# Patient Record
Sex: FEMALE | Race: WHITE | Employment: OTHER | URBAN - METROPOLITAN AREA
[De-identification: names, ages, dates, MRNs, and addresses within clinical notes are randomized per-mention and may not be internally consistent; named-entity substitution may affect disease eponyms.]

---

## 2017-01-30 ENCOUNTER — TRANSFERRED RECORDS (OUTPATIENT)
Dept: HEALTH INFORMATION MANAGEMENT | Facility: CLINIC | Age: 82
End: 2017-01-30

## 2017-02-06 DIAGNOSIS — M81.0 OSTEOPOROSIS: ICD-10-CM

## 2017-02-06 DIAGNOSIS — D50.8 OTHER IRON DEFICIENCY ANEMIA: Primary | ICD-10-CM

## 2017-02-06 RX ORDER — FERROUS GLUCONATE 324(38)MG
1 TABLET ORAL DAILY
Qty: 30 TABLET | Refills: 10 | Status: SHIPPED | OUTPATIENT
Start: 2017-02-06 | End: 2018-02-02

## 2017-02-06 RX ORDER — ALENDRONATE SODIUM 70 MG/1
70 TABLET ORAL
Qty: 12 TABLET | Refills: 3 | Status: SHIPPED
Start: 2017-02-06 | End: 2018-02-02

## 2017-02-06 NOTE — TELEPHONE ENCOUNTER
Please have MA/RN/CCRN return call to patient to advise when this has been addressed     Rosita Gaspar Care Coordinator RN  [facility]  [phone]

## 2017-02-06 NOTE — TELEPHONE ENCOUNTER
Clinic Care Coordination Contact    CCRN received call from patient     She states she does not have any refills on her Iron pills     And     Wonders if she is to continue with Fosamax - as she does not have any refills left of this either     CCRN advised that she would send a note to pcp and someone will call her back with recommendations     Refills pended     Rosita Gaspar Care Coordinator RN  [facility]  [phone]

## 2017-02-17 ENCOUNTER — TELEPHONE (OUTPATIENT)
Dept: FAMILY MEDICINE | Facility: CLINIC | Age: 82
End: 2017-02-17

## 2017-02-17 NOTE — TELEPHONE ENCOUNTER
Kam with  HC calling [phone]    Requesting recertification orders of:    HHA 2 x a week until the 22nd of April.    Informed approved per standing orders. HC staff will fax these orders for MD signature.      Natividad Guerra RN, BSN, PHN

## 2017-04-18 ENCOUNTER — TELEPHONE (OUTPATIENT)
Dept: FAMILY MEDICINE | Facility: CLINIC | Age: 82
End: 2017-04-18

## 2017-04-18 NOTE — TELEPHONE ENCOUNTER
Informed approved per standing orders-- Dr. Allen Dupree.  Home Care staff will fax these orders for MD lewis.   Blayne Lopez RN

## 2017-04-18 NOTE — TELEPHONE ENCOUNTER
[facility] needs verbal order for home health aide 2 times a week for the next 60 days, they are doing 60 days recertification.    [facility] number is [phone].  Ludmila Menon

## 2017-05-08 ENCOUNTER — OFFICE VISIT (OUTPATIENT)
Dept: FAMILY MEDICINE | Facility: CLINIC | Age: 82
End: 2017-05-08
Payer: COMMERCIAL

## 2017-05-08 ENCOUNTER — RADIANT APPOINTMENT (OUTPATIENT)
Dept: GENERAL RADIOLOGY | Facility: CLINIC | Age: 82
End: 2017-05-08
Attending: PHYSICIAN ASSISTANT
Payer: COMMERCIAL

## 2017-05-08 VITALS
TEMPERATURE: 97.5 F | HEART RATE: 124 BPM | RESPIRATION RATE: 18 BRPM | HEIGHT: 55 IN | SYSTOLIC BLOOD PRESSURE: 132 MMHG | WEIGHT: 70 LBS | BODY MASS INDEX: 16.2 KG/M2 | DIASTOLIC BLOOD PRESSURE: 83 MMHG

## 2017-05-08 DIAGNOSIS — R10.13 ABDOMINAL PAIN, EPIGASTRIC: ICD-10-CM

## 2017-05-08 DIAGNOSIS — R13.10 DYSPHAGIA, UNSPECIFIED TYPE: ICD-10-CM

## 2017-05-08 DIAGNOSIS — R11.0 NAUSEA: ICD-10-CM

## 2017-05-08 DIAGNOSIS — G47.00 INSOMNIA, UNSPECIFIED TYPE: ICD-10-CM

## 2017-05-08 DIAGNOSIS — R10.13 ABDOMINAL PAIN, EPIGASTRIC: Primary | ICD-10-CM

## 2017-05-08 PROCEDURE — 99214 OFFICE O/P EST MOD 30 MIN: CPT | Performed by: PHYSICIAN ASSISTANT

## 2017-05-08 PROCEDURE — 71020 XR CHEST 2 VW: CPT

## 2017-05-08 PROCEDURE — 93000 ELECTROCARDIOGRAM COMPLETE: CPT | Performed by: PHYSICIAN ASSISTANT

## 2017-05-08 RX ORDER — ONDANSETRON 4 MG/1
4-8 TABLET, ORALLY DISINTEGRATING ORAL
Qty: 20 TABLET | Refills: 1 | Status: SHIPPED | OUTPATIENT
Start: 2017-05-08 | End: 2017-05-13

## 2017-05-08 NOTE — MR AVS SNAPSHOT
"              After Visit Summary   2017    Nedra Carr    MRN: [id number]           Patient Information     Date Of Birth          1932        Visit Information        Provider Department      2017 11:30 AM Juan A Solo PA-C [facility]        Today's Diagnoses     Abdominal pain, epigastric    -  1    Nausea        Insomnia, unspecified type           Follow-ups after your visit        Who to contact     If you have questions or need follow up information about today's clinic visit or your schedule please contact [facility] directly at [phone].  Normal or non-critical lab and imaging results will be communicated to you by [employer], letter or phone within 4 business days after the clinic has received the results. If you do not hear from us within 7 days, please contact the clinic through [employer] or phone. If you have a critical or abnormal lab result, we will notify you by phone as soon as possible.  Submit refill requests through [facility] or call your pharmacy and they will forward the refill request to us. Please allow 3 business days for your refill to be completed.          Additional Information About Your Visit        MyChart Information     [facility] lets you send messages to your doctor, view your test results, renew your prescriptions, schedule appointments and more. To sign up, go to [email or web address] . Click on \"Log in\" on the left side of the screen, which will take you to the Welcome page. Then click on \"Sign up Now\" on the right side of the page.     You will be asked to enter the access code listed below, as well as some personal information. Please follow the directions to create your username and password.     Your access code is: [email or web address]  Expires: 2017 12:44 PM     Your access code will  in 90 days. If you need help or a new code, please call your [facility] or [phone].        Care EveryWhere ID     " "This is your Care EveryWhere ID. This could be used by other organizations to access your [place] medical records  [id number]        Your Vitals Were     Pulse Temperature Respirations Height BMI (Body Mass Index)       124 97.5  F (36.4  C) (Oral) 18 4' 6\" (1.372 m) 16.88 kg/m2        Blood Pressure from Last 3 Encounters:   05/08/17 132/83   12/05/16 124/70   04/18/16 129/76    Weight from Last 3 Encounters:   05/08/17 70 lb (31.8 kg)   12/05/16 76 lb (34.5 kg)   04/18/16 78 lb (35.4 kg)              We Performed the Following     EKG 12-lead complete w/read - Clinics          Today's Medication Changes          These changes are accurate as of: 5/8/17 12:44 PM.  If you have any questions, ask your nurse or doctor.               Start taking these medicines.        Dose/Directions    melatonin 1 MG Tabs tablet   Used for:  Insomnia, unspecified type   Started by:  Juan A Solo PA-C        Dose:  1-3 mg   Take 1-3 tablets (1-3 mg) by mouth nightly as needed for sleep   Refills:  0       ondansetron 4 MG ODT tab   Commonly known as:  ZOFRAN ODT   Used for:  Abdominal pain, epigastric, Nausea   Started by:  Juan A Solo PA-C        Dose:  4-8 mg   Take 1-2 tablets (4-8 mg) by mouth 3 times daily (before meals)   Quantity:  20 tablet   Refills:  1         These medicines have changed or have updated prescriptions.        Dose/Directions    HYDROcodone-acetaminophen 5-325 MG per tablet   Commonly known as:  NORCO   This may have changed:  Another medication with the same name was removed. Continue taking this medication, and follow the directions you see here.   Used for:  Right-sided thoracic back pain   Changed by:  Juan A Solo PA-C        Dose:  1 tablet   Take 1 tablet by mouth every 6 hours as needed for moderate to severe pain   Quantity:  20 tablet   Refills:  0         Stop taking these medicines if you haven't already. Please contact your care team if you have questions.     " azelastine 0.1 % spray   Commonly known as:  ASTELIN   Stopped by:  Juan A Solo PA-C                Where to get your medicines      These medications were sent to [facility] [id number] [place] [address]  [id number] [place] [address]     Phone:  [phone]     ondansetron 4 MG ODT tab         Some of these will need a paper prescription and others can be bought over the counter.  Ask your nurse if you have questions.     You don't need a prescription for these medications     melatonin 1 MG Tabs tablet                Primary Care Provider Office Phone # Fax #    Juan A Solo PA-C [phone]       [facility] [id number] [place] [address]        Thank you!     Thank you for choosing [facility]  for your care. Our goal is always to provide you with excellent care. Hearing back from our patients is one way we can continue to improve our services. Please take a few minutes to complete the written survey that you may receive in the mail after your visit with us. Thank you!             Your Updated Medication List - Protect others around you: Learn how to safely use, store and throw away your medicines at www.disposemymeds.org.          This list is accurate as of: 5/8/17 12:44 PM.  Always use your most recent med list.                   Brand Name Dispense Instructions for use    acetaminophen 325 MG tablet    TYLENOL    60 tablet    Take 2 tablets (650 mg) by mouth every 6 hours as needed for mild pain       albuterol 108 (90 BASE) MCG/ACT Inhaler    PROAIR HFA/PROVENTIL HFA/VENTOLIN HFA    1 Inhaler    Inhale 2 puffs into the lungs every 6 hours as needed for shortness of breath / dyspnea or wheezing       alendronate 70 MG tablet    FOSAMAX    12 tablet    Take 1 tablet (70 mg) by mouth every 7 days Take with over 8 ounces water and stay upright for at least 30 minutes after dose.  Take at least 60  minutes before breakfast       ENSURE Liqd     15 each    Take 0.5 Cans by mouth daily (strawberry)       ferrous gluconate 324 (38 FE) MG tablet    FERGON    30 tablet    Take 1 tablet (324 mg) by mouth daily       HYDROcodone-acetaminophen 5-325 MG per tablet    NORCO    20 tablet    Take 1 tablet by mouth every 6 hours as needed for moderate to severe pain       melatonin 1 MG Tabs tablet      Take 1-3 tablets (1-3 mg) by mouth nightly as needed for sleep       Multi-vitamin Tabs tablet     100 tablet    Take 1 tablet by mouth daily.       neomycin-polymyxin-hydrocortisone 3.5-38030-5 otic suspension    CORTISPORIN         ondansetron 4 MG ODT tab    ZOFRAN ODT    20 tablet    Take 1-2 tablets (4-8 mg) by mouth 3 times daily (before meals)

## 2017-05-08 NOTE — PROGRESS NOTES
SUBJECTIVE:                                                    Nedra Carr is a 84 year old female who presents to clinic today for the following health issues:      ABDOMINAL PAIN     Onset: Friday afternoon    Description:   Character: cramping  Radiation: None    Intensity: moderate    Progression of Symptoms:  same    Accompanying Signs & Symptoms:  Fever/Chills?: no   Gas/Bloating: no   Nausea: YES  Vomitting: YES-very little  Diarrhea?: no   Constipation:no   Dysuria or Hematuria: no    History:   Trauma: YES- patient was eating apple and got caught in throat-after getting out stomach pain started  Previous similar pain: no    Previous tests done: none    Precipitating factors:   Does the pain change with:     Food: patient has not been able to eat    BM: no     Urination: no     Therapies Tried and outcome: none         Problem list and histories reviewed & adjusted, as indicated.  Additional history: as documented    Patient Active Problem List   Diagnosis     Hallux varus, acquired     Other hammer toe (acquired)     Osteoporosis     HTN (hypertension)     Cataract     CARDIOVASCULAR SCREENING; LDL GOAL LESS THAN 130     Advance Care Planning     Intractable chronic migraine without aura     Anemia     Perforated gastric ulcer (H)     Thoracic back pain     Emphysematous cholecystitis     Health Care Home     Paroxysmal atrial fibrillation (H)     Abdominal pain, unspecified abdominal location     UTI (urinary tract infection)     Sepsis (H)     Headache     Physical deconditioning     Urinary bladder stone     S/P cystoscopy     Hypoxia     Hydronephrosis     Acute cholecystitis     Cardiogenic shock (H)     Closed compression fracture of thoracic vertebra (H)     Family history of other digestive disorders     Allergic rhinitis     Arthritis of hand     Esophageal stricture     Obstructive sleep apnea     Weakness     At high risk for falls     Multiple pelvic fractures (H)     Mobility impaired      Past Surgical History:   Procedure Laterality Date     BACK SURGERY       BRONCHOSCOPY FLEXIBLE N/A 11/21/2014    Procedure: BRONCHOSCOPY FLEXIBLE;  Surgeon: Rhonda Donohue MD;  Location: [facility]     C NONSPECIFIC PROCEDURE      s/p dilation at [facility] by Dr. Radha FELIPE NONSPECIFIC PROCEDURE      s/p dilation at [facility] by Dr. Betty FELIPE NONSPECIFIC PROCEDURE      Vag hysterectomy     CHOLECYSTECTOMY N/A 11/15/2014    Procedure: CHOLECYSTECTOMY;  Surgeon: Yomi Brennan MD;  Location: [facility]     CYSTOSCOPY, LITHOLAPAXY, COMBINED N/A 4/8/2015    Procedure: COMBINED CYSTOSCOPY, LITHOLAPAXY;  Surgeon: Randy Reno MD;  Location: [facility]     ENT SURGERY       ESOPHAGOSCOPY, GASTROSCOPY, DUODENOSCOPY (EGD), COMBINED  11/21/2012    Procedure: COMBINED ESOPHAGOSCOPY, GASTROSCOPY, DUODENOSCOPY (EGD), BIOPSY SINGLE OR MULTIPLE;   ESOPHAGOSCOPY, GASTROSCOPY, DUODENOSCOPY (EGD)   with cold biopsy and dilalation with #15 savary;  Surgeon: Guillermo Arcos MD;  Location: [facility]     ESOPHAGOSCOPY, GASTROSCOPY, DUODENOSCOPY (EGD), COMBINED  2/22/2013    Procedure: COMBINED ESOPHAGOSCOPY, GASTROSCOPY, DUODENOSCOPY (EGD);  ESOPHAGOSCOPY, GASTROSCOPY, DUODENOSCOPY (EGD) ;  Surgeon: Lissett Posada MD;  Location: [facility]     ESOPHAGOSCOPY, GASTROSCOPY, DUODENOSCOPY (EGD), COMBINED N/A 10/29/2014    Procedure: COMBINED ESOPHAGOSCOPY, GASTROSCOPY, DUODENOSCOPY (EGD);  Surgeon: Dany Ambrocio MD;  Location: [facility]     ESOPHAGOSCOPY, GASTROSCOPY, DUODENOSCOPY (EGD), COMBINED N/A 1/13/2015    Procedure: COMBINED ESOPHAGOSCOPY, GASTROSCOPY, DUODENOSCOPY (EGD), BIOPSY SINGLE OR MULTIPLE;  Surgeon: Dany Ambrocio MD;  Location: [facility]     ESOPHAGOSCOPY, GASTROSCOPY, DUODENOSCOPY (EGD), COMBINED N/A 3/5/2015    Procedure: COMBINED ESOPHAGOSCOPY, GASTROSCOPY, DUODENOSCOPY (EGD);  Surgeon: Dany Ambrocio MD;  Location: [facility]     ESOPHAGOSCOPY, GASTROSCOPY, DUODENOSCOPY (EGD), COMBINED N/A 11/10/2015     Procedure: COMBINED ESOPHAGOSCOPY, GASTROSCOPY, DUODENOSCOPY (EGD);  Surgeon: Dany Ambrocio MD;  Location: [facility]     EYE SURGERY       GYN SURGERY       LAPAROTOMY EXPLORATORY N/A 11/15/2014    Procedure: LAPAROTOMY EXPLORATORY;  Surgeon: Yomi Brennan MD;  Location: [facility]     LASER HOLMIUM LITHOTRIPSY BLADDER N/A 4/8/2015    Procedure: LASER HOLMIUM LITHOTRIPSY BLADDER;  Surgeon: Randy Reno MD;  Location: [facility]     ORTHOPEDIC SURGERY      hip fx surgery x 2       Social History   Substance Use Topics     Smoking status: Never Smoker     Smokeless tobacco: Never Used     Alcohol use No     Family History   Problem Relation Age of Onset     DIABETES Mother      DIABETES Sister      HEART DISEASE Son          Current Outpatient Prescriptions   Medication Sig Dispense Refill     ondansetron (ZOFRAN ODT) 4 MG ODT tab Take 1-2 tablets (4-8 mg) by mouth 3 times daily (before meals) 20 tablet 1     melatonin 1 MG TABS tablet Take 1-3 tablets (1-3 mg) by mouth nightly as needed for sleep       ferrous gluconate (FERGON) 324 (38 FE) MG tablet Take 1 tablet (324 mg) by mouth daily 30 tablet 10     alendronate (FOSAMAX) 70 MG tablet Take 1 tablet (70 mg) by mouth every 7 days Take with over 8 ounces water and stay upright for at least 30 minutes after dose.  Take at least 60 minutes before breakfast 12 tablet 3     neomycin-polymyxin-hydrocortisone (CORTISPORIN) 3.5-10516-8 otic suspension   0     Nutritional Supplements (ENSURE) LIQD Take 0.5 Cans by mouth daily (strawberry) 15 each 11     HYDROcodone-acetaminophen (NORCO) 5-325 MG per tablet Take 1 tablet by mouth every 6 hours as needed for moderate to severe pain 20 tablet 0     acetaminophen (TYLENOL) 325 MG tablet Take 2 tablets (650 mg) by mouth every 6 hours as needed for mild pain 60 tablet 0     albuterol (PROAIR HFA, PROVENTIL HFA, VENTOLIN HFA) 108 (90 BASE) MCG/ACT inhaler Inhale 2 puffs into the lungs every 6 hours as needed for shortness  "of breath / dyspnea or wheezing 1 Inhaler 1     multivitamin, therapeutic with minerals (MULTI-VITAMIN) TABS Take 1 tablet by mouth daily. 100 tablet 3     Allergies   Allergen Reactions     No Known Drug Allergies      BP Readings from Last 3 Encounters:   05/08/17 132/83   12/05/16 124/70   04/18/16 129/76    Wt Readings from Last 3 Encounters:   05/08/17 70 lb (31.8 kg)   12/05/16 76 lb (34.5 kg)   04/18/16 78 lb (35.4 kg)                    Reviewed and updated as needed this visit by clinical staff  Tobacco  Allergies  Meds  Problems  Med Hx  Surg Hx  Fam Hx  Soc Hx        Reviewed and updated as needed this visit by Provider  Allergies  Meds  Problems         ROS:  Constitutional, HEENT, neuro, cardiovascular, pulmonary, gi and gu systems are negative, except as otherwise noted.    OBJECTIVE:                                                    /83 (BP Location: Right arm, Patient Position: Chair, Cuff Size: Adult Regular)  Pulse 124  Temp 97.5  F (36.4  C) (Oral)  Resp 18  Ht 4' 6\" (1.372 m)  Wt 70 lb (31.8 kg)  BMI 16.88 kg/m2  Body mass index is 16.88 kg/(m^2).  GENERAL: alert, no distress, frail and elderly  HENT: normal cephalic/atraumatic, oropharynx clear and oral mucous membranes moist  RESP: lungs clear to auscultation - no rales, rhonchi or wheezes  CV: regular rates and rhythm, normal S1 S2, no S3 or S4 and no murmur, click or rub  ABDOMEN: mild tenderness epigastric, no organomegaly or masses, liver span normal to percussion, bowel sounds normal and no palpable or pulsatile masses  MS: tenderness to palpation over midline sternum and left anterior medial 9th rib.   PSYCH: mentation appears normal, affect normal/bright    Diagnostic Test Results:  CXR - negative for acute changes. No evidence supportive hiatal hernia, fracture, or infiltrate.   EKG - Tachycardia with slightly shortened GA at 118. Otherwise, negative.      ASSESSMENT/PLAN:                                           "            This is a 84 yoF with a history of past esophageal stricture and perforated gastric ulcers as well as osteoporosis and fragility who presents to clinic today for a 3 day history of epigastric pain associated with nausea to the point of inability to tolerate PO other than sips of water. CXR was obtained to assess for fracture, evidence of possible hiatal hernia, or atypical pneumonia presentation. This was negative on preliminary review by radiology. EKG showed no significant findings of acute cardiac etiology. Given inability to tolerate POs as well as fragility of patient, considered having patient go to ER. Instead, will attempt zofran and if able to better tolerate orals, will have close follow up with GI facilitated by YAHAIRA in referrals. If still unable to tolerate orals after zofran, patient educated to go to ER.      Of note, recommending melatonin otc prn for sleep.       ICD-10-CM    1. Abdominal pain, epigastric R10.13 XR Chest 2 Views     EKG 12-lead complete w/read - Clinics     ondansetron (ZOFRAN ODT) 4 MG ODT tab     GASTROENTEROLOGY ADULT REF CONSULT ONLY   2. Nausea R11.0 ondansetron (ZOFRAN ODT) 4 MG ODT tab     GASTROENTEROLOGY ADULT REF CONSULT ONLY   3. Insomnia, unspecified type G47.00 melatonin 1 MG TABS tablet   4. Dysphagia, unspecified type R13.10 GASTROENTEROLOGY ADULT REF CONSULT ONLY       Follow up: as above     Juan A Solo PA-C  [facility]

## 2017-05-08 NOTE — NURSING NOTE
"  Chief Complaint   Patient presents with     Abdominal Pain       Initial /83 (BP Location: Right arm, Patient Position: Chair, Cuff Size: Adult Regular)  Pulse 124  Temp 97.5  F (36.4  C) (Oral)  Resp 18  Ht 4' 6\" (1.372 m)  Wt 70 lb (31.8 kg)  BMI 16.88 kg/m2 Estimated body mass index is 16.88 kg/(m^2) as calculated from the following:    Height as of this encounter: 4' 6\" (1.372 m).    Weight as of this encounter: 70 lb (31.8 kg).  Medication Reconciliation: complete      Health Maintenance addressed:  NONE    n/a    ANISH Chaudhry    "

## 2017-05-11 ENCOUNTER — APPOINTMENT (OUTPATIENT)
Dept: CT IMAGING | Facility: CLINIC | Age: 82
End: 2017-05-11
Attending: INTERNAL MEDICINE
Payer: COMMERCIAL

## 2017-05-11 ENCOUNTER — HOSPITAL ENCOUNTER (EMERGENCY)
Facility: CLINIC | Age: 82
Discharge: HOME | End: 2017-05-11
Attending: INTERNAL MEDICINE | Admitting: INTERNAL MEDICINE
Payer: COMMERCIAL

## 2017-05-11 VITALS
HEART RATE: 83 BPM | BODY MASS INDEX: 18.32 KG/M2 | SYSTOLIC BLOOD PRESSURE: 143 MMHG | TEMPERATURE: 97.6 F | RESPIRATION RATE: 20 BRPM | DIASTOLIC BLOOD PRESSURE: 84 MMHG | OXYGEN SATURATION: 94 % | WEIGHT: 76 LBS

## 2017-05-11 DIAGNOSIS — R11.0 NAUSEA: ICD-10-CM

## 2017-05-11 DIAGNOSIS — R10.13 ABDOMINAL PAIN, EPIGASTRIC: ICD-10-CM

## 2017-05-11 LAB
ALBUMIN SERPL-MCNC: 3.7 G/DL (ref 3.4–5)
ALP SERPL-CCNC: 118 U/L (ref 40–150)
ALT SERPL W P-5'-P-CCNC: 25 U/L (ref 0–50)
ANION GAP SERPL CALCULATED.3IONS-SCNC: 9 MMOL/L (ref 3–14)
AST SERPL W P-5'-P-CCNC: 19 U/L (ref 0–45)
BASOPHILS # BLD AUTO: 0.1 10E9/L (ref 0–0.2)
BASOPHILS NFR BLD AUTO: 0.7 %
BILIRUB SERPL-MCNC: 0.4 MG/DL (ref 0.2–1.3)
BUN SERPL-MCNC: 23 MG/DL (ref 7–30)
CALCIUM SERPL-MCNC: 9.5 MG/DL (ref 8.5–10.1)
CHLORIDE SERPL-SCNC: 108 MMOL/L (ref 94–109)
CO2 SERPL-SCNC: 23 MMOL/L (ref 20–32)
CREAT SERPL-MCNC: 0.66 MG/DL (ref 0.52–1.04)
DIFFERENTIAL METHOD BLD: ABNORMAL
EOSINOPHIL # BLD AUTO: 0 10E9/L (ref 0–0.7)
EOSINOPHIL NFR BLD AUTO: 0.3 %
ERYTHROCYTE [DISTWIDTH] IN BLOOD BY AUTOMATED COUNT: 13.2 % (ref 10–15)
GFR SERPL CREATININE-BSD FRML MDRD: 85 ML/MIN/1.7M2
GLUCOSE SERPL-MCNC: 153 MG/DL (ref 70–99)
HCT VFR BLD AUTO: 36.3 % (ref 35–47)
HGB BLD-MCNC: 11.1 G/DL (ref 11.7–15.7)
IMM GRANULOCYTES # BLD: 0 10E9/L (ref 0–0.4)
IMM GRANULOCYTES NFR BLD: 0.4 %
LIPASE SERPL-CCNC: 167 U/L (ref 73–393)
LYMPHOCYTES # BLD AUTO: 1.3 10E9/L (ref 0.8–5.3)
LYMPHOCYTES NFR BLD AUTO: 18 %
MCH RBC QN AUTO: 29.6 PG (ref 26.5–33)
MCHC RBC AUTO-ENTMCNC: 30.6 G/DL (ref 31.5–36.5)
MCV RBC AUTO: 97 FL (ref 78–100)
MONOCYTES # BLD AUTO: 0.7 10E9/L (ref 0–1.3)
MONOCYTES NFR BLD AUTO: 9.3 %
NEUTROPHILS # BLD AUTO: 5.2 10E9/L (ref 1.6–8.3)
NEUTROPHILS NFR BLD AUTO: 71.3 %
NRBC # BLD AUTO: 0 10*3/UL
NRBC BLD AUTO-RTO: 0 /100
PLATELET # BLD AUTO: 454 10E9/L (ref 150–450)
POTASSIUM SERPL-SCNC: 4.2 MMOL/L (ref 3.4–5.3)
PROT SERPL-MCNC: 7.7 G/DL (ref 6.8–8.8)
RBC # BLD AUTO: 3.75 10E12/L (ref 3.8–5.2)
SODIUM SERPL-SCNC: 140 MMOL/L (ref 133–144)
WBC # BLD AUTO: 7.3 10E9/L (ref 4–11)

## 2017-05-11 PROCEDURE — 85025 COMPLETE CBC W/AUTO DIFF WBC: CPT | Performed by: INTERNAL MEDICINE

## 2017-05-11 PROCEDURE — 99285 EMERGENCY DEPT VISIT HI MDM: CPT | Mod: 25

## 2017-05-11 PROCEDURE — 74177 CT ABD & PELVIS W/CONTRAST: CPT

## 2017-05-11 PROCEDURE — 80053 COMPREHEN METABOLIC PANEL: CPT | Performed by: INTERNAL MEDICINE

## 2017-05-11 PROCEDURE — 25500064 ZZH RX 255 OP 636: Performed by: INTERNAL MEDICINE

## 2017-05-11 PROCEDURE — 83690 ASSAY OF LIPASE: CPT | Performed by: INTERNAL MEDICINE

## 2017-05-11 PROCEDURE — 96374 THER/PROPH/DIAG INJ IV PUSH: CPT | Mod: 59

## 2017-05-11 PROCEDURE — 25000128 H RX IP 250 OP 636: Performed by: INTERNAL MEDICINE

## 2017-05-11 RX ORDER — HYDROCODONE BITARTRATE AND ACETAMINOPHEN 7.5; 325 MG/15ML; MG/15ML
5 SOLUTION ORAL 4 TIMES DAILY PRN
Qty: 80 ML | Refills: 0 | Status: SHIPPED | OUTPATIENT
Start: 2017-05-11 | End: 2017-12-19

## 2017-05-11 RX ORDER — ONDANSETRON 2 MG/ML
4 INJECTION INTRAMUSCULAR; INTRAVENOUS EVERY 30 MIN PRN
Status: DISCONTINUED | OUTPATIENT
Start: 2017-05-11 | End: 2017-05-11 | Stop reason: HOSPADM

## 2017-05-11 RX ORDER — ONDANSETRON 4 MG/1
4 TABLET, ORALLY DISINTEGRATING ORAL EVERY 8 HOURS PRN
Qty: 10 TABLET | Refills: 0 | Status: SHIPPED | OUTPATIENT
Start: 2017-05-11 | End: 2017-05-14

## 2017-05-11 RX ORDER — IOPAMIDOL 755 MG/ML
500 INJECTION, SOLUTION INTRAVASCULAR ONCE
Status: COMPLETED | OUTPATIENT
Start: 2017-05-11 | End: 2017-05-11

## 2017-05-11 RX ADMIN — IOPAMIDOL 38 ML: 755 INJECTION, SOLUTION INTRAVENOUS at 14:48

## 2017-05-11 RX ADMIN — ONDANSETRON 4 MG: 2 INJECTION INTRAMUSCULAR; INTRAVENOUS at 13:27

## 2017-05-11 RX ADMIN — SODIUM CHLORIDE 9 ML: 9 INJECTION, SOLUTION INTRAVENOUS at 14:48

## 2017-05-11 ASSESSMENT — ENCOUNTER SYMPTOMS
VOMITING: 0
NAUSEA: 1
FEVER: 0
CHILLS: 0
ABDOMINAL PAIN: 1
DIARRHEA: 0
CONSTIPATION: 1
CHOKING: 0

## 2017-05-11 NOTE — ED NOTES
Patient educated on narcotic pain medicine, Lortab, as well as medication instructions for Zofran and Prilosec. Educated to not drive or operate equipment while taking this medication. Patient educated that medication can make them drowsy or impaired. Educated that pain medications can cause addiction and that opioids can cause constipation, and to drink plenty of fluids and consume fiber. Patient received discharge instructions and has no other questions at this time.

## 2017-05-11 NOTE — ED AVS SNAPSHOT
[facility] Emergency Department    [address] [place] [address]    Phone:  [phone]    Fax:  [phone]                                       Nedra Carr   MRN: [id number]    Department:  [facility] Emergency Department   Date of Visit:  5/11/2017           Patient Information     Date Of Birth          12/22/1932        Your diagnoses for this visit were:     Abdominal pain, epigastric     Nausea        You were seen by uYly Sage MD.        Discharge Instructions       Discharge Instructions  Abdominal Pain    Abdominal pain can be caused by many things. Your evaluation today does not show the exact cause for your pain. Your doctor today has decided that it is unlikely your pain is due to a life threatening problem, or a problem requiring surgery or hospital admission. Sometimes those problems cannot be found right away, so it is very important that you follow up as directed.  Sometimes only the changes which occur over time allow the cause of your pain to be found.    Return to the Emergency Department for a recheck in 8-12 hours if your pain continues.  If your pain gets worse, changes in location, or feels different, return to the Emergency Department right away.    ADULTS:  Return to the Emergency Department right away if:      You get an oral temperature above 102oF or as directed by your doctor.    You have blood in your stools (bright red or black, tarry stools).    You keep throwing up or can t drink liquids.    You see blood when you throw up.    You can t have a bowel movement or you can t pass gas.    Your stomach gets bloated or bigger.    Your skin or the whites of your eyes look yellow.    You faint.    You have bloody, frequent or painful urination.    You have new symptoms or anything that worries you.    CHILDREN:  Return to the Emergency Department right away if your child has any of the above-listed symptoms or the  following:      Pushes your hand away or screams/cries when his/her belly is touched.    You notice your child is very fussy or weak.    Your child is very tired and is too tired to eat or drink.    Your child is dehydrated.  Signs of dehydration can be:  o Your infant has had no wet diapers in 4-5 hours.  o Your older child has not passed urine in 6-8 hours.  o Your infant or child starts to have dry mouth and lips, or no saliva or tears.    PREGNANT WOMEN:  Return to the Emergency Department right away if you have any of the above-listed symptoms or the following:      You have bleeding, leaking fluid or passing tissue from the vagina.    You have worse pain or cramping, or pain in your shoulder or back.    You have vomiting that will not stop.    You have painful or bloody urination.    You have a temperature of 100oF or more.    Your baby is not moving as much as usual.    You faint.    You get a bad headache with or without eye problems and abdominal pain.    You have a convulsion or seizure.    You have unusual discharge from your vagina and abdominal pain.    Abdominal pain is pretty common during pregnancy.  Your pain may or may not be related to your pregnancy. You should follow-up closely with your OB doctor so they can evaluate you and your baby.  Until you follow-up with your regular doctor, do the following:       Avoid sex and do not put anything in your vagina.    Drink clear fluids.    Only take medications approved by your doctor.    MORE INFORMATION:    Appendicitis:  A possible cause of abdominal pain in any person who still has their appendix is acute appendicitis. Appendicitis is often hard to diagnose.  Testing does not always rule out early appendicitis or other causes of abdominal pain. Close follow-up with your doctor and re-evaluations may be needed to figure out the reason for your abdominal pain.    Follow-up:  It is very important that you make an appointment with your clinic and go to  "the appointment.  If you do not follow-up with your primary doctor, it may result in missing an important development which could result in permanent injury or disability and/or lasting pain.  If there is any problem keeping your appointment, call your doctor or return to the Emergency Department.    Medications:  Take your medications as directed by your doctor today.  Before using over-the-counter medications, ask your doctor and make sure to take the medications as directed.  If you have any questions about medications, ask your doctor.    Diet:  Resume your normal diet as much as possible, but do not eat fried, fatty or spicy foods while you have pain.  Do not drink alcohol or have caffeine.  Do not smoke tobacco.    Probiotics: If you have been given an antibiotic, you may want to also take a probiotic pill or eat yogurt with live cultures. Probiotics have \"good bacteria\" to help your intestines stay healthy. Studies have shown that probiotics help prevent diarrhea and other intestine problems (including C. diff infection) when you take antibiotics. You can buy these without a prescription in the pharmacy section of the store.     If you were given a prescription for medicine here today, be sure to read all of the information (including the package insert) that comes with your prescription.  This will include important information about the medicine, its side effects, and any warnings that you need to know about.  The pharmacist who fills the prescription can provide more information and answer questions you may have about the medicine.  If you have questions or concerns that the pharmacist cannot address, please call or return to the Emergency Department.         Opioid Medication Information    Pain medications are among the most commonly prescribed medicines, so we are including this information for all our patients. If you did not receive pain medication or get a prescription for pain medicine, you can " ignore it.     You may have been given a prescription for an opioid (narcotic) pain medicine and/or have received a pain medicine while here in the Emergency Department. These medicines can make you drowsy or impaired. You must not drive, operate dangerous equipment, or engage in any other dangerous activities while taking these medications. If you drive while taking these medications, you could be arrested for DUI, or driving under the influence. Do not drink any alcohol while you are taking these medications.     Opioid pain medications can cause addiction. If you have a history of chemical dependency of any type, you are at a higher risk of becoming addicted to pain medications.  Only take these prescribed medications to treat your pain when all other options have been tried. Take it for as short a time and as few doses as possible. Store your pain pills in a secure place, as they are frequently stolen and provide a dangerous opportunity for children or visitors in your house to start abusing these powerful medications. We will not replace any lost or stolen medicine.  As soon as your pain is better, you should flush all your remaining medication.     Many prescription pain medications contain Tylenol  (acetaminophen), including Vicodin , Tylenol #3 , Norco , Lortab , and Percocet .  You should not take any extra pills of Tylenol  if you are using these prescription medications or you can get very sick.  Do not ever take more than 3000 mg of acetaminophen in any 24 hour period.    All opioids tend to cause constipation. Drink plenty of water and eat foods that have a lot of fiber, such as fruits, vegetables, prune juice, apple juice and high fiber cereal.  Take a laxative if you don t move your bowels at least every other day. Miralax , Milk of Magnesia, Colace , or Senna  can be used to keep you regular.      Remember that you can always come back to the Emergency Department if you are not able to see your  regular doctor in the amount of time listed above, if you get any new symptoms, or if there is anything that worries you.        24 Hour Appointment Hotline       To make an appointment at any [facility] clinic, call [phone] ([phone]). If you don't have a family doctor or clinic, we will help you find one. [facility] are conveniently located to serve the needs of you and your family.             Review of your medicines      START taking        Dose / Directions Last dose taken    omeprazole 20 MG CR capsule   Commonly known as:  priLOSEC   Dose:  20 mg   Quantity:  30 capsule        Take 1 capsule (20 mg) by mouth daily   Refills:  0          Our records show that you are taking the medicines listed below. If these are incorrect, please call your family doctor or clinic.        Dose / Directions Last dose taken    acetaminophen 325 MG tablet   Commonly known as:  TYLENOL   Dose:  650 mg   Quantity:  60 tablet        Take 2 tablets (650 mg) by mouth every 6 hours as needed for mild pain   Refills:  0        albuterol 108 (90 BASE) MCG/ACT Inhaler   Commonly known as:  PROAIR HFA/PROVENTIL HFA/VENTOLIN HFA   Dose:  2 puff   Quantity:  1 Inhaler        Inhale 2 puffs into the lungs every 6 hours as needed for shortness of breath / dyspnea or wheezing   Refills:  1        alendronate 70 MG tablet   Commonly known as:  FOSAMAX   Dose:  70 mg   Quantity:  12 tablet        Take 1 tablet (70 mg) by mouth every 7 days Take with over 8 ounces water and stay upright for at least 30 minutes after dose.  Take at least 60 minutes before breakfast   Refills:  3        ENSURE Liqd   Dose:  0.5 Can   Quantity:  15 each        Take 0.5 Cans by mouth daily (strawberry)   Refills:  11        ferrous gluconate 324 (38 FE) MG tablet   Commonly known as:  FERGON   Dose:  1 tablet   Quantity:  30 tablet        Take 1 tablet (324 mg) by mouth daily   Refills:  10        melatonin 1 MG Tabs tablet   Dose:  1-3 mg         Take 1-3 tablets (1-3 mg) by mouth nightly as needed for sleep   Refills:  0        Multi-vitamin Tabs tablet   Dose:  1 tablet   Quantity:  100 tablet        Take 1 tablet by mouth daily.   Refills:  3        neomycin-polymyxin-hydrocortisone 3.5-80558-5 otic suspension   Commonly known as:  CORTISPORIN        Refills:  0          ASK your doctor about these medications        Dose / Directions Last dose taken    * HYDROcodone-acetaminophen 5-325 MG per tablet   Commonly known as:  NORCO   Dose:  1 tablet   What changed:  Another medication with the same name was added. Make sure you understand how and when to take each.   Quantity:  20 tablet   Ask about: Which instructions should I use?        Take 1 tablet by mouth every 6 hours as needed for moderate to severe pain   Refills:  0        * HYDROcodone-acetaminophen 7.5-325 MG/15ML solution   Commonly known as:  LORTAB   Dose:  5 mL   What changed:  You were already taking a medication with the same name, and this prescription was added. Make sure you understand how and when to take each.   Quantity:  80 mL   Ask about: Which instructions should I use?        Take 5 mLs by mouth 4 times daily as needed for moderate to severe pain   Refills:  0        * ondansetron 4 MG ODT tab   Commonly known as:  ZOFRAN ODT   Dose:  4-8 mg   What changed:  Another medication with the same name was added. Make sure you understand how and when to take each.   Quantity:  20 tablet   Ask about: Which instructions should I use?        Take 1-2 tablets (4-8 mg) by mouth 3 times daily (before meals)   Refills:  1        * ondansetron 4 MG ODT tab   Commonly known as:  ZOFRAN ODT   Dose:  4 mg   What changed:  You were already taking a medication with the same name, and this prescription was added. Make sure you understand how and when to take each.   Quantity:  10 tablet   Ask about: Which instructions should I use?        Take 1 tablet (4 mg) by mouth every 8 hours as needed    Refills:  0        * Notice:  This list has 4 medication(s) that are the same as other medications prescribed for you. Read the directions carefully, and ask your doctor or other care provider to review them with you.            Prescriptions were sent or printed at these locations (3 Prescriptions)                   Other Prescriptions                Printed at Department/Unit printer (3 of 3)         omeprazole (PRILOSEC) 20 MG CR capsule               ondansetron (ZOFRAN ODT) 4 MG ODT tab               HYDROcodone-acetaminophen (LORTAB) 7.5-325 MG/15ML solution                Procedures and tests performed during your visit     CBC with platelets differential    CT Abdomen Pelvis w Contrast    Comprehensive metabolic panel    Lipase      Orders Needing Specimen Collection     None      Pending Results     No orders found from 5/9/2017 to 5/12/2017.            Pending Culture Results     No orders found from 5/9/2017 to 5/12/2017.            Pending Results Instructions     If you had any lab results that were not finalized at the time of your Discharge, you can call the ED Lab Result RN at [phone]. You will be contacted by this team for any positive Lab results or changes in treatment. The nurses are available 7 days a week from 10A to 6:30P.  You can leave a message 24 hours per day and they will return your call.        Test Results From Your Hospital Stay        5/11/2017  1:28 PM      Component Results     Component Value Ref Range & Units Status    WBC 7.3 4.0 - 11.0 10e9/L Final    RBC Count 3.75 (L) 3.8 - 5.2 10e12/L Final    Hemoglobin 11.1 (L) 11.7 - 15.7 g/dL Final    Hematocrit 36.3 35.0 - 47.0 % Final    MCV 97 78 - 100 fl Final    MCH 29.6 26.5 - 33.0 pg Final    MCHC 30.6 (L) 31.5 - 36.5 g/dL Final    RDW 13.2 10.0 - 15.0 % Final    Platelet Count 454 (H) 150 - 450 10e9/L Final    Diff Method Automated Method  Final    % Neutrophils 71.3 % Final    % Lymphocytes 18.0 % Final    % Monocytes  9.3 % Final    % Eosinophils 0.3 % Final    % Basophils 0.7 % Final    % Immature Granulocytes 0.4 % Final    Nucleated RBCs 0 0 /100 Final    Absolute Neutrophil 5.2 1.6 - 8.3 10e9/L Final    Absolute Lymphocytes 1.3 0.8 - 5.3 10e9/L Final    Absolute Monocytes 0.7 0.0 - 1.3 10e9/L Final    Absolute Eosinophils 0.0 0.0 - 0.7 10e9/L Final    Absolute Basophils 0.1 0.0 - 0.2 10e9/L Final    Abs Immature Granulocytes 0.0 0 - 0.4 10e9/L Final    Absolute Nucleated RBC 0.0  Final         5/11/2017  1:49 PM      Component Results     Component Value Ref Range & Units Status    Sodium 140 133 - 144 mmol/L Final    Potassium 4.2 3.4 - 5.3 mmol/L Final    Chloride 108 94 - 109 mmol/L Final    Carbon Dioxide 23 20 - 32 mmol/L Final    Anion Gap 9 3 - 14 mmol/L Final    Glucose 153 (H) 70 - 99 mg/dL Final    Urea Nitrogen 23 7 - 30 mg/dL Final    Creatinine 0.66 0.52 - 1.04 mg/dL Final    GFR Estimate 85 >60 mL/min/1.7m2 Final    Non  GFR Calc    GFR Estimate If Black >90   GFR Calc   >60 mL/min/1.7m2 Final    Calcium 9.5 8.5 - 10.1 mg/dL Final    Bilirubin Total 0.4 0.2 - 1.3 mg/dL Final    Albumin 3.7 3.4 - 5.0 g/dL Final    Protein Total 7.7 6.8 - 8.8 g/dL Final    Alkaline Phosphatase 118 40 - 150 U/L Final    ALT 25 0 - 50 U/L Final    AST 19 0 - 45 U/L Final         5/11/2017  1:49 PM      Component Results     Component Value Ref Range & Units Status    Lipase 167 73 - 393 U/L Final         5/11/2017  3:26 PM      Narrative     CT ABDOMEN AND PELVIS WITH CONTRAST  5/11/2017 2:59 PM     HISTORY: Epigastric pain.     COMPARISON: CT abdomen and pelvis 11/15/2014. CT abdomen and pelvis  1/10/2015.    TECHNIQUE: Axial images from the lung bases to the symphysis are  performed with additional coronal reformatted images. 38 mL of Isovue  370 are given intravenously.  Radiation dose for this scan was reduced  using automated exposure control, adjustment of the mA and/or kV  according to patient  size, or iterative reconstruction technique. Oral  contrast is also given.    FINDINGS: Possible mild mucoid impaction at the right lung base. Mild  stranding is also noted at the right lung base. This could represent  scarring or inflammation. No consolidation is appreciated to suggest  pneumonia. No pleural fluid. Heart size appears enlarged.     Abdomen: Cholecystectomy changes are noted. The liver, spleen,  pancreas, adrenal glands and kidneys are unremarkable. No  hydronephrosis. Nonobstructing left renal collecting system stone  measures 0.4 cm on series 2, image 18. No enlarged lymph nodes. Aorta  is calcified and tortuous. No aneurysm or dissection. The bowel is  normal in caliber without obstruction or diverticulitis. Appendix not  visualized.    Pelvis: Old bilateral pelvic rami fractures are present. Bilateral  dynamic hip screws reduce old bilateral femoral neck fractures. The  bladder and rectum are unremarkable. No pelvic mass or fluid  collection is evident. No enlarged pelvic lymph nodes. Bone window  examination demonstrates multiple vertebral body compression  deformities, likely chronic as they appear unchanged compared to prior  CT.        Impression     IMPRESSION: Possible early infiltrate right lung base with mucoid  impactions. No consolidation to indicate pneumonia. No acute changes  in the abdomen or pelvis to account for patient's symptoms. No bowel  obstruction or diverticulitis.    BRENT CLANCY MD                Clinical Quality Measure: Blood Pressure Screening     Your blood pressure was checked while you were in the emergency department today. The last reading we obtained was  BP: 159/87 . Please read the guidelines below about what these numbers mean and what you should do about them.  If your systolic blood pressure (the top number) is less than 120 and your diastolic blood pressure (the bottom number) is less than 80, then your blood pressure is normal. There is nothing more that  "you need to do about it.  If your systolic blood pressure (the top number) is 120-139 or your diastolic blood pressure (the bottom number) is 80-89, your blood pressure may be higher than it should be. You should have your blood pressure rechecked within a year by a primary care provider.  If your systolic blood pressure (the top number) is 140 or greater or your diastolic blood pressure (the bottom number) is 90 or greater, you may have high blood pressure. High blood pressure is treatable, but if left untreated over time it can put you at risk for heart attack, stroke, or kidney failure. You should have your blood pressure rechecked by a primary care provider within the next 4 weeks.  If your provider in the emergency department today gave you specific instructions to follow-up with your doctor or provider even sooner than that, you should follow that instruction and not wait for up to 4 weeks for your follow-up visit.        Thank you for choosing [facility]       Thank you for choosing [facility] for your care. Our goal is always to provide you with excellent care. Hearing back from our patients is one way we can continue to improve our services. Please take a few minutes to complete the written survey that you may receive in the mail after you visit with us. Thank you!        [employer] Information     [email or web address] lets you send messages to your doctor, view your test results, renew your prescriptions, schedule appointments and more. To sign up, go to [email or web address] . Click on \"Log in\" on the left side of the screen, which will take you to the Welcome page. Then click on \"Sign up Now\" on the right side of the page.     You will be asked to enter the access code listed below, as well as some personal information. Please follow the directions to create your username and password.     Your access code is: [email or web address]  Expires: 2017 12:44 PM     Your access code will  in 90 days. If you need help or a new " code, please call your [facility] clinic or [phone].        Care EveryWhere ID     This is your Care EveryWhere ID. This could be used by other organizations to access your [facility] medical records  [id number]        After Visit Summary       This is your record. Keep this with you and show to your community pharmacist(s) and doctor(s) at your next visit.

## 2017-05-11 NOTE — ED PROVIDER NOTES
History     Chief Complaint:  Abdominal Pain    HPI   Nedra Carr is a 84 year old female with a history of esophageal stricture and dilatation as well as ulcers who presents with abdominal pain. The patient states that Friday she was eating a piece of apple pie when it got stuck in her throat. She did not choke on this and eventually it moved down. She notes that since that time she has had persistent upper abdominal pain and nausea. She has not had any further choking episodes and has been tolerating soft foods like jello. However, her pain was not improved and given her lack of PO intake, she presents here with a neighbor for evaluation. She denies any vomiting. She has not had any fevers, chills, diarrhea (she has not had a bowel movement since Sunday), or shortness of breath.    Allergies:  No Known Drug Allergies      Medications:    Fergon  Fosamax  Zofran ODT  Ensure liquid  Tylenol  Albuterol inhaler     Past Medical History:    Cardiogenic shock  Allergic rhinitis  Physical deconditioning  Esophageal stricture  UZMA  Emphysematous cholecystitis  Thoracic back pain  Perforated gastric ulcer  Anemia  Chronic migraine  Osteoporosis  Hypertension  Hallux varus  Atrial fibrillation  Polymyalgia rheumatica  Renal stones     Past Surgical History:    Back surgery  Dilatation esophageal stricture x2  Hysterectomy   Cholecystectomy  Litholapaxy  ENT surgery  Eye surgery  Gyn surgery  Laparotomy exploratory  Lithotripsy bladder  Hip fracture surgery x2    Family History:    Diabetes  Heart disease     Social History:  Smoking status: Never smoker  Alcohol use: No   Marital Status:        Review of Systems   Constitutional: Negative for chills and fever.   Respiratory: Negative for choking.    Gastrointestinal: Positive for abdominal pain, constipation and nausea. Negative for diarrhea and vomiting.   All other systems reviewed and are negative.      Physical Exam   First Vitals:  BP: 135/87  Pulse:  125  Temp: 97.6  F (36.4  C)  Resp: 20  Weight: 34.5 kg (76 lb)  SpO2: 95 %      Physical Exam   Constitutional: She is cooperative.   frail   HENT:   Right Ear: Tympanic membrane normal.   Left Ear: Tympanic membrane normal.   Mouth/Throat: Oropharynx is clear and moist and mucous membranes are normal.   Eyes: Conjunctivae are normal.   Neck: Normal range of motion.   Cardiovascular: Regular rhythm and normal heart sounds.    Pulmonary/Chest: Effort normal and breath sounds normal.   Abdominal: Soft. Normal appearance and bowel sounds are normal. There is tenderness in the epigastric area. There is no rebound and no guarding.   Musculoskeletal: Normal range of motion.   Marked kyphoscoliosis   Lymphadenopathy:     She has no cervical adenopathy.   Neurological: She is alert.   Skin: Skin is warm and dry.   Psychiatric: She has a normal mood and affect.       Emergency Department Course   Imaging:  Radiographic findings were communicated with the patient who voiced understanding of the findings.    CT Abdomen Pelvis with contrast:  IMPRESSION: Possible early infiltrate right lung base with mucoid impactions. No consolidation to indicate pneumonia. No acute changes in the abdomen or pelvis to account for patient's symptoms. No bowel obstruction or diverticulitis.    BRENT CLANCY MD    Results per radiology.    Laboratory:  CBC: HGB 11.1 (L),  (H) ow WNL (WBC 7.3)   CMP: Glucose 153 (H) ow WNL (Creatinine 0.66)   Lipase: 167     Interventions:  1327: Zofran 4 mg IV     Emergency Department Course:  Past medical records, nursing notes, and vitals reviewed.  1253: I performed an exam of the patient and obtained history as documented above.   Above workup undertaken.  1541: I discussed patient with Dr. Ambrocio who performed patient's most recent EGD.  I personally reviewed the laboratory results with the Patient and answered all related questions prior to discharge.    1549: I rechecked the patient.  Findings and  plan explained to the Patient. Patient discharged home with instructions regarding supportive care, medications, and reasons to return. The importance of close follow-up was reviewed.      Impression & Plan    Medical Decision Making:  Nedra Carr is a 84 year old female with a history of esophageal stricture with multiple dilatations presenting now with ongoing epigastric pain and nausea.  As noted she was previously evaluated in clinic with no distinct etiology identified.  I considered a broad differential here but evaluation here is not diagnostic.  CT does not show any evidence of ulcer, pancreatitis, or other inflammatory condition within the abdomen.  There was noted to be an area that appeared to be mucoid impaction at the right lung base but the patient has not had cough, fever, or shortness of breath, and does not have leukocytosis.  I do not think this is consistent with infectious pneumonia.  Given her marked kyphoscoliosis this may be related to decreased pulmonary function.  I think we can observe.  As noted, I spoke with Dr. Ambrocio who has done the patient's most recent EGDs.  He favored treatment with a proton pump inhibitor and antiemetic.  She tells me she is already taking Tagamet, so will leave that intact. I will also prescribe Lortab which I have given in liquid form given patient's small size.  I reminded her to be careful given the sedating effects.  Return if worse or new symptoms, follow up Monday as scheduled.      Diagnosis:    ICD-10-CM    1. Abdominal pain, epigastric R10.13    2. Nausea R11.0        Disposition:  Discharged to home with plan as outlined.    Discharge Medications:  New Prescriptions    HYDROCODONE-ACETAMINOPHEN (LORTAB) 7.5-325 MG/15ML SOLUTION    Take 5 mLs by mouth 4 times daily as needed for moderate to severe pain    OMEPRAZOLE (PRILOSEC) 20 MG CR CAPSULE    Take 1 capsule (20 mg) by mouth daily    ONDANSETRON (ZOFRAN ODT) 4 MG ODT TAB    Take 1 tablet (4  mg) by mouth every 8 hours as needed         Juan A Willett  5/11/2017   [facility] EMERGENCY DEPARTMENT  I, Juan A Willett, am serving as a scribe at 12:53 PM on 5/11/2017 to document services personally performed by Yuly Sage MD based on my observations and the provider's statements to me.       Yuly Sage MD  05/13/17 0701

## 2017-05-11 NOTE — ED AVS SNAPSHOT
[facility] Emergency Department    [address]    [facility] [address]    Phone:  [phone]    Fax:  [phone]                                       Nedra Carr   MRN: [id number]    Department:  [facility] Emergency Department   Date of Visit:  5/11/2017           After Visit Summary Signature Page     I have received my discharge instructions, and my questions have been answered. I have discussed any challenges I see with this plan with the nurse or doctor.    ..........................................................................................................................................  Patient/Patient Representative Signature      ..........................................................................................................................................  Patient Representative Print Name and Relationship to Patient    ..................................................               ................................................  Date                                            Time    ..........................................................................................................................................  Reviewed by Signature/Title    ...................................................              ..............................................  Date                                                            Time

## 2017-05-13 ENCOUNTER — HOSPITAL ENCOUNTER (OUTPATIENT)
Facility: CLINIC | Age: 82
Setting detail: OBSERVATION
Discharge: HOME | End: 2017-05-14
Attending: EMERGENCY MEDICINE | Admitting: HOSPITALIST
Payer: COMMERCIAL

## 2017-05-13 ENCOUNTER — APPOINTMENT (OUTPATIENT)
Dept: GENERAL RADIOLOGY | Facility: CLINIC | Age: 82
End: 2017-05-13
Attending: PHYSICIAN ASSISTANT
Payer: COMMERCIAL

## 2017-05-13 DIAGNOSIS — K29.00 OTHER ACUTE GASTRITIS WITHOUT HEMORRHAGE: ICD-10-CM

## 2017-05-13 DIAGNOSIS — R13.10 DYSPHAGIA, UNSPECIFIED TYPE: ICD-10-CM

## 2017-05-13 DIAGNOSIS — R11.2 NAUSEA AND VOMITING, INTRACTABILITY OF VOMITING NOT SPECIFIED, UNSPECIFIED VOMITING TYPE: ICD-10-CM

## 2017-05-13 PROBLEM — R10.9 ABDOMINAL PAIN: Status: ACTIVE | Noted: 2017-05-13

## 2017-05-13 LAB
ALBUMIN SERPL-MCNC: 3.5 G/DL (ref 3.4–5)
ALP SERPL-CCNC: 101 U/L (ref 40–150)
ALT SERPL W P-5'-P-CCNC: 20 U/L (ref 0–50)
ANION GAP SERPL CALCULATED.3IONS-SCNC: 9 MMOL/L (ref 3–14)
AST SERPL W P-5'-P-CCNC: 16 U/L (ref 0–45)
BASOPHILS # BLD AUTO: 0 10E9/L (ref 0–0.2)
BASOPHILS NFR BLD AUTO: 0.5 %
BILIRUB SERPL-MCNC: 0.4 MG/DL (ref 0.2–1.3)
BUN SERPL-MCNC: 22 MG/DL (ref 7–30)
CALCIUM SERPL-MCNC: 9.3 MG/DL (ref 8.5–10.1)
CHLORIDE SERPL-SCNC: 107 MMOL/L (ref 94–109)
CO2 SERPL-SCNC: 26 MMOL/L (ref 20–32)
CREAT SERPL-MCNC: 0.62 MG/DL (ref 0.52–1.04)
DIFFERENTIAL METHOD BLD: ABNORMAL
EOSINOPHIL # BLD AUTO: 0 10E9/L (ref 0–0.7)
EOSINOPHIL NFR BLD AUTO: 0.1 %
ERYTHROCYTE [DISTWIDTH] IN BLOOD BY AUTOMATED COUNT: 13.1 % (ref 10–15)
GFR SERPL CREATININE-BSD FRML MDRD: ABNORMAL ML/MIN/1.7M2
GLUCOSE SERPL-MCNC: 120 MG/DL (ref 70–99)
HCT VFR BLD AUTO: 34.4 % (ref 35–47)
HGB BLD-MCNC: 10.9 G/DL (ref 11.7–15.7)
IMM GRANULOCYTES # BLD: 0 10E9/L (ref 0–0.4)
IMM GRANULOCYTES NFR BLD: 0.4 %
LACTATE BLD-SCNC: 0.8 MMOL/L (ref 0.7–2.1)
LIPASE SERPL-CCNC: 79 U/L (ref 73–393)
LYMPHOCYTES # BLD AUTO: 0.9 10E9/L (ref 0.8–5.3)
LYMPHOCYTES NFR BLD AUTO: 12.6 %
MCH RBC QN AUTO: 29.6 PG (ref 26.5–33)
MCHC RBC AUTO-ENTMCNC: 31.7 G/DL (ref 31.5–36.5)
MCV RBC AUTO: 94 FL (ref 78–100)
MONOCYTES # BLD AUTO: 0.5 10E9/L (ref 0–1.3)
MONOCYTES NFR BLD AUTO: 7.2 %
NEUTROPHILS # BLD AUTO: 5.9 10E9/L (ref 1.6–8.3)
NEUTROPHILS NFR BLD AUTO: 79.2 %
NRBC # BLD AUTO: 0 10*3/UL
NRBC BLD AUTO-RTO: 0 /100
PLATELET # BLD AUTO: 481 10E9/L (ref 150–450)
POTASSIUM SERPL-SCNC: 4.2 MMOL/L (ref 3.4–5.3)
PROT SERPL-MCNC: 7.3 G/DL (ref 6.8–8.8)
RBC # BLD AUTO: 3.68 10E12/L (ref 3.8–5.2)
SODIUM SERPL-SCNC: 142 MMOL/L (ref 133–144)
WBC # BLD AUTO: 7.4 10E9/L (ref 4–11)

## 2017-05-13 PROCEDURE — 96375 TX/PRO/DX INJ NEW DRUG ADDON: CPT

## 2017-05-13 PROCEDURE — 25000125 ZZHC RX 250: Performed by: EMERGENCY MEDICINE

## 2017-05-13 PROCEDURE — 96374 THER/PROPH/DIAG INJ IV PUSH: CPT

## 2017-05-13 PROCEDURE — 96376 TX/PRO/DX INJ SAME DRUG ADON: CPT

## 2017-05-13 PROCEDURE — 25000128 H RX IP 250 OP 636: Performed by: EMERGENCY MEDICINE

## 2017-05-13 PROCEDURE — 83690 ASSAY OF LIPASE: CPT | Performed by: EMERGENCY MEDICINE

## 2017-05-13 PROCEDURE — 99217 ZZC OBSERVATION CARE DISCHARGE: CPT | Performed by: PHYSICIAN ASSISTANT

## 2017-05-13 PROCEDURE — 99220 ZZC INITIAL OBSERVATION CARE,LEVL III: CPT | Performed by: PHYSICIAN ASSISTANT

## 2017-05-13 PROCEDURE — 96361 HYDRATE IV INFUSION ADD-ON: CPT

## 2017-05-13 PROCEDURE — 83605 ASSAY OF LACTIC ACID: CPT | Performed by: EMERGENCY MEDICINE

## 2017-05-13 PROCEDURE — 74240 X-RAY XM UPR GI TRC 1CNTRST: CPT

## 2017-05-13 PROCEDURE — 25000128 H RX IP 250 OP 636: Performed by: PHYSICIAN ASSISTANT

## 2017-05-13 PROCEDURE — 80053 COMPREHEN METABOLIC PANEL: CPT | Performed by: EMERGENCY MEDICINE

## 2017-05-13 PROCEDURE — G0378 HOSPITAL OBSERVATION PER HR: HCPCS

## 2017-05-13 PROCEDURE — 99285 EMERGENCY DEPT VISIT HI MDM: CPT | Mod: 25

## 2017-05-13 PROCEDURE — 85025 COMPLETE CBC W/AUTO DIFF WBC: CPT | Performed by: EMERGENCY MEDICINE

## 2017-05-13 RX ORDER — PROCHLORPERAZINE 25 MG
12.5 SUPPOSITORY, RECTAL RECTAL EVERY 12 HOURS PRN
Status: DISCONTINUED | OUTPATIENT
Start: 2017-05-13 | End: 2017-05-14 | Stop reason: HOSPADM

## 2017-05-13 RX ORDER — SODIUM CHLORIDE 9 MG/ML
1000 INJECTION, SOLUTION INTRAVENOUS CONTINUOUS
Status: DISCONTINUED | OUTPATIENT
Start: 2017-05-13 | End: 2017-05-13

## 2017-05-13 RX ORDER — POLYETHYLENE GLYCOL 3350 17 G/17G
17 POWDER, FOR SOLUTION ORAL DAILY PRN
Status: DISCONTINUED | OUTPATIENT
Start: 2017-05-13 | End: 2017-05-14 | Stop reason: HOSPADM

## 2017-05-13 RX ORDER — MORPHINE SULFATE 2 MG/ML
2-4 INJECTION, SOLUTION INTRAMUSCULAR; INTRAVENOUS
Status: COMPLETED | OUTPATIENT
Start: 2017-05-13 | End: 2017-05-13

## 2017-05-13 RX ORDER — ONDANSETRON 4 MG/1
4 TABLET, ORALLY DISINTEGRATING ORAL EVERY 6 HOURS PRN
Status: DISCONTINUED | OUTPATIENT
Start: 2017-05-13 | End: 2017-05-14 | Stop reason: HOSPADM

## 2017-05-13 RX ORDER — LACTOSE-REDUCED FOOD
0.5 LIQUID (ML) ORAL DAILY
Status: DISCONTINUED | OUTPATIENT
Start: 2017-05-13 | End: 2017-05-13 | Stop reason: RX

## 2017-05-13 RX ORDER — LIDOCAINE 40 MG/G
CREAM TOPICAL
Status: DISCONTINUED | OUTPATIENT
Start: 2017-05-13 | End: 2017-05-14 | Stop reason: HOSPADM

## 2017-05-13 RX ORDER — MORPHINE SULFATE 4 MG/ML
4 INJECTION, SOLUTION INTRAMUSCULAR; INTRAVENOUS ONCE
Status: COMPLETED | OUTPATIENT
Start: 2017-05-13 | End: 2017-05-13

## 2017-05-13 RX ORDER — SODIUM CHLORIDE 9 MG/ML
INJECTION, SOLUTION INTRAVENOUS CONTINUOUS
Status: DISCONTINUED | OUTPATIENT
Start: 2017-05-13 | End: 2017-05-14 | Stop reason: HOSPADM

## 2017-05-13 RX ORDER — AMOXICILLIN 250 MG
1-2 CAPSULE ORAL 2 TIMES DAILY PRN
Status: DISCONTINUED | OUTPATIENT
Start: 2017-05-13 | End: 2017-05-14 | Stop reason: HOSPADM

## 2017-05-13 RX ORDER — ONDANSETRON 2 MG/ML
4 INJECTION INTRAMUSCULAR; INTRAVENOUS EVERY 6 HOURS PRN
Status: DISCONTINUED | OUTPATIENT
Start: 2017-05-13 | End: 2017-05-14 | Stop reason: HOSPADM

## 2017-05-13 RX ORDER — LIDOCAINE 40 MG/G
CREAM TOPICAL
Status: DISCONTINUED | OUTPATIENT
Start: 2017-05-13 | End: 2017-05-13

## 2017-05-13 RX ORDER — BISACODYL 10 MG
10 SUPPOSITORY, RECTAL RECTAL DAILY PRN
Status: DISCONTINUED | OUTPATIENT
Start: 2017-05-13 | End: 2017-05-14 | Stop reason: HOSPADM

## 2017-05-13 RX ORDER — NALOXONE HYDROCHLORIDE 0.4 MG/ML
.1-.4 INJECTION, SOLUTION INTRAMUSCULAR; INTRAVENOUS; SUBCUTANEOUS
Status: DISCONTINUED | OUTPATIENT
Start: 2017-05-13 | End: 2017-05-14 | Stop reason: HOSPADM

## 2017-05-13 RX ORDER — OXYCODONE HYDROCHLORIDE 5 MG/1
5-10 TABLET ORAL
Status: DISCONTINUED | OUTPATIENT
Start: 2017-05-13 | End: 2017-05-14 | Stop reason: HOSPADM

## 2017-05-13 RX ORDER — ONDANSETRON 2 MG/ML
4 INJECTION INTRAMUSCULAR; INTRAVENOUS EVERY 30 MIN PRN
Status: DISCONTINUED | OUTPATIENT
Start: 2017-05-13 | End: 2017-05-13

## 2017-05-13 RX ORDER — CALCIUM POLYCARBOPHIL 625 MG 625 MG/1
625 TABLET ORAL DAILY
Status: DISCONTINUED | OUTPATIENT
Start: 2017-05-13 | End: 2017-05-14 | Stop reason: HOSPADM

## 2017-05-13 RX ORDER — ACETAMINOPHEN 325 MG/1
650 TABLET ORAL EVERY 4 HOURS PRN
Status: DISCONTINUED | OUTPATIENT
Start: 2017-05-13 | End: 2017-05-14 | Stop reason: HOSPADM

## 2017-05-13 RX ORDER — PROCHLORPERAZINE MALEATE 5 MG
5 TABLET ORAL EVERY 6 HOURS PRN
Status: DISCONTINUED | OUTPATIENT
Start: 2017-05-13 | End: 2017-05-14 | Stop reason: HOSPADM

## 2017-05-13 RX ADMIN — ONDANSETRON 4 MG: 2 INJECTION INTRAMUSCULAR; INTRAVENOUS at 15:34

## 2017-05-13 RX ADMIN — MORPHINE SULFATE 2 MG: 2 INJECTION, SOLUTION INTRAMUSCULAR; INTRAVENOUS at 10:29

## 2017-05-13 RX ADMIN — MORPHINE SULFATE 4 MG: 4 INJECTION, SOLUTION INTRAMUSCULAR; INTRAVENOUS at 11:52

## 2017-05-13 RX ADMIN — SODIUM CHLORIDE 1000 ML: 9 INJECTION, SOLUTION INTRAVENOUS at 10:30

## 2017-05-13 RX ADMIN — DIATRIZOATE MEGLUMINE AND DIATRIZOATE SODIUM 120 ML: 660; 100 SOLUTION ORAL; RECTAL at 17:31

## 2017-05-13 RX ADMIN — ONDANSETRON 4 MG: 2 INJECTION INTRAMUSCULAR; INTRAVENOUS at 10:29

## 2017-05-13 RX ADMIN — SODIUM CHLORIDE: 9 INJECTION, SOLUTION INTRAVENOUS at 15:32

## 2017-05-13 RX ADMIN — PANTOPRAZOLE SODIUM 40 MG: 40 INJECTION, POWDER, FOR SOLUTION INTRAVENOUS at 10:29

## 2017-05-13 ASSESSMENT — ENCOUNTER SYMPTOMS
FEVER: 0
VOMITING: 1
SHORTNESS OF BREATH: 0
APPETITE CHANGE: 1
TROUBLE SWALLOWING: 0
ABDOMINAL PAIN: 1

## 2017-05-13 NOTE — ED NOTES
"Patient was here two days ago for abdomen. There where no findings at that time. Patient is back because she states she is still having the pain and \"isn't able to keep anything down\". Of note patient also states her last BM was last Sunday night.   "

## 2017-05-13 NOTE — PLAN OF CARE
Problem: Discharge Planning  Goal: Discharge Planning (Adult, OB, Behavioral, Peds)  Outcome: No Change  PRIMARY DIAGNOSIS: epigastric pain  Primary Symptoms: nausea, constipation, abd pain     OUTPATIENT/OBSERVATION GOALS TO BE MET BEFORE DISCHARGE:     1. Orthostatic symptoms (BP decrease or HR increase with patient upright)?  N/A  2. Vital Signs stable? Yes  3. Documented urine output: Yes  4. Tolerating PO fluid: No, NPO with ice chips  5. Symptoms improved:  No, nausea  6. Labs WNL? Yes  7. ADLs back to baseline?  No, generalized weakness  8. Activity and level of assistance: Assist of 1  9. Pain status: Denies at this time  10. Barriers to discharge noted: Yes, GI consult     Interpretation of rhythm per telemetry tech: N/A     VSS, A&Ox4, pt up with assist of 1, reports nausea, IV zofran x1, requested ice chips, dryheaving after ice chips, unable to tolerate, NPO, LS diminished, RA, denies SOB, BS hypoactive, passing gas, last BM 5/7/17, IVF, GI consult in place, plan for small bowel follow through, will continue to monitor and provide supportive cares.

## 2017-05-13 NOTE — IP AVS SNAPSHOT
[facility] Observation Department    [address]    [facility] [address]    Phone:  [phone]                                       After Visit Summary   5/13/2017    Nedra Carr    MRN: [id number]           After Visit Summary Signature Page     I have received my discharge instructions, and my questions have been answered. I have discussed any challenges I see with this plan with the nurse or doctor.    ..........................................................................................................................................  Patient/Patient Representative Signature      ..........................................................................................................................................  Patient Representative Print Name and Relationship to Patient    ..................................................               ................................................  Date                                            Time    ..........................................................................................................................................  Reviewed by Signature/Title    ...................................................              ..............................................  Date                                                            Time

## 2017-05-13 NOTE — IP AVS SNAPSHOT
MRN:[id number]                      After Visit Summary   5/13/2017    Nedra Carr    MRN: [id number]           Thank you!     Thank you for choosing [facility] for your care. Our goal is always to provide you with excellent care. Hearing back from our patients is one way we can continue to improve our services. Please take a few minutes to complete the written survey that you may receive in the mail after you visit. If you would like to speak to someone directly about your visit please contact Patient Relations at [phone]. Thank you!          Patient Information     Date Of Birth          12/22/1932        About your hospital stay     You were admitted on:  May 13, 2017 You last received care in the:  [facility] Observation Department    You were discharged on:  May 14, 2017        Reason for your hospital stay       Nedra Carr is a 84 year old female with a PMH significant for HTN (not on meds), PUD, esophageal stricture s/p dilation in the past, anemia, paroxysmal a fib, and UZMA (does not use her CPAP), who presented on 5/11/2017 with acute nausea, vomiting and epigastric pain. Work up in the ED revealed: VSS; CMP within normal limits; CBC with hgb 10.9 (baseline) otherwise unremarkable; lactic acid wnl; lipase wnl; CT of the abdomen/pelvis with contrast on 5/11/17 showed Possible early infiltrate right lung base with mucoid impactions but otherwise unremarkable; CXR negative for infiltrate. Patient was admitted under observation for further evaluation. Patient started on IV Protonix and given anti-emetics and pain medications as needed with relief. Underwent upper GI study, which was negative for esophageal stricture. Patient's symptoms improved with PPI so likely due to underlying gastritis. Patient discharged home on Protonix daily; still has anti-emetics available at home in case she needs them. Was given GI referral by her PCP several days,  "recommend OP follow-up with them.                  Who to Call     For medical emergencies, please call 911.  For non-urgent questions about your medical care, please call your primary care provider or clinic, [phone]          Attending Provider     Provider Specialty    Marlen Bennett MD Emergency Medicine    Blayne Mercado MD Internal Medicine       Primary Care Provider Office Phone # Fax #    Juan A Solo PA-C [phone]       [facility] [id number] [place] [address]        After Care Instructions     Diet       Follow this diet upon discharge: soft, bland diet as tolerated                  Follow-up Appointments     Follow-up and recommended labs and tests        Follow up with primary care provider, Juan A Solo, within 7 days for hospital follow- up.  No follow up labs or test are needed.  Follow up with [facility] within the next month. You can contact them at [phone] to schedule an appointment.                             Pending Results     Date and Time Order Name Status Description    5/13/2017 1641 XR Upper GI without KUB Preliminary             Statement of Approval     Ordered          05/14/17 0948  I have reviewed and agree with all the recommendations and orders detailed in this document.  EFFECTIVE NOW     Approved and electronically signed by:  Terri Arevalo PA-C             Admission Information     Date & Time Provider Department Dept. Phone    5/13/2017 Blayne Mercado MD [facility] Observation Department [phone]      Your Vitals Were     Blood Pressure Pulse Temperature Respirations Height Weight    142/73 (BP Location: Right arm) 102 97.3  F (36.3  C) (Oral) 16 1.245 m (4' 1\") 32.2 kg (70 lb 14.4 oz)    Pulse Oximetry BMI (Body Mass Index)                96% 20.76 kg/m2          MyChart Information     [employer] lets you send messages to your doctor, view your test results, " "renew your prescriptions, schedule appointments and more. To sign up, go to [email or web address] . Click on \"Log in\" on the left side of the screen, which will take you to the Welcome page. Then click on \"Sign up Now\" on the right side of the page.     You will be asked to enter the access code listed below, as well as some personal information. Please follow the directions to create your username and password.     Your access code is: [email or web address]  Expires: 2017 12:44 PM     Your access code will  in 90 days. If you need help or a new code, please call your [place] clinic or [phone].        Care EveryWhere ID     This is your Care EveryWhere ID. This could be used by other organizations to access your [place] medical records  [id number]           Review of your medicines      START taking        Dose / Directions    pantoprazole 40 MG EC tablet   Commonly known as:  PROTONIX   Used for:  Other acute gastritis without hemorrhage        Dose:  40 mg   Take 1 tablet (40 mg) by mouth every morning (before breakfast) Take 30-60 minutes before a meal.   Quantity:  30 tablet   Refills:  0         CONTINUE these medicines which have NOT CHANGED        Dose / Directions    acetaminophen 325 MG tablet   Commonly known as:  TYLENOL   Used for:  Abdominal pain, other specified site        Dose:  650 mg   Take 2 tablets (650 mg) by mouth every 6 hours as needed for mild pain   Quantity:  60 tablet   Refills:  0       albuterol 108 (90 BASE) MCG/ACT Inhaler   Commonly known as:  PROAIR HFA/PROVENTIL HFA/VENTOLIN HFA   Used for:  Dyspnea        Dose:  2 puff   Inhale 2 puffs into the lungs every 6 hours as needed for shortness of breath / dyspnea or wheezing   Quantity:  1 Inhaler   Refills:  1       alendronate 70 MG tablet   Commonly known as:  FOSAMAX   Used for:  Osteoporosis        Dose:  70 mg   Take 1 tablet (70 mg) by mouth every 7 days Take with over 8 ounces water and stay upright for at least 30 " minutes after dose.  Take at least 60 minutes before breakfast   Quantity:  12 tablet   Refills:  3       ENSURE Liqd   Used for:  Perforated gastric ulcer, chronic or unspecified (H), Physical deconditioning, Weakness        Dose:  0.5 Can   Take 0.5 Cans by mouth daily (strawberry)   Quantity:  15 each   Refills:  11       ferrous gluconate 324 (38 FE) MG tablet   Commonly known as:  FERGON   Used for:  Other iron deficiency anemia        Dose:  1 tablet   Take 1 tablet (324 mg) by mouth daily   Quantity:  30 tablet   Refills:  10       HYDROcodone-acetaminophen 7.5-325 MG/15ML solution   Commonly known as:  LORTAB        Dose:  5 mL   Take 5 mLs by mouth 4 times daily as needed for moderate to severe pain   Quantity:  80 mL   Refills:  0       Multi-vitamin Tabs tablet        Dose:  1 tablet   Take 1 tablet by mouth daily.   Quantity:  100 tablet   Refills:  3       ondansetron 4 MG ODT tab   Commonly known as:  ZOFRAN ODT        Dose:  4 mg   Take 1 tablet (4 mg) by mouth every 8 hours as needed   Quantity:  10 tablet   Refills:  0         STOP taking     omeprazole 20 MG CR capsule   Commonly known as:  priLOSEC                Where to get your medicines      These medications were sent to [facility] [address] [facility]  [address] [facility] [address]     Phone:  [phone]     pantoprazole 40 MG EC tablet                Protect others around you: Learn how to safely use, store and throw away your medicines at www.disposemymeds.org.             Medication List: This is a list of all your medications and when to take them. Check marks below indicate your daily home schedule. Keep this list as a reference.      Medications           Morning Afternoon Evening Bedtime As Needed    acetaminophen 325 MG tablet   Commonly known as:  TYLENOL   Take 2 tablets (650 mg) by mouth every 6 hours as needed for mild pain                                albuterol 108 (90  BASE) MCG/ACT Inhaler   Commonly known as:  PROAIR HFA/PROVENTIL HFA/VENTOLIN HFA   Inhale 2 puffs into the lungs every 6 hours as needed for shortness of breath / dyspnea or wheezing                                alendronate 70 MG tablet   Commonly known as:  FOSAMAX   Take 1 tablet (70 mg) by mouth every 7 days Take with over 8 ounces water and stay upright for at least 30 minutes after dose.  Take at least 60 minutes before breakfast                                ENSURE Liqd   Take 0.5 Cans by mouth daily (strawberry)                                ferrous gluconate 324 (38 FE) MG tablet   Commonly known as:  FERGON   Take 1 tablet (324 mg) by mouth daily                                HYDROcodone-acetaminophen 7.5-325 MG/15ML solution   Commonly known as:  LORTAB   Take 5 mLs by mouth 4 times daily as needed for moderate to severe pain                                Multi-vitamin Tabs tablet   Take 1 tablet by mouth daily.                                ondansetron 4 MG ODT tab   Commonly known as:  ZOFRAN ODT   Take 1 tablet (4 mg) by mouth every 8 hours as needed                                pantoprazole 40 MG EC tablet   Commonly known as:  PROTONIX   Take 1 tablet (40 mg) by mouth every morning (before breakfast) Take 30-60 minutes before a meal.

## 2017-05-13 NOTE — PLAN OF CARE
ROOM # 223    Living Situation (if not independent, order SW consult):Independently in an apt building  Facility name:  : Mark(daughter) [phone]    Activity level at baseline: Ind   Activity level on admit: Light A1      Patient registered to observation; given Patient Bill of Rights; given the opportunity to ask questions about observation status and their plan of care.  Patient has been oriented to the observation room, bathroom and call light is in place.    Discussed discharge goals and expectations with patient/family.

## 2017-05-13 NOTE — PHARMACY-ADMISSION MEDICATION HISTORY
Admission medication history interview status for this patient is complete. See [facility] admission navigator for allergy information, prior to admission medications and immunization status.     Medication history interview source(s):Patient  Medication history resources (including written lists, pill bottles, clinic record):None  Primary pharmacy:[facility]     Changes made to PTA medication list:  Added: none  Deleted: Norco, melatonin, Cortisporin otic  Changed: none  She hasn't taken any of her medications except for tylenol 325 mg on Wednesday in the past week because of sickness.      Actions taken by pharmacist (provider contacted, etc):None     Additional medication history information:None    Medication reconciliation/reorder completed by provider prior to medication history? No    For patients on insulin therapy: No (Yes/No)  Lantus/levemir/NPH/Mix 70/30 dose:  _____   in AM/PM  or twice daily   Sliding scale Novolog Y/N  If Yes, do you have a baseline novolog pre-meal dose:  ______units with meals   Patients eat three meals a day:   Y/N     Any Barriers to therapy:  cost of medications/comfortable with giving injections (if applicable)/ comfortable and confident with current diabetes regimen       Prior to Admission medications    Medication Sig Last Dose Taking? Auth Provider   omeprazole (PRILOSEC) 20 MG CR capsule Take 1 capsule (20 mg) by mouth daily Past Week at na Yes Yuly Sage MD   HYDROcodone-acetaminophen (LORTAB) 7.5-325 MG/15ML solution Take 5 mLs by mouth 4 times daily as needed for moderate to severe pain Past Week at na Yes Yuly Sage MD   ferrous gluconate (FERGON) 324 (38 FE) MG tablet Take 1 tablet (324 mg) by mouth daily Past Week at na Yes Martin Rodriguez MD   Nutritional Supplements (ENSURE) LIQD Take 0.5 Cans by mouth daily (strawberry) Past Week at na Yes Juan A Solo PA-C   multivitamin, therapeutic with minerals (MULTI-VITAMIN) TABS Take  1 tablet by mouth daily. Past Week at   Houston Vargas MD   ondansetron (ZOFRAN ODT) 4 MG ODT tab Take 1 tablet (4 mg) by mouth every 8 hours as needed More than a month at   Yuly Sage MD   alendronate (FOSAMAX) 70 MG tablet Take 1 tablet (70 mg) by mouth every 7 days Take with over 8 ounces water and stay upright for at least 30 minutes after dose.  Take at least 60 minutes before breakfast 5/5/2017 at   Martin Rodriguez MD   acetaminophen (TYLENOL) 325 MG tablet Take 2 tablets (650 mg) by mouth every 6 hours as needed for mild pain 5/10/2017 at   Dane Christina MD   albuterol (PROAIR HFA, PROVENTIL HFA, VENTOLIN HFA) 108 (90 BASE) MCG/ACT inhaler Inhale 2 puffs into the lungs every 6 hours as needed for shortness of breath / dyspnea or wheezing More than a month at   Hannah Galeana MD

## 2017-05-13 NOTE — H&P
[facility]  History and Physical Exam     Nedra Carr MRN# [id number]   YOB: 1932 Age: 84 year old      Date of Admission:  5/13/2017    Primary care provider: Juan A Solo          Assessment:   Nedra Carr is a 84 year old female with a PMH significant for HTN (not on meds), PUD, esophageal stricture s/p dilation in the past, anemia, paroxysmal a fib, and UZMA (does not use her CPAP), who presents with acute nausea, vomiting and epigastric pain.     Work up in the ED reveals: VSS. CMP within normal limits. CBC with a white count of 7.4, hgb 10.9 (baseline), plt 481. Lactic acid 0.8. Lipase 79. CT of the abdomen/pelvis with contrast on 5/11/17 showed Possible early infiltrate right lung base with mucoid impactions. No consolidation to indicate pneumonia. No acute changes in the abdomen or pelvis to account for patient's symptoms. No bowel obstruction or diverticulitis. Appendix not visualized. The patient was given IVF, protonix 40mg IV x 1 and IV Morphine with improvement of her symptoms. Dr. Ambrocio was contacted and recommended UGI series with SBFT and GI consult for continued care.     Patient will be registered to Observation for further evaluation and symptom management.     1. Acute Epigastric Pain - with nausea and vomiting. The patient states that 1 week ago she was eating apple pie and it got stuck in the back of her throat. She was able to get it up and out and felt fine after that. The following day she developed epigastric pain and nausea/vomiting. She has also had constipation, last BM was 5/5/17. She was seen in the clinic on 5/8 (EKG, CXR done that were normal), she was started on Zofran and given an OP GI referral. Her symptoms persisted so she presented to the ER on 5/11. CT of the abdomen/pelvis was done that was unremarkable. Labs also normal (CMP, CBC, Lipase). Dr. Ambrocio was called and recommended a PPI and pain meds for her  symptoms and OP follow up on 5/15. The patient has continued to have symptoms with continued epigastric pain and decreased oral intake. ER eval today again normal. Patient has a history of gastritis and duodenal ulcers as well as esophageal stricture - most likely what is causing her symptoms today.   - admit to Obs  - start gentle IVF  - NPO for now  - UGI with SBFT today  - continue Protonix 40mg IV  - GI consult  - avoid NSAIDs    2. Constipation - patient has not had a BM for 1 week. Has good BS on exam and is passing flatus. Belly is soft and non distended. Will await UGI series. Following these results will initiate dulcolax, miralax as needed. Add in fiber con as well.     3. UZMA  - supplemental oxygen at night as needed. Patient does not use her CPAP.     4. DVT prophylaxis: pt at low risk, encourage ambulation.    5. FULL CODE                Chief Complaint:   Acute nausea, vomiting and abdominal pain          History of Present Illness:   Nedra Carr is a 84 year old female with a PMH significant for HTN (not on meds), PUD, esophageal stricture s/p dilation in the past, anemia, paroxysmal a fib, and UZMA (does not use her CPAP), who presents with acute nausea, vomiting and epigastric pain. The patient states that 1 week ago she was eating apple pie and it got stuck in the back of her throat. She was able to get it up and out and felt fine after that. The following day she developed epigastric pain and nausea/vomiting. She has also had constipation, last BM was 5/5/17. She was seen in the clinic on 5/8 (EKG, CXR done that were normal), she was started on Zofran and given an OP GI referral. Her symptoms persisted so she presented to the ER on 5/11. CT of the abdomen/pelvis was done that was unremarkable. Labs also normal (CMP, CBC, Lipase). Dr. Ambrocio was called and recommended a PPI and pain meds for her symptoms and OP follow up on 5/15. The patient has continued to have symptoms with continued  epigastric pain and decreased oral intake. She states the pain is in the epigastric area and non radiating. Dull pain. Seems to be worse when she eats. Pain meds make it better. Has nausea all the time, vomits only sometimes - mainly when she eats or drinks. Has not eaten much. No diarrhea. No hematemesis. No blood per rectum. No f/c. She does not feel ill. No recent travel or ill contacts. No ASA or NSAID use due to previous GIB from duodenal ulcer. Has not had symptoms like this in the past.     Work up in the ED reveals: VSS. CMP within normal limits. CBC with a white count of 7.4, hgb 10.9 (baseline), plt 481. Lactic acid 0.8. Lipase 79. CT of the abdomen/pelvis with contrast on 5/11/17 showed Possible early infiltrate right lung base with mucoid impactions. No consolidation to indicate pneumonia. No acute changes in the abdomen or pelvis to account for patient's symptoms. No bowel obstruction or diverticulitis. Appendix not visualized. The patient was given IVF, protonix 40mg IV x 1 and IV Morphine with improvement of her symptoms. Dr. Abmrocio was contacted and recommended UGI series with SBFT and GI consult for continued care.              Past Medical History:     Past Medical History:   Diagnosis Date     Atrial fibrillation (H)      Dysphagia 2000    Had duodenal strcture dilated at [facility] [place] in 2000 and by Dr. Hernández in 2001     HTN (hypertension)      Migraine, unspecified, without mention of intractable migraine without mention of status migrainosus     beta blocker prophylaxis     Obstructive sleep apnea (adult) (pediatric) 4/9/2015     Osteoporosis, unspecified      Pain in joint, shoulder region      Polymyalgia rheumatica (H)      Renal stones                Past Surgical History:     Past Surgical History:   Procedure Laterality Date     BACK SURGERY       BRONCHOSCOPY FLEXIBLE N/A 11/21/2014    Procedure: BRONCHOSCOPY FLEXIBLE;  Surgeon: Rhonda Donohue MD;  Location:  GI     C NONSPECIFIC  PROCEDURE      s/p dilation [place] [facility] [place]. Radha FELIPE NONSPECIFIC PROCEDURE      s/p dilation at [facility] by Dr. Betty FELIPE NONSPECIFIC PROCEDURE      Vag hysterectomy     CHOLECYSTECTOMY N/A 11/15/2014    Procedure: CHOLECYSTECTOMY;  Surgeon: Yomi Brennan MD;  Location: [facility] OR     CYSTOSCOPY, LITHOLAPAXY, COMBINED N/A 4/8/2015    Procedure: COMBINED CYSTOSCOPY, LITHOLAPAXY;  Surgeon: Randy Reno MD;  Location: [facility] OR     ENT SURGERY       ESOPHAGOSCOPY, GASTROSCOPY, DUODENOSCOPY (EGD), COMBINED  11/21/2012    Procedure: COMBINED ESOPHAGOSCOPY, GASTROSCOPY, DUODENOSCOPY (EGD), BIOPSY SINGLE OR MULTIPLE;   ESOPHAGOSCOPY, GASTROSCOPY, DUODENOSCOPY (EGD)   with cold biopsy and dilalation with #15 savary;  Surgeon: Guillermo Arcos MD;  Location: [facility] GI     ESOPHAGOSCOPY, GASTROSCOPY, DUODENOSCOPY (EGD), COMBINED  2/22/2013    Procedure: COMBINED ESOPHAGOSCOPY, GASTROSCOPY, DUODENOSCOPY (EGD);  ESOPHAGOSCOPY, GASTROSCOPY, DUODENOSCOPY (EGD) ;  Surgeon: Lissett Posada MD;  Location: [facility] GI     ESOPHAGOSCOPY, GASTROSCOPY, DUODENOSCOPY (EGD), COMBINED N/A 10/29/2014    Procedure: COMBINED ESOPHAGOSCOPY, GASTROSCOPY, DUODENOSCOPY (EGD);  Surgeon: Dany Ambrocio MD;  Location: [facility]     ESOPHAGOSCOPY, GASTROSCOPY, DUODENOSCOPY (EGD), COMBINED N/A 1/13/2015    Procedure: COMBINED ESOPHAGOSCOPY, GASTROSCOPY, DUODENOSCOPY (EGD), BIOPSY SINGLE OR MULTIPLE;  Surgeon: Dany Ambrocio MD;  Location: [facility]     ESOPHAGOSCOPY, GASTROSCOPY, DUODENOSCOPY (EGD), COMBINED N/A 3/5/2015    Procedure: COMBINED ESOPHAGOSCOPY, GASTROSCOPY, DUODENOSCOPY (EGD);  Surgeon: Dany Ambrocio MD;  Location: [facility]     ESOPHAGOSCOPY, GASTROSCOPY, DUODENOSCOPY (EGD), COMBINED N/A 11/10/2015    Procedure: COMBINED ESOPHAGOSCOPY, GASTROSCOPY, DUODENOSCOPY (EGD);  Surgeon: Dany Ambrocio MD;  Location: [facility]     EYE SURGERY       GYN SURGERY       LAPAROTOMY EXPLORATORY N/A 11/15/2014    Procedure: LAPAROTOMY  EXPLORATORY;  Surgeon: Yomi Brennan MD;  Location: [facility] OR     LASER HOLMIUM LITHOTRIPSY BLADDER N/A 4/8/2015    Procedure: LASER HOLMIUM LITHOTRIPSY BLADDER;  Surgeon: Randy Reno MD;  Location: [facility] OR     ORTHOPEDIC SURGERY      hip fx surgery x 2               Social History:     Social History     Social History     Marital status:      Spouse name: Jefferson     Number of children: 3     Years of education: N/A     Occupational History      Retired     Social History Main Topics     Smoking status: Never Smoker     Smokeless tobacco: Never Used     Alcohol use No     Drug use: No     Sexual activity: Not Currently     Partners: Male     Other Topics Concern     Caffeine Concern No     1 cup weekly     Special Diet No     Exercise Yes     band exercises     Social History Narrative               Family History:     Family History   Problem Relation Age of Onset     DIABETES Mother      DIABETES Sister      HEART DISEASE Son               Allergies:      Allergies   Allergen Reactions     No Known Drug Allergies                Medications:     Prior to Admission medications    Medication Sig Last Dose Taking? Auth Provider   omeprazole (PRILOSEC) 20 MG CR capsule Take 1 capsule (20 mg) by mouth daily Past Week at na Yes Yuly Sage MD   HYDROcodone-acetaminophen (LORTAB) 7.5-325 MG/15ML solution Take 5 mLs by mouth 4 times daily as needed for moderate to severe pain Past Week at na Yes Yuly Sage MD   ferrous gluconate (FERGON) 324 (38 FE) MG tablet Take 1 tablet (324 mg) by mouth daily Past Week at na Yes Martin Rodriguez MD   Nutritional Supplements (ENSURE) LIQD Take 0.5 Cans by mouth daily (strawberry) Past Week at na Yes Juan A Solo PA-C   multivitamin, therapeutic with minerals (MULTI-VITAMIN) TABS Take 1 tablet by mouth daily. Past Week at  na Yes Houston Zhang MD   ondansetron (ZOFRAN ODT) 4 MG ODT tab Take 1 tablet (4 mg) by mouth every 8 hours as needed  "More than a month at   Yuly Sage MD   alendronate (FOSAMAX) 70 MG tablet Take 1 tablet (70 mg) by mouth every 7 days Take with over 8 ounces water and stay upright for at least 30 minutes after dose.  Take at least 60 minutes before breakfast 5/5/2017 at   Martin Rodriguez MD   acetaminophen (TYLENOL) 325 MG tablet Take 2 tablets (650 mg) by mouth every 6 hours as needed for mild pain 5/10/2017 at   Dane Christina MD   albuterol (PROAIR HFA, PROVENTIL HFA, VENTOLIN HFA) 108 (90 BASE) MCG/ACT inhaler Inhale 2 puffs into the lungs every 6 hours as needed for shortness of breath / dyspnea or wheezing More than a month at   Hannah Galeana MD              Review of Systems:   A Comprehensive greater than 10 system review of systems was carried out.  Pertinent positives and negatives are noted above.  Otherwise negative for contributory information.     CONSTITUTIONAL:NEGATIVE for fever, chills, change in weight  INTEGUMENTARY/SKIN: NEGATIVE for worrisome rashes, moles or lesions  ENT/MOUTH: NEGATIVE for ear, mouth and throat problems  RESP:NEGATIVE for significant cough or SOB  CV: NEGATIVE for chest pain, palpitations or peripheral edema  GI: abdominal pain epigastric, constipation, nausea, poor appetite and vomiting  MUSCULOSKELETAL: NEGATIVE for significant arthralgias or myalgia  NEURO: NEGATIVE for weakness, dizziness or paresthesias         Physical Exam:   Blood pressure 138/60, pulse 102, temperature 97.8  F (36.6  C), temperature source Oral, resp. rate 18, height 1.245 m (4' 1\"), weight 31.6 kg (69 lb 11.2 oz), SpO2 96 %, not currently breastfeeding.    GENERAL: healthy, alert and no distress, non-toxic appearing  EYES: Eyes grossly normal to inspection, extraocular movements - intact, and PERRL  HENT: Nose- normal; Mouth- no ulcers, no lesions.   ORAL MUCOSA: moderately dry  NECK: no tenderness, no adenopathy, no asymmetry, no masses, no stiffness; thyroid- normal to " palpation  RESP: lungs clear to auscultation - no rales, no rhonchi, no wheezes  CV: regular rates and rhythm, normal S1 S2, no S3 or S4 and no murmur, no click or rub   ABDOMEN: BS present and normoactive. Soft and non distended. Tender to palpation in the epigastric area with no rebound or guarding.   MS: extremities- no gross deformities noted, no edema  SKIN: no suspicious lesions, no rashes  NEURO: strength and tone- normal, sensory exam- grossly normal, mentation- intact, speech- normal, reflexes- symmetric  BACK: no CVA tenderness, no paralumbar tenderness  PSYCH: Alert and oriented times 3. Affect is normal.            Data:     No results for input(s): PH, PHV, PO2, PO2V, SAT, PCO2, PCO2V, HCO3, HCO3V in the last 168 hours.    Recent Labs  Lab 05/13/17  1028 05/11/17  1314   WBC 7.4 7.3   HGB 10.9* 11.1*   HCT 34.4* 36.3   MCV 94 97   * 454*          Lab Results   Component Value Date     05/13/2017     05/11/2017     08/24/2015    Lab Results   Component Value Date    CHLORIDE 107 05/13/2017    CHLORIDE 108 05/11/2017    CHLORIDE 109 08/24/2015    Lab Results   Component Value Date    BUN 22 05/13/2017    BUN 23 05/11/2017    BUN 18 08/24/2015      Lab Results   Component Value Date    POTASSIUM 4.2 05/13/2017    POTASSIUM 4.2 05/11/2017    POTASSIUM 4.2 08/24/2015    Lab Results   Component Value Date    CO2 26 05/13/2017    CO2 23 05/11/2017    CO2 23 08/24/2015    Lab Results   Component Value Date    CR 0.62 05/13/2017    CR 0.66 05/11/2017    CR 0.60 08/27/2015        No results for input(s): CULT in the last 168 hours.    Recent Labs  Lab 05/13/17  1028 05/11/17  1314    140   POTASSIUM 4.2 4.2   CHLORIDE 107 108   CO2 26 23   ANIONGAP 9 9   * 153*   BUN 22 23   CR 0.62 0.66   GFRESTIMATED >90Non  GFR Calc 85   GFRESTBLACK >90African American GFR Calc >90African American GFR Calc   GABINO 9.3 9.5   PROTTOTAL 7.3 7.7   ALBUMIN 3.5 3.7   BILITOTAL 0.4  0.4   ALKPHOS 101 118   AST 16 19   ALT 20 25     No results for input(s): INR in the last 168 hours.    Recent Labs  Lab 05/13/17  1028   LACT 0.8       Recent Labs  Lab 05/13/17  1028 05/11/17  1314   LIPASE 79 167     No results for input(s): TSH in the last 168 hours.  No results for input(s): TROPONIN, TROPI, TROPR in the last 168 hours.    Invalid input(s): TROP, TROPONINIES  No results for input(s): COLOR, APPEARANCE, URINEGLC, URINEBILI, URINEKETONE, SG, UBLD, URINEPH, PROTEIN, UROBILINOGEN, NITRITE, LEUKEST, RBCU, WBCU in the last 168 hours.      No results found for this or any previous visit (from the past 48 hour(s)).    Codie Perez PA-C

## 2017-05-13 NOTE — PROCEDURES
RADIOLOGY PROCEDURE NOTE  Patient name: Nedra Carr  MRN: [id number]  : 1932    Pre-procedure diagnosis: Barium swallow, difficulty swallowing.  Post-procedure diagnosis: Same    Procedure Date/Time: May 13, 2017  6:18 PM  Procedure: Single contrast barium swallow performed.  Contrast easily passes through esophagus to stomach.  No obstruction, web, stenosis.  Stomach fills normally.  No obvious ulceration, though patient unable to tolerate crystals (and therefore, double contrast exam).   Unable to perform SBFT tonight, though scattered contrast within loops of bowel.  No obvious obstruction, though fluoro hold images poor quality for this assessment.  No gastric outlet obstruction.  Estimated blood loss: None  Specimen(s) collected with description: None  The patient tolerated the procedure well with no immediate complications.  Significant findings:  Please see above    See imaging dictation for procedural details.    Provider name: Patel Philip  Assistant(s):None

## 2017-05-13 NOTE — ED NOTES
Called daughter in law Luann at [phone] and left a message to let her know patient will be admitted.

## 2017-05-13 NOTE — ED PROVIDER NOTES
History     Chief Complaint  Abdominal Pain    HPI   Nedra Carr is a 84 year old female who presents to the emergency department today for evaluation of abdominal pain. The patient was recently seen here in the emergency department on 05/11/2017 for abdominal pain and the feeling of a bit of apple stuck in her throat. The patient had work up as per below and was then discharged. Since that time, the patient reports that she has been vomiting and hasn't been able to keep any food down for the past week. The patient denies any sensation of foreign body in her throat and she states that she can swallow her secretions. The patient also endorses epigastric abdominal pain and states that her last bowel movement was on Sunday, 05/07/2017. She denies any shortness of breath, fevers, or recent falls. The patient has had decreased urine. She lives alone in a senior citizen building with help available. The patient doesn't have her gallbladder but still has her appendix.     05/11/2017 - CT Abdomen Pelvis with contrast  IMPRESSION: Possible early infiltrate right lung base with mucoid impactions. No consolidation to indicate pneumonia. No acute changes in the abdomen or pelvis to account for patient's symptoms. No bowel obstruction or diverticulitis.  BRENT CLANCY MD  Reading per radiology     05/11/2017 - Laboratory  CBC: HGB 11.1 (L),  (H), WBC 7.3  CMP: Glucose 153 (H) o/w WNL (Creatinine 0.66)   Lipase: 167     05/11/2017 - [facility] Dr. Sage  Nedra Carr is a 84 year old female with a history of esophageal stricture with multiple dilatations presenting now with ongoing epigastric pain and nausea. As noted she was previously evaluated in clinic with no distinct etiology identified. I considered a broad differential here but evaluation here is not diagnostic. CT does not show any evidence of ulcer, pancreatitis, or other inflammatory condition within the abdomen. There was noted to be an area that  appeared to be mucoid impaction at the right lung base but the patient has not had cough, fever, or shortness of breath, and does not have leukocytosis. I do not think this is consistent with infectious pneumonia. Given her marked kyphoscoliosis this may be related to decreased pulmonary function. I think we can observe. As noted, I spoke with Dr. Ambrocio who has done the patient's most recent EGDs. He favored treatment with a proton pump inhibitor and antiemetic. She tells me she is already taking Tagamet, so will leave that intact. I will also prescribe Lortab which I have given in liquid form given patient's small size. I reminded her to be careful given the sedating effects. Return if worse or new symptoms, follow up Monday as scheduled.     Allergies:  No Known Drug Allergies    Medications:    Prilosec  Zofran  Lortab  Zofran  Melatonin  Fergon  Fosamax  Cortisporin  Ensure  Norco  Tylenol  Albuterol  Multivitamin     Past Medical History:    Atrial Fibrillation   Dysphagia  Hypertension   Migraine, unspecified, without mention of intractable migraine without mention of status migrainosus   Obstructive sleep apnea  Osteoporosis, unspecified  Pain in joint, shoulder region  Polymyalgia rheumatica  Renal stones     Past Surgical History:    Back surgery   Bronchoscopy flexible  Vaginal hysterectomy   Cholecystectomy  Cystoscopy, litholapaxy, combined  ENT Surgery   Esophagoscopy, gastroscopy, duodenoscopy (egd), combined    Eye surgery   Gyn surgery   laparotomy exploratory  Laser holmium lithotripsy bladder  Orthopedic surgery, hip fracture surgery, x 2     Family History:    Mother: Diabetes  Sister: Diabetes   Son: Heart Disease     Social History:  Smoking Status: Never Smoker  Smokeless Tobacco: Never Used  Alcohol Use: Negative   Marital Status:   [5]     Review of Systems   Constitutional: Positive for appetite change (Decreased due to vomiting). Negative for fever.   HENT: Negative for trouble  "swallowing.    Respiratory: Negative for shortness of breath.    Gastrointestinal: Positive for abdominal pain and vomiting.   Genitourinary: Positive for decreased urine volume.   All other systems reviewed and are negative.    Physical Exam   Vitals:  Patient Vitals for the past 24 hrs:   BP Temp Temp src Pulse Heart Rate Resp SpO2 Height Weight   05/13/17 1408 138/60 97.8  F (36.6  C) Oral - 87 18 96 % - 31.6 kg (69 lb 11.2 oz)   05/13/17 1300 125/66 - - - - - (!) 89 % - -   05/13/17 1245 130/70 - - - - - 92 % - -   05/13/17 1230 126/65 - - - - - 96 % - -   05/13/17 1154 - - - - - - 95 % - -   05/13/17 1152 - - - - 89 - - - -   05/13/17 1145 147/75 - - - - - 96 % - -   05/13/17 1130 146/73 - - - - - 95 % - -   05/13/17 1115 141/72 - - - - - 90 % - -   05/13/17 1100 132/75 - - - - - 93 % - -   05/13/17 1045 126/70 - - - - - 94 % - -   05/13/17 1030 129/79 - - - - - - - -   05/13/17 1015 138/76 - - - - - 94 % - -   05/13/17 0916 139/86 97.8  F (36.6  C) - 102 102 18 95 % 1.245 m (4' 1\") 34 kg (75 lb)        Physical Exam  General: Frail elderly female  Eyes: PERRL, pale pink conjunctiva, no scleral icterus  ENT: Dry mucous membranes, oropharynx clear.   CV: Normal S1S2, no murmur, rub or gallop. Regular rate and rhythm  Resp: Clear to auscultation bilaterally, no wheezes, rales or rhonchi. Normal respiratory effort.  GI: Abdomen is tender in upper abdomen, most tender in the epigastrium, nondistended. No palpable masses. No rebound or guarding.  MSK: No edema. Nontender. Normal active range of motion.  Skin: Warm and dry. No rashes or lesions or ecchymoses on visible skin.  Neuro: Alert and oriented. Responds appropriately to all questions and commands. No focal findings appreciated. Normal muscle tone.  Psych: Normal mood and affect. Pleasant.    Emergency Department Course     Laboratory:  Laboratory findings were communicated with the patient who voiced understanding of the findings.    CBC: WBC 7.4, HGB 10.9 " (L),  (H)  CMP: Glucose 120 (H) o/w WNL (Creatinine 0.62)  Lactic Acid Whole Blood: 0.8  Lipase: 79     Interventions:  1030 NS 1000 ml IV   1029 Zofran 4 mg IV  1029 Morphine 2 mg IV  1029 Protonix 40 mg IV  1152 Morphine 4 mg IV    Emergency Department Course:  Nursing notes and vitals reviewed.  I performed an exam of the patient as documented above.   IV was inserted and blood was drawn for laboratory testing, results above.  Patient reassessed. Notes she is still in pain.  1212 I spoke with Dr. Dany Ambrocio of gastroenterology regarding patient's presentation, findings, and plan of care.  Patient reassessed. Is feeling more comfortable. Denies any new concerns.  I discussed the treatment plan with the patient. They expressed understanding of this plan and consented to admission. I discussed the patient with Codie Perez PA-C, who will admit the patient to a monitored bed for further evaluation and treatment.  I personally reviewed the laboratory results with the patient and answered all related questions prior to admission.  Impression & Plan      Medical Decision Making:  Nedra Carr is a 84 year old female with known esophageal issues, status post multiple endoscopies performed by Dr. Ambrocio, who presents to the emergency department today for evaluation of dysphagia, nausea, and vomiting. She was seen here in the emergency department two days prior with similar symptoms as well as epigastric pain which she also has today but more severe. She has evidence, clinically, of dehydration, however her labs are not reflective of that. She was not having trouble swallowing secretions and so I doubt complete obstruction of the esophagus. Certainly I considered a partial esophageal obstruction or stricture. It could also be dysmotility secondary to smooth muscle issues. She will be admitted given her worsening symptoms with decreased oral intake and ongoing pain. I considered peptic ulcer disease  and gastritis. She had improvement in her pain after interventions here. She was given IV Protonix. I discussed the patient with Dr. Ambrocio who recommended gastrograph and swallowing be consulting on admission. I discussed the plan with the patient and she voiced understanding and agreement. All questions were answered prior to admission.     Diagnosis:    ICD-10-CM    1. Abdominal pain, epigastric R10.13    2. Dysphagia, unspecified type R13.10    3. Nausea and vomiting, intractability of vomiting not specified, unspecified vomiting type R11.2      Disposition:   The patient is admitted to Codie Perez PA-C, of the hospitalist service to a medical observation bed in stable condition.      Scribe Disclosure:  I, Fernando Yeyo, am serving as a scribe at 9:12 AM on 5/13/2017 to document services personally performed by Marlen Bennett MD, based on my observations and the provider's statements to me.    [facility] EMERGENCY DEPARTMENT       Marlen Bennett MD  05/13/17 3140

## 2017-05-13 NOTE — ED NOTES
"[facility]  ED Nurse Handoff Report    Nedra Carr is a 84 year old female   ED Chief complaint: Abdominal Pain  . ED Diagnosis:   Final diagnoses:   Abdominal pain, epigastric   Dysphagia, unspecified type   Nausea and vomiting, intractability of vomiting not specified, unspecified vomiting type     Allergies:   Allergies   Allergen Reactions     No Known Drug Allergies        Code Status:  Activity level - Baseline/Home:  Stand with Assist. Activity Level - Current:   Stand with Assist. Lift room needed: No. Bariatric: No   Needed: No   Isolation: No. Infection: Not Applicable.     Vital Signs:   Vitals:    05/13/17 1130 05/13/17 1145 05/13/17 1154 05/13/17 1230   BP: 146/73 147/75  126/65   Pulse:       Resp:       Temp:       SpO2: 95% 96% 95% 96%   Weight:       Height:         Cardiac Rhythm:  ,      Pain level: 0-10 Pain Scale: 9  Patient confused: No. Patient Falls Risk: Yes.   Elimination Status: Has voided  Patient Report - Initial Complaint: Abdomen pain.  Focused Assessment: Bilateral lower quadrant pain, nausea. Patient was seen here two days ago for abdomen pain and the feeling of a \"bite of apple\" being stuck. Workup was unremarkable at this time. Patient is now returned because she has not had any improvement in symptoms. Plan is to admit patient for pain control and possible swallow study.   Tests Performed: bloodwork. Abnormal Results: Unremarkable.   Treatments provided: Pain medication, nausea medication, and Protonix  Family Comments: Aware patient is here.   OBS brochure/video discussed/provided to patient:  N/A  ED Medications:   Medications   lidocaine 1 % 1 mL (not administered)   lidocaine (LMX4) kit (not administered)   sodium chloride (PF) 0.9% PF flush 3 mL (not administered)   sodium chloride (PF) 0.9% PF flush 3 mL (not administered)   0.9% sodium chloride BOLUS (1,000 mLs Intravenous New Bag 5/13/17 1030)     Followed by   0.9% sodium chloride infusion " (not administered)   ondansetron (ZOFRAN) injection 4 mg (4 mg Intravenous Given 5/13/17 1029)   morphine (PF) injection 2-4 mg (2 mg Intravenous Given 5/13/17 1029)   pantoprazole (PROTONIX) 40 mg IV push injection (40 mg Intravenous Given 5/13/17 1029)   morphine (PF) injection 4 mg (4 mg Intravenous Given 5/13/17 1152)     Drips infusing:  No     ED Nurse Name/Phone Number: Nikki Arenas,   12:40 PM  RECEIVING UNIT ED HANDOFF REVIEW    Above ED Nurse Handoff Report was reviewed: Yes  Reviewed by: Mariam Porter on May 13, 2017 at 12:55 PM

## 2017-05-14 VITALS
OXYGEN SATURATION: 92 % | DIASTOLIC BLOOD PRESSURE: 69 MMHG | TEMPERATURE: 98 F | RESPIRATION RATE: 14 BRPM | WEIGHT: 70.9 LBS | SYSTOLIC BLOOD PRESSURE: 127 MMHG | BODY MASS INDEX: 16.41 KG/M2 | HEART RATE: 102 BPM | HEIGHT: 55 IN

## 2017-05-14 LAB
ANION GAP SERPL CALCULATED.3IONS-SCNC: 12 MMOL/L (ref 3–14)
BASOPHILS # BLD AUTO: 0 10E9/L (ref 0–0.2)
BASOPHILS NFR BLD AUTO: 0.5 %
BUN SERPL-MCNC: 17 MG/DL (ref 7–30)
CALCIUM SERPL-MCNC: 8.4 MG/DL (ref 8.5–10.1)
CHLORIDE SERPL-SCNC: 112 MMOL/L (ref 94–109)
CO2 SERPL-SCNC: 21 MMOL/L (ref 20–32)
CREAT SERPL-MCNC: 0.54 MG/DL (ref 0.52–1.04)
DIFFERENTIAL METHOD BLD: ABNORMAL
EOSINOPHIL # BLD AUTO: 0 10E9/L (ref 0–0.7)
EOSINOPHIL NFR BLD AUTO: 0.5 %
ERYTHROCYTE [DISTWIDTH] IN BLOOD BY AUTOMATED COUNT: 13.4 % (ref 10–15)
GFR SERPL CREATININE-BSD FRML MDRD: ABNORMAL ML/MIN/1.7M2
GLUCOSE SERPL-MCNC: 51 MG/DL (ref 70–99)
HCT VFR BLD AUTO: 31.5 % (ref 35–47)
HGB BLD-MCNC: 9.7 G/DL (ref 11.7–15.7)
IMM GRANULOCYTES # BLD: 0 10E9/L (ref 0–0.4)
IMM GRANULOCYTES NFR BLD: 0.6 %
LYMPHOCYTES # BLD AUTO: 1.8 10E9/L (ref 0.8–5.3)
LYMPHOCYTES NFR BLD AUTO: 27.6 %
MCH RBC QN AUTO: 30 PG (ref 26.5–33)
MCHC RBC AUTO-ENTMCNC: 30.8 G/DL (ref 31.5–36.5)
MCV RBC AUTO: 98 FL (ref 78–100)
MONOCYTES # BLD AUTO: 0.6 10E9/L (ref 0–1.3)
MONOCYTES NFR BLD AUTO: 8.7 %
NEUTROPHILS # BLD AUTO: 4 10E9/L (ref 1.6–8.3)
NEUTROPHILS NFR BLD AUTO: 62.1 %
NRBC # BLD AUTO: 0 10*3/UL
NRBC BLD AUTO-RTO: 0 /100
PLATELET # BLD AUTO: 425 10E9/L (ref 150–450)
POTASSIUM SERPL-SCNC: 4 MMOL/L (ref 3.4–5.3)
RBC # BLD AUTO: 3.23 10E12/L (ref 3.8–5.2)
SODIUM SERPL-SCNC: 145 MMOL/L (ref 133–144)
WBC # BLD AUTO: 6.4 10E9/L (ref 4–11)

## 2017-05-14 PROCEDURE — G0378 HOSPITAL OBSERVATION PER HR: HCPCS

## 2017-05-14 PROCEDURE — 25000128 H RX IP 250 OP 636: Performed by: PHYSICIAN ASSISTANT

## 2017-05-14 PROCEDURE — 85025 COMPLETE CBC W/AUTO DIFF WBC: CPT | Performed by: PHYSICIAN ASSISTANT

## 2017-05-14 PROCEDURE — 36415 COLL VENOUS BLD VENIPUNCTURE: CPT | Performed by: PHYSICIAN ASSISTANT

## 2017-05-14 PROCEDURE — 96376 TX/PRO/DX INJ SAME DRUG ADON: CPT

## 2017-05-14 PROCEDURE — 25000125 ZZHC RX 250: Performed by: PHYSICIAN ASSISTANT

## 2017-05-14 PROCEDURE — 80048 BASIC METABOLIC PNL TOTAL CA: CPT | Performed by: PHYSICIAN ASSISTANT

## 2017-05-14 RX ORDER — PANTOPRAZOLE SODIUM 40 MG/1
40 TABLET, DELAYED RELEASE ORAL
Qty: 30 TABLET | Refills: 0 | Status: SHIPPED | OUTPATIENT
Start: 2017-05-14 | End: 2017-05-25

## 2017-05-14 RX ADMIN — PANTOPRAZOLE SODIUM 40 MG: 40 INJECTION, POWDER, FOR SOLUTION INTRAVENOUS at 07:02

## 2017-05-14 RX ADMIN — SODIUM CHLORIDE: 9 INJECTION, SOLUTION INTRAVENOUS at 00:43

## 2017-05-14 NOTE — PLAN OF CARE
Problem: Discharge Planning  Goal: Discharge Planning (Adult, OB, Behavioral, Peds)  Outcome: No Change  PRIMARY DIAGNOSIS: epigastric pain  Primary Symptoms: nausea, constipation, abd pain      OUTPATIENT/OBSERVATION GOALS TO BE MET BEFORE DISCHARGE:      1. Orthostatic symptoms (BP decrease or HR increase with patient upright)? N/A  2. Vital Signs stable? Yes  3. Documented urine output: Yes  4. Tolerating PO fluid: No, NPO with ice chips  5. Symptoms improved: Yes, pt denies nausea, stomach discomfort  6. Labs WNL? Yes  7. ADLs back to baseline? No, generalized weakness  8. Activity and level of assistance: Assist of 1  9. Pain status: Denies at this time  10. Barriers to discharge noted: Yes, GI consult      Interpretation of rhythm per telemetry tech: N/A      VSS, A&Ox4, pt up with assist of 1, pt denies nausea, NPO, GI consult, small bowel follow through results pending, will continue to monitor and provide supportive cares.

## 2017-05-14 NOTE — PROGRESS NOTES
Ambulated patient in the vera with a SBA. Patient had a slow but steady gait. Required the railing for balance several times.

## 2017-05-14 NOTE — PLAN OF CARE
Problem: Discharge Planning  Goal: Discharge Planning (Adult, OB, Behavioral, Peds)  Outcome: Adequate for Discharge Date Met:  05/14/17  PRIMARY DIAGNOSIS: epigastric pain  Primary Symptoms: nausea, constipation, abd pain      OUTPATIENT/OBSERVATION GOALS TO BE MET BEFORE DISCHARGE:      1. Orthostatic symptoms (BP decrease or HR increase with patient upright)? N/A  2. Vital Signs stable? Yes  3. Documented urine output: Yes  4. Tolerating PO fluid: Yes, CL/Regular  5. Symptoms improved: Yes, pt denies nausea, stomach discomfort, emesis  6. Labs WNL? Yes  7. ADLs back to baseline? Yes  8. Activity and level of assistance: Assist of 1  9. Pain status: Denies at this time  10. Barriers to discharge noted: No      Interpretation of rhythm per telemetry tech: N/A      VSS, A&Ox4. Denies nausea, emesis, pain, +BS and abd is soft. Had 1 loose stool this shift. Diet advanced to regular and pt tolerating so far. Up with A x 1 and walker. Barium swallow complete, u/a to complete small bowel follow through. Plan to discharge back to home.  Will continue to monitor and provide supportive cares.

## 2017-05-14 NOTE — PLAN OF CARE
Problem: Discharge Planning  Goal: Discharge Planning (Adult, OB, Behavioral, Peds)  Outcome: Improving  PRIMARY DIAGNOSIS: epigastric pain  Primary Symptoms: nausea, constipation, abd pain      OUTPATIENT/OBSERVATION GOALS TO BE MET BEFORE DISCHARGE:      1. Orthostatic symptoms (BP decrease or HR increase with patient upright)? N/A  2. Vital Signs stable? Yes  3. Documented urine output: Yes  4. Tolerating PO fluid: Yes, CL  5. Symptoms improved: Yes, pt denies nausea, stomach discomfort, emesis  6. Labs WNL? Yes  7. ADLs back to baseline? Yes  8. Activity and level of assistance: Assist of 1  9. Pain status: Denies at this time  10. Barriers to discharge noted: No-GI consult to be cancelled      Interpretation of rhythm per telemetry tech: N/A      VSS, A&Ox4. Denies nausea, emesis, pain, +BS and abd is soft. Had 3 loose stools on evening shift per RN report.   Diet advanced to CL and pt tolerating so far.  Up with A x 1 and walker. Barium swallow complete, u/a to complete small bowel follow through.  GI consult to be canceled by PA. Will continue to monitor and provide supportive cares.

## 2017-05-14 NOTE — PLAN OF CARE
Problem: Discharge Planning  Goal: Discharge Planning (Adult, OB, Behavioral, Peds)  Outcome: Improving  PRIMARY DIAGNOSIS: epigastric pain  Primary Symptoms: nausea, constipation, abd pain      OUTPATIENT/OBSERVATION GOALS TO BE MET BEFORE DISCHARGE:      1. Orthostatic symptoms (BP decrease or HR increase with patient upright)? N/A  2. Vital Signs stable? Yes  3. Documented urine output: Yes  4. Tolerating PO fluid: No, NPO with ice chips  5. Symptoms improved: Yes, pt denies nausea, stomach discomfort  6. Labs WNL? Yes  7. ADLs back to baseline? No, generalized weakness  8. Activity and level of assistance: Assist of 1  9. Pain status: Denies at this time  10. Barriers to discharge noted: Yes, GI consult      Interpretation of rhythm per telemetry tech: N/A      VSS, A&Ox4, pt up with assist of 1, pt denies nausea, NPO, GI consult, small bowel follow through results pending, will continue to monitor and provide supportive cares.

## 2017-05-14 NOTE — PROGRESS NOTES
Patient's After Visit Summary was reviewed with patient and/or family.   Patient verbalized understanding of After Visit Summary, recommended follow up and was given an opportunity to ask questions.   Discharge medications sent home with patient/family: YES, Protonix   Discharged with other:Luann daughter in law-left unit via wheelchair.

## 2017-05-14 NOTE — DISCHARGE SUMMARY
[facility]  Discharge Summary        Nedra Carr MRN# [id number]   YOB: 1932 Age: 84 year old     Date of Admission:  5/13/2017  Date of Discharge:  5/14/2017  Admitting Physician:  Blayne Mercado MD  Discharge Physician: Terri Arevalo PA-C  Discharging Service: Hospitalist      Primary Provider: Juan A Solo  Primary Care Physician Phone Number: [phone]         Primary Discharge Diagnoses:    Nedra Carr was admitted on 5/13/2017 for concerns of nausea, vomiting and diarrhea. Consistent with acute on chronic gastritis.     1. Gastritis: sx resolved at time of discharge. Upper GI study negative for esophageal stricture. D/c omeprazole, start Protonix 40 mg qAM. Anti-emetics prn. Follow soft, bland diet. Avoid NSAIDs.     2. Constipation - resolved during hospitalization with fiber supplement        Secondary Discharge Diagnoses:     Past Medical History:   Diagnosis Date     Atrial fibrillation (H)      Dysphagia 2000    Had duodenal strcture dilated at [facility] in 2000 and by Dr. Hernández in 2001     HTN (hypertension)      Migraine, unspecified, without mention of intractable migraine without mention of status migrainosus     beta blocker prophylaxis     Obstructive sleep apnea (adult) (pediatric) 4/9/2015     Osteoporosis, unspecified      Pain in joint, shoulder region      Polymyalgia rheumatica (H)      Renal stones                 Code Status:      Full Code        Brief Hospital Summary:        Reason for your hospital stay       Nedra Carr is a 84 year old female with a PMH significant for HTN (not on meds), PUD, esophageal stricture s/p dilation in the past, anemia, paroxysmal a fib, and UZMA (does not use her CPAP), who presented on 5/11/2017 with acute nausea, vomiting and epigastric pain. Work up in the ED revealed: VSS; CMP within normal limits; CBC with hgb 10.9 (baseline) otherwise unremarkable; lactic acid wnl; lipase wnl; CT of the  abdomen/pelvis with contrast on 5/11/17 showed Possible early infiltrate right lung base with mucoid impactions but otherwise unremarkable; CXR negative for infiltrate. Patient was admitted under observation for further evaluation. Patient started on IV Protonix and given anti-emetics and pain medications as needed with relief. Underwent upper GI study,   which was negative for esophageal stricture. Patient's symptoms improved with PPI so likely due to underlying gastritis. Patient discharged home on Protonix daily; still has anti-emetics available at home in case she needs them. Was given GI referral by her PCP several days, recommend OP follow-up with them.                 Significant Lab During Hospitalization:        Recent Labs  Lab 05/14/17  0542 05/13/17  1028 05/11/17  1314   WBC 6.4 7.4 7.3   HGB 9.7* 10.9* 11.1*   HCT 31.5* 34.4* 36.3   MCV 98 94 97    481* 454*       Recent Labs  Lab 05/14/17  0542 05/13/17  1028 05/11/17  1314   * 142 140   POTASSIUM 4.0 4.2 4.2   CHLORIDE 112* 107 108   CO2 21 26 23   ANIONGAP 12 9 9   GLC 51* 120* 153*   BUN 17 22 23   CR 0.54 0.62 0.66   GFRESTIMATED >90Non  GFR Calc >90Non  GFR Calc 85   GFRESTBLACK >90African American GFR Calc >90African American GFR Calc >90African American GFR Calc   GABINO 8.4* 9.3 9.5   PROTTOTAL  --  7.3 7.7   ALBUMIN  --  3.5 3.7   BILITOTAL  --  0.4 0.4   ALKPHOS  --  101 118   AST  --  16 19   ALT  --  20 25       Recent Labs  Lab 05/13/17  1028   LACT 0.8       Recent Labs  Lab 05/13/17  1028 05/11/17  1314   LIPASE 79 167                Significant Imaging During Hospitalization:      Results for orders placed or performed during the hospital encounter of 05/13/17   XR Upper GI without KUB    Narrative    UPPER GI WITHOUT ABDOMEN ONE VIEW 5/13/2017 5:27 PM    HISTORY: 84-year-old woman with epigastric pain, nausea, and vomiting.  History of esophageal stricture.    TECHNIQUE: Patient was brought to  the radiology department and placed  in a standing position. Patient unable to tolerate crystals, therefore  this is a single contrast upper GI examination. Barium was  administered to the patient while spot fluoroscopic images obtained  throughout the procedure. The barium passed quickly into the gastric  lumen. No suggestion of stricture, web, obstruction, or other  abnormality. Gastric lumen fills normally with contrast. No evidence  of gastric outlet obstruction. Limited for evaluation in any gastric  or duodenal ulcer given inability to administer crystal formation.  Patient has significant scoliotic curvature of the thoracic spine,  apex right.    Fluoroscopic time: 1.8 minutes  Total fluoroscopic dose: 7.6 mGy      Impression    IMPRESSION:   1. Single contrast upper GI examination demonstrating patent esophagus  with quick transit of contrast material. The gastric lumen fills  normally, no evidence of gastric outlet obstruction. Limited for  assessment of gastric or duodenal ulcer given this is a single  contrast examination.  2. Unable to perform small bowel follow-through this evening. This  would require to be done in the daytime hours. No evidence of bowel  obstruction on CT examination obtained 2 days prior.              Pending Results:        Unresulted Labs Ordered in the Past 30 Days of this Admission     No orders found for last 61 day(s).              Consultations This Hospital Stay:      No consultations were requested during this admission         Discharge Instructions and Follow-Up:      Follow-up Appointments       Follow up with primary care provider, Juan A Solo, within 7 days   for hospital follow- up.  No follow up labs or test are needed.    Follow up with [facility] within the next month. You can contact   them at [phone] to schedule an appointment.                Discharge Disposition:      Discharged to home         Discharge Medications:        Current  Discharge Medication List      START taking these medications    Details   pantoprazole (PROTONIX) 40 MG EC tablet Take 1 tablet (40 mg) by mouth every morning (before breakfast) Take 30-60 minutes before a meal.  Qty: 30 tablet, Refills: 0    Associated Diagnoses: Other acute gastritis without hemorrhage         CONTINUE these medications which have NOT CHANGED    Details   HYDROcodone-acetaminophen (LORTAB) 7.5-325 MG/15ML solution Take 5 mLs by mouth 4 times daily as needed for moderate to severe pain  Qty: 80 mL, Refills: 0      ferrous gluconate (FERGON) 324 (38 FE) MG tablet Take 1 tablet (324 mg) by mouth daily  Qty: 30 tablet, Refills: 10    Associated Diagnoses: Other iron deficiency anemia      Nutritional Supplements (ENSURE) LIQD Take 0.5 Cans by mouth daily (strawberry)  Qty: 15 each, Refills: 11    Comments: Supplement type/formula/brand per patient's preference and health care plan.  Associated Diagnoses: Perforated gastric ulcer, chronic or unspecified (H); Physical deconditioning; Weakness      multivitamin, therapeutic with minerals (MULTI-VITAMIN) TABS Take 1 tablet by mouth daily.  Qty: 100 tablet, Refills: 3      ondansetron (ZOFRAN ODT) 4 MG ODT tab Take 1 tablet (4 mg) by mouth every 8 hours as needed  Qty: 10 tablet, Refills: 0      alendronate (FOSAMAX) 70 MG tablet Take 1 tablet (70 mg) by mouth every 7 days Take with over 8 ounces water and stay upright for at least 30 minutes after dose.  Take at least 60 minutes before breakfast  Qty: 12 tablet, Refills: 3    Associated Diagnoses: Osteoporosis      acetaminophen (TYLENOL) 325 MG tablet Take 2 tablets (650 mg) by mouth every 6 hours as needed for mild pain  Qty: 60 tablet, Refills: 0    Associated Diagnoses: Abdominal pain, other specified site      albuterol (PROAIR HFA, PROVENTIL HFA, VENTOLIN HFA) 108 (90 BASE) MCG/ACT inhaler Inhale 2 puffs into the lungs every 6 hours as needed for shortness of breath / dyspnea or wheezing  Qty: 1  "Inhaler, Refills: 1    Associated Diagnoses: Dyspnea         STOP taking these medications       omeprazole (PRILOSEC) 20 MG CR capsule Comments:   Reason for Stopping:                   Allergies:         Allergies   Allergen Reactions     No Known Drug Allergies            Condition and Physical on Discharge:      Discharge condition: Stable   Vitals: Blood pressure 142/73, pulse 102, temperature 97.3  F (36.3  C), temperature source Oral, resp. rate 16, height 1.245 m (4' 1\"), weight 32.2 kg (70 lb 14.4 oz), SpO2 96 %, not currently breastfeeding.  70 lbs 14.4 oz      GENERAL: thin, comfortable, no acute distress  PSYCH: pleasant, oriented  HEENT:  Normal conjunctiva, normal hearing, nasal mucosa and Oropharynx are normal.  NECK:  Supple, no neck vein distention, no adenopathy.  HEART:  Normal S1, S2 with no murmur, no pericardial rub, gallops or S3 or S4.  LUNGS:  Clear to auscultation, normal Respiratory effort. No wheezing, rales or ronchi.  ABDOMEN:  Soft, normal bowel sounds. Non-tender, non distended.   EXTREMITIES:  No pedal edema, +2 pulses bilateral and equal.  SKIN:  Dry to touch, No rash, wound or ulcerations.  NEUROLOGIC:  alert, sensation is intact with no focal deficits.     Terri Arevalo PA-C  "

## 2017-05-14 NOTE — PLAN OF CARE
Problem: Discharge Planning  Goal: Discharge Planning (Adult, OB, Behavioral, Peds)  Outcome: Declining  PRIMARY DIAGNOSIS: epigastric pain  Primary Symptoms: nausea, constipation, abd pain      OUTPATIENT/OBSERVATION GOALS TO BE MET BEFORE DISCHARGE:      1. Orthostatic symptoms (BP decrease or HR increase with patient upright)? N/A  2. Vital Signs stable? Yes  3. Documented urine output: Yes  4. Tolerating PO fluid: No, NPO with ice chips  5. Symptoms improved: No, nausea  6. Labs WNL? Yes  7. ADLs back to baseline? No, generalized weakness  8. Activity and level of assistance: Assist of 1  9. Pain status: Denies at this time  10. Barriers to discharge noted: Yes, GI consult      Interpretation of rhythm per telemetry tech: N/A      VSS, A&Ox4, pt up with assist of 1, emesis x1 though states she feels better after, NPO, GI consult, small bowel follow through results pending, will continue to monitor and provide supportive cares.

## 2017-05-15 ENCOUNTER — CARE COORDINATION (OUTPATIENT)
Dept: CARE COORDINATION | Facility: CLINIC | Age: 82
End: 2017-05-15

## 2017-05-15 ENCOUNTER — TELEPHONE (OUTPATIENT)
Dept: FAMILY MEDICINE | Facility: CLINIC | Age: 82
End: 2017-05-15

## 2017-05-15 NOTE — PROGRESS NOTES
Clinic Care Coordination Contact  OUTREACH    Referral Information:  Referral Source: IP/TCU Report  Reason for Contact: post hospital follow up   Care Conference: No     Universal Utilization:   ED Visits in last year: 1  Hospital visits in last year: 1  Last PCP appointment: 05/08/17  Missed Appointments:  (NO CONCERNS)  Concerns:  (NO CONCERNS)  Multiple Providers or Specialists: YES    Clinical Concerns:  Current Medical Concerns: Patient admitted from 5/10-5/14 for nausea/vomiting/dysphagia   Patient states she is still really not feeling well at all -   Patient has been able to keep down a small amount of jell and applesauce that is it.   Patient given gastro referral upon discharge and advised to f/u within a few weeks. Patent does not wish to do this today   CCRN offered pcp appt. for hospital follow up, and patient declines - she wants to watch her symptoms and make appt. If wosrened    Current Behavioral Concerns: no concerns noted    Education Provided to patient:    Clinical Pathway Name: None    Medication Management:  States compliance - no questions/conceee    Functional Status:  Mobility Status: Independent w/Device  Independent with ADLs and daughter able to help with IADLS if needed      Transportation: family / medical transport       Psychosocial:  Current living arrangement:: I live alone  Financial/Insurance:no concerns noted      Resources and Interventions:  Current Resources: none      Goals:   Goal #1 eat small amounts of soft bland diet to assist with stomach pain     Patient/Caregiver understanding: Yes   Frequency of Care Coordination: 1 WEEK   Upcoming appointment:  (declines scheduling now )     Plan:   Patient will continue to eat/drink small amounts - if pain worsens she will go back to ER   Patient will call to schedule pcp office visit as she declines this now   CCRN will check in with patient in a few days - she agrees with this plan     Rosita Gaspar Care Coordinator  RN  [facility]  [phone]  May 15, 2017

## 2017-05-15 NOTE — LETTER
[facility]  Complex Care Plan  About Me  Patient Name:  Nedra Carr    YOB: 1932  Age:   84 year old   Adriano MRN: [id number] Telephone Information:     Home Phone [phone]   Mobile [phone]       Address:    [address]    [place] [place] [facility] [place] [address] Email address:  No e-mail address on record      Emergency Contact(s)  Name Relationship Lgl Grd Work Phone Home Phone Mobile Phone   1. EVELINA RAMÍREZ Daughter  none [phone]   2. MICKEY RAMÍREZ Son  none [phone]   3. KAUSHAL DRAKE Son  none [phone]           Primary language:  English     needed? No   [facility]:  [phone] op. 1  Other communication barriers: No  Preferred Method of Communication:  Phone  Current living arrangement: I live alone  Mobility Status/ Medical Equipment: Independent w/Device  Other information to know about me:    Health Maintenance  Health Maintenance Reviewed: Due/Overdue   Health Maintenance Due   Topic Date Due     PNEUMOCOCCAL (2 of 2 - PCV13) 11/01/2007     TETANUS IMMUNIZATION (SYSTEM ASSIGNED)  11/05/2016     FALL RISK ASSESSMENT  04/18/2017     My Access Plan  Medical Emergency 911   Primary Clinic Line [facility]- [phone]   24 Hour Appointment Line [phone] or  [phone]) (toll-free)   24 Hour Nurse Line [phone] (toll-free)   Preferred Urgent Care [facility], [phone]   Preferred Hospital [facility]  [phone]   Preferred Pharmacy [facility] [place], [place] - [address]     Behavioral Health Crisis Line [facility], [phone] or 911     My Care Team Members   Juan A Solo PA-C PCP - General Physician Assistant 7/1/15    Phone: [phone] Fax: [phone]         Rosita Gaspar RN Clinic Care Coordinator  5/15/17    Phone: [phone] Fax:  [phone]      Rolando Nieves MD MD Otolaryngology 5/15/17    Phone: [phone] Fax: [phone]           My Care Plans  Self Management and Treatment Plan  Goals and (Comments)  Goal #1: I will eat and drink small amount of food at a time to aid with stomach pain / discomfort     10% of goal reached    Action Plans on File: Depression  Advance Care Plans/Directives Type:   Type Advanced Care Plans/Directives: Advanced Directive - On File    My Medical and Care Information  Problem List   Patient Active Problem List   Diagnosis     Hallux varus, acquired     Other hammer toe (acquired)     Osteoporosis     HTN (hypertension)     Cataract     CARDIOVASCULAR SCREENING; LDL GOAL LESS THAN 130     Advance Care Planning     Intractable chronic migraine without aura     Anemia     Perforated gastric ulcer (H)     Thoracic back pain     Emphysematous cholecystitis     Health Care Home     Paroxysmal atrial fibrillation (H)     Abdominal pain, unspecified abdominal location     UTI (urinary tract infection)     Sepsis (H)     Headache     Physical deconditioning     Urinary bladder stone     S/P cystoscopy     Hypoxia     Hydronephrosis     Acute cholecystitis     Cardiogenic shock (H)     Closed compression fracture of thoracic vertebra (H)     Family history of other digestive disorders     Allergic rhinitis     Arthritis of hand     Esophageal stricture     Obstructive sleep apnea     Weakness     At high risk for falls     Multiple pelvic fractures (H)     Mobility impaired     Abdominal pain        Current Medications and Allergies:  See printed Medication Report.

## 2017-05-25 ENCOUNTER — CARE COORDINATION (OUTPATIENT)
Dept: CARE COORDINATION | Facility: CLINIC | Age: 82
End: 2017-05-25

## 2017-05-25 ENCOUNTER — OFFICE VISIT (OUTPATIENT)
Dept: FAMILY MEDICINE | Facility: CLINIC | Age: 82
End: 2017-05-25
Payer: COMMERCIAL

## 2017-05-25 VITALS
RESPIRATION RATE: 16 BRPM | BODY MASS INDEX: 19.62 KG/M2 | WEIGHT: 67 LBS | DIASTOLIC BLOOD PRESSURE: 74 MMHG | HEART RATE: 121 BPM | SYSTOLIC BLOOD PRESSURE: 113 MMHG

## 2017-05-25 DIAGNOSIS — K29.00 OTHER ACUTE GASTRITIS WITHOUT HEMORRHAGE: ICD-10-CM

## 2017-05-25 PROCEDURE — 99495 TRANSJ CARE MGMT MOD F2F 14D: CPT | Performed by: PHYSICIAN ASSISTANT

## 2017-05-25 RX ORDER — PANTOPRAZOLE SODIUM 40 MG/1
40 TABLET, DELAYED RELEASE ORAL
Qty: 90 TABLET | Refills: 1 | Status: SHIPPED | OUTPATIENT
Start: 2017-05-25 | End: 2018-11-13

## 2017-05-25 NOTE — PROGRESS NOTES
SUBJECTIVE:                                                    Nedra Carr is a 84 year old female who presents to clinic today for the following health issues:          Hospital Follow-up Visit:    Hospital/Nursing Home/IP Rehab Facility: [facility]  Date of Admission: 5/10/17  Date of Discharge: 5/14/17  Reason(s) for Admission: nausea, vomiting, dysphagia  -patient states feeling a little better, starting to eat more            Problems taking medications regularly:  None       Medication changes since discharge: None       Problems adhering to non-medication therapy:  None      Summary of hospitalization:  [facility] discharge [facility] reviewed  Diagnostic Tests/Treatments reviewed.  Follow up needed: none  Other Healthcare Providers Involved in Patient s Care:         None  Update since discharge: improved. Still have mild discomfort in epigastric area, but has increased food intake. Continues to avoid spicy foods. Has not yet scheduled GI follow up given hosptialization.     Post Discharge Medication Reconciliation: discharge medications reconciled, continue medications without change.  Plan of care communicated with patient     Coding guidelines for this visit:  Type of Medical   Decision Making Face-to-Face Visit       within 7 Days of discharge Face-to-Face Visit        within 14 days of discharge   Moderate Complexity [id number]   High Complexity [id number]              Problem list and histories reviewed & adjusted, as indicated.  Additional history: as documented    Patient Active Problem List   Diagnosis     Hallux varus, acquired     Other hammer toe (acquired)     Osteoporosis     HTN (hypertension)     Cataract     CARDIOVASCULAR SCREENING; LDL GOAL LESS THAN 130     Advance Care Planning     Intractable chronic migraine without aura     Anemia     Perforated gastric ulcer (H)     Thoracic back pain     Emphysematous cholecystitis     Health Care Home     Paroxysmal  atrial fibrillation (H)     Abdominal pain, unspecified abdominal location     UTI (urinary tract infection)     Sepsis (H)     Headache     Physical deconditioning     Urinary bladder stone     S/P cystoscopy     Hypoxia     Hydronephrosis     Acute cholecystitis     Cardiogenic shock (H)     Closed compression fracture of thoracic vertebra (H)     Family history of other digestive disorders     Allergic rhinitis     Arthritis of hand     Esophageal stricture     Obstructive sleep apnea     Weakness     At high risk for falls     Multiple pelvic fractures (H)     Mobility impaired     Abdominal pain     Past Surgical History:   Procedure Laterality Date     BACK SURGERY       BRONCHOSCOPY FLEXIBLE N/A 11/21/2014    Procedure: BRONCHOSCOPY FLEXIBLE;  Surgeon: Rhonda Donohue MD;  Location:  GI     C NONSPECIFIC PROCEDURE      s/p dilation at [facility] by Dr. Radha FELIPE NONSPECIFIC PROCEDURE      s/p dilation at [facility] by Dr. Betty FELIPE NONSPECIFIC PROCEDURE      Vag hysterectomy     CHOLECYSTECTOMY N/A 11/15/2014    Procedure: CHOLECYSTECTOMY;  Surgeon: Yomi Brennan MD;  Location: [facility] OR     CYSTOSCOPY, LITHOLAPAXY, COMBINED N/A 4/8/2015    Procedure: COMBINED CYSTOSCOPY, LITHOLAPAXY;  Surgeon: Randy Reno MD;  Location: [facility] OR     ENT SURGERY       ESOPHAGOSCOPY, GASTROSCOPY, DUODENOSCOPY (EGD), COMBINED  11/21/2012    Procedure: COMBINED ESOPHAGOSCOPY, GASTROSCOPY, DUODENOSCOPY (EGD), BIOPSY SINGLE OR MULTIPLE;   ESOPHAGOSCOPY, GASTROSCOPY, DUODENOSCOPY (EGD)   with cold biopsy and dilalation with #15 savary;  Surgeon: Guillermo Arcos MD;  Location:  GI     ESOPHAGOSCOPY, GASTROSCOPY, DUODENOSCOPY (EGD), COMBINED  2/22/2013    Procedure: COMBINED ESOPHAGOSCOPY, GASTROSCOPY, DUODENOSCOPY (EGD);  ESOPHAGOSCOPY, GASTROSCOPY, DUODENOSCOPY (EGD) ;  Surgeon: Lissett Posada MD;  Location:  GI     ESOPHAGOSCOPY, GASTROSCOPY, DUODENOSCOPY (EGD), COMBINED N/A 10/29/2014    Procedure:  COMBINED ESOPHAGOSCOPY, GASTROSCOPY, DUODENOSCOPY (EGD);  Surgeon: Dany Ambrocio MD;  Location:  GI     ESOPHAGOSCOPY, GASTROSCOPY, DUODENOSCOPY (EGD), COMBINED N/A 1/13/2015    Procedure: COMBINED ESOPHAGOSCOPY, GASTROSCOPY, DUODENOSCOPY (EGD), BIOPSY SINGLE OR MULTIPLE;  Surgeon: Dany Ambrocio MD;  Location:  GI     ESOPHAGOSCOPY, GASTROSCOPY, DUODENOSCOPY (EGD), COMBINED N/A 3/5/2015    Procedure: COMBINED ESOPHAGOSCOPY, GASTROSCOPY, DUODENOSCOPY (EGD);  Surgeon: Dany Ambrocio MD;  Location:  GI     ESOPHAGOSCOPY, GASTROSCOPY, DUODENOSCOPY (EGD), COMBINED N/A 11/10/2015    Procedure: COMBINED ESOPHAGOSCOPY, GASTROSCOPY, DUODENOSCOPY (EGD);  Surgeon: Dany Ambrocio MD;  Location:  GI     EYE SURGERY       GYN SURGERY       LAPAROTOMY EXPLORATORY N/A 11/15/2014    Procedure: LAPAROTOMY EXPLORATORY;  Surgeon: Yomi Brennan MD;  Location:  OR     LASER HOLMIUM LITHOTRIPSY BLADDER N/A 4/8/2015    Procedure: LASER HOLMIUM LITHOTRIPSY BLADDER;  Surgeon: Randy Reno MD;  Location:  OR     ORTHOPEDIC SURGERY      hip fx surgery x 2       Social History   Substance Use Topics     Smoking status: Never Smoker     Smokeless tobacco: Never Used     Alcohol use No     Family History   Problem Relation Age of Onset     DIABETES Mother      DIABETES Sister      HEART DISEASE Son          Current Outpatient Prescriptions   Medication Sig Dispense Refill     pantoprazole (PROTONIX) 40 MG EC tablet Take 1 tablet (40 mg) by mouth every morning (before breakfast) Take 30-60 minutes before a meal. 90 tablet 1     HYDROcodone-acetaminophen (LORTAB) 7.5-325 MG/15ML solution Take 5 mLs by mouth 4 times daily as needed for moderate to severe pain 80 mL 0     ferrous gluconate (FERGON) 324 (38 FE) MG tablet Take 1 tablet (324 mg) by mouth daily 30 tablet 10     alendronate (FOSAMAX) 70 MG tablet Take 1 tablet (70 mg) by mouth every 7 days Take with over 8 ounces water and stay upright for at  least 30 minutes after dose.  Take at least 60 minutes before breakfast 12 tablet 3     Nutritional Supplements (ENSURE) LIQD Take 0.5 Cans by mouth daily (strawberry) 15 each 11     acetaminophen (TYLENOL) 325 MG tablet Take 2 tablets (650 mg) by mouth every 6 hours as needed for mild pain 60 tablet 0     albuterol (PROAIR HFA, PROVENTIL HFA, VENTOLIN HFA) 108 (90 BASE) MCG/ACT inhaler Inhale 2 puffs into the lungs every 6 hours as needed for shortness of breath / dyspnea or wheezing 1 Inhaler 1     multivitamin, therapeutic with minerals (MULTI-VITAMIN) TABS Take 1 tablet by mouth daily. 100 tablet 3     Allergies   Allergen Reactions     No Known Drug Allergies      BP Readings from Last 3 Encounters:   05/25/17 113/74   05/14/17 127/69   05/11/17 143/84    Wt Readings from Last 3 Encounters:   05/25/17 67 lb (30.4 kg)   05/14/17 70 lb 14.4 oz (32.2 kg)   05/11/17 76 lb (34.5 kg)                    Reviewed and updated as needed this visit by clinical staff  Tobacco  Allergies  Meds  Problems  Med Hx  Surg Hx  Fam Hx  Soc Hx        Reviewed and updated as needed this visit by Provider  Allergies  Meds  Problems         ROS:  Constitutional, HEENT, cardiovascular, pulmonary, gi and gu systems are negative, except as otherwise noted.    OBJECTIVE:                                                    /74 (BP Location: Right arm, Patient Position: Chair, Cuff Size: Adult Regular)  Pulse 121  Resp 16  Wt 67 lb (30.4 kg)  BMI 19.62 kg/m2  Body mass index is 19.62 kg/(m^2).  GENERAL: alert, no distress, frail and elderly  RESP: lungs clear to auscultation - no rales, rhonchi or wheezes  CV: regular rates and rhythm, normal S1 S2, no S3 or S4 and no murmur, click or rub  ABDOMEN: soft, nontender, no hepatosplenomegaly, no masses and bowel sounds normal  PSYCH: mentation appears normal, affect normal/bright    Diagnostic Test Results:  none      ASSESSMENT/PLAN:                                                       (K29.00) Other acute gastritis without hemorrhage  Comment: improved symptoms with change to protonix. Recommending continuing this and maintaining follow up with GI. Will have referrals help facilitate this given patient's age. Also recommending continued avoidance of acid foods, which were discussed, until pain completely resolves.   Plan: pantoprazole (PROTONIX) 40 MG EC tablet        -Medication use and side effects discussed with the patient. Patient is in complete understanding and agreement with plan.         Follow up: per GI    Juan A Solo PA-C  [facility]

## 2017-05-25 NOTE — MR AVS SNAPSHOT
After Visit Summary   5/25/2017    Nedra Carr    MRN: [id number]           Patient Information     Date Of Birth          12/22/1932        Visit Information        Provider Department      5/25/2017 1:30 PM Juan A Solo PA-C [facility]        Today's Diagnoses     Other acute gastritis without hemorrhage          Care Instructions      Treating Gastritis  A medical evaluation will be done to find out the cause of your symptoms. The evaluation may include your health history, a physical exam, and some tests. Once your evaluation is done, treatment can begin. It may include taking certain medications and making some lifestyle changes. Follow your doctor s advice.  Taking Medications     Take your medications as directed, even if your stomach pain goes away.    Your doctor may prescribe some medications to neutralize or reduce excess stomach acids. If tests show that H. pylori are in your stomach lining, antibiotics may be prescribed. H.pylori are a type of bacteria that can cause gastritis.  Avoiding Certain Things    Aspirin. Avoid taking aspirin and other anti-inflammatory medications, such as ibuprofen. They can irritate your stomach lining. Also, check with your doctor before taking or stopping any medications.    Spicy foods and caffeine. Stay away from foods prepared with spices, especially black pepper. Caffeine can also make your symptoms worse. So, avoid coffee, tea, cola drinks, and chocolate. Be sure to tell your doctor about any other foods or liquids that bother your stomach.    Tobacco and alcohol. Don t use tobacco or drink alcohol. Tobacco and alcohol can increase stomach acids and worsen your gastritis symptoms.  Reducing Your Stress  Stress may make your gastritis symptoms worse. Whenever you can, reduce the stress in your life. One way to do this is to start an exercise program--talk to your doctor first. Also, try to get enough sleep, at least 8  "hours a night.    5284-6871 [employer]. [address] [place] [address], [place] [address]. All rights reserved. This information is not intended as a substitute for professional medical care. Always follow your healthcare professional's instructions.                Follow-ups after your visit        Who to contact     If you have questions or need follow up information about today's clinic visit or your schedule please contact [facility] directly at [phone].  Normal or non-critical lab and imaging results will be communicated to you by [employer], letter or phone within 4 business days after the clinic has received the results. If you do not hear from us within 7 days, please contact the clinic through [employer] or phone. If you have a critical or abnormal lab result, we will notify you by phone as soon as possible.  Submit refill requests through [employer] or call your pharmacy and they will forward the refill request to us. Please allow 3 business days for your refill to be completed.          Additional Information About Your Visit        [employer] Information     [employer] lets you send messages to your doctor, view your test results, renew your prescriptions, schedule appointments and more. To sign up, go to [email or web address] . Click on \"Log in\" on the left side of the screen, which will take you to the Welcome page. Then click on \"Sign up Now\" on the right side of the page.     You will be asked to enter the access code listed below, as well as some personal information. Please follow the directions to create your username and password.     Your access code is: [email or web address]  Expires: 2017 12:44 PM     Your access code will  in 90 days. If you need help or a new code, please call your [facility] or [phone].        Care EveryWhere ID     This is your Care EveryWhere ID. This could be used by other organizations to access your [facility] medical records  [id number]   "      Your Vitals Were     Pulse Respirations BMI (Body Mass Index)             121 16 19.62 kg/m2          Blood Pressure from Last 3 Encounters:   05/25/17 113/74   05/14/17 127/69   05/11/17 143/84    Weight from Last 3 Encounters:   05/25/17 67 lb (30.4 kg)   05/14/17 70 lb 14.4 oz (32.2 kg)   05/11/17 76 lb (34.5 kg)              Today, you had the following     No orders found for display         Where to get your medicines      These medications were sent to [facility] [address]  [address] [place] [address]     Phone:  [phone]     pantoprazole 40 MG EC tablet          Primary Care Provider Office Phone # Fax #    Juan A Rolando Solo PA-C [phone]       [facility] [id number] [place] [address]        Thank you!     Thank you for choosing [facility]  for your care. Our goal is always to provide you with excellent care. Hearing back from our patients is one way we can continue to improve our services. Please take a few minutes to complete the written survey that you may receive in the mail after your visit with us. Thank you!             Your Updated Medication List - Protect others around you: Learn how to safely use, store and throw away your medicines at www.disposemymeds.org.          This list is accurate as of: 5/25/17  1:39 PM.  Always use your most recent med list.                   Brand Name Dispense Instructions for use    acetaminophen 325 MG tablet    TYLENOL    60 tablet    Take 2 tablets (650 mg) by mouth every 6 hours as needed for mild pain       albuterol 108 (90 BASE) MCG/ACT Inhaler    PROAIR HFA/PROVENTIL HFA/VENTOLIN HFA    1 Inhaler    Inhale 2 puffs into the lungs every 6 hours as needed for shortness of breath / dyspnea or wheezing       alendronate 70 MG tablet    FOSAMAX    12 tablet    Take 1 tablet (70 mg) by mouth every 7 days Take with over 8 ounces water  and stay upright for at least 30 minutes after dose.  Take at least 60 minutes before breakfast       ENSURE Liqd     15 each    Take 0.5 Cans by mouth daily (strawberry)       ferrous gluconate 324 (38 FE) MG tablet    FERGON    30 tablet    Take 1 tablet (324 mg) by mouth daily       HYDROcodone-acetaminophen 7.5-325 MG/15ML solution    LORTAB    80 mL    Take 5 mLs by mouth 4 times daily as needed for moderate to severe pain       Multi-vitamin Tabs tablet     100 tablet    Take 1 tablet by mouth daily.       pantoprazole 40 MG EC tablet    PROTONIX    90 tablet    Take 1 tablet (40 mg) by mouth every morning (before breakfast) Take 30-60 minutes before a meal.

## 2017-05-25 NOTE — PROGRESS NOTES
Clinic Care Coordination Contact    Situation: Patient chart reviewed by care coordinator RN     Background: F/U planned today with patient to f/u on gastritis issues     Assessment: per chart review patient was in to see pcp today     Plan/Recommendations: [facility] will f/u with patient next week     Rosita Gaspar Care Coordinator RN  [facility]  [phone]  May 25, 2017

## 2017-05-25 NOTE — PATIENT INSTRUCTIONS
Treating Gastritis  A medical evaluation will be done to find out the cause of your symptoms. The evaluation may include your health history, a physical exam, and some tests. Once your evaluation is done, treatment can begin. It may include taking certain medications and making some lifestyle changes. Follow your doctor s advice.  Taking Medications     Take your medications as directed, even if your stomach pain goes away.    Your doctor may prescribe some medications to neutralize or reduce excess stomach acids. If tests show that H. pylori are in your stomach lining, antibiotics may be prescribed. H.pylori are a type of bacteria that can cause gastritis.  Avoiding Certain Things    Aspirin. Avoid taking aspirin and other anti-inflammatory medications, such as ibuprofen. They can irritate your stomach lining. Also, check with your doctor before taking or stopping any medications.    Spicy foods and caffeine. Stay away from foods prepared with spices, especially black pepper. Caffeine can also make your symptoms worse. So, avoid coffee, tea, cola drinks, and chocolate. Be sure to tell your doctor about any other foods or liquids that bother your stomach.    Tobacco and alcohol. Don t use tobacco or drink alcohol. Tobacco and alcohol can increase stomach acids and worsen your gastritis symptoms.  Reducing Your Stress  Stress may make your gastritis symptoms worse. Whenever you can, reduce the stress in your life. One way to do this is to start an exercise program talk to your doctor first. Also, try to get enough sleep, at least 8 hours a night.    5736-4205 [facility]. [address] [place] [address], [place] [address]. All rights reserved. This information is not intended as a substitute for professional medical care. Always follow your healthcare professional's instructions.

## 2017-05-25 NOTE — Clinical Note
Dakotah Padron ended up going to the hospital and did not follow up with [facility]. Symptoms are improving, but  I still recommend follow up with them. Can we call dakotah and help get this set up for routine follow up?   Thanks,  Bert Solo, PAC

## 2017-05-25 NOTE — NURSING NOTE
"  Chief Complaint   Patient presents with     Hospital F/U       Initial /74 (BP Location: Right arm, Patient Position: Chair, Cuff Size: Adult Regular)  Pulse 121  Resp 16  Wt 67 lb (30.4 kg)  BMI 19.62 kg/m2 Estimated body mass index is 19.62 kg/(m^2) as calculated from the following:    Height as of 5/13/17: 4' 1\" (1.245 m).    Weight as of this encounter: 67 lb (30.4 kg).  Medication Reconciliation: complete          ANISH Chaudhry    "

## 2017-06-20 ENCOUNTER — TELEPHONE (OUTPATIENT)
Dept: FAMILY MEDICINE | Facility: CLINIC | Age: 82
End: 2017-06-20

## 2017-06-20 NOTE — TELEPHONE ENCOUNTER
Jason calling from  [facility]  re certification of services  HHA 2x/wk for 2 months    Approved per protocol    Jeanne Judd RN, BS  Clinical Nurse Triage.

## 2017-06-26 ENCOUNTER — TRANSFERRED RECORDS (OUTPATIENT)
Dept: HEALTH INFORMATION MANAGEMENT | Facility: CLINIC | Age: 82
End: 2017-06-26

## 2017-07-10 ENCOUNTER — HOSPITAL ENCOUNTER (OUTPATIENT)
Facility: CLINIC | Age: 82
Discharge: HOME | End: 2017-07-10
Attending: INTERNAL MEDICINE | Admitting: INTERNAL MEDICINE
Payer: COMMERCIAL

## 2017-07-10 VITALS
WEIGHT: 67 LBS | HEART RATE: 109 BPM | HEIGHT: 55 IN | OXYGEN SATURATION: 94 % | SYSTOLIC BLOOD PRESSURE: 105 MMHG | RESPIRATION RATE: 18 BRPM | BODY MASS INDEX: 15.51 KG/M2 | DIASTOLIC BLOOD PRESSURE: 78 MMHG

## 2017-07-10 LAB — UPPER GI ENDOSCOPY: NORMAL

## 2017-07-10 PROCEDURE — 25000128 H RX IP 250 OP 636: Performed by: INTERNAL MEDICINE

## 2017-07-10 PROCEDURE — 43249 ESOPH EGD DILATION <30 MM: CPT | Performed by: INTERNAL MEDICINE

## 2017-07-10 PROCEDURE — 40000104 ZZH STATISTIC MODERATE SEDATION < 10 MIN: Performed by: INTERNAL MEDICINE

## 2017-07-10 PROCEDURE — 25000125 ZZHC RX 250: Performed by: INTERNAL MEDICINE

## 2017-07-10 RX ORDER — ONDANSETRON 2 MG/ML
4 INJECTION INTRAMUSCULAR; INTRAVENOUS
Status: DISCONTINUED | OUTPATIENT
Start: 2017-07-10 | End: 2017-07-10 | Stop reason: HOSPADM

## 2017-07-10 RX ORDER — NALOXONE HYDROCHLORIDE 0.4 MG/ML
.1-.4 INJECTION, SOLUTION INTRAMUSCULAR; INTRAVENOUS; SUBCUTANEOUS
Status: DISCONTINUED | OUTPATIENT
Start: 2017-07-10 | End: 2017-07-10 | Stop reason: HOSPADM

## 2017-07-10 RX ORDER — FLUMAZENIL 0.1 MG/ML
0.2 INJECTION, SOLUTION INTRAVENOUS
Status: DISCONTINUED | OUTPATIENT
Start: 2017-07-10 | End: 2017-07-10 | Stop reason: HOSPADM

## 2017-07-10 RX ORDER — ONDANSETRON 4 MG/1
4 TABLET, ORALLY DISINTEGRATING ORAL EVERY 6 HOURS PRN
Status: DISCONTINUED | OUTPATIENT
Start: 2017-07-10 | End: 2017-07-10 | Stop reason: HOSPADM

## 2017-07-10 RX ORDER — ONDANSETRON 2 MG/ML
4 INJECTION INTRAMUSCULAR; INTRAVENOUS EVERY 6 HOURS PRN
Status: DISCONTINUED | OUTPATIENT
Start: 2017-07-10 | End: 2017-07-10 | Stop reason: HOSPADM

## 2017-07-10 RX ORDER — LIDOCAINE 40 MG/G
CREAM TOPICAL
Status: DISCONTINUED | OUTPATIENT
Start: 2017-07-10 | End: 2017-07-10 | Stop reason: HOSPADM

## 2017-07-10 RX ORDER — FENTANYL CITRATE 50 UG/ML
INJECTION, SOLUTION INTRAMUSCULAR; INTRAVENOUS PRN
Status: DISCONTINUED | OUTPATIENT
Start: 2017-07-10 | End: 2017-07-10 | Stop reason: HOSPADM

## 2017-07-10 NOTE — H&P
Pre-Endoscopy History and Physical     Nedra Carr MRN# [id number]   YOB: 1932 Age: 84 year old     Date of Procedure: 7/10/2017  Primary care provider: Juan A Solo  Type of Endoscopy: Gastroscopy with possible biopsy, possible dilation  Reason for Procedure: dysphagia  Type of Anesthesia Anticipated: Conscious Sedation    HPI:    Nedra is a 84 year old female who will be undergoing the above procedure.      A history and physical has been performed. The patient's medications and allergies have been reviewed. The risks and benefits of the procedure and the sedation options and risks were discussed with the patient.  All questions were answered and informed consent was obtained.      She denies a personal or family history of anesthesia complications or bleeding disorders.     Patient Active Problem List   Diagnosis     Hallux varus, acquired     Other hammer toe (acquired)     Osteoporosis     HTN (hypertension)     Cataract     CARDIOVASCULAR SCREENING; LDL GOAL LESS THAN 130     Advance Care Planning     Intractable chronic migraine without aura     Anemia     Perforated gastric ulcer (H)     Thoracic back pain     Emphysematous cholecystitis     Health Care Home     Paroxysmal atrial fibrillation (H)     Abdominal pain, unspecified abdominal location     UTI (urinary tract infection)     Sepsis (H)     Headache     Physical deconditioning     Urinary bladder stone     S/P cystoscopy     Hypoxia     Hydronephrosis     Acute cholecystitis     Cardiogenic shock (H)     Closed compression fracture of thoracic vertebra (H)     Family history of other digestive disorders     Allergic rhinitis     Arthritis of hand     Esophageal stricture     Obstructive sleep apnea     Weakness     At high risk for falls     Multiple pelvic fractures (H)     Mobility impaired     Abdominal pain        Past Medical History:   Diagnosis Date     Atrial fibrillation (H)      Dysphagia 2000    [place]  [place] str[place] [facility] [place] Dr. Hernández in 2001     HTN (hypertension)      Migraine, unspecified, without mention of intractable migraine without mention of status migrainosus     beta blocker prophylaxis     Obstructive sleep apnea (adult) (pediatric) 4/9/2015     Osteoporosis, unspecified      Pain in joint, shoulder region      Polymyalgia rheumatica (H)      Renal stones         Past Surgical History:   Procedure Laterality Date     BACK SURGERY       BRONCHOSCOPY FLEXIBLE N/A 11/21/2014    Procedure: BRONCHOSCOPY FLEXIBLE;  Surgeon: Rhonda Donohue MD;  Location: [facility]     C NONSPECIFIC PROCEDURE      s/p dilation [place] [facility] [place]. Radha FELIPE NONSPECIFIC PROCEDURE      s/p dilation at [facility] by Dr. Betty FELIPE NONSPECIFIC PROCEDURE      Vag hysterectomy     CHOLECYSTECTOMY N/A 11/15/2014    Procedure: CHOLECYSTECTOMY;  Surgeon: Yomi Brennan MD;  Location: [facility]     CYSTOSCOPY, LITHOLAPAXY, COMBINED N/A 4/8/2015    Procedure: COMBINED CYSTOSCOPY, LITHOLAPAXY;  Surgeon: Randy Reno MD;  Location: [facility]     ENT SURGERY       ESOPHAGOSCOPY, GASTROSCOPY, DUODENOSCOPY (EGD), COMBINED  11/21/2012    Procedure: COMBINED ESOPHAGOSCOPY, GASTROSCOPY, DUODENOSCOPY (EGD), BIOPSY SINGLE OR MULTIPLE;   ESOPHAGOSCOPY, GASTROSCOPY, DUODENOSCOPY (EGD)   with cold biopsy and dilalation with #15 savary;  Surgeon: Guillermo Arcos MD;  Location: [facility]     ESOPHAGOSCOPY, GASTROSCOPY, DUODENOSCOPY (EGD), COMBINED  2/22/2013    Procedure: COMBINED ESOPHAGOSCOPY, GASTROSCOPY, DUODENOSCOPY (EGD);  ESOPHAGOSCOPY, GASTROSCOPY, DUODENOSCOPY (EGD) ;  Surgeon: Lissett Posada MD;  Location:  GI     ESOPHAGOSCOPY, GASTROSCOPY, DUODENOSCOPY (EGD), COMBINED N/A 10/29/2014    Procedure: COMBINED ESOPHAGOSCOPY, GASTROSCOPY, DUODENOSCOPY (EGD);  Surgeon: Dany Ambrocio MD;  Location:  GI     ESOPHAGOSCOPY, GASTROSCOPY, DUODENOSCOPY (EGD), COMBINED N/A 1/13/2015    Procedure: COMBINED  ESOPHAGOSCOPY, GASTROSCOPY, DUODENOSCOPY (EGD), BIOPSY SINGLE OR MULTIPLE;  Surgeon: Dany Ambrocio MD;  Location:  GI     ESOPHAGOSCOPY, GASTROSCOPY, DUODENOSCOPY (EGD), COMBINED N/A 3/5/2015    Procedure: COMBINED ESOPHAGOSCOPY, GASTROSCOPY, DUODENOSCOPY (EGD);  Surgeon: Dany Ambrocio MD;  [facility]: [facility]     ESOPHAGOSCOPY, GASTROSCOPY, DUODENOSCOPY (EGD), COMBINED N/A 11/10/2015    Procedure: COMBINED ESOPHAGOSCOPY, GASTROSCOPY, DUODENOSCOPY (EGD);  Surgeon: Dany Ambrocio MD;  Location:  GI     EYE SURGERY       GYN SURGERY       LAPAROTOMY EXPLORATORY N/A 11/15/2014    Procedure: LAPAROTOMY EXPLORATORY;  Surgeon: Yomi Brennan MD;  Location: [facility]     LASER HOLMIUM LITHOTRIPSY BLADDER N/A 4/8/2015    Procedure: LASER HOLMIUM LITHOTRIPSY BLADDER;  Surgeon: Randy Reno MD;  Location: [facility]     ORTHOPEDIC SURGERY      hip fx surgery x 2       Social History   Substance Use Topics     Smoking status: Never Smoker     Smokeless tobacco: Never Used     Alcohol use No       Family History   Problem Relation Age of Onset     DIABETES Mother      DIABETES Sister      HEART DISEASE Son        Prior to Admission medications    Medication Sig Start Date End Date Taking? Authorizing Provider   pantoprazole (PROTONIX) 40 MG EC tablet Take 1 tablet (40 mg) by mouth every morning (before breakfast) Take 30-60 minutes before a meal. 5/25/17  Yes Juan A Solo PA-C   HYDROcodone-acetaminophen (LORTAB) 7.5-325 MG/15ML solution Take 5 mLs by mouth 4 times daily as needed for moderate to severe pain 5/11/17  Yes Yuly Sage MD   ferrous gluconate (FERGON) 324 (38 FE) MG tablet Take 1 tablet (324 mg) by mouth daily 2/6/17  Yes Martin Rodriguez MD   alendronate (FOSAMAX) 70 MG tablet Take 1 tablet (70 mg) by mouth every 7 days Take with over 8 ounces water and stay upright for at least 30 minutes after dose.  Take at least 60 minutes before breakfast 2/6/17  Yes Martin Rodriguez MD  "  Nutritional Supplements (ENSURE) LIQD Take 0.5 Cans by mouth daily (strawberry) 10/14/15  Yes Juan A Solo PA-C   multivitamin, therapeutic with minerals (MULTI-VITAMIN) TABS Take 1 tablet by mouth daily. 5/8/13  Yes Houston Zhang MD   acetaminophen (TYLENOL) 325 MG tablet Take 2 tablets (650 mg) by mouth every 6 hours as needed for mild pain 1/16/15   Dane Christina MD   albuterol (PROAIR HFA, PROVENTIL HFA, VENTOLIN HFA) 108 (90 BASE) MCG/ACT inhaler Inhale 2 puffs into the lungs every 6 hours as needed for shortness of breath / dyspnea or wheezing 8/20/14   Hannah Galeana MD       Allergies   Allergen Reactions     No Known Drug Allergies         REVIEW OF SYSTEMS:   5 point ROS negative except as noted above in HPI, including Gen., Resp., CV, GI &  system review.    PHYSICAL EXAM:   There were no vitals taken for this visit. Estimated body mass index is 19.62 kg/(m^2) as calculated from the following:    Height as of 5/13/17: 1.245 m (4' 1\").    Weight as of 5/25/17: 30.4 kg (67 lb).   GENERAL APPEARANCE: alert, and oriented  MENTAL STATUS: alert  AIRWAY EXAM: Mallampatti Class II (visualization of the soft palate, fauces, and uvula)  RESP: lungs clear to auscultation - no rales, rhonchi or wheezes  CV: regular rates and rhythm  DIAGNOSTICS:    Not indicated    IMPRESSION   ASA Class 2 - Mild systemic disease    PLAN:   Plan for Gastroscopy with possible biopsy, possible dilation. We discussed the risks, benefits and alternatives and the patient wished to proceed.    The above has been forwarded to the consulting provider.      Signed Electronically by: Dany Ambrocio  July 10, 2017          "

## 2017-07-10 NOTE — LETTER
July 3, 2017      Nedra Carr  [address]   [facility] [address]              Dear Nedra Carr,    Thank you for choosing [facility]. You are scheduled for the following service.   Date:   7/10/2017  Monday      Procedure: UPPER ENDOSCOPY-EGD  Doctor: Dr. Dany Ambrocio           Arrival Time:  12:30 PM    *check in at Emergency/Endoscopy desk*  Procedure Time: 1:00 PM     Location:   [facility]        Endoscopy Department, First Floor (Enter through ER Doors) *         [address] [place], [place] [address]      [phone] or [phone] ([facility]) to reschedule        PRE-PROCEDURE CHECKLIST    If you have diabetes, ask your regular doctor for diet and medication restrictions.  If you take any antiplatelet or anticoagulant medications (such as Coumadin, Lovenox, Plavix, etc.) and have not already discussed this, please call your primary physician for advice on holding this medication.  If you take Aspirin, you may continue to do so.  If you are or may be pregnant, please discuss the risks and benefits of this procedure with your doctor.  You must arrange for a ride for the day of your exam. If you fail to arrange transportation with a responsible adult, your procedure will need to be cancelled and rescheduled. Taxi, bus and medical transport are not acceptable unless you have a responsible adult that you know & trust with you. Please arrange for this  to be able to pick you up in our department, approximately one hour after your scheduled procedure, if they are not able to stay with you.      Canceling or rescheduling   If you must cancel or reschedule your appointment, please call [phone] as soon as possible.      Upper Endoscopy or Esophagogastroduodenoscopy (EGD) is a test performed to evaluate symptoms of persistent abdominal pain, nausea, vomiting and difficulty swallowing. It may also be used to treat various  conditions of the upper gastrointestinal tract, such as bleeding, narrowing or abnormal growths.     What happens during an upper endoscopy?  On the day of your procedure, plan to spend up to one and a half hour after your arrival at the endoscopy center. The exam itself takes about 5 to 10 minutes.    Before the exam:  - You will change into a gown.   - Your medical history and medication list will be reviewed with you, unless it has already been done over the phone.   - A nurse will insert an intravenous (IV) line into your hand or arm.  - The doctor will talk to you and give you a consent form to sign.    During the exam:  - Medicine will be given through the IV line to help you relax and feel comfortable.   - Your heart rate and oxygen levels will be monitored. If your blood pressure is low, you may be given fluids through the IV line.   - The doctor will insert a flexible, hollow tube, called an endoscope, into your mouth and will advance it slowly through the esophagus, stomach and duodenum (the first part of your small intestine).   - You may have a feeling of pressure or fullness.   - If you have difficulty swallowing, and the doctor finds a narrowing in your esophagus, it may be possible for the area to be expanded-dilated during the exam.   - If abnormal tissue is found, the doctor may remove it through the endoscope (biopsy it) for closer examination. The tissue removal is painless.    After the exam:  - Any tissue samples removed during the exam will be sent to a lab for evaluation. It may take 5 to 7 working days for you to be notified of the results  - The doctor will prepare a full report for the physician who referred you for the upper endoscopy.   - The doctor will talk with you about the initial results of your exam.   - You may feel bloated after the procedure. That is normal and should not last long.   - Your throat may feel sore for a short time.   - Following the exam, you may resume your  normal diet. Avoid alcohol until the next day.   - You may resume your regular activities the day after the procedure.   - Medication given during the exam will prohibit you from driving for the rest of the day.  - A nurse will provide you with complete discharge instructions before you leave the endoscopy center. Be sure to ask the nurse for specific instructions if you take blood thinners such as Aspirin , Coumadin , Lovenox , Plavix , etc.       PREPARATION    To ensure a successful exam, please follow all instructions carefully.      The night before your exam:    STOP eating solid foods at 11:45 pm.     Clear liquids are okay to drink (examples: Gatorade , apple juice, clear broth, etc.).     DO NOT drink red liquids or alcoholic beverages.    The day of your exam:    STOP drinking clear liquids 4 hours before your exam.     You may take your usual medications with 4 oz. of water, but it needs to be at least 4 hours prior to your procedure.    When you leave for the procedure:    Bring a list of all of your current medications, including any allergy or over-the-counter medications, unless you have already reviewed that with an Endoscopy RN over the phone.     Bring a photo ID as well as up-to-date insurance information, such as your insurance card and any referral forms that might be required by your payer.       DIRECTIONS TO THE ENDOSCOPY DEPARTMENT    From the north ([facility])  Take 35W south, exit on [address] . Get into the left hand eri, turn left (east), go one-half mile to [address] and turn left. Go north to the first stoplight, take a right on [email or web address] and follow it to the Emergency entrance.  From the south ([facility])  Take 35N to the 35E split and exit on [address]. On [address] , turn left (west) to [address]. Turn right (north) on [address]. Go north to the first stoplight, take a right on [email or web address] and follow it to  the Emergency entrance.  From the east via 35E ([facility])  Take [address]E south to [address]  exit. Turn right on [address]. Go west to [address]. Turn right (north) on [address]. Go to the first stoplight, take a right and follow on [address] to the Emergency entrance.  From the east via [address] ([facility])  Take [address] West to [address]. Turn left (south) on [address] to [address]. Turn left (east) on [address] and follow it to the Emergency entrance.  From the west via Highway 13 ([place])  Take [address] east to [address]. Turn right (south) on [address] to [address]. Turn left (east) on [address] and follow it to the Emergency entrance.

## 2017-07-16 ENCOUNTER — CARE COORDINATION (OUTPATIENT)
Dept: CARE COORDINATION | Facility: CLINIC | Age: 82
End: 2017-07-16

## 2017-07-16 NOTE — PROGRESS NOTES
Clinic Care Coordination Contact  [facility]/Voicemail    Referral Source: IP/TCU Report  Clinical Data: Care Coordinator Outreach - follow up gastritis   Outreach attempted x 1.  Left message on voicemail with call back information and requested return call.  Plan: Care Coordinator will try to reach patient again in 1-2 business days.    Rosita Gaspar Care Coordinator RN  [facility]  [phone]  July 17, 2017

## 2017-07-18 NOTE — PROGRESS NOTES
Clinic Care Coordination Contact  OUTREACH    Referral Information:  Referral Source: IP/TCU Report  Reason for Contact: gastritis follow up   Care Conference: No     Universal Utilization:   ED Visits in last year: 1  Hospital visits in last year: 1  Last PCP appointment: 05/25/17  Missed Appointments:  (NO CONCERNS )  Concerns:  (NO CONCERNS )  Multiple Providers or Specialists: YES    Clinical Concerns:  Current Medical Concerns: gastritis follow up   Patient states her stomach is feeling much better. She is eating well and does not have any concerns at this time. Patient states she did the upper endoscopy and they did not find anything wrong.    Patient is back to baseline with stomach pain d/c and eating po intake     No follow up necessary from RN Care coordinator per patient - she will keep CCRN contact information and call in the future if needs arise     Current Behavioral Concerns: no concerns - patient is in good spirits     Education Provided to patient: call if stomach pain returns     Clinical Pathway Name: None    Medication Management:  States compliance - no concerns with meds at this time     Functional Status:  Mobility Status: Independent w/Device  Equipment Currently Used at Home: walker, rolling  Transportation: family - medical transport   Independent with ADLs and if assistance needed with IADLS daughter assists      Psychosocial:  Current living arrangement: I live alone  Financial/Insurance: no concerns      Resources and Interventions:  Current Resources:  (NA);  (NA)  PAS Number:  (NA)  Senior Linkage Line Referral Placed:  (NA)  Advanced Care Plans/Directives on file:: Yes  Referrals Placed:  (NA)     Goals:   Goal 1 Statement: I will eat small amount of food through out the day to aid with stomach upset instead of larger meals  Goal 1 Progression Percent: 100%  Goal 1 Progression Date: 07/18/17     Patient/Caregiver understanding: yes   Frequency of Care Coordination: closed   Upcoming  appointment:  (declines scheduling now )     Plan: Patient is back to baseline - denies stomach pain and is eating/drinking orally with no concerns   CCRN will close care coordination at this time and patient will call in future if needs arise   Please refer to care coordination in the future if needs arise     Rosita Gaspar Care Coordinator RN  [facility]  [phone]  July 18, 2017

## 2017-07-18 NOTE — PROGRESS NOTES
Clinic Care Coordination Contact  [facility]/Voicemail    Referral Source: IP/TCU Report  Clinical Data: Care Coordinator Outreach follow up gastritis   Outreach attempted x 2.  Left message on voicemail with call back information and requested return call.  Plan: Care Coordinator will mail unable to contact letter in 2 business days if no contact from patient.     Rosita Gaspar Care Coordinator RN  [facility]  [phone]  July 18, 2017

## 2017-08-09 ENCOUNTER — TELEPHONE (OUTPATIENT)
Dept: FAMILY MEDICINE | Facility: CLINIC | Age: 82
End: 2017-08-09

## 2017-08-09 NOTE — TELEPHONE ENCOUNTER
Can we call patient and verify number of calories consumed without ensure daily?     Thanks,    Bert Solo, PAC

## 2017-08-09 NOTE — TELEPHONE ENCOUNTER
Received 2 page fax for Prescription/Certificate of Medical Necessity for Bert Solo to complete. Form in the in-basket at ZB's desk.

## 2017-08-09 NOTE — TELEPHONE ENCOUNTER
Nedra does not know how many calories but this is usual diet  Breakfast  1 cup apple juice   Donut holes  bown of oatmeal     Lunch   Royal with lunch meat     Snacks day  Piece of cake or cookies  1 cup milk   1/2 can Ensure    Dinner  Meat, potatoes and vegetables  Blayne Lopez, RN

## 2017-08-18 ENCOUNTER — TELEPHONE (OUTPATIENT)
Dept: FAMILY MEDICINE | Facility: CLINIC | Age: 82
End: 2017-08-18

## 2017-08-18 NOTE — TELEPHONE ENCOUNTER
Jesus with [facility] home [facility] calls, verbal orders provided for home care re certification for another 60 days, HHA etc, fyi to RITESH Lucas, RN, BSN  Message handled by Nurse Triage.

## 2017-08-23 NOTE — LETTER
[facility] CARE COORDINATION  [address] [address]  [phone]    May 15, 2017    Nedra Carr  [address] [place] [address] [address]  [facility] [address]    Dear Nedra,     I am the Clinic Care Coordinator that works with your primary care provider's clinic. I wanted to introduce myself and provide you with my contact information for you to be able to call me with any questions or concerns. I wanted to thank you for spending the time to talk with me.  Below is a description of what Clinic Care Coordination is and how I can further assist you.     The Clinic Care Coordinator role is a Registered Nurse and/or  who understands the health care system. The goal of Clinic Care Coordination is to help you manage your health and improve access to the [facility] system in the most efficient manner.  The Registered Nurse can assist you in meeting your health care goals by providing education, coordinating services, and strengthening the communication among your providers. The  can assist you with financial, behavioral, psychosocial, and chemical dependency and counseling/psychiatric resources.    Please feel free to keep this letter and contact information to contact me at [phone] with any further questions or concerns that may arise. We at [facility] are focused on providing you with the highest-quality healthcare experience possible and that all starts with you.     Sincerely,     Rosita Gaspar Care Coordinator RN  [facility]  [phone]  May 15, 2017     Enclosed: I have enclosed a copy of the Complex Care Plan. This has helpful information and goals that we have talked about. Please keep this in an easy to access place to use as needed.     REVIEW OF SYSTEMS:  Constitutional: negative  Eye Problem(s):glasses or contacts  ENT Problem(s):headaches and sinus trouble  Cardiovascular problem(s):negative  Respiratory problem(s):negative  Gastro-intestinal problem(s):negative GI  Genito-urinary problem(s):negative  Musculoskeletal problem(s):back pain and arthritis  Integumentary problem(s):negative  Neurological problem(s):neuropathy  Psychiatric problem(s):negative  Endocrine problem(s):diabetes  Hematologic and/or Lymphatic problem(s):negative    Patient does take aspirin.

## 2017-08-30 NOTE — TELEPHONE ENCOUNTER
[employer] calling, never received fax, do not see it abstracted, is this at [facility] ? Please re-fax [phone]. Ruth Behrens.

## 2017-08-31 NOTE — TELEPHONE ENCOUNTER
Waiting to hear back from [employer] if they have form, form was sent to abstracting 8/10,  Aniya Davila was working on it.

## 2017-09-11 ENCOUNTER — TELEPHONE (OUTPATIENT)
Dept: FAMILY MEDICINE | Facility: CLINIC | Age: 82
End: 2017-09-11

## 2017-09-11 NOTE — TELEPHONE ENCOUNTER
3 page fax received from [employer] for Juan A Solo's signature.  Forms have been signed/dated by Bert Solo.  Forms faxed to [phone].    Codie Phillips/

## 2017-10-06 NOTE — TELEPHONE ENCOUNTER
Closing old open encounter     Rosiat Gaspar Care Coordinator RN  [facility]  [phone]  October 6, 2017

## 2017-10-17 ENCOUNTER — TELEPHONE (OUTPATIENT)
Dept: FAMILY MEDICINE | Facility: CLINIC | Age: 82
End: 2017-10-17

## 2017-10-17 NOTE — TELEPHONE ENCOUNTER
Jesus with [facility] home [facility] calls, verbal orders provided for 60 day re certification and HHA twice a week x 60 days, RITESH and MD xochilt PEREZ RN, BSN  Message handled by Nurse Triage.

## 2017-10-18 ENCOUNTER — ALLIED HEALTH/NURSE VISIT (OUTPATIENT)
Dept: NURSING | Facility: CLINIC | Age: 82
End: 2017-10-18
Payer: COMMERCIAL

## 2017-10-18 DIAGNOSIS — Z23 NEED FOR PROPHYLACTIC VACCINATION AND INOCULATION AGAINST INFLUENZA: Primary | ICD-10-CM

## 2017-10-18 PROCEDURE — 99207 ZZC NO CHARGE NURSE ONLY: CPT

## 2017-10-18 PROCEDURE — 90662 IIV NO PRSV INCREASED AG IM: CPT

## 2017-10-18 PROCEDURE — G0008 ADMIN INFLUENZA VIRUS VAC: HCPCS

## 2017-10-18 NOTE — MR AVS SNAPSHOT
"              After Visit Summary   10/18/2017    Nedra Carr    MRN: [id number]           Patient Information     Date Of Birth          1932        Visit Information        Provider Department      10/18/2017 11:00 AM CR SILVER MA/LPN [facility]        Today's Diagnoses     Need for prophylactic vaccination and inoculation against influenza    -  1       Follow-ups after your visit        Who to contact     If you have questions or need follow up information about today's clinic visit or your schedule please contact [facility] directly at [phone].  Normal or non-critical lab and imaging results will be communicated to you by Archevoshart, letter or phone within 4 business days after the clinic has received the results. If you do not hear from us within 7 days, please contact the clinic through Currentlyt or phone. If you have a critical or abnormal lab result, we will notify you by phone as soon as possible.  Submit refill requests through GoTable or call your pharmacy and they will forward the refill request to us. Please allow 3 business days for your refill to be completed.          Additional Information About Your Visit        MyChart Information     GoTable lets you send messages to your doctor, view your test results, renew your prescriptions, schedule appointments and more. To sign up, go to [email or web address] . Click on \"Log in\" on the left side of the screen, which will take you to the Welcome page. Then click on \"Sign up Now\" on the right side of the page.     You will be asked to enter the access code listed below, as well as some personal information. Please follow the directions to create your username and password.     Your access code is: [email or web address]  Expires: 2018 11:01 AM     Your access code will  in 90 days. If you need help or a new code, please call your [facility] or [phone].        Care EveryWhere ID     This is your " Care EveryWhere ID. This could be used by other organizations to access your [place] medical records  [id number]         Blood Pressure from Last 3 Encounters:   07/10/17 105/78   05/25/17 113/74   05/14/17 127/69    Weight from Last 3 Encounters:   07/10/17 67 lb (30.4 kg)   05/25/17 67 lb (30.4 kg)   05/14/17 70 lb 14.4 oz (32.2 kg)              We Performed the Following     FLU VACCINE, INCREASED ANTIGEN, PRESV FREE, AGE 65+ [35024]     Vaccine Administration, Initial [44181]        Primary Care Provider Office Phone # Fax #    Juan A Rolando Solo PA-C [phone] [address] [facility] [address]        Equal Access to Services     ELAINA ANAYA : Hadii aad ku hadasho Soayad, waaxda luqadaha, qaybta kaalmada adeegyada, [place] el . So [facility] [phone].    ATENCIÓN: Si habla español, tiene a barrientos disposición servicios gratuitos de asistencia lingüística. Naomi al [phone].    We comply with applicable federal civil rights laws and [place] laws. We do not discriminate on the basis of race, color, national origin, age, disability, sex, sexual orientation, or gender identity.            Thank you!     Thank you for choosing [facility]  for your care. Our goal is always to provide you with excellent care. Hearing back from our patients is one way we can continue to improve our services. Please take a few minutes to complete the written survey that you may receive in the mail after your visit with us. Thank you!             Your Updated Medication List - Protect others around you: Learn how to safely use, store and throw away your medicines at www.disposemymeds.org.          This list is accurate as of: 10/18/17 11:01 AM.  Always use your most recent med list.                   Brand Name Dispense Instructions for use Diagnosis    acetaminophen 325 MG tablet    TYLENOL    60 tablet    Take 2 tablets (650 mg) by mouth every 6 hours as  needed for mild pain    Abdominal pain, other specified site       albuterol 108 (90 BASE) MCG/ACT Inhaler    PROAIR HFA/PROVENTIL HFA/VENTOLIN HFA    1 Inhaler    Inhale 2 puffs into the lungs every 6 hours as needed for shortness of breath / dyspnea or wheezing    Dyspnea       alendronate 70 MG tablet    FOSAMAX    12 tablet    Take 1 tablet (70 mg) by mouth every 7 days Take with over 8 ounces water and stay upright for at least 30 minutes after dose.  Take at least 60 minutes before breakfast    Osteoporosis       ENSURE Liqd     15 each    Take 0.5 Cans by mouth daily (strawberry)    Perforated gastric ulcer, chronic or unspecified, Physical deconditioning, Weakness       ferrous gluconate 324 (38 FE) MG tablet    FERGON    30 tablet    Take 1 tablet (324 mg) by mouth daily    Other iron deficiency anemia       HYDROcodone-acetaminophen 7.5-325 MG/15ML solution    LORTAB    80 mL    Take 5 mLs by mouth 4 times daily as needed for moderate to severe pain        Multi-vitamin Tabs tablet     100 tablet    Take 1 tablet by mouth daily.        pantoprazole 40 MG EC tablet    PROTONIX    90 tablet    Take 1 tablet (40 mg) by mouth every morning (before breakfast) Take 30-60 minutes before a meal.    Other acute gastritis without hemorrhage

## 2017-10-18 NOTE — PROGRESS NOTES
Injectable Influenza Immunization Documentation    1.  Is the person to be vaccinated sick today?   No    2. Does the person to be vaccinated have an allergy to a component   of the vaccine?   No    3. Has the person to be vaccinated ever had a serious reaction   to influenza vaccine in the past?   No    4. Has the person to be vaccinated ever had Guillain-Barré syndrome?   No    Form completed by Denia Savage MA

## 2017-10-26 ENCOUNTER — OFFICE VISIT (OUTPATIENT)
Dept: FAMILY MEDICINE | Facility: CLINIC | Age: 82
End: 2017-10-26
Payer: COMMERCIAL

## 2017-10-26 ENCOUNTER — TELEPHONE (OUTPATIENT)
Dept: FAMILY MEDICINE | Facility: CLINIC | Age: 82
End: 2017-10-26

## 2017-10-26 VITALS
WEIGHT: 69.4 LBS | BODY MASS INDEX: 16.06 KG/M2 | TEMPERATURE: 97.5 F | HEIGHT: 55 IN | DIASTOLIC BLOOD PRESSURE: 72 MMHG | HEART RATE: 113 BPM | SYSTOLIC BLOOD PRESSURE: 114 MMHG | OXYGEN SATURATION: 94 %

## 2017-10-26 DIAGNOSIS — K29.00 OTHER ACUTE GASTRITIS WITHOUT HEMORRHAGE: Primary | ICD-10-CM

## 2017-10-26 LAB
ALBUMIN SERPL-MCNC: 3.7 G/DL (ref 3.4–5)
ALP SERPL-CCNC: 78 U/L (ref 40–150)
ALT SERPL W P-5'-P-CCNC: 20 U/L (ref 0–50)
ANION GAP SERPL CALCULATED.3IONS-SCNC: 10 MMOL/L (ref 3–14)
AST SERPL W P-5'-P-CCNC: 18 U/L (ref 0–45)
BILIRUB SERPL-MCNC: 0.2 MG/DL (ref 0.2–1.3)
BUN SERPL-MCNC: 22 MG/DL (ref 7–30)
CALCIUM SERPL-MCNC: 9.4 MG/DL (ref 8.5–10.1)
CHLORIDE SERPL-SCNC: 106 MMOL/L (ref 94–109)
CO2 SERPL-SCNC: 22 MMOL/L (ref 20–32)
CREAT SERPL-MCNC: 0.74 MG/DL (ref 0.52–1.04)
ERYTHROCYTE [DISTWIDTH] IN BLOOD BY AUTOMATED COUNT: 13.9 % (ref 10–15)
GFR SERPL CREATININE-BSD FRML MDRD: 75 ML/MIN/1.7M2
GLUCOSE SERPL-MCNC: 168 MG/DL (ref 70–99)
HCT VFR BLD AUTO: 36.4 % (ref 35–47)
HGB BLD-MCNC: 11.3 G/DL (ref 11.7–15.7)
LIPASE SERPL-CCNC: 89 U/L (ref 73–393)
MCH RBC QN AUTO: 30 PG (ref 26.5–33)
MCHC RBC AUTO-ENTMCNC: 31 G/DL (ref 31.5–36.5)
MCV RBC AUTO: 97 FL (ref 78–100)
PLATELET # BLD AUTO: 438 10E9/L (ref 150–450)
POTASSIUM SERPL-SCNC: 4.3 MMOL/L (ref 3.4–5.3)
PROT SERPL-MCNC: 7.2 G/DL (ref 6.8–8.8)
RBC # BLD AUTO: 3.77 10E12/L (ref 3.8–5.2)
SODIUM SERPL-SCNC: 138 MMOL/L (ref 133–144)
WBC # BLD AUTO: 6.9 10E9/L (ref 4–11)

## 2017-10-26 PROCEDURE — 80053 COMPREHEN METABOLIC PANEL: CPT | Performed by: PHYSICIAN ASSISTANT

## 2017-10-26 PROCEDURE — 85027 COMPLETE CBC AUTOMATED: CPT | Performed by: PHYSICIAN ASSISTANT

## 2017-10-26 PROCEDURE — 99213 OFFICE O/P EST LOW 20 MIN: CPT | Performed by: PHYSICIAN ASSISTANT

## 2017-10-26 PROCEDURE — 83690 ASSAY OF LIPASE: CPT | Performed by: PHYSICIAN ASSISTANT

## 2017-10-26 PROCEDURE — 36415 COLL VENOUS BLD VENIPUNCTURE: CPT | Performed by: PHYSICIAN ASSISTANT

## 2017-10-26 NOTE — NURSING NOTE
"Chief Complaint   Patient presents with     Abdominal Pain       Initial /72 (BP Location: Right arm, Patient Position: Chair, Cuff Size: Adult Regular)  Pulse 113  Temp 97.5  F (36.4  C) (Oral)  Wt 69 lb 6.4 oz (31.5 kg)  SpO2 94%  Breastfeeding? No  BMI 20.32 kg/m2 Estimated body mass index is 20.32 kg/(m^2) as calculated from the following:    Height as of 7/10/17: 4' 1\" (1.245 m).    Weight as of this encounter: 69 lb 6.4 oz (31.5 kg).  Medication Reconciliation: complete Denia Savage MA  Health Maintenance has been reviewed.       "

## 2017-10-26 NOTE — LETTER
October 27, 2017      Nedra Carr  [address] [facility]   [facility] [address]        Nedra,     Your electrolytes, blood sugar, kidney function and liver enzymes were normal.   Lipase (pancreas) and CBC/hemoglobin were normal as well. Please monitor your symptoms and if they continue, let us know.     Shima Saenz PA-C    Results for orders placed or performed in visit on 10/26/17   CBC with platelets   Result Value Ref Range    WBC 6.9 4.0 - 11.0 10e9/L    RBC Count 3.77 (L) 3.8 - 5.2 10e12/L    Hemoglobin 11.3 (L) 11.7 - 15.7 g/dL    Hematocrit 36.4 35.0 - 47.0 %    MCV 97 78 - 100 fl    MCH 30.0 26.5 - 33.0 pg    MCHC 31.0 (L) 31.5 - 36.5 g/dL    RDW 13.9 10.0 - 15.0 %    Platelet Count 438 150 - 450 10e9/L   Comprehensive metabolic panel   Result Value Ref Range    Sodium 138 133 - 144 mmol/L    Potassium 4.3 3.4 - 5.3 mmol/L    Chloride 106 94 - 109 mmol/L    Carbon Dioxide 22 20 - 32 mmol/L    Anion Gap 10 3 - 14 mmol/L    Glucose 168 (H) 70 - 99 mg/dL    Urea Nitrogen 22 7 - 30 mg/dL    Creatinine 0.74 0.52 - 1.04 mg/dL    GFR Estimate 75 >60 mL/min/1.7m2    GFR Estimate If Black >90 >60 mL/min/1.7m2    Calcium 9.4 8.5 - 10.1 mg/dL    Bilirubin Total 0.2 0.2 - 1.3 mg/dL    Albumin 3.7 3.4 - 5.0 g/dL    Protein Total 7.2 6.8 - 8.8 g/dL    Alkaline Phosphatase 78 40 - 150 U/L    ALT 20 0 - 50 U/L    AST 18 0 - 45 U/L   Lipase   Result Value Ref Range    Lipase 89 73 - 393 U/L

## 2017-10-26 NOTE — LETTER
October 27, 2017      Nedra Carr  [id number] [facility]   [facility] [address]        Dear ,    We are writing to inform you of your test results.    Your electrolytes, blood sugar, kidney function and liver enzymes were normal.   Lipase (pancreas) and CBC/hemoglobin were normal as well. Please monitor your symptoms and if they continue, let us know.      Resulted Orders   CBC with platelets   Result Value Ref Range    WBC 6.9 4.0 - 11.0 10e9/L    RBC Count 3.77 (L) 3.8 - 5.2 10e12/L      Comment:      Results confirmed by repeat test    Hemoglobin 11.3 (L) 11.7 - 15.7 g/dL      Comment:      Results confirmed by repeat test    Hematocrit 36.4 35.0 - 47.0 %    MCV 97 78 - 100 fl    MCH 30.0 26.5 - 33.0 pg    MCHC 31.0 (L) 31.5 - 36.5 g/dL      Comment:      Results confirmed by repeat test    RDW 13.9 10.0 - 15.0 %    Platelet Count 438 150 - 450 10e9/L   Comprehensive metabolic panel   Result Value Ref Range    Sodium 138 133 - 144 mmol/L    Potassium 4.3 3.4 - 5.3 mmol/L    Chloride 106 94 - 109 mmol/L    Carbon Dioxide 22 20 - 32 mmol/L    Anion Gap 10 3 - 14 mmol/L    Glucose 168 (H) 70 - 99 mg/dL    Urea Nitrogen 22 7 - 30 mg/dL    Creatinine 0.74 0.52 - 1.04 mg/dL    GFR Estimate 75 >60 mL/min/1.7m2      Comment:      Non  GFR Calc    GFR Estimate If Black >90 >60 mL/min/1.7m2      Comment:       GFR Calc    Calcium 9.4 8.5 - 10.1 mg/dL    Bilirubin Total 0.2 0.2 - 1.3 mg/dL    Albumin 3.7 3.4 - 5.0 g/dL    Protein Total 7.2 6.8 - 8.8 g/dL    Alkaline Phosphatase 78 40 - 150 U/L    ALT 20 0 - 50 U/L    AST 18 0 - 45 U/L   Lipase   Result Value Ref Range    Lipase 89 73 - 393 U/L       If you have any questions or concerns, please call the clinic at the number listed above.       Sincerely,        Shima Saenz PA-C, CANDE

## 2017-10-26 NOTE — MR AVS SNAPSHOT
"              After Visit Summary   10/26/2017    Nedra Carr    MRN: [id number]           Patient Information     Date Of Birth          1932        Visit Information        Provider Department      10/26/2017 1:15 PM Shima Saenz PA-C [facility]        Today's Diagnoses     Other acute gastritis without hemorrhage    -  1       Follow-ups after your visit        Who to contact     If you have questions or need follow up information about today's clinic visit or your schedule please contact [facility] directly at [phone].  Normal or non-critical lab and imaging results will be communicated to you by Ecohaushart, letter or phone within 4 business days after the clinic has received the results. If you do not hear from us within 7 days, please contact the clinic through Ecohaushart or phone. If you have a critical or abnormal lab result, we will notify you by phone as soon as possible.  Submit refill requests through Bonobos or call your pharmacy and they will forward the refill request to us. Please allow 3 business days for your refill to be completed.          Additional Information About Your Visit        MyChart Information     Bonobos lets you send messages to your doctor, view your test results, renew your prescriptions, schedule appointments and more. To sign up, go to [email or web address] . Click on \"Log in\" on the left side of the screen, which will take you to the Welcome page. Then click on \"Sign up Now\" on the right side of the page.     You will be asked to enter the access code listed below, as well as some personal information. Please follow the directions to create your username and password.     Your access code is: [email or web address]  Expires: 2018 11:01 AM     Your access code will  in 90 days. If you need help or a new code, please call your [facility] or [phone].        Care EveryWhere ID     This is your Care EveryWhere " "ID. This could be used by other organizations to access your [place] medical records  [id number]        Your Vitals Were     Pulse Temperature Height Pulse Oximetry Breastfeeding? BMI (Body Mass Index)    113 97.5  F (36.4  C) (Oral) 4' 1\" (1.245 m) 94% No 20.32 kg/m2       Blood Pressure from Last 3 Encounters:   10/26/17 114/72   07/10/17 105/78   05/25/17 113/74    Weight from Last 3 Encounters:   10/26/17 69 lb 6.4 oz (31.5 kg)   07/10/17 67 lb (30.4 kg)   05/25/17 67 lb (30.4 kg)              We Performed the Following     CBC with platelets     Comprehensive metabolic panel     Lipase          Today's Medication Changes          These changes are accurate as of: 10/26/17  3:03 PM.  If you have any questions, ask your nurse or doctor.               Start taking these medicines.        Dose/Directions    ranitidine 300 MG tablet   Commonly known as:  ZANTAC   Used for:  Other acute gastritis without hemorrhage   Started by:  Shima Saenz PA-C        Dose:  300 mg   Take 1 tablet (300 mg) by mouth At Bedtime   Quantity:  30 tablet   Refills:  1            Where to get your medicines      These medications were sent to [facility] [place] [id number]     Phone:  [phone]     ranitidine 300 MG tablet                Primary Care Provider Office Phone # Fax #    Juan A Rolando Solo PA-C [phone] [address] [place] [address]        Equal Access to Services     : Hadii sravan suarez Soayad, waaxda luqadaha, qaybta kaalmada [place] . So [place] -[phone].    ATENCIÓN: Si habla español, tiene a barrientos disposición servicios gratuitos de asistencia lingüística. Llame al [phone].    We comply with applicable federal civil rights laws and [place] laws. We do not discriminate on the basis of race, color, national origin, age, disability, " sex, sexual orientation, or gender identity.            Thank you!     Thank you for choosing [facility]  for your care. Our goal is always to provide you with excellent care. Hearing back from our patients is one way we can continue to improve our services. Please take a few minutes to complete the written survey that you may receive in the mail after your visit with us. Thank you!             Your Updated Medication List - Protect others around you: Learn how to safely use, store and throw away your medicines at www.disposemymeds.org.          This list is accurate as of: 10/26/17  3:03 PM.  Always use your most recent med list.                   Brand Name Dispense Instructions for use Diagnosis    acetaminophen 325 MG tablet    TYLENOL    60 tablet    Take 2 tablets (650 mg) by mouth every 6 hours as needed for mild pain    Abdominal pain, other specified site       albuterol 108 (90 BASE) MCG/ACT Inhaler    PROAIR HFA/PROVENTIL HFA/VENTOLIN HFA    1 Inhaler    Inhale 2 puffs into the lungs every 6 hours as needed for shortness of breath / dyspnea or wheezing    Dyspnea       alendronate 70 MG tablet    FOSAMAX    12 tablet    Take 1 tablet (70 mg) by mouth every 7 days Take with over 8 ounces water and stay upright for at least 30 minutes after dose.  Take at least 60 minutes before breakfast    Osteoporosis       ENSURE Liqd     15 each    Take 0.5 Cans by mouth daily (strawberry)    Perforated gastric ulcer, chronic or unspecified, Physical deconditioning, Weakness       ferrous gluconate 324 (38 FE) MG tablet    FERGON    30 tablet    Take 1 tablet (324 mg) by mouth daily    Other iron deficiency anemia       HYDROcodone-acetaminophen 7.5-325 MG/15ML solution    LORTAB    80 mL    Take 5 mLs by mouth 4 times daily as needed for moderate to severe pain        Multi-vitamin Tabs tablet     100 tablet    Take 1 tablet by mouth daily.        pantoprazole 40 MG EC tablet    PROTONIX    90  tablet    Take 1 tablet (40 mg) by mouth every morning (before breakfast) Take 30-60 minutes before a meal.    Other acute gastritis without hemorrhage       ranitidine 300 MG tablet    ZANTAC    30 tablet    Take 1 tablet (300 mg) by mouth At Bedtime    Other acute gastritis without hemorrhage

## 2017-10-26 NOTE — PROGRESS NOTES
SUBJECTIVE:   Nedra Carr is a 84 year old female who presents to clinic today for the following health issues:      ABDOMINAL   PAIN     Onset: x 7 days ago    Description:   Character: Dull ache  Location: lower abdomen both sides  Radiation: None    Intensity: moderate    Progression of Symptoms:  same    Accompanying Signs & Symptoms:  Fever/Chills?: no   Gas/Bloating: no   Nausea: YES  Vomitting: no   Diarrhea?: no   Constipation:no   Dysuria or Hematuria: no    History:   Trauma: no   Previous similar pain: YES- Patient states along time ago when she had and ulcer    Previous tests done: none    Precipitating factors:   Does the pain change with:     Food: no      BM: no     Urination: no     Alleviating factors:      Therapies Tried and outcome: Tylenol helps with little relief     LMP:  not applicable     Patient reports oranges hurt her stomach.     Normal BM's daily. No dark stool or loose stool.   H/o PUD  Last endoscopy normal 7/2017.     Problem list and histories reviewed & adjusted, as indicated.  Additional history: as documented    Patient Active Problem List   Diagnosis     Hallux varus, acquired     Other hammer toe (acquired)     Osteoporosis     HTN (hypertension)     Cataract     CARDIOVASCULAR SCREENING; LDL GOAL LESS THAN 130     Advance Care Planning     Intractable chronic migraine without aura     Anemia     Perforated gastric ulcer (H)     Thoracic back pain     Emphysematous cholecystitis     Health Care Home     Paroxysmal atrial fibrillation (H)     Abdominal pain, unspecified abdominal location     UTI (urinary tract infection)     Sepsis (H)     Headache     Physical deconditioning     Urinary bladder stone     S/P cystoscopy     Hypoxia     Hydronephrosis     Acute cholecystitis     Cardiogenic shock (H)     Closed compression fracture of thoracic vertebra (H)     Family history of other digestive disorders     Allergic rhinitis     Arthritis of hand     Esophageal stricture      Obstructive sleep apnea     Weakness     At high risk for falls     Multiple pelvic fractures (H)     Mobility impaired     Abdominal pain     Past Surgical History:   Procedure Laterality Date     BACK SURGERY       BRONCHOSCOPY FLEXIBLE N/A 11/21/2014    Procedure: BRONCHOSCOPY FLEXIBLE;  Surgeon: Rhonda Donohue MD;  Location: [facility]     C NONSPECIFIC PROCEDURE      s/p dilation at [facility] by Dr. Radha FELIPE NONSPECIFIC PROCEDURE      s/p dilation at [facility] by Dr. Betty FELIPE NONSPECIFIC PROCEDURE      Vag hysterectomy     CHOLECYSTECTOMY N/A 11/15/2014    Procedure: CHOLECYSTECTOMY;  Surgeon: Yomi Brennan MD;  Location: [facility] OR     CYSTOSCOPY, LITHOLAPAXY, COMBINED N/A 4/8/2015    Procedure: COMBINED CYSTOSCOPY, LITHOLAPAXY;  Surgeon: Randy Reno MD;  Location: [facility] OR     ENT SURGERY       ESOPHAGOSCOPY, GASTROSCOPY, DUODENOSCOPY (EGD), COMBINED  11/21/2012    Procedure: COMBINED ESOPHAGOSCOPY, GASTROSCOPY, DUODENOSCOPY (EGD), BIOPSY SINGLE OR MULTIPLE;   ESOPHAGOSCOPY, GASTROSCOPY, DUODENOSCOPY (EGD)   with cold biopsy and dilalation with #15 savary;  Surgeon: Guillermo Arcos MD;  Location: [facility]     ESOPHAGOSCOPY, GASTROSCOPY, DUODENOSCOPY (EGD), COMBINED  2/22/2013    Procedure: COMBINED ESOPHAGOSCOPY, GASTROSCOPY, DUODENOSCOPY (EGD);  ESOPHAGOSCOPY, GASTROSCOPY, DUODENOSCOPY (EGD) ;  Surgeon: Lissett Posada MD;  Location: [facility]     ESOPHAGOSCOPY, GASTROSCOPY, DUODENOSCOPY (EGD), COMBINED N/A 10/29/2014    Procedure: COMBINED ESOPHAGOSCOPY, GASTROSCOPY, DUODENOSCOPY (EGD);  Surgeon: Dany Ambrocio MD;  Location: [facility]     ESOPHAGOSCOPY, GASTROSCOPY, DUODENOSCOPY (EGD), COMBINED N/A 1/13/2015    Procedure: COMBINED ESOPHAGOSCOPY, GASTROSCOPY, DUODENOSCOPY (EGD), BIOPSY SINGLE OR MULTIPLE;  Surgeon: Dany Ambrocio MD;  Location: [facility]     ESOPHAGOSCOPY, GASTROSCOPY, DUODENOSCOPY (EGD), COMBINED N/A 3/5/2015    Procedure: COMBINED ESOPHAGOSCOPY, GASTROSCOPY, DUODENOSCOPY  "(EGD);  Surgeon: Dany Ambrocio MD;  Location:  GI     ESOPHAGOSCOPY, GASTROSCOPY, DUODENOSCOPY (EGD), COMBINED N/A 11/10/2015    Procedure: COMBINED ESOPHAGOSCOPY, GASTROSCOPY, DUODENOSCOPY (EGD);  Surgeon: Dany Ambrocio MD;  Location:  GI     EYE SURGERY       GYN SURGERY       LAPAROTOMY EXPLORATORY N/A 11/15/2014    Procedure: LAPAROTOMY EXPLORATORY;  Surgeon: Yomi Brennan MD;  Location: [facility]     LASER HOLMIUM LITHOTRIPSY BLADDER N/A 4/8/2015    Procedure: LASER HOLMIUM LITHOTRIPSY BLADDER;  Surgeon: Randy Reno MD;  Location: [facility]     ORTHOPEDIC SURGERY      hip fx surgery x 2       Social History   Substance Use Topics     Smoking status: Never Smoker     Smokeless tobacco: Never Used     Alcohol use No     Family History   Problem Relation Age of Onset     DIABETES Mother      DIABETES Sister      HEART DISEASE Son              Reviewed and updated as needed this visit by clinical staffTobacco  Allergies  Med Hx  Surg Hx  Fam Hx  Soc Hx      Reviewed and updated as needed this visit by Provider         ROS:  Constitutional, HEENT, cardiovascular, pulmonary, gi and gu systems are negative, except as otherwise noted.      OBJECTIVE:   /72 (BP Location: Right arm, Patient Position: Chair, Cuff Size: Adult Regular)  Pulse 113  Temp 97.5  F (36.4  C) (Oral)  Ht 4' 1\" (1.245 m)  Wt 69 lb 6.4 oz (31.5 kg)  SpO2 94%  Breastfeeding? No  BMI 20.32 kg/m2  Body mass index is 20.32 kg/(m^2).  GENERAL APPEARANCE: healthy, alert and no distress  RESP: lungs clear to auscultation - no rales, rhonchi or wheezes  CV: regular rates and rhythm, normal S1 S2, no S3 or S4 and no murmur, click or rub  ABDOMEN: soft, mild epigastric tenderness,  without hepatosplenomegaly or masses and bowel sounds normal    Diagnostic Test Results:  Results for orders placed or performed in visit on 10/26/17 (from the past 24 hour(s))   CBC with platelets   Result Value Ref Range    WBC 6.9 4.0 - " 11.0 10e9/L    RBC Count 3.77 (L) 3.8 - 5.2 10e12/L    Hemoglobin 11.3 (L) 11.7 - 15.7 g/dL    Hematocrit 36.4 35.0 - 47.0 %    MCV 97 78 - 100 fl    MCH 30.0 26.5 - 33.0 pg    MCHC 31.0 (L) 31.5 - 36.5 g/dL    RDW 13.9 10.0 - 15.0 %    Platelet Count 438 150 - 450 10e9/L       ASSESSMENT/PLAN:             1. Other acute gastritis without hemorrhage  Monitor symptoms. [place] diet, advance as tolerated.  Trial of ranitidine. If no improvement over next 5-7 days return to clinic or call.   The patient indicates understanding of these issues and agrees with the plan.    - ranitidine (ZANTAC) 300 MG tablet; Take 1 tablet (300 mg) by mouth At Bedtime  Dispense: 30 tablet; Refill: 1  - CBC with platelets  - Comprehensive metabolic panel  - Lipase        Shima Saenz PA-C, CANDE  [facility]

## 2017-10-27 NOTE — TELEPHONE ENCOUNTER
Nedra,    Your electrolytes, blood sugar, kidney function and liver enzymes were normal.   Lipase (pancreas) and CBC/hemoglobin were normal as well. Please monitor your symptoms and if they continue, let us know.    Shima Saenz PA-C

## 2017-12-19 ENCOUNTER — OFFICE VISIT (OUTPATIENT)
Dept: FAMILY MEDICINE | Facility: CLINIC | Age: 82
End: 2017-12-19
Payer: COMMERCIAL

## 2017-12-19 VITALS
TEMPERATURE: 97.6 F | BODY MASS INDEX: 20.5 KG/M2 | RESPIRATION RATE: 20 BRPM | HEART RATE: 94 BPM | DIASTOLIC BLOOD PRESSURE: 74 MMHG | SYSTOLIC BLOOD PRESSURE: 128 MMHG | WEIGHT: 70 LBS

## 2017-12-19 DIAGNOSIS — M12.9 ARTHRITIS, MULTIPLE JOINT INVOLVEMENT: Primary | ICD-10-CM

## 2017-12-19 PROCEDURE — 99213 OFFICE O/P EST LOW 20 MIN: CPT | Performed by: PHYSICIAN ASSISTANT

## 2017-12-19 RX ORDER — OXYCODONE HYDROCHLORIDE 5 MG/1
5 TABLET ORAL EVERY 8 HOURS PRN
Qty: 30 TABLET | Refills: 0 | Status: SHIPPED | OUTPATIENT
Start: 2017-12-19 | End: 2018-06-18

## 2017-12-19 NOTE — MR AVS SNAPSHOT
After Visit Summary   12/19/2017    Nedra Carr    MRN: [id number]           Patient Information     Date Of Birth          12/22/1932        Visit Information        Provider Department      12/19/2017 1:45 PM Juan A Solo PA-C [facility]        Today's Diagnoses     Arthritis, multiple joint involvement    -  1      Care Instructions      What Is Arthritis?  Arthritis is a disease that affects the joints. Joints are the parts where bones meet and move. It can affect any joint in your body. There are many types of arthritis, including osteoarthritis, rheumatoid arthritis, gout and lupus. If your symptoms are mild, medicines may be enough to ease pain and swelling. For more severe arthritis, you may need surgery to improve the condition of the joint or replace part or all of the joint.                  What causes arthritis?  Cartilage is a smooth substance that protects the ends of your bones and provides cushioning. When you have arthritis, this cartilage breaks down and can no longer protect your bones. This can happen from an autoimmune disease. Or it can happen from wear and tear, infections, or trauma. The bones rub against each other, causing pain and swelling. Over time, small pieces of rough or splintered bone (bone spurs) may develop. The joint's range of motion can become limited.  Symptoms  Some of the more common symptoms of arthritis include:    Joint pain and stiffness. Pain and stiffness get worse with long periods of rest or using a joint too long or too hard.    Joints that have lost normal shape and motion    Tender, inflamed joints. They may look red and feel warm.    Grinding or popping noise with joint movement    Feeling tired all the time  Reducing symptoms  Following a healthy lifestyle by losing weight and exercising can help ease symptoms of osteoarthritis. Strengthening muscles around the affected joint may will reduce the strain on the  joint. Hot and cold packs may help. Over-the-counter and prescription medicines can be very helpful for arthritis. Talk with your healthcare provider about the best treatments for your condition.   Date Last Reviewed: 5/1/2017 2000-2017 [employer]. [address] [place] [address]. All rights reserved. This information is not intended as a substitute for professional medical care. Always follow your healthcare professional's instructions.                Follow-ups after your visit        Additional Services     ORTHO  REFERRAL       [facility] is referring you to the Orthopedic  Services at [facility].       The  Representative will assist you in the coordination of your Orthopedic and Musculoskeletal Care as prescribed by your physician.    The  Representative will call you within 1 business day to help schedule your appointment, or you may contact the  Representative at:    All areas ~ [phone]     Type of Referral : Non Surgical       Timeframe requested: Routine    Coverage of these services is subject to the terms and limitations of your health insurance plan.  Please call member services at your health plan with any benefit or coverage questions.      If X-rays, CT or MRI's have been performed, please contact the facility where they were done to arrange for , prior to your scheduled appointment.  Please bring this referral request to your appointment and present it to your specialist.                  Who to contact     If you have questions or need follow up information about today's clinic visit or your schedule please contact [facility] directly at [phone].  Normal or non-critical lab and imaging results will be communicated to you by MyChart, letter or phone within 4 business days after the clinic has received the results. If you do not hear from us within 7  "days, please contact the clinic through [employer] or phone. If you have a critical or abnormal lab result, we will notify you by phone as soon as possible.  Submit refill requests through [employer] or call your pharmacy and they will forward the refill request to us. Please allow 3 business days for your refill to be completed.          Additional Information About Your Visit        [employer] Information     [employer] lets you send messages to your doctor, view your test results, renew your prescriptions, schedule appointments and more. To sign up, go to [email or web address] . Click on \"Log in\" on the left side of the screen, which will take you to the Welcome page. Then click on \"Sign up Now\" on the right side of the page.     You will be asked to enter the access code listed below, as well as some personal information. Please follow the directions to create your username and password.     Your access code is: [email or web address]  Expires: 2018 10:01 AM     Your access code will  in 90 days. If you need help or a new code, please call your [facility] clinic or [phone].        Care EveryWhere ID     This is your Care EveryWhere ID. This could be used by other organizations to access your [facility] medical records  [id number]        Your Vitals Were     Pulse Temperature Respirations BMI (Body Mass Index)          94 97.6  F (36.4  C) (Oral) 20 20.5 kg/m2         Blood Pressure from Last 3 Encounters:   17 128/74   10/26/17 114/72   07/10/17 105/78    Weight from Last 3 Encounters:   17 70 lb (31.8 kg)   10/26/17 69 lb 6.4 oz (31.5 kg)   07/10/17 67 lb (30.4 kg)              We Performed the Following     ORTHO  REFERRAL          Today's Medication Changes          These changes are accurate as of: 17  2:13 PM.  If you have any questions, ask your nurse or doctor.               Start taking these medicines.        Dose/Directions    oxyCODONE IR 5 MG tablet   Commonly known as:  " ROXICODONE   Used for:  Arthritis, multiple joint involvement   Started by:  Juan A Solo PA-C        Dose:  5 mg   Take 1 tablet (5 mg) by mouth every 8 hours as needed for pain maximum 3 tablet(s) per day   Quantity:  30 tablet   Refills:  0         Stop taking these medicines if you haven't already. Please contact your care team if you have questions.     HYDROcodone-acetaminophen 7.5-325 MG/15ML solution   Commonly known as:  LORTAB   Stopped by:  Juan A Solo PA-C                Where to get your medicines      Some of these will need a paper prescription and others can be bought over the counter.  Ask your nurse if you have questions.     Bring a paper prescription for each of these medications     oxyCODONE IR 5 MG tablet                Primary Care Provider Office Phone # Fax #    Juan A Solo PA-C [phone] [address] [facility] [address]        Equal Access to Services     [facility] Center[facility] : Hadii sravan alvaradoo Soayad, waaxda luqadaha, qaybta kaalmada adeegyada, [place] el . So [facility] [phone].    ATENCIÓN: Si habla español, tiene a barrientos disposición servicios gratuitos de asistencia lingüística. Llame al [phone].    We comply with applicable federal civil rights laws and [place] laws. We do not discriminate on the basis of race, color, national origin, age, disability, sex, sexual orientation, or gender identity.            Thank you!     Thank you for choosing [facility]  for your care. Our goal is always to provide you with excellent care. Hearing back from our patients is one way we can continue to improve our services. Please take a few minutes to complete the written survey that you may receive in the mail after your visit with us. Thank you!             Your Updated Medication List - Protect others around you: Learn how to safely use, store and throw away your medicines at  www.disposemymeds.org.          This list is accurate as of: 12/19/17  2:13 PM.  Always use your most recent med list.                   Brand Name Dispense Instructions for use Diagnosis    acetaminophen 325 MG tablet    TYLENOL    60 tablet    Take 2 tablets (650 mg) by mouth every 6 hours as needed for mild pain    Abdominal pain, other specified site       albuterol 108 (90 BASE) MCG/ACT Inhaler    PROAIR HFA/PROVENTIL HFA/VENTOLIN HFA    1 Inhaler    Inhale 2 puffs into the lungs every 6 hours as needed for shortness of breath / dyspnea or wheezing    Dyspnea       alendronate 70 MG tablet    FOSAMAX    12 tablet    Take 1 tablet (70 mg) by mouth every 7 days Take with over 8 ounces water and stay upright for at least 30 minutes after dose.  Take at least 60 minutes before breakfast    Osteoporosis       ENSURE Liqd     15 each    Take 0.5 Cans by mouth daily (strawberry)    Perforated gastric ulcer, chronic or unspecified, Physical deconditioning, Weakness       ferrous gluconate 324 (38 FE) MG tablet    FERGON    30 tablet    Take 1 tablet (324 mg) by mouth daily    Other iron deficiency anemia       Multi-vitamin Tabs tablet     100 tablet    Take 1 tablet by mouth daily.        oxyCODONE IR 5 MG tablet    ROXICODONE    30 tablet    Take 1 tablet (5 mg) by mouth every 8 hours as needed for pain maximum 3 tablet(s) per day    Arthritis, multiple joint involvement       pantoprazole 40 MG EC tablet    PROTONIX    90 tablet    Take 1 tablet (40 mg) by mouth every morning (before breakfast) Take 30-60 minutes before a meal.    Other acute gastritis without hemorrhage       ranitidine 300 MG tablet    ZANTAC    30 tablet    Take 1 tablet (300 mg) by mouth At Bedtime    Other acute gastritis without hemorrhage

## 2017-12-19 NOTE — PATIENT INSTRUCTIONS
What Is Arthritis?  Arthritis is a disease that affects the joints. Joints are the parts where bones meet and move. It can affect any joint in your body. There are many types of arthritis, including osteoarthritis, rheumatoid arthritis, gout and lupus. If your symptoms are mild, medicines may be enough to ease pain and swelling. For more severe arthritis, you may need surgery to improve the condition of the joint or replace part or all of the joint.                  What causes arthritis?  Cartilage is a smooth substance that protects the ends of your bones and provides cushioning. When you have arthritis, this cartilage breaks down and can no longer protect your bones. This can happen from an autoimmune disease. Or it can happen from wear and tear, infections, or trauma. The bones rub against each other, causing pain and swelling. Over time, small pieces of rough or splintered bone (bone spurs) may develop. The joint's range of motion can become limited.  Symptoms  Some of the more common symptoms of arthritis include:    Joint pain and stiffness. Pain and stiffness get worse with long periods of rest or using a joint too long or too hard.    Joints that have lost normal shape and motion    Tender, inflamed joints. They may look red and feel warm.    Grinding or popping noise with joint movement    Feeling tired all the time  Reducing symptoms  Following a healthy lifestyle by losing weight and exercising can help ease symptoms of osteoarthritis. Strengthening muscles around the affected joint may will reduce the strain on the joint. Hot and cold packs may help. Over-the-counter and prescription medicines can be very helpful for arthritis. Talk with your healthcare provider about the best treatments for your condition.   Date Last Reviewed: 5/1/2017 2000-2017 [employer]. [address] [place] [address], [place] [address]. All rights reserved. This information is not intended as a substitute for  professional medical care. Always follow your healthcare professional's instructions.

## 2017-12-19 NOTE — PROGRESS NOTES
"  SUBJECTIVE:   Nedra Carr is a 84 year old female who presents to clinic today for the following health issues:        Pain    Onset: weeks    Description:   Location: left side of back, unable to lift arms up; pain \"all over\"  Character: Dull ache    Intensity: 7-8/10    Progression of Symptoms: worse    Accompanying Signs & Symptoms:  Other symptoms: none    History:   Previous similar pain: YES- arthritis    Precipitating factors:   Trauma or overuse: no     Alleviating factors:  Improved by: nothing    Therapies Tried and outcome: tylenol-no relief          Problem list and histories reviewed & adjusted, as indicated.  Additional history: as documented    Patient Active Problem List   Diagnosis     Hallux varus, acquired     Other hammer toe (acquired)     Osteoporosis     HTN (hypertension)     Cataract     CARDIOVASCULAR SCREENING; LDL GOAL LESS THAN 130     Advance Care Planning     Intractable chronic migraine without aura     Anemia     Perforated gastric ulcer (H)     Thoracic back pain     Emphysematous cholecystitis     Health Care Home     Paroxysmal atrial fibrillation (H)     Abdominal pain, unspecified abdominal location     UTI (urinary tract infection)     Sepsis (H)     Headache     Physical deconditioning     Urinary bladder stone     S/P cystoscopy     Hypoxia     Hydronephrosis     Acute cholecystitis     Cardiogenic shock (H)     Closed compression fracture of thoracic vertebra (H)     Family history of other digestive disorders     Allergic rhinitis     Arthritis of hand     Esophageal stricture     Obstructive sleep apnea     Weakness     At high risk for falls     Multiple pelvic fractures (H)     Mobility impaired     Abdominal pain     Past Surgical History:   Procedure Laterality Date     BACK SURGERY       BRONCHOSCOPY FLEXIBLE N/A 11/21/2014    Procedure: BRONCHOSCOPY FLEXIBLE;  Surgeon: Rhonda Donohue MD;  Location: [facility] GI     C NONSPECIFIC PROCEDURE      s/p dilation at " [facility] by Dr. Radha FELIPE NONSPECIFIC PROCEDURE      s/p dilation at [facility] by Dr. Betty FELIPE NONSPECIFIC PROCEDURE      Vag hysterectomy     CHOLECYSTECTOMY N/A 11/15/2014    Procedure: CHOLECYSTECTOMY;  Surgeon: Yomi Brennan MD;  Location: [facility] OR     CYSTOSCOPY, LITHOLAPAXY, COMBINED N/A 4/8/2015    Procedure: COMBINED CYSTOSCOPY, LITHOLAPAXY;  Surgeon: Randy Reno MD;  Location: [facility] OR     ENT SURGERY       ESOPHAGOSCOPY, GASTROSCOPY, DUODENOSCOPY (EGD), COMBINED  11/21/2012    Procedure: COMBINED ESOPHAGOSCOPY, GASTROSCOPY, DUODENOSCOPY (EGD), BIOPSY SINGLE OR MULTIPLE;   ESOPHAGOSCOPY, GASTROSCOPY, DUODENOSCOPY (EGD)   with cold biopsy and dilalation with #15 savary;  Surgeon: Guillermo Arcos MD;  Location: [facility] GI     ESOPHAGOSCOPY, GASTROSCOPY, DUODENOSCOPY (EGD), COMBINED  2/22/2013    Procedure: COMBINED ESOPHAGOSCOPY, GASTROSCOPY, DUODENOSCOPY (EGD);  ESOPHAGOSCOPY, GASTROSCOPY, DUODENOSCOPY (EGD) ;  Surgeon: Lissett Posada MD;  Location:  GI     ESOPHAGOSCOPY, GASTROSCOPY, DUODENOSCOPY (EGD), COMBINED N/A 10/29/2014    Procedure: COMBINED ESOPHAGOSCOPY, GASTROSCOPY, DUODENOSCOPY (EGD);  Surgeon: Dany Ambrocio MD;  Location:  GI     ESOPHAGOSCOPY, GASTROSCOPY, DUODENOSCOPY (EGD), COMBINED N/A 1/13/2015    Procedure: COMBINED ESOPHAGOSCOPY, GASTROSCOPY, DUODENOSCOPY (EGD), BIOPSY SINGLE OR MULTIPLE;  Surgeon: Dany Ambrocio MD;  Location: [facility]     ESOPHAGOSCOPY, GASTROSCOPY, DUODENOSCOPY (EGD), COMBINED N/A 3/5/2015    Procedure: COMBINED ESOPHAGOSCOPY, GASTROSCOPY, DUODENOSCOPY (EGD);  Surgeon: Dany Ambrocio MD;  Location: [facility]     ESOPHAGOSCOPY, GASTROSCOPY, DUODENOSCOPY (EGD), COMBINED N/A 11/10/2015    Procedure: COMBINED ESOPHAGOSCOPY, GASTROSCOPY, DUODENOSCOPY (EGD);  Surgeon: Dany Ambrocio MD;  Location:  GI     EYE SURGERY       GYN SURGERY       LAPAROTOMY EXPLORATORY N/A 11/15/2014    Procedure: LAPAROTOMY EXPLORATORY;  Surgeon: Ymoi Brennan  MD FLOWER;  Location: [facility] OR     LASER HOLMIUM LITHOTRIPSY BLADDER N/A 4/8/2015    Procedure: LASER HOLMIUM LITHOTRIPSY BLADDER;  Surgeon: Randy Reno MD;  Location: [facility] OR     ORTHOPEDIC SURGERY      hip fx surgery x 2       Social History   Substance Use Topics     Smoking status: Never Smoker     Smokeless tobacco: Never Used     Alcohol use No     Family History   Problem Relation Age of Onset     DIABETES Mother      DIABETES Sister      HEART DISEASE Son          Current Outpatient Prescriptions   Medication Sig Dispense Refill     oxyCODONE IR (ROXICODONE) 5 MG tablet Take 1 tablet (5 mg) by mouth every 8 hours as needed for pain maximum 3 tablet(s) per day 30 tablet 0     ranitidine (ZANTAC) 300 MG tablet Take 1 tablet (300 mg) by mouth At Bedtime 30 tablet 1     pantoprazole (PROTONIX) 40 MG EC tablet Take 1 tablet (40 mg) by mouth every morning (before breakfast) Take 30-60 minutes before a meal. 90 tablet 1     ferrous gluconate (FERGON) 324 (38 FE) MG tablet Take 1 tablet (324 mg) by mouth daily 30 tablet 10     alendronate (FOSAMAX) 70 MG tablet Take 1 tablet (70 mg) by mouth every 7 days Take with over 8 ounces water and stay upright for at least 30 minutes after dose.  Take at least 60 minutes before breakfast 12 tablet 3     Nutritional Supplements (ENSURE) LIQD Take 0.5 Cans by mouth daily (strawberry) 15 each 11     acetaminophen (TYLENOL) 325 MG tablet Take 2 tablets (650 mg) by mouth every 6 hours as needed for mild pain 60 tablet 0     albuterol (PROAIR HFA, PROVENTIL HFA, VENTOLIN HFA) 108 (90 BASE) MCG/ACT inhaler Inhale 2 puffs into the lungs every 6 hours as needed for shortness of breath / dyspnea or wheezing 1 Inhaler 1     multivitamin, therapeutic with minerals (MULTI-VITAMIN) TABS Take 1 tablet by mouth daily. 100 tablet 3     Allergies   Allergen Reactions     No Known Drug Allergies      BP Readings from Last 3 Encounters:   12/19/17 128/74   10/26/17 114/72   07/10/17 105/78     Wt Readings from Last 3 Encounters:   12/19/17 70 lb (31.8 kg)   10/26/17 69 lb 6.4 oz (31.5 kg)   07/10/17 67 lb (30.4 kg)                        Reviewed and updated as needed this visit by clinical staffTobacco  Allergies  Meds  Problems  Med Hx  Surg Hx  Fam Hx  Soc Hx        Reviewed and updated as needed this visit by Provider  Allergies  Meds  Problems         ROS:  Constitutional, msk, neuro, cardiovascular, pulmonary systems are negative, except as otherwise noted.      OBJECTIVE:   /74 (BP Location: Right arm, Patient Position: Chair, Cuff Size: Adult Regular)  Pulse 94  Temp 97.6  F (36.4  C) (Oral)  Resp 20  Wt 70 lb (31.8 kg)  BMI 20.5 kg/m2  Body mass index is 20.5 kg/(m^2).  GENERAL: alert, no distress, frail and elderly  MS: severe scoliosis with severe arthritic findings on bilateral hands and knees with ulnar deviation of fingers.   PSYCH: mentation appears normal, affect normal/bright    Diagnostic Test Results:  none     ASSESSMENT/PLAN:     (M12.9) Arthritis, multiple joint involvement  (primary encounter diagnosis)  Comment: reason for pain. Limited treatment options due to age and history of PUD. Tylenol prn to be continued as long as not greater than 3000 mg daily. Will given prn oxycodone for severe pain, but recommending seeing ortho discuss further management of arthritis. OTC Tumeric discussed as well as continuing to stay active.   Plan: oxyCODONE IR (ROXICODONE) 5 MG tablet, ORTHO          REFERRAL        -Medication use and side effects discussed with the patient. Patient is in complete understanding and agreement with plan.         Follow up: as above     Juan A Solo PA-C  [facility]

## 2018-01-04 ENCOUNTER — OFFICE VISIT (OUTPATIENT)
Dept: NEUROSURGERY | Facility: CLINIC | Age: 83
End: 2018-01-04
Attending: NURSE PRACTITIONER
Payer: COMMERCIAL

## 2018-01-04 ENCOUNTER — RADIANT APPOINTMENT (OUTPATIENT)
Dept: GENERAL RADIOLOGY | Facility: CLINIC | Age: 83
End: 2018-01-04
Attending: NURSE PRACTITIONER
Payer: COMMERCIAL

## 2018-01-04 VITALS
WEIGHT: 70.2 LBS | HEART RATE: 110 BPM | OXYGEN SATURATION: 97 % | HEIGHT: 55 IN | SYSTOLIC BLOOD PRESSURE: 119 MMHG | BODY MASS INDEX: 16.24 KG/M2 | DIASTOLIC BLOOD PRESSURE: 74 MMHG

## 2018-01-04 DIAGNOSIS — M54.6 THORACIC SPINE PAIN: ICD-10-CM

## 2018-01-04 DIAGNOSIS — M54.50 CHRONIC LEFT-SIDED LOW BACK PAIN WITHOUT SCIATICA: ICD-10-CM

## 2018-01-04 DIAGNOSIS — G89.29 CHRONIC LEFT-SIDED LOW BACK PAIN WITHOUT SCIATICA: ICD-10-CM

## 2018-01-04 DIAGNOSIS — R07.81 RIB PAIN ON LEFT SIDE: ICD-10-CM

## 2018-01-04 DIAGNOSIS — R07.81 RIB PAIN ON LEFT SIDE: Primary | ICD-10-CM

## 2018-01-04 PROCEDURE — 72070 X-RAY EXAM THORAC SPINE 2VWS: CPT

## 2018-01-04 PROCEDURE — G0463 HOSPITAL OUTPT CLINIC VISIT: HCPCS | Performed by: NURSE PRACTITIONER

## 2018-01-04 PROCEDURE — 99204 OFFICE O/P NEW MOD 45 MIN: CPT | Performed by: NURSE PRACTITIONER

## 2018-01-04 PROCEDURE — 71101 X-RAY EXAM UNILAT RIBS/CHEST: CPT | Mod: LT

## 2018-01-04 PROCEDURE — 72100 X-RAY EXAM L-S SPINE 2/3 VWS: CPT

## 2018-01-04 ASSESSMENT — PAIN SCALES - GENERAL: PAINLEVEL: SEVERE PAIN (6)

## 2018-01-04 NOTE — NURSING NOTE
"Nedra Carr is a 85 year old female who presents for:  Chief Complaint   Patient presents with     Neurologic Problem     Left sided low back pain, arthritis multiple joint involvment         Initial Vitals:  /74 (BP Location: Right arm, Patient Position: Sitting, Cuff Size: Adult Regular)  Pulse 110  Ht 4' 1\" (1.245 m)  Wt 70 lb 3.2 oz (31.8 kg)  SpO2 97%  BMI 20.56 kg/m2 Estimated body mass index is 20.56 kg/(m^2) as calculated from the following:    Height as of this encounter: 4' 1\" (1.245 m).    Weight as of this encounter: 70 lb 3.2 oz (31.8 kg).. Body surface area is 1.05 meters squared. BP completed using cuff size: regular  Severe Pain (6)    Do you feel safe in your environment?  Yes  Do you need any refills today? No    Nursing Comments: Left sided low back pain, arthritis multiple joint involvment.        5 min. nursing intake time  Cassie Wick MA       Discharge plan: 1. Please schedule your physical therapy.      2.  Xrays today   2 min. nursing discharge time  Cassie Wick MA        "

## 2018-01-04 NOTE — LETTER
1/4/2018         RE: Nedra Carr  [address]   [facility] [address]        Dear Colleague,    Thank you for referring your patient, Nedra Carr, to the [facility]. Please see a copy of my visit note below.    Dr. Neto Ruiz  [facility]  Neurosurgery Clinic Visit      CC: low back pain    Primary care Provider: Juan A Solo      Reason For Visit:   I was asked by Dr. Solo to consult on the patient for low back pain.      HPI: Nedra Carr is a 85 year old female with low back pain on the left. She notes that the pain began about 1 year ago. She feels that is is all the time. She states it hurts to walk. She denies any radicular symptoms or weakness. She uses a walker when walking.  She notes that sitting and laying down makes it better. She suffers from arthritis as well. She states that she has all over joint pain.  She has not had PT or injection therapy.      Pain at its worst 10  Pain right now:  6    Past Medical History:   Diagnosis Date     Atrial fibrillation (H)      Dysphagia 2000    Had duodenal strcture dilated at [facility] in 2000 and by Dr. Hernández in 2001     HTN (hypertension)      Migraine, unspecified, without mention of intractable migraine without mention of status migrainosus     beta blocker prophylaxis     Obstructive sleep apnea (adult) (pediatric) 4/9/2015     Osteoporosis, unspecified      Pain in joint, shoulder region      Polymyalgia rheumatica (H)      Renal stones        Past Medical History reviewed with patient during visit.    Past Surgical History:   Procedure Laterality Date     BACK SURGERY       BRONCHOSCOPY FLEXIBLE N/A 11/21/2014    Procedure: BRONCHOSCOPY FLEXIBLE;  Surgeon: Rhonda Donohue MD;  Location: [facility] GI     C NONSPECIFIC PROCEDURE      s/p dilation at [facility] by Dr. Radha FELIPE NONSPECIFIC PROCEDURE      s/p dilation at [facility] by Dr. Betty FELIPE NONSPECIFIC PROCEDURE      Vag  hysterectomy     CHOLECYSTECTOMY N/A 11/15/2014    Procedure: CHOLECYSTECTOMY;  Surgeon: Yomi Brennan MD;  Location: [facility] OR     CYSTOSCOPY, LITHOLAPAXY, COMBINED N/A 4/8/2015    Procedure: COMBINED CYSTOSCOPY, LITHOLAPAXY;  Surgeon: Randy Reno MD;  Location: [facility] OR     ENT SURGERY       ESOPHAGOSCOPY, GASTROSCOPY, DUODENOSCOPY (EGD), COMBINED  11/21/2012    Procedure: COMBINED ESOPHAGOSCOPY, GASTROSCOPY, DUODENOSCOPY (EGD), BIOPSY SINGLE OR MULTIPLE;   ESOPHAGOSCOPY, GASTROSCOPY, DUODENOSCOPY (EGD)   with cold biopsy and dilalation with #15 savary;  Surgeon: Guillermo Arcos MD;  Location:  GI     ESOPHAGOSCOPY, GASTROSCOPY, DUODENOSCOPY (EGD), COMBINED  2/22/2013    Procedure: COMBINED ESOPHAGOSCOPY, GASTROSCOPY, DUODENOSCOPY (EGD);  ESOPHAGOSCOPY, GASTROSCOPY, DUODENOSCOPY (EGD) ;  Surgeon: Lissett Posada MD;  Location:  GI     ESOPHAGOSCOPY, GASTROSCOPY, DUODENOSCOPY (EGD), COMBINED N/A 10/29/2014    Procedure: COMBINED ESOPHAGOSCOPY, GASTROSCOPY, DUODENOSCOPY (EGD);  Surgeon: Dany Ambrocio MD;  Location:  GI     ESOPHAGOSCOPY, GASTROSCOPY, DUODENOSCOPY (EGD), COMBINED N/A 1/13/2015    Procedure: COMBINED ESOPHAGOSCOPY, GASTROSCOPY, DUODENOSCOPY (EGD), BIOPSY SINGLE OR MULTIPLE;  Surgeon: Dany Ambrocio MD;  Location:  GI     ESOPHAGOSCOPY, GASTROSCOPY, DUODENOSCOPY (EGD), COMBINED N/A 3/5/2015    Procedure: COMBINED ESOPHAGOSCOPY, GASTROSCOPY, DUODENOSCOPY (EGD);  Surgeon: Dany Ambrocio MD;  Location: [facility] GI     ESOPHAGOSCOPY, GASTROSCOPY, DUODENOSCOPY (EGD), COMBINED N/A 11/10/2015    Procedure: COMBINED ESOPHAGOSCOPY, GASTROSCOPY, DUODENOSCOPY (EGD);  Surgeon: Dany Ambrocio MD;  Location:  GI     EYE SURGERY       GYN SURGERY       LAPAROTOMY EXPLORATORY N/A 11/15/2014    Procedure: LAPAROTOMY EXPLORATORY;  Surgeon: Yomi Brennan MD;  Location: [facility] OR     LASER HOLMIUM LITHOTRIPSY BLADDER N/A 4/8/2015    Procedure: LASER HOLMIUM LITHOTRIPSY  BLADDER;  Surgeon: Randy Reno MD;  Location: [facility] OR     ORTHOPEDIC SURGERY      hip fx surgery x 2         Past Surgical History reviewed with patient during visit.    Current Outpatient Prescriptions   Medication     oxyCODONE IR (ROXICODONE) 5 MG tablet     ranitidine (ZANTAC) 300 MG tablet     pantoprazole (PROTONIX) 40 MG EC tablet     ferrous gluconate (FERGON) 324 (38 FE) MG tablet     alendronate (FOSAMAX) 70 MG tablet     Nutritional Supplements (ENSURE) LIQD     acetaminophen (TYLENOL) 325 MG tablet     albuterol (PROAIR HFA, PROVENTIL HFA, VENTOLIN HFA) 108 (90 BASE) MCG/ACT inhaler     multivitamin, therapeutic with minerals (MULTI-VITAMIN) TABS     No current facility-administered medications for this visit.        Allergies   Allergen Reactions     No Known Drug Allergies        Social History     Social History     Marital status:      Spouse name: Jefferson     Number of children: 3     Years of education: N/A     Occupational History      Retired     Social History Main Topics     Smoking status: Never Smoker     Smokeless tobacco: Never Used     Alcohol use No     Drug use: No     Sexual activity: Not Currently     Partners: Male     Other Topics Concern     Caffeine Concern No     1 cup weekly     Special Diet No     Exercise Yes     band exercises     Social History Narrative       Family History   Problem Relation Age of Onset     DIABETES Mother      DIABETES Sister      HEART DISEASE Son          Review Of Systems  Skin: negative  Eyes: negative  Ears/Nose/Throat: negative  Respiratory: No shortness of breath, dyspnea on exertion, cough, or hemoptysis  Cardiovascular: negative  Gastrointestinal: negative  Genitourinary: negative  Musculoskeletal: back pain  Neurologic: negative  Psychiatric: negative  Hematologic/Lymphatic/Immunologic: negative  Endocrine: osteoporosis      ROS: 10 point ROS neg other than the symptoms noted above in the HPI.    Vital Signs: /74 (BP  "Location: Right arm, Patient Position: Sitting, Cuff Size: Adult Regular)  Pulse 110  Ht 4' 1\" (1.245 m)  Wt 70 lb 3.2 oz (31.8 kg)  SpO2 97%  BMI 20.56 kg/m2    Examination:  Constitutional:  Alert, very petite stature  Memory: recent and remote memory intact   HEENT: Normocephalic, atraumatic.   Pulm:  Without shortness of breath   CV:  No pitting edema of BLE.    Neurological:  Awake  Alert  Oriented x 3  Speech clear  Cranial nerves II - XII intact  PERRL  EOMI  Face symmetric  Tongue midline  Motor exam   Shoulder Abduction:  Right:  5/5   Left:  5/5  Biceps:                      Right:  5/5   Left:  5/5  Triceps:                     Right:  5/5   Left:  5/5  Wrist Extensors:       Right:  5/5   Left:  5/5  Wrist Flexors:           Right:  5/5   Left:  5/5  Intrinsics:                   Right:  5/5   Left:  5/5   Hip Flexor:                Right: 5/5  Left:  5/5  Hip Adductor:             Right:  5/5  Left:  5/5  Hip Abductor:             Right:  5/5  Left:  5/5  Gastroc Soleus:        Right:  5/5  Left:  5/5  Tib/Ant:                      Right:  5/5  Left:  5/5  EHL:                          Right:  5/5  Left:  5/5   Sensation normal to bilateral upper and lower extremities    Gait: Able to stand from a seated position. Hunched over antalgic gait  Pt has arthritic nodes to knuckles to bilateral hands.   Lumbar examination reveals tenderness to spine.  Pt very kyphotic.    Pain with palpation to the left rib cage      Imaging: NONE    Assessment/Plan:   Nedra Carr is a 85 year old female with low back pain on the left. She notes that the pain began about 1 year ago. She feels that is is all the time. She states it hurts to walk. She denies any radicular symptoms or weakness. She uses a walker when walking.  She notes that sitting and laying down makes it better. She suffers from arthritis as well. She states that she has all over joint pain.  She has not had PT or injection therapy.  The pt is very " arthritic and thin.  She notes mostly left rib cage pain.  Due to her age I would only recommend conservative care of PT. I will obtain xrays to rule out any fracture. She agreed with the POC.        Patient Instructions   1. Please schedule your physical therapy.      2.  Xrays today          Luann Villegas CNP  [facility]  [address] [address]    Tel [phone]  Pager [phone]      Again, thank you for allowing me to participate in the care of your patient.        Sincerely,        EUGENIE Hodges CNP

## 2018-01-04 NOTE — MR AVS SNAPSHOT
After Visit Summary   1/4/2018    Nedra Carr    MRN: [id number]           Patient Information     Date Of Birth          12/22/1932        Visit Information        Provider Department      1/4/2018 11:40 AM Luann Villgeas APRN CNP [facility]        Today's Diagnoses     Rib pain on left side    -  1    Chronic left-sided low back pain without sciatica        Thoracic spine pain          Care Instructions    1. Please schedule your physical therapy.      2.  Xrays today           Follow-ups after your visit        Additional Services     [facility]MA PT, HAND, AND CHIROPRACTIC REFERRAL       **This order will print in the [facility] Scheduling Office**    Physical Therapy, Hand Therapy and Chiropractic Care are available through:    *[facility]  *[facility]  *[facility]    Call one number to schedule at any of the above locations: [phone].    Your provider has referred you to: Physical Therapy at [facility] or [facility]    Indication/Reason for Referral: spine pain  Onset of Illness: chronic  Therapy Orders: Evaluate and Treat  Special Programs: None  Special Request: None    Adilson De Anda      Additional Comments for the Therapist or Chiropractor:         Please be aware that coverage of these services is subject to the terms and limitations of your health insurance plan.  Call member services at your health plan with any benefit or coverage questions.      Please bring the following to your appointment:    *Your personal calendar for scheduling future appointments  *Comfortable clothing                  Your next 10 appointments already scheduled     Jan 04, 2018 11:40 AM CST   New Visit with EUGENIE Hodges CNP   [facility] ([facility])    [address] [address] [id number] [phone]              Future tests that were ordered for you today     Open  "Future Orders        Priority Expected Expires Ordered    XR Ribs & Chest Left G/E 3 Views Routine 2018    XR Lumbar Spine 2/3 Views Routine 2018    XR Thoracic Spine 2 Views Routine 2018            Who to contact     If you have questions or need follow up information about today's clinic visit or your schedule please contact [facility] directly at [phone].  Normal or non-critical lab and imaging results will be communicated to you by [employer], letter or phone within 4 business days after the clinic has received the results. If you do not hear from us within 7 days, please contact the clinic through [employer] or phone. If you have a critical or abnormal lab result, we will notify you by phone as soon as possible.  Submit refill requests through [employer] or call your pharmacy and they will forward the refill request to us. Please allow 3 business days for your refill to be completed.          Additional Information About Your Visit        [employer] Information     [employer] lets you send messages to your doctor, view your test results, renew your prescriptions, schedule appointments and more. To sign up, go to [email or web address] . Click on \"Log in\" on the left side of the screen, which will take you to the Welcome page. Then click on \"Sign up Now\" on the right side of the page.     You will be asked to enter the access code listed below, as well as some personal information. Please follow the directions to create your username and password.     Your access code is: [email or web address]  Expires: 2018 10:01 AM     Your access code will  in 90 days. If you need help or a new code, please call your [place] clinic or [phone].        Care EveryWhere ID     This is your Care EveryWhere ID. This could be used by other organizations to access your [place] medical records  [id number]        Your Vitals Were     Pulse Height " "Pulse Oximetry BMI (Body Mass Index)          110 4' 1\" (1.245 m) 97% 20.56 kg/m2         Blood Pressure from Last 3 Encounters:   01/04/18 119/74   12/19/17 128/74   10/26/17 114/72    Weight from Last 3 Encounters:   01/04/18 70 lb 3.2 oz (31.8 kg)   12/19/17 70 lb (31.8 kg)   10/26/17 69 lb 6.4 oz (31.5 kg)              We Performed the Following     EDILMA PT, HAND, AND CHIROPRACTIC REFERRAL        Primary Care Provider Office Phone # Fax #    Juan A Rolando Solo PA-C [phone] [address] [facility] [address]        Equal Access to Services     ELAINA ANAYA : Hadii sravan alvaradoo Soayad, waaxda luqadaha, qaybta kaalmada adeegyada, [place] el . So [facility] [phone].    ATENCIÓN: Si habla español, tiene a barrientos disposición servicios gratuitos de asistencia lingüística. Llame al [phone].    We comply with applicable federal civil rights laws and [place] laws. We do not discriminate on the basis of race, color, national origin, age, disability, sex, sexual orientation, or gender identity.            Thank you!     Thank you for choosing [facility]  for your care. Our goal is always to provide you with excellent care. Hearing back from our patients is one way we can continue to improve our services. Please take a few minutes to complete the written survey that you may receive in the mail after your visit with us. Thank you!             Your Updated Medication List - Protect others around you: Learn how to safely use, store and throw away your medicines at www.disposemymeds.org.          This list is accurate as of: 1/4/18 11:27 AM.  Always use your most recent med list.                   Brand Name Dispense Instructions for use Diagnosis    acetaminophen 325 MG tablet    TYLENOL    60 tablet    Take 2 tablets (650 mg) by mouth every 6 hours as needed for mild pain    Abdominal pain, other specified site       albuterol 108 (90 BASE) " MCG/ACT Inhaler    PROAIR HFA/PROVENTIL HFA/VENTOLIN HFA    1 Inhaler    Inhale 2 puffs into the lungs every 6 hours as needed for shortness of breath / dyspnea or wheezing    Dyspnea       alendronate 70 MG tablet    FOSAMAX    12 tablet    Take 1 tablet (70 mg) by mouth every 7 days Take with over 8 ounces water and stay upright for at least 30 minutes after dose.  Take at least 60 minutes before breakfast    Osteoporosis       ENSURE Liqd     15 each    Take 0.5 Cans by mouth daily (strawberry)    Perforated gastric ulcer, chronic or unspecified, Physical deconditioning, Weakness       ferrous gluconate 324 (38 FE) MG tablet    FERGON    30 tablet    Take 1 tablet (324 mg) by mouth daily    Other iron deficiency anemia       Multi-vitamin Tabs tablet     100 tablet    Take 1 tablet by mouth daily.        oxyCODONE IR 5 MG tablet    ROXICODONE    30 tablet    Take 1 tablet (5 mg) by mouth every 8 hours as needed for pain maximum 3 tablet(s) per day    Arthritis, multiple joint involvement       pantoprazole 40 MG EC tablet    PROTONIX    90 tablet    Take 1 tablet (40 mg) by mouth every morning (before breakfast) Take 30-60 minutes before a meal.    Other acute gastritis without hemorrhage       ranitidine 300 MG tablet    ZANTAC    30 tablet    Take 1 tablet (300 mg) by mouth At Bedtime    Other acute gastritis without hemorrhage

## 2018-01-04 NOTE — PROGRESS NOTES
Dr. Neto Ruiz  [facility]  Neurosurgery Clinic Visit      CC: low back pain    Primary care Provider: Juan A Solo      Reason For Visit:   I was asked by Dr. Solo to consult on the patient for low back pain.      HPI: Nedra Carr is a 85 year old female with low back pain on the left. She notes that the pain began about 1 year ago. She feels that is is all the time. She states it hurts to walk. She denies any radicular symptoms or weakness. She uses a walker when walking.  She notes that sitting and laying down makes it better. She suffers from arthritis as well. She states that she has all over joint pain.  She has not had PT or injection therapy.      Pain at its worst 10  Pain right now:  6    Past Medical History:   Diagnosis Date     Atrial fibrillation (H)      Dysphagia 2000    [place] [facility] [place] Dr. Hernández in 2001     HTN (hypertension)      Migraine, unspecified, without mention of intractable migraine without mention of status migrainosus     beta blocker prophylaxis     Obstructive sleep apnea (adult) (pediatric) 4/9/2015     Osteoporosis, unspecified      Pain in joint, shoulder region      Polymyalgia rheumatica (H)      Renal stones        Past Medical History reviewed with patient during visit.    Past Surgical History:   Procedure Laterality Date     BACK SURGERY       BRONCHOSCOPY FLEXIBLE N/A 11/21/2014    Procedure: BRONCHOSCOPY FLEXIBLE;  Surgeon: Rhonda Donohue MD;  Location:  GI     C NONSPECIFIC PROCEDURE      [place] [facility] [place] Radha FELIPE NONSPECIFIC PROCEDURE      s/p dilation at [facility] by Dr. Betty FELIPE NONSPECIFIC PROCEDURE      Vag hysterectomy     CHOLECYSTECTOMY N/A 11/15/2014    Procedure: CHOLECYSTECTOMY;  Surgeon: Yomi Brennan MD;  Location:  OR     CYSTOSCOPY, LITHOLAPAXY, COMBINED N/A 4/8/2015    Procedure: COMBINED CYSTOSCOPY, LITHOLAPAXY;  Surgeon: Randy Reno MD;   Location:  OR     ENT SURGERY       ESOPHAGOSCOPY, GASTROSCOPY, DUODENOSCOPY (EGD), COMBINED  11/21/2012    Procedure: COMBINED ESOPHAGOSCOPY, GASTROSCOPY, DUODENOSCOPY (EGD), BIOPSY SINGLE OR MULTIPLE;   ESOPHAGOSCOPY, GASTROSCOPY, DUODENOSCOPY (EGD)   with cold biopsy and dilalation with #15 savary;  Surgeon: Guillermo Arcos MD;  Location:  GI     ESOPHAGOSCOPY, GASTROSCOPY, DUODENOSCOPY (EGD), COMBINED  2/22/2013    Procedure: COMBINED ESOPHAGOSCOPY, GASTROSCOPY, DUODENOSCOPY (EGD);  ESOPHAGOSCOPY, GASTROSCOPY, DUODENOSCOPY (EGD) ;  Surgeon: Lissett Posada MD;  Location:  GI     ESOPHAGOSCOPY, GASTROSCOPY, DUODENOSCOPY (EGD), COMBINED N/A 10/29/2014    Procedure: COMBINED ESOPHAGOSCOPY, GASTROSCOPY, DUODENOSCOPY (EGD);  Surgeon: Dany Ambrocio MD;  Location:  GI     ESOPHAGOSCOPY, GASTROSCOPY, DUODENOSCOPY (EGD), COMBINED N/A 1/13/2015    Procedure: COMBINED ESOPHAGOSCOPY, GASTROSCOPY, DUODENOSCOPY (EGD), BIOPSY SINGLE OR MULTIPLE;  Surgeon: Dany Ambrocio MD;  Location:  GI     ESOPHAGOSCOPY, GASTROSCOPY, DUODENOSCOPY (EGD), COMBINED N/A 3/5/2015    Procedure: COMBINED ESOPHAGOSCOPY, GASTROSCOPY, DUODENOSCOPY (EGD);  Surgeon: Dany Ambrocio MD;  Location:  GI     ESOPHAGOSCOPY, GASTROSCOPY, DUODENOSCOPY (EGD), COMBINED N/A 11/10/2015    Procedure: COMBINED ESOPHAGOSCOPY, GASTROSCOPY, DUODENOSCOPY (EGD);  Surgeon: Dany Ambrocio MD;  Location:  GI     EYE SURGERY       GYN SURGERY       LAPAROTOMY EXPLORATORY N/A 11/15/2014    Procedure: LAPAROTOMY EXPLORATORY;  Surgeon: Yomi Brennan MD;  Location:  OR     LASER HOLMIUM LITHOTRIPSY BLADDER N/A 4/8/2015    Procedure: LASER HOLMIUM LITHOTRIPSY BLADDER;  Surgeon: Randy Reno MD;  Location:  OR     ORTHOPEDIC SURGERY      hip fx surgery x 2         Past Surgical History reviewed with patient during visit.    Current Outpatient Prescriptions   Medication     oxyCODONE IR (ROXICODONE) 5 MG tablet      "ranitidine (ZANTAC) 300 MG tablet     pantoprazole (PROTONIX) 40 MG EC tablet     ferrous gluconate (FERGON) 324 (38 FE) MG tablet     alendronate (FOSAMAX) 70 MG tablet     Nutritional Supplements (ENSURE) LIQD     acetaminophen (TYLENOL) 325 MG tablet     albuterol (PROAIR HFA, PROVENTIL HFA, VENTOLIN HFA) 108 (90 BASE) MCG/ACT inhaler     multivitamin, therapeutic with minerals (MULTI-VITAMIN) TABS     No current facility-administered medications for this visit.        Allergies   Allergen Reactions     No Known Drug Allergies        Social History     Social History     Marital status:      Spouse name: Jefferson     Number of children: 3     Years of education: N/A     Occupational History      Retired     Social History Main Topics     Smoking status: Never Smoker     Smokeless tobacco: Never Used     Alcohol use No     Drug use: No     Sexual activity: Not Currently     Partners: Male     Other Topics Concern     Caffeine Concern No     1 cup weekly     Special Diet No     Exercise Yes     band exercises     Social History Narrative       Family History   Problem Relation Age of Onset     DIABETES Mother      DIABETES Sister      HEART DISEASE Son          Review Of Systems  Skin: negative  Eyes: negative  Ears/Nose/Throat: negative  Respiratory: No shortness of breath, dyspnea on exertion, cough, or hemoptysis  Cardiovascular: negative  Gastrointestinal: negative  Genitourinary: negative  Musculoskeletal: back pain  Neurologic: negative  Psychiatric: negative  Hematologic/Lymphatic/Immunologic: negative  Endocrine: osteoporosis      ROS: 10 point ROS neg other than the symptoms noted above in the HPI.    Vital Signs: /74 (BP Location: Right arm, Patient Position: Sitting, Cuff Size: Adult Regular)  Pulse 110  Ht 4' 1\" (1.245 m)  Wt 70 lb 3.2 oz (31.8 kg)  SpO2 97%  BMI 20.56 kg/m2    Examination:  Constitutional:  Alert, very petite stature  Memory: recent and remote memory intact   HEENT: " Normocephalic, atraumatic.   Pulm:  Without shortness of breath   CV:  No pitting edema of BLE.    Neurological:  Awake  Alert  Oriented x 3  Speech clear  Cranial nerves II - XII intact  PERRL  EOMI  Face symmetric  Tongue midline  Motor exam   Shoulder Abduction:  Right:  5/5   Left:  5/5  Biceps:                      Right:  5/5   Left:  5/5  Triceps:                     Right:  5/5   Left:  5/5  Wrist Extensors:       Right:  5/5   Left:  5/5  Wrist Flexors:           Right:  5/5   Left:  5/5  Intrinsics:                   Right:  5/5   Left:  5/5   Hip Flexor:                Right: 5/5  Left:  5/5  Hip Adductor:             Right:  5/5  Left:  5/5  Hip Abductor:             Right:  5/5  Left:  5/5  Gastroc Soleus:        Right:  5/5  Left:  5/5  Tib/Ant:                      Right:  5/5  Left:  5/5  EHL:                          Right:  5/5  Left:  5/5   Sensation normal to bilateral upper and lower extremities    Gait: Able to stand from a seated position. Hunched over antalgic gait  Pt has arthritic nodes to knuckles to bilateral hands.   Lumbar examination reveals tenderness to spine.  Pt very kyphotic.    Pain with palpation to the left rib cage      Imaging: NONE    Assessment/Plan:   Nedra Carr is a 85 year old female with low back pain on the left. She notes that the pain began about 1 year ago. She feels that is is all the time. She states it hurts to walk. She denies any radicular symptoms or weakness. She uses a walker when walking.  She notes that sitting and laying down makes it better. She suffers from arthritis as well. She states that she has all over joint pain.  She has not had PT or injection therapy.  The pt is very arthritic and thin.  She notes mostly left rib cage pain.  Due to her age I would only recommend conservative care of PT. I will obtain xrays to rule out any fracture. She agreed with the POC.        Patient Instructions   1. Please schedule your physical therapy.      2.   Xrays today          Luann Villegas [facility]  [facility]  [address] [address]    Tel [phone]  Pager [phone]

## 2018-01-09 ENCOUNTER — THERAPY VISIT (OUTPATIENT)
Dept: PHYSICAL THERAPY | Facility: CLINIC | Age: 83
End: 2018-01-09
Payer: COMMERCIAL

## 2018-01-09 DIAGNOSIS — M54.50 CHRONIC LEFT-SIDED LOW BACK PAIN WITHOUT SCIATICA: ICD-10-CM

## 2018-01-09 DIAGNOSIS — M54.6 CHRONIC LEFT-SIDED THORACIC BACK PAIN: Primary | ICD-10-CM

## 2018-01-09 DIAGNOSIS — G89.29 CHRONIC LEFT-SIDED THORACIC BACK PAIN: Primary | ICD-10-CM

## 2018-01-09 DIAGNOSIS — G89.29 CHRONIC LEFT-SIDED LOW BACK PAIN WITHOUT SCIATICA: ICD-10-CM

## 2018-01-09 PROCEDURE — 97161 PT EVAL LOW COMPLEX 20 MIN: CPT | Mod: GP | Performed by: PHYSICAL THERAPIST

## 2018-01-09 PROCEDURE — 97110 THERAPEUTIC EXERCISES: CPT | Mod: GP | Performed by: PHYSICAL THERAPIST

## 2018-01-09 NOTE — MR AVS SNAPSHOT
"              After Visit Summary   1/9/2018    Nedra Carr    MRN: [id number]           Patient Information     Date Of Birth          12/22/1932        Visit Information        Provider Department      1/9/2018 12:50 PM Tiffanie Ayala, PT [facility]        Today's Diagnoses     Chronic left-sided thoracic back pain    -  1    Chronic left-sided low back pain without sciatica           Follow-ups after your visit        Your next 10 appointments already scheduled     Jan 16, 2018 11:00 AM CST   [facility] with Tiffanie Ayala, PT   [facility] ([facility]  )    [address] [address] [address]   [phone]              Who to contact     If you have questions or need follow up information about today's clinic visit or your schedule please contact [facility] directly at [phone].  Normal or non-critical lab and imaging results will be communicated to you by MyChart, letter or phone within 4 business days after the clinic has received the results. If you do not hear from us within 7 days, please contact the clinic through MyChart or phone. If you have a critical or abnormal lab result, we will notify you by phone as soon as possible.  Submit refill requests through [employer] or call your pharmacy and they will forward the refill request to us. Please allow 3 business days for your refill to be completed.          Additional Information About Your Visit        MyChart Information     [employer] lets you send messages to your doctor, view your test results, renew your prescriptions, schedule appointments and more. To sign up, go to [email or web address] . Click on \"Log in\" on the left side of the screen, which will take you to the Welcome page. Then click on \"Sign up Now\" on the right side of the page.     You will be asked to enter the access code listed below, as well as some personal information. Please follow the directions to create your username and " password.     Your access code is: [email or web address]  Expires: 2018 10:01 AM     Your access code will  in 90 days. If you need help or a new code, please call your [place] clinic or [phone].        Care EveryWhere ID     This is your Care EveryWhere ID. This could be used by other organizations to access your [place] medical records  [id number]         Blood Pressure from Last 3 Encounters:   18 119/74   17 128/74   10/26/17 114/72    Weight from Last 3 Encounters:   18 31.8 kg (70 lb 3.2 oz)   17 31.8 kg (70 lb)   10/26/17 31.5 kg (69 lb 6.4 oz)              We Performed the Following     HC PT EVAL, LOW COMPLEXITY     [facility] INITIAL EVAL REPORT     THERAPEUTIC EXERCISES        Primary Care Provider Office Phone # Fax #    Juan A Rolando Solo PA-C [phone] [address] [facility] [address]        Equal Access to Services     : Hadii aad ku hadasho Soomaali, waaxda luqadaha, qaybta kaalmada adeegyada, [place] el . So [facility] [phone].    ATENCIÓN: Si habla español, tiene a barrientos disposición servicios gratuitos de asistencia lingüística. [facility] [phone].    We comply with applicable federal civil rights laws and [place] laws. We do not discriminate on the basis of race, color, national origin, age, disability, sex, sexual orientation, or gender identity.            Thank you!     Thank you for choosing [facility]  for your care. Our goal is always to provide you with excellent care. Hearing back from our patients is one way we can continue to improve our services. Please take a few minutes to complete the written survey that you may receive in the mail after your visit with us. Thank you!             Your Updated Medication List - Protect others around you: Learn how to safely use, store and throw away your medicines at www.disposemymeds.org.          This list is accurate as of: 18  1:24  PM.  Always use your most recent med list.                   Brand Name Dispense Instructions for use Diagnosis    acetaminophen 325 MG tablet    TYLENOL    60 tablet    Take 2 tablets (650 mg) by mouth every 6 hours as needed for mild pain    Abdominal pain, other specified site       albuterol 108 (90 BASE) MCG/ACT Inhaler    PROAIR HFA/PROVENTIL HFA/VENTOLIN HFA    1 Inhaler    Inhale 2 puffs into the lungs every 6 hours as needed for shortness of breath / dyspnea or wheezing    Dyspnea       alendronate 70 MG tablet    FOSAMAX    12 tablet    Take 1 tablet (70 mg) by mouth every 7 days Take with over 8 ounces water and stay upright for at least 30 minutes after dose.  Take at least 60 minutes before breakfast    Osteoporosis       ENSURE Liqd     15 each    Take 0.5 Cans by mouth daily (strawberry)    Perforated gastric ulcer, chronic or unspecified, Physical deconditioning, Weakness       ferrous gluconate 324 (38 FE) MG tablet    FERGON    30 tablet    Take 1 tablet (324 mg) by mouth daily    Other iron deficiency anemia       Multi-vitamin Tabs tablet     100 tablet    Take 1 tablet by mouth daily.        oxyCODONE IR 5 MG tablet    ROXICODONE    30 tablet    Take 1 tablet (5 mg) by mouth every 8 hours as needed for pain maximum 3 tablet(s) per day    Arthritis, multiple joint involvement       pantoprazole 40 MG EC tablet    PROTONIX    90 tablet    Take 1 tablet (40 mg) by mouth every morning (before breakfast) Take 30-60 minutes before a meal.    Other acute gastritis without hemorrhage       ranitidine 300 MG tablet    ZANTAC    30 tablet    Take 1 tablet (300 mg) by mouth At Bedtime    Other acute gastritis without hemorrhage

## 2018-01-09 NOTE — PROGRESS NOTES
[facility] Initial Evaluation  Subjective:  Patient is a 85 year old female presenting with rehab back hpi.   Nedra Carr is a 85 year old female with a lumbar and thoracic condition.  Condition occurred with:  Insidious onset.    This is a chronic condition  Nedra Carr is a 85 year old female with low back pain on the left. She notes that the pain began about 1 year ago. She feels that is is all the time. She states it hurts to walk. She denies any radicular symptoms or weakness. She uses a walker when walking.  She notes that sitting and laying down makes it better. She suffers from arthritis as well. She states that she has all over joint pain.  She has not had PT or injection therapy.        Pain is described as aching and is constant and reported as 5/10.  Associated symptoms:  Loss of motion/stiffness and loss of strength. Pain is worse during the day and worse in the A.M..  Symptoms are exacerbated by walking and standing and relieved by rest.  Since onset symptoms are unchanged.  Special tests:  X-ray.    There was mild improvement following previous treatment.  General health as reported by patient is fair.  Pertinent medical history includes:  Osteoporosis.  Medical allergies: no.  Other surgeries include:  Other.  Current medications:  Meds to increase bone density and pain medication.  Current occupation is Retired. .        Barriers include:  None as reported by the patient.    Red flags:  None as reported by the patient.                        Objective:  Standing Alignment:    Cervical/Thoracic:  Convex thoracic scoliosis R    Lumbar:  Convex scoliosis L            Gait:    Assistive Devices:  None  Deviations:  Lumbar:  Trunk flexion               Lumbar/SI Evaluation  ROM:    AROM Lumbar:   Flexion:          Min loss  Ext:                    Mod to major loss   Side Bend:        Left:     Right:   Rotation:           Left:     Right:   Side Glide:        Left:  Mod  loss pdm    Right:  Min loss pdm      AROM Thoracic:  Flex:               Min loss  Ext:                Mod to major loss, pdm  Rotation:        Left:  Mod loss pdm    Right:  Mod loss pdm                                                                      General     ROS    Assessment/Plan:    Patient is a 85 year old female with thoracic and lumbar complaints.    Patient has the following significant findings with corresponding treatment plan.                Diagnosis 1:  Thoracic pain  Pain -  self management, education, directional preference exercise and home program  Decreased ROM/flexibility - manual therapy and therapeutic exercise  Decreased strength - therapeutic exercise and therapeutic activities  Decreased function - therapeutic activities  Impaired posture - neuro re-education  Diagnosis 2:  Lumbar pain   Pain -  self management, education, directional preference exercise and home program  Decreased ROM/flexibility - manual therapy and therapeutic exercise  Decreased strength - therapeutic exercise and therapeutic activities  Decreased function - therapeutic activities  Impaired posture - neuro re-education    Therapy Evaluation Codes:   1) History comprised of:   Personal factors that impact the plan of care:      None.    Comorbidity factors that impact the plan of care are:      Weakness and osteoporosis.     Medications impacting care: Bone density and Pain.  2) Examination of Body Systems comprised of:   Body structures and functions that impact the plan of care:      Lumbar spine and Thoracic Spine.   Activity limitations that impact the plan of care are:      Lifting, Sitting, Standing and Walking.  3) Clinical presentation characteristics are:   Stable/Uncomplicated.  4) Decision-Making    Low complexity using standardized patient assessment instrument and/or measureable assessment of functional outcome.  Cumulative Therapy Evaluation is: Low complexity.    Previous and current functional  limitations:  (See Goal Flow Sheet for this information)    Short term and Long term goals: (See Goal Flow Sheet for this information)     Communication ability:  Patient appears to be able to clearly communicate and understand verbal and written communication and follow directions correctly.  Treatment Explanation - The following has been discussed with the patient:   RX ordered/plan of care  Anticipated outcomes  Possible risks and side effects  This patient would benefit from PT intervention to resume normal activities.   Rehab potential is fair.    Frequency:  1 X week, once daily  Duration:  for 6 weeks  Discharge Plan:  Achieve all LTG.  Independent in home treatment program.  Reach maximal therapeutic benefit.    Please refer to the daily flowsheet for treatment today, total treatment time and time spent performing 1:1 timed codes.

## 2018-01-16 ENCOUNTER — THERAPY VISIT (OUTPATIENT)
Dept: PHYSICAL THERAPY | Facility: CLINIC | Age: 83
End: 2018-01-16
Payer: COMMERCIAL

## 2018-01-16 DIAGNOSIS — M54.6 CHRONIC LEFT-SIDED THORACIC BACK PAIN: ICD-10-CM

## 2018-01-16 DIAGNOSIS — G89.29 CHRONIC LEFT-SIDED LOW BACK PAIN WITHOUT SCIATICA: ICD-10-CM

## 2018-01-16 DIAGNOSIS — G89.29 CHRONIC LEFT-SIDED THORACIC BACK PAIN: ICD-10-CM

## 2018-01-16 DIAGNOSIS — M54.50 CHRONIC LEFT-SIDED LOW BACK PAIN WITHOUT SCIATICA: ICD-10-CM

## 2018-01-16 DIAGNOSIS — M54.16 LUMBAR RADICULAR PAIN: Primary | ICD-10-CM

## 2018-01-16 PROCEDURE — 97035 APP MDLTY 1+ULTRASOUND EA 15: CPT | Mod: GP | Performed by: PHYSICAL THERAPIST

## 2018-01-16 PROCEDURE — 97110 THERAPEUTIC EXERCISES: CPT | Mod: GP | Performed by: PHYSICAL THERAPIST

## 2018-01-16 PROCEDURE — 97530 THERAPEUTIC ACTIVITIES: CPT | Mod: GP | Performed by: PHYSICAL THERAPIST

## 2018-01-29 ENCOUNTER — THERAPY VISIT (OUTPATIENT)
Dept: PHYSICAL THERAPY | Facility: CLINIC | Age: 83
End: 2018-01-29
Payer: COMMERCIAL

## 2018-01-29 DIAGNOSIS — M54.50 CHRONIC LEFT-SIDED LOW BACK PAIN WITHOUT SCIATICA: ICD-10-CM

## 2018-01-29 DIAGNOSIS — G89.29 CHRONIC LEFT-SIDED LOW BACK PAIN WITHOUT SCIATICA: ICD-10-CM

## 2018-01-29 DIAGNOSIS — G89.29 CHRONIC LEFT-SIDED THORACIC BACK PAIN: ICD-10-CM

## 2018-01-29 DIAGNOSIS — M54.16 LUMBAR RADICULAR PAIN: Primary | ICD-10-CM

## 2018-01-29 DIAGNOSIS — M54.6 CHRONIC LEFT-SIDED THORACIC BACK PAIN: ICD-10-CM

## 2018-01-29 PROCEDURE — 97112 NEUROMUSCULAR REEDUCATION: CPT | Mod: GP | Performed by: PHYSICAL THERAPIST

## 2018-01-29 PROCEDURE — 97530 THERAPEUTIC ACTIVITIES: CPT | Mod: GP | Performed by: PHYSICAL THERAPIST

## 2018-01-29 NOTE — MR AVS SNAPSHOT
"              After Visit Summary   2018    Nedra Carr    MRN: [id number]           Patient Information     Date Of Birth          1932        Visit Information        Provider Department      2018 1:05 PM Tiffanie Ayala, PT [facility]        Today's Diagnoses     Chronic left-sided low back pain without sciatica        Chronic left-sided thoracic back pain           Follow-ups after your visit        Who to contact     If you have questions or need follow up information about today's clinic visit or your schedule please contact [facility] directly at [phone].  Normal or non-critical lab and imaging results will be communicated to you by MyChart, letter or phone within 4 business days after the clinic has received the results. If you do not hear from us within 7 days, please contact the clinic through [employer] or phone. If you have a critical or abnormal lab result, we will notify you by phone as soon as possible.  Submit refill requests through [employer] or call your pharmacy and they will forward the refill request to us. Please allow 3 business days for your refill to be completed.          Additional Information About Your Visit        MyChart Information     [employer] lets you send messages to your doctor, view your test results, renew your prescriptions, schedule appointments and more. To sign up, go to [email or web address] . Click on \"Log in\" on the left side of the screen, which will take you to the Welcome page. Then click on \"Sign up Now\" on the right side of the page.     You will be asked to enter the access code listed below, as well as some personal information. Please follow the directions to create your username and password.     Your access code is: [email or web address]  Expires: 2018 11:58 AM     Your access code will  in 90 days. If you need help or a new code, please call your [place] clinic or [phone].        Care EveryWhere ID     This is " your Care EveryWhere ID. This could be used by other organizations to access your [place] medical records  [id number]         Blood Pressure from Last 3 Encounters:   01/04/18 119/74   12/19/17 128/74   10/26/17 114/72    Weight from Last 3 Encounters:   01/04/18 31.8 kg (70 lb 3.2 oz)   12/19/17 31.8 kg (70 lb)   10/26/17 31.5 kg (69 lb 6.4 oz)              We Performed the Following     NEUROMUSCULAR RE-EDUCATION     THERAPEUTIC ACTIVITIES          Today's Medication Changes          These changes are accurate as of 1/29/18 11:59 PM.  If you have any questions, ask your nurse or doctor.               These medicines have changed or have updated prescriptions.        Dose/Directions    * order for DME   This may have changed:  Another medication with the same name was added. Make sure you understand how and when to take each.   Used for:  Lumbar radicular pain   Changed by:  Luann Villegas APRN CNP        Equipment being ordered: abdominal binder   Quantity:  1 Units   Refills:  0       * order for DME   This may have changed:  You were already taking a medication with the same name, and this prescription was added. Make sure you understand how and when to take each.   Used for:  Lumbar radicular pain   Changed by:  Luann Villegas APRN CNP        Equipment being ordered: lumbar corset   Quantity:  1 Units   Refills:  0       * Notice:  This list has 2 medication(s) that are the same as other medications prescribed for you. Read the directions carefully, and ask your doctor or other care provider to review them with you.         Where to get your medicines      Some of these will need a paper prescription and others can be bought over the counter.  Ask your nurse if you have questions.     You don't need a prescription for these medications     order for DME                Primary Care Provider Office Phone # Fax #    Juan A Solo PA-C [phone] [address]  [place]  [facility] [address]        Equal Access to Services     [facility]: Hadii sravan shaw daniela [place], waaxda luqadaha, qaybta kaalmada sana, [place] mayes. So [facility] [phone].    ATENCIÓN: Si habla español, tiene a barrientos disposición servicios gratuitos de asistencia lingüística. Naomi al [phone].    We comply with applicable federal civil rights laws and [place] laws. We do not discriminate on the basis of race, color, national origin, age, disability, sex, sexual orientation, or gender identity.            Thank you!     Thank you for choosing [facility]  for your care. Our goal is always to provide you with excellent care. Hearing back from our patients is one way we can continue to improve our services. Please take a few minutes to complete the written survey that you may receive in the mail after your visit with us. Thank you!             Your Updated Medication List - Protect others around you: Learn how to safely use, store and throw away your medicines at www.disposemymeds.org.          This list is accurate as of 1/29/18 11:59 PM.  Always use your most recent med list.                   Brand Name Dispense Instructions for use Diagnosis    acetaminophen 325 MG tablet    TYLENOL    60 tablet    Take 2 tablets (650 mg) by mouth every 6 hours as needed for mild pain    Abdominal pain, other specified site       albuterol 108 (90 BASE) MCG/ACT Inhaler    PROAIR HFA/PROVENTIL HFA/VENTOLIN HFA    1 Inhaler    Inhale 2 puffs into the lungs every 6 hours as needed for shortness of breath / dyspnea or wheezing    Dyspnea       alendronate 70 MG tablet    FOSAMAX    12 tablet    Take 1 tablet (70 mg) by mouth every 7 days Take with over 8 ounces water and stay upright for at least 30 minutes after dose.  Take at least 60 minutes before breakfast    Osteoporosis       ENSURE Liqd     15 each    Take 0.5 Cans by mouth daily (strawberry)    Perforated gastric ulcer, chronic or  unspecified, Physical deconditioning, Weakness       ferrous gluconate 324 (38 FE) MG tablet    FERGON    30 tablet    Take 1 tablet (324 mg) by mouth daily    Other iron deficiency anemia       Multi-vitamin Tabs tablet     100 tablet    Take 1 tablet by mouth daily.        * order for DME     1 Units    Equipment being ordered: abdominal binder    Lumbar radicular pain       * order for DME     1 Units    Equipment being ordered: lumbar corset    Lumbar radicular pain       oxyCODONE IR 5 MG tablet    ROXICODONE    30 tablet    Take 1 tablet (5 mg) by mouth every 8 hours as needed for pain maximum 3 tablet(s) per day    Arthritis, multiple joint involvement       pantoprazole 40 MG EC tablet    PROTONIX    90 tablet    Take 1 tablet (40 mg) by mouth every morning (before breakfast) Take 30-60 minutes before a meal.    Other acute gastritis without hemorrhage       ranitidine 300 MG tablet    ZANTAC    30 tablet    Take 1 tablet (300 mg) by mouth At Bedtime    Other acute gastritis without hemorrhage       * Notice:  This list has 2 medication(s) that are the same as other medications prescribed for you. Read the directions carefully, and ask your doctor or other care provider to review them with you.

## 2018-01-30 PROBLEM — G89.29 CHRONIC LEFT-SIDED LOW BACK PAIN WITHOUT SCIATICA: Status: RESOLVED | Noted: 2018-01-09 | Resolved: 2018-01-30

## 2018-01-30 PROBLEM — M54.50 CHRONIC LEFT-SIDED LOW BACK PAIN WITHOUT SCIATICA: Status: RESOLVED | Noted: 2018-01-09 | Resolved: 2018-01-30

## 2018-01-30 NOTE — PROGRESS NOTES
Subjective:  HPI                    Objective:  System    Physical Exam    General     ROS    Assessment/Plan:    DISCHARGE REPORT    Progress reporting period is from 1-9-18 to 1-29-18.       SUBJECTIVE  Subjective changes noted by patient:  .  Subjective: patient returns to clinic with some increased pain.  Anxious to  lumbar support for symptom control.    Current pain level is 5/10 Current Pain level: 5/10.     Previous pain level was  5/10 Initial Pain level: 5/10.   Changes in function:  None  Adverse reaction to treatment or activity: None    OBJECTIVE  Changes noted in objective findings:  The objective findings below are from DOS 1-29-18.  Objective: Assisted patient with donning/doffing abdominal binder with decreased pain noted with brace on.  Discussed with patient nature of chronic condition, need to continue to perform maitenance exericses of bands, back strengthening as able.  DC to HEP due to lack of progress.      ASSESSMENT/PLAN  Updated problem list and treatment plan: Diagnosis 1:  Chronic low back pain  Pain -  splint/taping/bracing/orthotics, self management, education, directional preference exercise and home program  Decreased ROM/flexibility - manual therapy and therapeutic exercise  Decreased strength - therapeutic exercise and therapeutic activities  Decreased function - therapeutic activities  Impaired posture - neuro re-education  STG/LTGs have been met or progress has been made towards goals:  None  Assessment of Progress: The patient's condition is unchanged.  Self Management Plans:  Patient  has been instructed in self management of symptoms.  I have re-evaluated this patient and find that the nature, scope, duration and intensity of the therapy is appropriate for the medical condition of the patient.  Nedra continues to require the following intervention to meet STG and LTG's:  PT intervention is no longer required to meet STG/LTG.    Recommendations:  This patient is ready  to be discharged from therapy and continue their home treatment program.    Please refer to the daily flowsheet for treatment today, total treatment time and time spent performing 1:1 timed codes.

## 2018-02-02 ENCOUNTER — TELEPHONE (OUTPATIENT)
Dept: CARE COORDINATION | Facility: CLINIC | Age: 83
End: 2018-02-02

## 2018-02-02 DIAGNOSIS — M81.0 AGE-RELATED OSTEOPOROSIS WITHOUT CURRENT PATHOLOGICAL FRACTURE: ICD-10-CM

## 2018-02-02 DIAGNOSIS — D50.8 OTHER IRON DEFICIENCY ANEMIA: ICD-10-CM

## 2018-02-02 RX ORDER — FERROUS GLUCONATE 324(38)MG
1 TABLET ORAL DAILY
Qty: 30 TABLET | Refills: 10 | Status: SHIPPED | OUTPATIENT
Start: 2018-02-02 | End: 2019-01-01

## 2018-02-02 RX ORDER — ALENDRONATE SODIUM 70 MG/1
70 TABLET ORAL
Qty: 12 TABLET | Refills: 3 | Status: SHIPPED | OUTPATIENT
Start: 2018-02-02 | End: 2019-01-28

## 2018-02-02 NOTE — TELEPHONE ENCOUNTER
Patient called [facility] asking for the following refills     Last office visit with pcp 12/19/17    Pending Prescriptions:                       Disp   Refills    alendronate (FOSAMAX) 70 MG tablet        12 tab*3            Sig: Take 1 tablet (70 mg) by mouth every 7 days Take           with over 8 ounces water and stay upright for at           least 30 minutes after dose.  Take at least 60           minutes before breakfast    ferrous gluconate (FERGON) 324 (38 FE) MG*30 tab*10           Sig: Take 1 tablet (324 mg) by mouth daily    Last Dexa 2015   Hemoglobin   Date Value Ref Range Status   10/26/2017 11.3 (L) 11.7 - 15.7 g/dL Final     Comment:     Results confirmed by repeat test   ]    Please call patient when approved and sent to pharmacy     Rosita Gaspar Care Coordinator RN  [facility]  [phone]  February 2, 2018

## 2018-02-02 NOTE — TELEPHONE ENCOUNTER
Routing refill request to provider for review/approval because:  Labs out of range:  See below. Hgb    DEXA not up to date. Last DEXA was July 2015.     Kaylin Garcia RN -- [facility]

## 2018-02-02 NOTE — TELEPHONE ENCOUNTER
"ferrous gluconate (FERGON) 324 (38 FE) MG tablet  Last Written Prescription Date: 2/6/17  Last Fill Quantity: 30,  # refills: 10   Last Office Visit with G, P or [facility] prescribing provider:  12/19/17                                     Hemoglobin   Date Value Ref Range Status   10/26/2017 11.3 (L) 11.7 - 15.7 g/dL Final     Comment:     Results confirmed by repeat test   05/14/2017 9.7 (L) 11.7 - 15.7 g/dL Final     ________________________________________________________________________________    Requested Prescriptions   Pending Prescriptions Disp Refills     alendronate (FOSAMAX) 70 MG tablet 12 tablet 3    Last Written Prescription Date:  2/6/17  Last Fill Quantity: 12,  # refills: 3   Last Office Visit: 12/19/17  Future Office Visit:      Sig: Take 1 tablet (70 mg) by mouth every 7 days Take with over 8 ounces water and stay upright for at least 30 minutes after dose.  Take at least 60 minutes before breakfast    Bisphosphonates Failed    2/2/2018 10:15 AM       Failed - Dexa on file within past 2 years    Please review last Dexa result.          Passed - Recent or future visit with authorizing provider's specialty    Patient had office visit in the last year or has a visit in the next 30 days with authorizing provider.  See \"Patient Info\" tab in inbasket, or \"Choose Columns\" in Meds & Orders section of the refill encounter.            Passed - Patient is age 18 or older       Passed - Normal Serum Creatinine on file within past 12 months    Recent Labs   Lab Test  10/26/17   1318   CR  0.74          "

## 2018-02-06 DIAGNOSIS — D50.8 OTHER IRON DEFICIENCY ANEMIA: ICD-10-CM

## 2018-02-06 LAB — HGB BLD-MCNC: 10.9 G/DL (ref 11.7–15.7)

## 2018-02-06 PROCEDURE — 36415 COLL VENOUS BLD VENIPUNCTURE: CPT | Performed by: PHYSICIAN ASSISTANT

## 2018-02-06 PROCEDURE — 85018 HEMOGLOBIN: CPT | Performed by: PHYSICIAN ASSISTANT

## 2018-02-08 ENCOUNTER — RADIANT APPOINTMENT (OUTPATIENT)
Dept: BONE DENSITY | Facility: CLINIC | Age: 83
End: 2018-02-08
Payer: COMMERCIAL

## 2018-02-08 DIAGNOSIS — M81.0 AGE-RELATED OSTEOPOROSIS WITHOUT CURRENT PATHOLOGICAL FRACTURE: ICD-10-CM

## 2018-02-08 PROCEDURE — 77081 DXA BONE DENSITY APPENDICULR: CPT | Performed by: INTERNAL MEDICINE

## 2018-02-08 PROCEDURE — 77080 DXA BONE DENSITY AXIAL: CPT | Mod: 59 | Performed by: INTERNAL MEDICINE

## 2018-04-17 ENCOUNTER — TELEPHONE (OUTPATIENT)
Dept: FAMILY MEDICINE | Facility: CLINIC | Age: 83
End: 2018-04-17

## 2018-04-17 NOTE — TELEPHONE ENCOUNTER
Susanna from  home care calls, verbal orders provided for HHA 2 times a week x 60 days and for supervisory nurse visit for HHA, fyi to NICOB  Britta Lucas, RN, BSN  Message handled by Nurse Triage.

## 2018-04-19 ENCOUNTER — TELEPHONE (OUTPATIENT)
Dept: FAMILY MEDICINE | Facility: CLINIC | Age: 83
End: 2018-04-19

## 2018-04-19 NOTE — TELEPHONE ENCOUNTER
Recd 3 page fax from [employer].  Please sign Prescription / Certification of Medical Necessity and fax to [phone].  Form in ZB in box.    Rita Denise

## 2018-04-20 ENCOUNTER — TELEPHONE (OUTPATIENT)
Dept: FAMILY MEDICINE | Facility: CLINIC | Age: 83
End: 2018-04-20

## 2018-04-20 NOTE — TELEPHONE ENCOUNTER
Recd 3 page fax from [employer].  Please complete and sign updated Prescription/Certificate of Medical Necessity and fax to [phone].  Form in ZB in box.    Rita Denise

## 2018-04-25 ENCOUNTER — OFFICE VISIT (OUTPATIENT)
Dept: FAMILY MEDICINE | Facility: CLINIC | Age: 83
End: 2018-04-25
Payer: COMMERCIAL

## 2018-04-25 VITALS
WEIGHT: 69.5 LBS | HEART RATE: 90 BPM | BODY MASS INDEX: 20.35 KG/M2 | TEMPERATURE: 97.7 F | OXYGEN SATURATION: 98 % | SYSTOLIC BLOOD PRESSURE: 122 MMHG | RESPIRATION RATE: 16 BRPM | DIASTOLIC BLOOD PRESSURE: 72 MMHG

## 2018-04-25 DIAGNOSIS — N30.01 ACUTE CYSTITIS WITH HEMATURIA: Primary | ICD-10-CM

## 2018-04-25 DIAGNOSIS — R82.90 NONSPECIFIC FINDING ON EXAMINATION OF URINE: ICD-10-CM

## 2018-04-25 LAB
ALBUMIN UR-MCNC: 100 MG/DL
APPEARANCE UR: ABNORMAL
BACTERIA #/AREA URNS HPF: ABNORMAL /HPF
BILIRUB UR QL STRIP: NEGATIVE
COLOR UR AUTO: YELLOW
GLUCOSE UR STRIP-MCNC: NEGATIVE MG/DL
HGB UR QL STRIP: ABNORMAL
KETONES UR STRIP-MCNC: NEGATIVE MG/DL
LEUKOCYTE ESTERASE UR QL STRIP: ABNORMAL
NITRATE UR QL: NEGATIVE
NON-SQ EPI CELLS #/AREA URNS LPF: ABNORMAL /LPF
PH UR STRIP: 5.5 PH (ref 5–7)
RBC #/AREA URNS AUTO: ABNORMAL /HPF
SOURCE: ABNORMAL
SP GR UR STRIP: 1.02 (ref 1–1.03)
TRANS CELLS #/AREA URNS HPF: ABNORMAL /HPF
URNS CMNT MICRO: ABNORMAL
UROBILINOGEN UR STRIP-ACNC: 0.2 EU/DL (ref 0.2–1)
WBC #/AREA URNS AUTO: ABNORMAL /HPF
WBC CLUMPS #/AREA URNS HPF: PRESENT /HPF

## 2018-04-25 PROCEDURE — 81001 URINALYSIS AUTO W/SCOPE: CPT | Performed by: PHYSICIAN ASSISTANT

## 2018-04-25 PROCEDURE — 99213 OFFICE O/P EST LOW 20 MIN: CPT | Performed by: PHYSICIAN ASSISTANT

## 2018-04-25 PROCEDURE — 87086 URINE CULTURE/COLONY COUNT: CPT | Performed by: PHYSICIAN ASSISTANT

## 2018-04-25 RX ORDER — CIPROFLOXACIN 500 MG/1
500 TABLET, FILM COATED ORAL 2 TIMES DAILY
Qty: 14 TABLET | Refills: 0 | Status: SHIPPED | OUTPATIENT
Start: 2018-04-25 | End: 2018-05-02

## 2018-04-25 NOTE — PATIENT INSTRUCTIONS
"   * BLADDER INFECTION,Female (Adult)    A bladder infection (\"cystitis\" or \"UTI\") usually causes a constant urge to urinate and a burning when passing urine. Urine may be cloudy, smelly or dark. There may be pain in the lower abdomen. A bladder infection occurs when bacteria from the vaginal area enter the bladder opening (urethra). This can occur from sexual intercourse, wearing tight clothing, dehydration and other factors.  HOME CARE:  1. Drink lots of fluids (at least 6-8 glasses a day, unless you must restrict fluids for other medical reasons). This will force the medicine into your urinary system and flush the bacteria out of your body. Cranberry juice has been shown to help clear out the bacteria.  2. Avoid sexual intercourse until your symptoms are gone.  3. A bladder infection is treated with antibiotics. You may also be given Pyridium (generic = phenazopyridine) to reduce the burning sensation. This medicine will cause your urine to become a bright orange color. The orange urine may stain clothing. You may wear a pad or panty-liner to protect clothing.  PREVENTING FUTURE INFECTIONS:  1. Always wipe from front to back after a bowel movement.  2. Keep the genital area clean and dry.  3. Drink plenty of fluids each day to avoid dehydration.  4. Urinate right after intercourse to flush out the bladder.  5. Wear cotton underwear and cotton-lined panty hose; avoid tight-fitting pants.  6. If you are on birth control pills and are having frequent bladder infections, discuss with your doctor.  FOLLOW UP: Return to this facility or see your doctor if ALL symptoms are not gone after three days of treatment.  GET PROMPT MEDICAL ATTENTION if any of the following occur:    Fever over 101 F (38.3 C)    No improvement by the third day of treatment    Increasing back or abdominal pain    Repeated vomiting; unable to keep medicine down    Weakness, dizziness or fainting    Vaginal discharge    Pain, redness or swelling in " the labia (outer vaginal area)    4542-3575 [employer]. [address] [place] [address], [place] [address]. All rights reserved. This information is not intended as a substitute for professional medical care. Always follow your healthcare professional's instructions.  This information has been modified by your health care provider with permission from the publisher.

## 2018-04-25 NOTE — MR AVS SNAPSHOT
"              After Visit Summary   4/25/2018    Nedra Carr    MRN: [id number]           Patient Information     Date Of Birth          12/22/1932        Visit Information        Provider Department      4/25/2018 10:15 AM Juan A Solo PA-C [facility]        Today's Diagnoses     Dysuria    -  1    Nonspecific finding on examination of urine          Care Instructions       * BLADDER INFECTION,Female (Adult)    A bladder infection (\"cystitis\" or \"UTI\") usually causes a constant urge to urinate and a burning when passing urine. Urine may be cloudy, smelly or dark. There may be pain in the lower abdomen. A bladder infection occurs when bacteria from the vaginal area enter the bladder opening (urethra). This can occur from sexual intercourse, wearing tight clothing, dehydration and other factors.  HOME CARE:  1. Drink lots of fluids (at least 6-8 glasses a day, unless you must restrict fluids for other medical reasons). This will force the medicine into your urinary system and flush the bacteria out of your body. Cranberry juice has been shown to help clear out the bacteria.  2. Avoid sexual intercourse until your symptoms are gone.  3. A bladder infection is treated with antibiotics. You may also be given Pyridium (generic = phenazopyridine) to reduce the burning sensation. This medicine will cause your urine to become a bright orange color. The orange urine may stain clothing. You may wear a pad or panty-liner to protect clothing.  PREVENTING FUTURE INFECTIONS:  1. Always wipe from front to back after a bowel movement.  2. Keep the genital area clean and dry.  3. Drink plenty of fluids each day to avoid dehydration.  4. Urinate right after intercourse to flush out the bladder.  5. Wear cotton underwear and cotton-lined panty hose; avoid tight-fitting pants.  6. If you are on birth control pills and are having frequent bladder infections, discuss with your doctor.  FOLLOW UP: Return " "to this facility or see your doctor if ALL symptoms are not gone after three days of treatment.  GET PROMPT MEDICAL ATTENTION if any of the following occur:    Fever over 101 F (38.3 C)    No improvement by the third day of treatment    Increasing back or abdominal pain    Repeated vomiting; unable to keep medicine down    Weakness, dizziness or fainting    Vaginal discharge    Pain, redness or swelling in the labia (outer vaginal area)    7401-5626 [employer]. [address] [place] [address], [place] [address]. All rights reserved. This information is not intended as a substitute for professional medical care. Always follow your healthcare professional's instructions.  This information has been modified by your health care provider with permission from the publisher.            Follow-ups after your visit        Who to contact     If you have questions or need follow up information about today's clinic visit or your schedule please contact [facility] directly at [phone].  Normal or non-critical lab and imaging results will be communicated to you by MyChart, letter or phone within 4 business days after the clinic has received the results. If you do not hear from us within 7 days, please contact the clinic through [employer] or phone. If you have a critical or abnormal lab result, we will notify you by phone as soon as possible.  Submit refill requests through [employer] or call your pharmacy and they will forward the refill request to us. Please allow 3 business days for your refill to be completed.          Additional Information About Your Visit        [employer] Information     [employer] lets you send messages to your doctor, view your test results, renew your prescriptions, schedule appointments and more. To sign up, go to [email or web address] . Click on \"Log in\" on the left side of the screen, which will take you to the Welcome page. Then click on \"Sign up Now\" on the right side of the " page.     You will be asked to enter the access code listed below, as well as some personal information. Please follow the directions to create your username and password.     Your access code is: [email or web address]  Expires: 2018 10:54 AM     Your access code will  in 90 days. If you need help or a new code, please call your [place] clinic or [phone].        Care EveryWhere ID     This is your Care EveryWhere ID. This could be used by other organizations to access your [place] medical records  [id number]        Your Vitals Were     Pulse Temperature Respirations Pulse Oximetry BMI (Body Mass Index)       90 97.7  F (36.5  C) (Oral) 16 98% 20.35 kg/m2        Blood Pressure from Last 3 Encounters:   18 122/72   18 119/74   17 128/74    Weight from Last 3 Encounters:   18 69 lb 8 oz (31.5 kg)   18 70 lb 3.2 oz (31.8 kg)   17 70 lb (31.8 kg)              We Performed the Following     *UA reflex to Microscopic and Culture ([place] and [facility] (except [place] and [place])     Urine Culture Aerobic Bacterial     Urine Microscopic          Today's Medication Changes          These changes are accurate as of 18 10:54 AM.  If you have any questions, ask your nurse or doctor.               Start taking these medicines.        Dose/Directions    ciprofloxacin 500 MG tablet   Commonly known as:  CIPRO   Used for:  Dysuria   Started by:  Juan A Solo PA-C        Dose:  500 mg   Take 1 tablet (500 mg) by mouth 2 times daily   Quantity:  14 tablet   Refills:  0            Where to get your medicines      These medications were sent to [facility] [id number] [place] [address]  [address] [facility] [address]     Phone:  [phone]     ciprofloxacin 500 MG tablet                Primary Care Provider Office Phone # Fax #    Juan A Solo PA-C [phone] [address] [facility] [address]         Equal Access to Services     [facility]: Hadii sravan shaw christianoeva Jacintoali, warajida luqadaha, qaybta kaabelcleveland jason. So [facility] [phone].    ATENCIÓN: Si habla español, tiene a barrientos disposición servicios gratuitos de asistencia lingüística. [place] al [phone].    We comply with applicable federal civil rights laws and [place] laws. We do not discriminate on the basis of race, color, national origin, age, disability, sex, sexual orientation, or gender identity.            Thank you!     Thank you for choosing [facility]  for your care. Our goal is always to provide you with excellent care. Hearing back from our patients is one way we can continue to improve our services. Please take a few minutes to complete the written survey that you may receive in the mail after your visit with us. Thank you!             Your Updated Medication List - Protect others around you: Learn how to safely use, store and throw away your medicines at www.disposemymeds.org.          This list is accurate as of 4/25/18 10:54 AM.  Always use your most recent med list.                   Brand Name Dispense Instructions for use Diagnosis    acetaminophen 325 MG tablet    TYLENOL    60 tablet    Take 2 tablets (650 mg) by mouth every 6 hours as needed for mild pain    Abdominal pain, other specified site       albuterol 108 (90 Base) MCG/ACT Inhaler    PROAIR HFA/PROVENTIL HFA/VENTOLIN HFA    1 Inhaler    Inhale 2 puffs into the lungs every 6 hours as needed for shortness of breath / dyspnea or wheezing    Dyspnea       alendronate 70 MG tablet    FOSAMAX    12 tablet    Take 1 tablet (70 mg) by mouth every 7 days Take with over 8 ounces water and stay upright for at least 30 minutes after dose.  Take at least 60 minutes before breakfast    Age-related osteoporosis without current pathological fracture       ciprofloxacin 500 MG tablet    CIPRO    14 tablet    Take 1 tablet  (500 mg) by mouth 2 times daily    Dysuria       ENSURE Liqd     15 each    Take 0.5 Cans by mouth daily (strawberry)    Perforated gastric ulcer, chronic or unspecified, Physical deconditioning, Weakness       ferrous gluconate 324 (38 Fe) MG tablet    FERGON    30 tablet    Take 1 tablet (324 mg) by mouth daily    Other iron deficiency anemia       Multi-vitamin Tabs tablet     100 tablet    Take 1 tablet by mouth daily.        * order for DME     1 Units    Equipment being ordered: abdominal binder    Lumbar radicular pain       * order for DME     1 Units    Equipment being ordered: lumbar corset    Lumbar radicular pain       oxyCODONE IR 5 MG tablet    ROXICODONE    30 tablet    Take 1 tablet (5 mg) by mouth every 8 hours as needed for pain maximum 3 tablet(s) per day    Arthritis, multiple joint involvement       pantoprazole 40 MG EC tablet    PROTONIX    90 tablet    Take 1 tablet (40 mg) by mouth every morning (before breakfast) Take 30-60 minutes before a meal.    Other acute gastritis without hemorrhage       ranitidine 300 MG tablet    ZANTAC    30 tablet    Take 1 tablet (300 mg) by mouth At Bedtime    Other acute gastritis without hemorrhage       * Notice:  This list has 2 medication(s) that are the same as other medications prescribed for you. Read the directions carefully, and ask your doctor or other care provider to review them with you.

## 2018-04-25 NOTE — PROGRESS NOTES
SUBJECTIVE:   Nedra Carr is a 85 year old female who presents to clinic today for the following health issues:    URINARY TRACT SYMPTOMS  Onset: 1 wk    Description:   Painful urination (Dysuria): YES- sometimes  Blood in urine (Hematuria): YES  Delay in urine (Hesitency): YES    Intensity: mild    Progression of Symptoms:  same    Accompanying Signs & Symptoms:  Fever/chills: no   Flank pain YES- pt has a hx of back pain  Nausea and vomiting: no   Any vaginal symptoms: none  Abdominal/Pelvic Pain: no     History:   History of frequent UTI's: no   History of kidney stones: YES  Sexually Active: no   Possibility of pregnancy: No    Precipitating factors:   none    Therapies Tried and outcome: n/a      Problem list and histories reviewed & adjusted, as indicated.  Additional history: as documented    Patient Active Problem List   Diagnosis     Hallux varus, acquired     Other hammer toe (acquired)     Osteoporosis     HTN (hypertension)     Cataract     CARDIOVASCULAR SCREENING; LDL GOAL LESS THAN 130     Advance Care Planning     Intractable chronic migraine without aura     Anemia     Perforated gastric ulcer (H)     Emphysematous cholecystitis     Health Care Home     Paroxysmal atrial fibrillation (H)     Abdominal pain, unspecified abdominal location     UTI (urinary tract infection)     Sepsis (H)     Headache     Physical deconditioning     Urinary bladder stone     S/P cystoscopy     Hypoxia     Hydronephrosis     Acute cholecystitis     Cardiogenic shock (H)     Closed compression fracture of thoracic vertebra (H)     Family history of other digestive disorders     Allergic rhinitis     Arthritis of hand     Esophageal stricture     Obstructive sleep apnea     Weakness     At high risk for falls     Multiple pelvic fractures (H)     Mobility impaired     Abdominal pain     Past Surgical History:   Procedure Laterality Date     BACK SURGERY       BRONCHOSCOPY FLEXIBLE N/A 11/21/2014    Procedure:  BRONCHOSCOPY FLEXIBLE;  Surgeon: Rhonda Donohue MD;  Location: [facility]     C NONSPECIFIC PROCEDURE      s/p dilation at [facility] by Dr. Radha FELIPE NONSPECIFIC PROCEDURE      s/p dilation at [facility] by Dr. Betty FELIPE NONSPECIFIC PROCEDURE      Vag hysterectomy     CHOLECYSTECTOMY N/A 11/15/2014    Procedure: CHOLECYSTECTOMY;  Surgeon: Yomi Brennan MD;  Location: [facility] OR     CYSTOSCOPY, LITHOLAPAXY, COMBINED N/A 4/8/2015    Procedure: COMBINED CYSTOSCOPY, LITHOLAPAXY;  Surgeon: Randy Reno MD;  Location: [facility] OR     ENT SURGERY       ESOPHAGOSCOPY, GASTROSCOPY, DUODENOSCOPY (EGD), COMBINED  11/21/2012    Procedure: COMBINED ESOPHAGOSCOPY, GASTROSCOPY, DUODENOSCOPY (EGD), BIOPSY SINGLE OR MULTIPLE;   ESOPHAGOSCOPY, GASTROSCOPY, DUODENOSCOPY (EGD)   with cold biopsy and dilalation with #15 savary;  Surgeon: Guillermo Arcos MD;  Location: [facility]     ESOPHAGOSCOPY, GASTROSCOPY, DUODENOSCOPY (EGD), COMBINED  2/22/2013    Procedure: COMBINED ESOPHAGOSCOPY, GASTROSCOPY, DUODENOSCOPY (EGD);  ESOPHAGOSCOPY, GASTROSCOPY, DUODENOSCOPY (EGD) ;  Surgeon: Lissett Posada MD;  Location: [facility]     ESOPHAGOSCOPY, GASTROSCOPY, DUODENOSCOPY (EGD), COMBINED N/A 10/29/2014    Procedure: COMBINED ESOPHAGOSCOPY, GASTROSCOPY, DUODENOSCOPY (EGD);  Surgeon: Dany Ambrocio MD;  Location: [facility]     ESOPHAGOSCOPY, GASTROSCOPY, DUODENOSCOPY (EGD), COMBINED N/A 1/13/2015    Procedure: COMBINED ESOPHAGOSCOPY, GASTROSCOPY, DUODENOSCOPY (EGD), BIOPSY SINGLE OR MULTIPLE;  Surgeon: Dany Ambrocio MD;  Location: [facility]     ESOPHAGOSCOPY, GASTROSCOPY, DUODENOSCOPY (EGD), COMBINED N/A 3/5/2015    Procedure: COMBINED ESOPHAGOSCOPY, GASTROSCOPY, DUODENOSCOPY (EGD);  Surgeon: Dany Ambrocio MD;  Location: [facility]     ESOPHAGOSCOPY, GASTROSCOPY, DUODENOSCOPY (EGD), COMBINED N/A 11/10/2015    Procedure: COMBINED ESOPHAGOSCOPY, GASTROSCOPY, DUODENOSCOPY (EGD);  Surgeon: Dany Ambrocio MD;  Location:  GI     EYE SURGERY        GYN SURGERY       LAPAROTOMY EXPLORATORY N/A 11/15/2014    Procedure: LAPAROTOMY EXPLORATORY;  Surgeon: Yomi Brennan MD;  Location: [facility]     LASER HOLMIUM LITHOTRIPSY BLADDER N/A 4/8/2015    Procedure: LASER HOLMIUM LITHOTRIPSY BLADDER;  Surgeon: Randy Reno MD;  Location: [facility]     ORTHOPEDIC SURGERY      hip fx surgery x 2       Social History   Substance Use Topics     Smoking status: Never Smoker     Smokeless tobacco: Never Used     Alcohol use No     Family History   Problem Relation Age of Onset     DIABETES Mother      DIABETES Sister      HEART DISEASE Son          Current Outpatient Prescriptions   Medication Sig Dispense Refill     acetaminophen (TYLENOL) 325 MG tablet Take 2 tablets (650 mg) by mouth every 6 hours as needed for mild pain 60 tablet 0     albuterol (PROAIR HFA, PROVENTIL HFA, VENTOLIN HFA) 108 (90 BASE) MCG/ACT inhaler Inhale 2 puffs into the lungs every 6 hours as needed for shortness of breath / dyspnea or wheezing 1 Inhaler 1     alendronate (FOSAMAX) 70 MG tablet Take 1 tablet (70 mg) by mouth every 7 days Take with over 8 ounces water and stay upright for at least 30 minutes after dose.  Take at least 60 minutes before breakfast 12 tablet 3     ciprofloxacin (CIPRO) 500 MG tablet Take 1 tablet (500 mg) by mouth 2 times daily 14 tablet 0     ferrous gluconate (FERGON) 324 (38 FE) MG tablet Take 1 tablet (324 mg) by mouth daily 30 tablet 10     multivitamin, therapeutic with minerals (MULTI-VITAMIN) TABS Take 1 tablet by mouth daily. 100 tablet 3     Nutritional Supplements (ENSURE) LIQD Take 0.5 Cans by mouth daily (strawberry) 15 each 11     order for DME Equipment being ordered: abdominal binder 1 Units 0     order for DME Equipment being ordered: lumbar corset 1 Units 0     oxyCODONE IR (ROXICODONE) 5 MG tablet Take 1 tablet (5 mg) by mouth every 8 hours as needed for pain maximum 3 tablet(s) per day 30 tablet 0     pantoprazole (PROTONIX) 40 MG EC tablet Take  1 tablet (40 mg) by mouth every morning (before breakfast) Take 30-60 minutes before a meal. 90 tablet 1     ranitidine (ZANTAC) 300 MG tablet Take 1 tablet (300 mg) by mouth At Bedtime 30 tablet 1     Allergies   Allergen Reactions     No Known Drug Allergies        Reviewed and updated as needed this visit by clinical staff  Tobacco  Allergies  Meds  Problems  Med Hx  Surg Hx  Fam Hx  Soc Hx        Reviewed and updated as needed this visit by Provider  Allergies  Meds  Problems         ROS:  Constitutional, HEENT, cardiovascular, pulmonary, gi and gu systems are negative, except as otherwise noted.    OBJECTIVE:     /72 (BP Location: Right arm, Patient Position: Chair, Cuff Size: Child)  Pulse 90  Temp 97.7  F (36.5  C) (Oral)  Resp 16  Wt 69 lb 8 oz (31.5 kg)  SpO2 98%  BMI 20.35 kg/m2  Body mass index is 20.35 kg/(m^2).  GENERAL: alert, no distress, frail and elderly  RESP: lungs clear to auscultation - no rales, rhonchi or wheezes  CV: regular rates and rhythm, normal S1 S2, no S3 or S4 and no murmur, click or rub  ABDOMEN: soft, nontender, no hepatosplenomegaly, no masses and bowel sounds normal  PSYCH: mentation appears normal, affect normal/bright    Diagnostic Test Results:  Results for orders placed or performed in visit on 04/25/18 (from the past 24 hour(s))   *UA reflex to Microscopic and Culture ([place] and [facility] (except [place] and [place])   Result Value Ref Range    Color Urine Yellow     Appearance Urine Cloudy     Glucose Urine Negative NEG^Negative mg/dL    Bilirubin Urine Negative NEG^Negative    Ketones Urine Negative NEG^Negative mg/dL    Specific Gravity Urine 1.020 1.003 - 1.035    Blood Urine Small (A) NEG^Negative    pH Urine 5.5 5.0 - 7.0 pH    Protein Albumin Urine 100 (A) NEG^Negative mg/dL    Urobilinogen Urine 0.2 0.2 - 1.0 EU/dL    Nitrite Urine Negative NEG^Negative    Leukocyte Esterase Urine Large (A) NEG^Negative    Source Midstream Urine     Urine Microscopic   Result Value Ref Range    WBC Urine  (A) OTO5^0 - 5 /HPF    RBC Urine O - 2 OTO2^O - 2 /HPF    WBC Clumps Present (A) NEG^Negative /HPF    Squamous Epithelial /LPF Urine Few FEW^Few /LPF    Transitional Epi Few FEW^Few /HPF    Bacteria Urine Few (A) NEG^Negative /HPF    Comment Urine Unconcentrated        ASSESSMENT/PLAN:     (N30.01) Acute cystitis with hematuria  (primary encounter diagnosis)  Comment: evident on UA. No evidence of pyelo or bacteremia on exam. Given age and risk of bacteremia, will treat with cipro bid for 7 days. Follow up in 1 week for recheck. Sooner if worsening. Culture pending.   Plan: *UA reflex to Microscopic and Culture ([place]         and [facility] (except [place] and         [place]), Urine Microscopic, Urine Culture         Aerobic Bacterial, ciprofloxacin (CIPRO) 500 MG        tablet        -Medication use and side effects discussed with the patient. Patient is in complete understanding and agreement with plan.       (R82.90) Nonspecific finding on examination of urine  Comment:   Plan: Urine Culture Aerobic Bacterial              Follow up: as above     Juan A Solo PA-C  [facility]

## 2018-04-26 LAB
BACTERIA SPEC CULT: NORMAL
SPECIMEN SOURCE: NORMAL

## 2018-05-02 ENCOUNTER — OFFICE VISIT (OUTPATIENT)
Dept: FAMILY MEDICINE | Facility: CLINIC | Age: 83
End: 2018-05-02
Payer: COMMERCIAL

## 2018-05-02 ENCOUNTER — TELEPHONE (OUTPATIENT)
Dept: FAMILY MEDICINE | Facility: CLINIC | Age: 83
End: 2018-05-02

## 2018-05-02 VITALS
DIASTOLIC BLOOD PRESSURE: 74 MMHG | BODY MASS INDEX: 20.5 KG/M2 | SYSTOLIC BLOOD PRESSURE: 108 MMHG | HEART RATE: 113 BPM | WEIGHT: 70 LBS | RESPIRATION RATE: 18 BRPM | TEMPERATURE: 97.7 F

## 2018-05-02 DIAGNOSIS — B37.31 VAGINAL CANDIDIASIS: Primary | ICD-10-CM

## 2018-05-02 DIAGNOSIS — R82.90 NONSPECIFIC FINDING ON EXAMINATION OF URINE: ICD-10-CM

## 2018-05-02 LAB
ALBUMIN UR-MCNC: 100 MG/DL
APPEARANCE UR: ABNORMAL
BACTERIA #/AREA URNS HPF: ABNORMAL /HPF
BILIRUB UR QL STRIP: NEGATIVE
COLOR UR AUTO: YELLOW
GLUCOSE UR STRIP-MCNC: NEGATIVE MG/DL
HGB UR QL STRIP: ABNORMAL
KETONES UR STRIP-MCNC: NEGATIVE MG/DL
LEUKOCYTE ESTERASE UR QL STRIP: ABNORMAL
NITRATE UR QL: NEGATIVE
NON-SQ EPI CELLS #/AREA URNS LPF: ABNORMAL /LPF
PH UR STRIP: 5.5 PH (ref 5–7)
RBC #/AREA URNS AUTO: ABNORMAL /HPF
SOURCE: ABNORMAL
SP GR UR STRIP: 1.02 (ref 1–1.03)
SPECIMEN SOURCE: ABNORMAL
TRANS CELLS #/AREA URNS HPF: ABNORMAL /HPF
UROBILINOGEN UR STRIP-ACNC: 0.2 EU/DL (ref 0.2–1)
WBC #/AREA URNS AUTO: >100 /HPF
WBC CLUMPS #/AREA URNS HPF: PRESENT /HPF
WET PREP SPEC: ABNORMAL

## 2018-05-02 PROCEDURE — 99213 OFFICE O/P EST LOW 20 MIN: CPT | Performed by: PHYSICIAN ASSISTANT

## 2018-05-02 PROCEDURE — 81001 URINALYSIS AUTO W/SCOPE: CPT | Performed by: PHYSICIAN ASSISTANT

## 2018-05-02 PROCEDURE — 87106 FUNGI IDENTIFICATION YEAST: CPT | Performed by: PHYSICIAN ASSISTANT

## 2018-05-02 PROCEDURE — 87086 URINE CULTURE/COLONY COUNT: CPT | Performed by: PHYSICIAN ASSISTANT

## 2018-05-02 PROCEDURE — 87210 SMEAR WET MOUNT SALINE/INK: CPT | Performed by: PHYSICIAN ASSISTANT

## 2018-05-02 RX ORDER — FLUCONAZOLE 150 MG/1
150 TABLET ORAL ONCE
Qty: 1 TABLET | Refills: 0 | Status: SHIPPED | OUTPATIENT
Start: 2018-05-02 | End: 2018-05-02

## 2018-05-02 NOTE — TELEPHONE ENCOUNTER
Please call patient. Wet prep showed yeast. This is likely the cause of her painful urination. I have sent a medication to her pharmacy that she only needs to take once. If it is not gone in 3 days, she should call and let me know.     Thanks,    Bert Solo, PAC

## 2018-05-02 NOTE — TELEPHONE ENCOUNTER
[employer] calls, [phone] ex Tommy Hoang,  needs ZB to fill out this form first one incorrect and needed addended, will fax again to [employer], call them if problem otherwise have ZB sign and fax    Britta Lucas RN, BSN  Message handled by Nurse Triage.

## 2018-05-02 NOTE — PROGRESS NOTES
SUBJECTIVE:   Nedra Carr is a 85 year old female who presents to clinic today for the following health issues:    F/U - UTI - getting worse. Frequency, Burning, nocturia 4-5 x's a night. No fever or chills. No abdominal pain. No new or worsening back pain. No confusion. Urine culture did not show UTI at last visit. cipro did not improve symptoms. Denies vaginal discharge or itching. No odor. No vaginal bleeding.     Problem list and histories reviewed & adjusted, as indicated.  Additional history: as documented    Patient Active Problem List   Diagnosis     Hallux varus, acquired     Other hammer toe (acquired)     Osteoporosis     HTN (hypertension)     Cataract     CARDIOVASCULAR SCREENING; LDL GOAL LESS THAN 130     Advance Care Planning     Intractable chronic migraine without aura     Anemia     Perforated gastric ulcer (H)     Emphysematous cholecystitis     Health Care Home     Paroxysmal atrial fibrillation (H)     Abdominal pain, unspecified abdominal location     UTI (urinary tract infection)     Sepsis (H)     Headache     Physical deconditioning     Urinary bladder stone     S/P cystoscopy     Hypoxia     Hydronephrosis     Acute cholecystitis     Cardiogenic shock (H)     Closed compression fracture of thoracic vertebra (H)     Family history of other digestive disorders     Allergic rhinitis     Arthritis of hand     Esophageal stricture     Obstructive sleep apnea     Weakness     At high risk for falls     Multiple pelvic fractures (H)     Mobility impaired     Abdominal pain     Past Surgical History:   Procedure Laterality Date     BACK SURGERY       BRONCHOSCOPY FLEXIBLE N/A 11/21/2014    Procedure: BRONCHOSCOPY FLEXIBLE;  Surgeon: Rhonda Donohue MD;  Location:  GI     C NONSPECIFIC PROCEDURE      s/p dilation at [facility] by Dr. Radha FELIPE NONSPECIFIC PROCEDURE      s/p dilation at [facility] by Dr. Betty FELIPE NONSPECIFIC PROCEDURE      Vag hysterectomy     CHOLECYSTECTOMY N/A 11/15/2014     Procedure: CHOLECYSTECTOMY;  Surgeon: Yomi Brennan MD;  Location:  OR     CYSTOSCOPY, LITHOLAPAXY, COMBINED N/A 4/8/2015    Procedure: COMBINED CYSTOSCOPY, LITHOLAPAXY;  Surgeon: Randy Reno MD;  Location:  OR     ENT SURGERY       ESOPHAGOSCOPY, GASTROSCOPY, DUODENOSCOPY (EGD), COMBINED  11/21/2012    Procedure: COMBINED ESOPHAGOSCOPY, GASTROSCOPY, DUODENOSCOPY (EGD), BIOPSY SINGLE OR MULTIPLE;   ESOPHAGOSCOPY, GASTROSCOPY, DUODENOSCOPY (EGD)   with cold biopsy and dilalation with #15 savary;  Surgeon: Guillermo Arcos MD;  Location:  GI     ESOPHAGOSCOPY, GASTROSCOPY, DUODENOSCOPY (EGD), COMBINED  2/22/2013    Procedure: COMBINED ESOPHAGOSCOPY, GASTROSCOPY, DUODENOSCOPY (EGD);  ESOPHAGOSCOPY, GASTROSCOPY, DUODENOSCOPY (EGD) ;  Surgeon: Lissett Posada MD;  Location:  GI     ESOPHAGOSCOPY, GASTROSCOPY, DUODENOSCOPY (EGD), COMBINED N/A 10/29/2014    Procedure: COMBINED ESOPHAGOSCOPY, GASTROSCOPY, DUODENOSCOPY (EGD);  Surgeon: Dany Ambrocio MD;  Location:  GI     ESOPHAGOSCOPY, GASTROSCOPY, DUODENOSCOPY (EGD), COMBINED N/A 1/13/2015    Procedure: COMBINED ESOPHAGOSCOPY, GASTROSCOPY, DUODENOSCOPY (EGD), BIOPSY SINGLE OR MULTIPLE;  Surgeon: Dany Ambrocio MD;  Location:  GI     ESOPHAGOSCOPY, GASTROSCOPY, DUODENOSCOPY (EGD), COMBINED N/A 3/5/2015    Procedure: COMBINED ESOPHAGOSCOPY, GASTROSCOPY, DUODENOSCOPY (EGD);  Surgeon: Dany Ambrocio MD;  Location:  GI     ESOPHAGOSCOPY, GASTROSCOPY, DUODENOSCOPY (EGD), COMBINED N/A 11/10/2015    Procedure: COMBINED ESOPHAGOSCOPY, GASTROSCOPY, DUODENOSCOPY (EGD);  Surgeon: Dany Ambrocio MD;  Location:  GI     EYE SURGERY       GYN SURGERY       LAPAROTOMY EXPLORATORY N/A 11/15/2014    Procedure: LAPAROTOMY EXPLORATORY;  Surgeon: Yomi Brennan MD;  Location: [facility] OR     LASER HOLMIUM LITHOTRIPSY BLADDER N/A 4/8/2015    Procedure: LASER HOLMIUM LITHOTRIPSY BLADDER;  Surgeon: Randy Reno MD;   Location: [facility] OR     ORTHOPEDIC SURGERY      hip fx surgery x 2       Social History   Substance Use Topics     Smoking status: Never Smoker     Smokeless tobacco: Never Used     Alcohol use No     Family History   Problem Relation Age of Onset     DIABETES Mother      DIABETES Sister      HEART DISEASE Son          Current Outpatient Prescriptions   Medication Sig Dispense Refill     acetaminophen (TYLENOL) 325 MG tablet Take 2 tablets (650 mg) by mouth every 6 hours as needed for mild pain 60 tablet 0     albuterol (PROAIR HFA, PROVENTIL HFA, VENTOLIN HFA) 108 (90 BASE) MCG/ACT inhaler Inhale 2 puffs into the lungs every 6 hours as needed for shortness of breath / dyspnea or wheezing 1 Inhaler 1     alendronate (FOSAMAX) 70 MG tablet Take 1 tablet (70 mg) by mouth every 7 days Take with over 8 ounces water and stay upright for at least 30 minutes after dose.  Take at least 60 minutes before breakfast 12 tablet 3     ferrous gluconate (FERGON) 324 (38 FE) MG tablet Take 1 tablet (324 mg) by mouth daily 30 tablet 10     fluconazole (DIFLUCAN) 150 MG tablet Take 1 tablet (150 mg) by mouth once for 1 dose 1 tablet 0     multivitamin, therapeutic with minerals (MULTI-VITAMIN) TABS Take 1 tablet by mouth daily. 100 tablet 3     Nutritional Supplements (ENSURE) LIQD Take 0.5 Cans by mouth daily (strawberry) 15 each 11     order for DME Equipment being ordered: abdominal binder 1 Units 0     order for DME Equipment being ordered: lumbar corset 1 Units 0     oxyCODONE IR (ROXICODONE) 5 MG tablet Take 1 tablet (5 mg) by mouth every 8 hours as needed for pain maximum 3 tablet(s) per day 30 tablet 0     pantoprazole (PROTONIX) 40 MG EC tablet Take 1 tablet (40 mg) by mouth every morning (before breakfast) Take 30-60 minutes before a meal. 90 tablet 1     ranitidine (ZANTAC) 300 MG tablet Take 1 tablet (300 mg) by mouth At Bedtime 30 tablet 1     Allergies   Allergen Reactions     No Known Drug Allergies      BP Readings from  Last 3 Encounters:   05/02/18 108/74   04/25/18 122/72   01/04/18 119/74    Wt Readings from Last 3 Encounters:   05/02/18 70 lb (31.8 kg)   04/25/18 69 lb 8 oz (31.5 kg)   01/04/18 70 lb 3.2 oz (31.8 kg)                    Reviewed and updated as needed this visit by clinical staff  Tobacco  Allergies  Meds  Problems  Med Hx  Surg Hx  Fam Hx  Soc Hx        Reviewed and updated as needed this visit by Provider  Allergies  Meds  Problems         ROS:  Constitutional, HEENT, cardiovascular, pulmonary, gi and gu systems are negative, except as otherwise noted.    OBJECTIVE:     /74 (BP Location: Right arm, Patient Position: Chair, Cuff Size: Child)  Pulse 113  Temp 97.7  F (36.5  C) (Oral)  Resp 18  Wt 70 lb (31.8 kg)  BMI 20.5 kg/m2  Body mass index is 20.5 kg/(m^2).  GENERAL: alert, no distress, frail and elderly  RESP: lungs clear to auscultation - no rales, rhonchi or wheezes  CV: regular rates and rhythm  ABDOMEN: soft, nontender, no hepatosplenomegaly, no masses and bowel sounds normal  PSYCH: mentation appears normal, affect normal/bright    Diagnostic Test Results:  Results for orders placed or performed in visit on 05/02/18 (from the past 24 hour(s))   *UA reflex to Microscopic and Culture ([place] and [facility] (except [place] and [place])   Result Value Ref Range    Color Urine Yellow     Appearance Urine Cloudy     Glucose Urine Negative NEG^Negative mg/dL    Bilirubin Urine Negative NEG^Negative    Ketones Urine Negative NEG^Negative mg/dL    Specific Gravity Urine 1.025 1.003 - 1.035    Blood Urine Small (A) NEG^Negative    pH Urine 5.5 5.0 - 7.0 pH    Protein Albumin Urine 100 (A) NEG^Negative mg/dL    Urobilinogen Urine 0.2 0.2 - 1.0 EU/dL    Nitrite Urine Negative NEG^Negative    Leukocyte Esterase Urine Large (A) NEG^Negative    Source Midstream Urine    Urine Microscopic   Result Value Ref Range    WBC Urine >100 (A) OTO5^0 - 5 /HPF    RBC Urine 10-25 (A) OTO2^O - 2 /HPF     WBC Clumps Present (A) NEG^Negative /HPF    Squamous Epithelial /LPF Urine Moderate (A) FEW^Few /LPF    Transitional Epi Few FEW^Few /HPF    Bacteria Urine Few (A) NEG^Negative /HPF   Wet prep   Result Value Ref Range    Specimen Description Vagina     Wet Prep No Trichomonas seen     Wet Prep No clue cells seen     Wet Prep Yeast seen (A)        ASSESSMENT/PLAN:     (B37.3) Vaginal candidiasis  (primary encounter diagnosis)  Comment: evident on wet prep. Given benign exam and UA similar to last weeks with negative culture, likely cause of dysuria. Will send diflucan to pharmacy. If no improvement in 3 days, repeat treatment may be needed. If continuing to worsen despite treatment, patient should RTC. Urine culture will be obtained again.   Plan: *UA reflex to Microscopic and Culture ([place]         and [facility] (except [place] and         [place]), Urine Microscopic, Urine Culture         Aerobic Bacterial, Wet prep, fluconazole         (DIFLUCAN) 150 MG tablet            (R82.90) Nonspecific finding on examination of urine  Comment:   Plan: Urine Culture Aerobic Bacterial              Follow up: as above     Juan A Solo PA-C  [facility]

## 2018-05-02 NOTE — PATIENT INSTRUCTIONS
Dysuria with Uncertain Cause (Adult)    The urethra is the tube that allows urine to pass out of the body. In a woman, the urethra is the opening above the vagina. In men, the urethra is the opening on the tip of the penis. Dysuria is the feeling of pain or burning in the urethra when passing urine.  Dysuria can be caused by anything that irritates or inflames the urethra. An infection or chemical irritation can cause this reaction. A bladder infection is the most common cause of dysuria in adults. A urine test can diagnose this. A bladder infection needs antibiotic treatment.  Soaps, lotions, colognes and feminine hygiene products can cause dysuria. So can birth control jellies, creams, and foams. It will go away 1 to 3 days after using these irritants.  Sexually transmitted diseases (STDs) such as chlamydia or gonorrhea can cause dysuria. Your healthcare provider may take a culture sample. Your provider may start you on antibiotic medicine before the culture test returns.  In women who have gone through menopause, dysuria can be from dryness in the lining of the urethra. This can be treated with hormones. Dysuria becomes long-term (chronic) when it lasts for weeks or months. You may need to see a specialist (urologist) to diagnose and treat chronic dysuria.  Home care  These home care tips may help:    Don't use any chemicals or products that you think may be causing your symptoms.    If you were given a prescription medicine, take as directed. Be sure to take it until it is all used up.    If a culture was taken, don't have sex until you have been told that it is negative. This means you don't have an infection. Then follow your healthcare provider's advice to treat your condition.  If a culture was done and it is positive:    Both you and your sexual partner may need to be treated. This is true even if your partner has no symptoms.    Contact your healthcare provider or go to an urgent care clinic or the  [facility] health department to be looked at and treated.    Don't have sex until both you and your partner(s) have finished all antibiotics and your healthcare provider says you are no longer contagious.    Learn about and use safe sex practices. The safest sex is with a partner who has tested negative and only has sex with you. Condoms can prevent STDs from spreading, but they aren't a guarantee.  Follow-up care  Follow up with your healthcare provider, or as advised. If a culture was taken, you may call as directed for the results. If you have an STD, follow up with your provider or the public health department for a complete STD screening, including HIV testing. For more information, contact CDC-INFO at 240-504-6763.  When to seek medical advice  Call your healthcare provider right away if any of these occur:    You aren't better after 3 days of treatment    Fever of 100.4 F (38 C) or higher, or as directed by your healthcare provider    Back or belly pain that gets worse    You can't urinate because of pain    New discharge from the urethra, vagina, or penis    Painful sores on the penis    Rash or joint pain    Painful lumps (lymph nodes) in the groin    Testicle pain or swelling of the scrotum  Date Last Reviewed: 11/1/2016 2000-2017 [employer]. [address] [place] [address], [place] [address]. All rights reserved. This information is not intended as a substitute for professional medical care. Always follow your healthcare professional's instructions.

## 2018-05-02 NOTE — TELEPHONE ENCOUNTER
Received 3 page fax for Prescription/Certificate of Medical Necessity for Bert Solo to complete. Form in the in-basket at ZB's desk.

## 2018-05-02 NOTE — NURSING NOTE
"Chief Complaint   Patient presents with     RECHECK     UTI       Initial /74 (BP Location: Right arm, Patient Position: Chair, Cuff Size: Child)  Pulse 113  Temp 97.7  F (36.5  C) (Oral)  Resp 18  Wt 70 lb (31.8 kg)  BMI 20.5 kg/m2 Estimated body mass index is 20.5 kg/(m^2) as calculated from the following:    Height as of 1/4/18: 4' 1\" (1.245 m).    Weight as of this encounter: 70 lb (31.8 kg).  Medication Reconciliation: complete    "

## 2018-05-02 NOTE — MR AVS SNAPSHOT
After Visit Summary   5/2/2018    Nedra Carr    MRN: [id number]           Patient Information     Date Of Birth          12/22/1932        Visit Information        Provider Department      5/2/2018 10:30 AM Juan A Solo PA-C [facility]        Today's Diagnoses     Dysuria    -  1    Nonspecific finding on examination of urine          Care Instructions      Dysuria with Uncertain Cause (Adult)    The urethra is the tube that allows urine to pass out of the body. In a woman, the urethra is the opening above the vagina. In men, the urethra is the opening on the tip of the penis. Dysuria is the feeling of pain or burning in the urethra when passing urine.  Dysuria can be caused by anything that irritates or inflames the urethra. An infection or chemical irritation can cause this reaction. A bladder infection is the most common cause of dysuria in adults. A urine test can diagnose this. A bladder infection needs antibiotic treatment.  Soaps, lotions, colognes and feminine hygiene products can cause dysuria. So can birth control jellies, creams, and foams. It will go away 1 to 3 days after using these irritants.  Sexually transmitted diseases (STDs) such as chlamydia or gonorrhea can cause dysuria. Your healthcare provider may take a culture sample. Your provider may start you on antibiotic medicine before the culture test returns.  In women who have gone through menopause, dysuria can be from dryness in the lining of the urethra. This can be treated with hormones. Dysuria becomes long-term (chronic) when it lasts for weeks or months. You may need to see a specialist (urologist) to diagnose and treat chronic dysuria.  Home care  These home care tips may help:    Don't use any chemicals or products that you think may be causing your symptoms.    If you were given a prescription medicine, take as directed. Be sure to take it until it is all used up.    If a culture was  taken, don't have sex until you have been told that it is negative. This means you don't have an infection. Then follow your healthcare provider's advice to treat your condition.  If a culture was done and it is positive:    Both you and your sexual partner may need to be treated. This is true even if your partner has no symptoms.    Contact your healthcare provider or go to an urgent care clinic or the public health department to be looked at and treated.    Don't have sex until both you and your partner(s) have finished all antibiotics and your healthcare provider says you are no longer contagious.    Learn about and use safe sex practices. The safest sex is with a partner who has tested negative and only has sex with you. Condoms can prevent STDs from spreading, but they aren't a guarantee.  Follow-up care  Follow up with your healthcare provider, or as advised. If a culture was taken, you may call as directed for the results. If you have an STD, follow up with your provider or the public health department for a complete STD screening, including HIV testing. For more information, contact CDC-INFO at 315-226-0563.  When to seek medical advice  Call your healthcare provider right away if any of these occur:    You aren't better after 3 days of treatment    Fever of 100.4 F (38 C) or higher, or as directed by your healthcare provider    Back or belly pain that gets worse    You can't urinate because of pain    New discharge from the urethra, vagina, or penis    Painful sores on the penis    Rash or joint pain    Painful lumps (lymph nodes) in the groin    Testicle pain or swelling of the scrotum  Date Last Reviewed: 11/1/2016 2000-2017 [employer]. [address] [place] [address]. All rights reserved. This information is not intended as a substitute for professional medical care. Always follow your healthcare professional's instructions.                Follow-ups after your visit       "  Who to contact     If you have questions or need follow up information about today's clinic visit or your schedule please contact [facility] directly at [phone].  Normal or non-critical lab and imaging results will be communicated to you by MyChart, letter or phone within 4 business days after the clinic has received the results. If you do not hear from us within 7 days, please contact the clinic through MyChart or phone. If you have a critical or abnormal lab result, we will notify you by phone as soon as possible.  Submit refill requests through [employer] or call your pharmacy and they will forward the refill request to us. Please allow 3 business days for your refill to be completed.          Additional Information About Your Visit        [employer] Information     [employer] lets you send messages to your doctor, view your test results, renew your prescriptions, schedule appointments and more. To sign up, go to [email or web address] . Click on \"Log in\" on the left side of the screen, which will take you to the Welcome page. Then click on \"Sign up Now\" on the right side of the page.     You will be asked to enter the access code listed below, as well as some personal information. Please follow the directions to create your username and password.     Your access code is: [email or web address]  Expires: 2018 10:54 AM     Your access code will  in 90 days. If you need help or a new code, please call your [place] clinic or [phone].        Care EveryWhere ID     This is your Care EveryWhere ID. This could be used by other organizations to access your [place] medical records  [id number]        Your Vitals Were     Pulse Temperature Respirations BMI (Body Mass Index)          113 97.7  F (36.5  C) (Oral) 18 20.5 kg/m2         Blood Pressure from Last 3 Encounters:   18 108/74   18 122/72   18 119/74    Weight from Last 3 Encounters:   18 70 lb (31.8 kg)   18 " 69 lb 8 oz (31.5 kg)   01/04/18 70 lb 3.2 oz (31.8 kg)              We Performed the Following     *UA reflex to Microscopic and Culture ([place] and [facility] (except [place] and [place])     Urine Culture Aerobic Bacterial     Urine Microscopic     Wet prep        Primary Care Provider Office Phone # Fax #    Juan A Rolando Solo PA-C [phone] [address] [facility] [address]        Equal Access to Services     ELAINA ANAYA : Hadii aad ku hadasho Soomaali, waaxda luqadaha, qaybta kaalmada adeegyada, waxay idiin hayaan adeeg kharash la'adolph . So [facility] [phone].    ATENCIÓN: Si habla español, tiene a barrientos disposición servicios gratuitos de asistencia lingüística. [facility] [phone].    We comply with applicable federal civil rights laws and [place] laws. We do not discriminate on the basis of race, color, national origin, age, disability, sex, sexual orientation, or gender identity.            Thank you!     Thank you for choosing [facility]  for your care. Our goal is always to provide you with excellent care. Hearing back from our patients is one way we can continue to improve our services. Please take a few minutes to complete the written survey that you may receive in the mail after your visit with us. Thank you!             Your Updated Medication List - Protect others around you: Learn how to safely use, store and throw away your medicines at www.disposemymeds.org.          This list is accurate as of 5/2/18 10:59 AM.  Always use your most recent med list.                   Brand Name Dispense Instructions for use Diagnosis    acetaminophen 325 MG tablet    TYLENOL    60 tablet    Take 2 tablets (650 mg) by mouth every 6 hours as needed for mild pain    Abdominal pain, other specified site       albuterol 108 (90 Base) MCG/ACT Inhaler    PROAIR HFA/PROVENTIL HFA/VENTOLIN HFA    1 Inhaler    Inhale 2 puffs into the lungs every 6 hours as needed for  shortness of breath / dyspnea or wheezing    Dyspnea       alendronate 70 MG tablet    FOSAMAX    12 tablet    Take 1 tablet (70 mg) by mouth every 7 days Take with over 8 ounces water and stay upright for at least 30 minutes after dose.  Take at least 60 minutes before breakfast    Age-related osteoporosis without current pathological fracture       ciprofloxacin 500 MG tablet    CIPRO    14 tablet    Take 1 tablet (500 mg) by mouth 2 times daily    Acute cystitis with hematuria       ENSURE Liqd     15 each    Take 0.5 Cans by mouth daily (strawberry)    Perforated gastric ulcer, chronic or unspecified, Physical deconditioning, Weakness       ferrous gluconate 324 (38 Fe) MG tablet    FERGON    30 tablet    Take 1 tablet (324 mg) by mouth daily    Other iron deficiency anemia       Multi-vitamin Tabs tablet     100 tablet    Take 1 tablet by mouth daily.        * order for DME     1 Units    Equipment being ordered: abdominal binder    Lumbar radicular pain       * order for DME     1 Units    Equipment being ordered: lumbar corset    Lumbar radicular pain       oxyCODONE IR 5 MG tablet    ROXICODONE    30 tablet    Take 1 tablet (5 mg) by mouth every 8 hours as needed for pain maximum 3 tablet(s) per day    Arthritis, multiple joint involvement       pantoprazole 40 MG EC tablet    PROTONIX    90 tablet    Take 1 tablet (40 mg) by mouth every morning (before breakfast) Take 30-60 minutes before a meal.    Other acute gastritis without hemorrhage       ranitidine 300 MG tablet    ZANTAC    30 tablet    Take 1 tablet (300 mg) by mouth At Bedtime    Other acute gastritis without hemorrhage       * Notice:  This list has 2 medication(s) that are the same as other medications prescribed for you. Read the directions carefully, and ask your doctor or other care provider to review them with you.

## 2018-05-03 NOTE — TELEPHONE ENCOUNTER
"Has filled out last month. Was sent a second request and not filled out. [employer] called and stated original form was \"incorrect\" and needed to be addended. They stated will fax again and to call if problem.     The form received is a new form. I am unsure was was incorrect with original form. Can we call [employer] and inquire was was incorrect with original form so we can prevent the same mistake from being made?    -Bert Solo, PAC  "

## 2018-05-03 NOTE — TELEPHONE ENCOUNTER
LM for [employer] to call back to clinic.   See RITESH's message below.  Form in Bert's folder at [facility].   Unable to locate original form.

## 2018-05-04 LAB
BACTERIA SPEC CULT: ABNORMAL
BACTERIA SPEC CULT: ABNORMAL
SPECIMEN SOURCE: ABNORMAL

## 2018-05-04 NOTE — TELEPHONE ENCOUNTER
Spoke with [employer].   They state they had submitted us the wrong form.   Received new/correct form for Bert to complete-attached with the previous form.   Form in Bert's inbasket to complete and sign.

## 2018-06-15 ENCOUNTER — TELEPHONE (OUTPATIENT)
Dept: FAMILY MEDICINE | Facility: CLINIC | Age: 83
End: 2018-06-15

## 2018-06-15 NOTE — TELEPHONE ENCOUNTER
Kathleen calling from  Home Care for orders-    HHA  2xwk/9wks    Skilled Nursing for supervisory visits and recertification visits.      Verbal order given.  Will fax for MD signature.  Soraya Arias RN

## 2018-06-18 ENCOUNTER — OFFICE VISIT (OUTPATIENT)
Dept: FAMILY MEDICINE | Facility: CLINIC | Age: 83
End: 2018-06-18
Payer: COMMERCIAL

## 2018-06-18 VITALS
SYSTOLIC BLOOD PRESSURE: 118 MMHG | WEIGHT: 67 LBS | OXYGEN SATURATION: 98 % | HEART RATE: 104 BPM | RESPIRATION RATE: 18 BRPM | TEMPERATURE: 98 F | BODY MASS INDEX: 19.62 KG/M2 | DIASTOLIC BLOOD PRESSURE: 72 MMHG

## 2018-06-18 DIAGNOSIS — R30.0 DYSURIA: Primary | ICD-10-CM

## 2018-06-18 DIAGNOSIS — R82.90 NONSPECIFIC FINDING ON EXAMINATION OF URINE: ICD-10-CM

## 2018-06-18 LAB
ALBUMIN UR-MCNC: 100 MG/DL
APPEARANCE UR: ABNORMAL
BACTERIA #/AREA URNS HPF: ABNORMAL /HPF
BILIRUB UR QL STRIP: NEGATIVE
COLOR UR AUTO: YELLOW
GLUCOSE UR STRIP-MCNC: NEGATIVE MG/DL
HGB UR QL STRIP: ABNORMAL
HYALINE CASTS #/AREA URNS LPF: ABNORMAL /LPF
KETONES UR STRIP-MCNC: NEGATIVE MG/DL
LEUKOCYTE ESTERASE UR QL STRIP: ABNORMAL
NITRATE UR QL: NEGATIVE
PH UR STRIP: 5.5 PH (ref 5–7)
RBC #/AREA URNS AUTO: ABNORMAL /HPF
SOURCE: ABNORMAL
SP GR UR STRIP: 1.02 (ref 1–1.03)
URNS CMNT MICRO: ABNORMAL
UROBILINOGEN UR STRIP-ACNC: 0.2 EU/DL (ref 0.2–1)
WBC #/AREA URNS AUTO: ABNORMAL /HPF
YEAST #/AREA URNS HPF: ABNORMAL /HPF

## 2018-06-18 PROCEDURE — 87086 URINE CULTURE/COLONY COUNT: CPT | Performed by: NURSE PRACTITIONER

## 2018-06-18 PROCEDURE — 99213 OFFICE O/P EST LOW 20 MIN: CPT | Performed by: NURSE PRACTITIONER

## 2018-06-18 PROCEDURE — 81001 URINALYSIS AUTO W/SCOPE: CPT | Performed by: NURSE PRACTITIONER

## 2018-06-18 RX ORDER — SULFAMETHOXAZOLE/TRIMETHOPRIM 800-160 MG
1 TABLET ORAL 2 TIMES DAILY
Qty: 6 TABLET | Refills: 0 | Status: SHIPPED | OUTPATIENT
Start: 2018-06-18 | End: 2018-06-21

## 2018-06-18 NOTE — PROGRESS NOTES
SUBJECTIVE:   Nedra Carr is a 85 year old female who presents to clinic today for the following health issues:    URINARY TRACT SYMPTOMS      Duration: 1 week    Description  dysuria and frequency    Intensity:  moderate    Accompanying signs and symptoms:  Fever/chills: no   Flank pain no   Nausea and vomiting: no   Vaginal symptoms: none  Abdominal/Pelvic Pain: no     History  History of frequent UTI's: YES  History of kidney stones: no   Sexually Active: no   Possibility of pregnancy: No    Precipitating or alleviating factors: None    Therapies tried and outcome: cranberry juice        Symptoms began 1 week ago, include dysuria, increased frequency and urgency. Associated right low back pain. History of UTI's. Denies vaginal itching, hematuria, fever.    Problem list and histories reviewed & adjusted, as indicated.  Additional history: as documented    Patient Active Problem List   Diagnosis     Hallux varus, acquired     Other hammer toe (acquired)     Osteoporosis     HTN (hypertension)     Cataract     CARDIOVASCULAR SCREENING; LDL GOAL LESS THAN 130     Advance Care Planning     Intractable chronic migraine without aura     Anemia     Perforated gastric ulcer (H)     Emphysematous cholecystitis     Health Care Home     Paroxysmal atrial fibrillation (H)     Abdominal pain, unspecified abdominal location     UTI (urinary tract infection)     Sepsis (H)     Headache     Physical deconditioning     Urinary bladder stone     S/P cystoscopy     Hypoxia     Hydronephrosis     Acute cholecystitis     Cardiogenic shock (H)     Closed compression fracture of thoracic vertebra (H)     Family history of other digestive disorders     Allergic rhinitis     Arthritis of hand     Esophageal stricture     Obstructive sleep apnea     Weakness     At high risk for falls     Multiple pelvic fractures (H)     Mobility impaired     Abdominal pain     Past Surgical History:   Procedure Laterality Date     BACK SURGERY        BRONCHOSCOPY FLEXIBLE N/A 11/21/2014    Procedure: BRONCHOSCOPY FLEXIBLE;  Surgeon: Rhonda Donohue MD;  Location: [facility]     C NONSPECIFIC PROCEDURE      s/p dilation at [facility] by Dr. Radha FELIPE NONSPECIFIC PROCEDURE      s/p dilation at [facility] by Dr. Betty FELIPE NONSPECIFIC PROCEDURE      Vag hysterectomy     CHOLECYSTECTOMY N/A 11/15/2014    Procedure: CHOLECYSTECTOMY;  Surgeon: Yomi Brennan MD;  Location: [facility] OR     CYSTOSCOPY, LITHOLAPAXY, COMBINED N/A 4/8/2015    Procedure: COMBINED CYSTOSCOPY, LITHOLAPAXY;  Surgeon: Randy Reno MD;  Location: [facility] OR     ENT SURGERY       ESOPHAGOSCOPY, GASTROSCOPY, DUODENOSCOPY (EGD), COMBINED  11/21/2012    Procedure: COMBINED ESOPHAGOSCOPY, GASTROSCOPY, DUODENOSCOPY (EGD), BIOPSY SINGLE OR MULTIPLE;   ESOPHAGOSCOPY, GASTROSCOPY, DUODENOSCOPY (EGD)   with cold biopsy and dilalation with #15 savary;  Surgeon: Guillermo Arcos MD;  Location: [facility]     ESOPHAGOSCOPY, GASTROSCOPY, DUODENOSCOPY (EGD), COMBINED  2/22/2013    Procedure: COMBINED ESOPHAGOSCOPY, GASTROSCOPY, DUODENOSCOPY (EGD);  ESOPHAGOSCOPY, GASTROSCOPY, DUODENOSCOPY (EGD) ;  Surgeon: Lissett Posada MD;  Location: [facility]     ESOPHAGOSCOPY, GASTROSCOPY, DUODENOSCOPY (EGD), COMBINED N/A 10/29/2014    Procedure: COMBINED ESOPHAGOSCOPY, GASTROSCOPY, DUODENOSCOPY (EGD);  Surgeon: Dany Ambrocio MD;  Location: [facility]     ESOPHAGOSCOPY, GASTROSCOPY, DUODENOSCOPY (EGD), COMBINED N/A 1/13/2015    Procedure: COMBINED ESOPHAGOSCOPY, GASTROSCOPY, DUODENOSCOPY (EGD), BIOPSY SINGLE OR MULTIPLE;  Surgeon: Dany Ambrocio MD;  Location: [facility]     ESOPHAGOSCOPY, GASTROSCOPY, DUODENOSCOPY (EGD), COMBINED N/A 3/5/2015    Procedure: COMBINED ESOPHAGOSCOPY, GASTROSCOPY, DUODENOSCOPY (EGD);  Surgeon: Dany Ambrocio MD;  [facility]: [facility]     ESOPHAGOSCOPY, GASTROSCOPY, DUODENOSCOPY (EGD), COMBINED N/A 11/10/2015    Procedure: COMBINED ESOPHAGOSCOPY, GASTROSCOPY, DUODENOSCOPY (EGD);  Surgeon:  Dany Ambrocio MD;  Location:  GI     EYE SURGERY       GYN SURGERY       LAPAROTOMY EXPLORATORY N/A 11/15/2014    Procedure: LAPAROTOMY EXPLORATORY;  Surgeon: Yomi Brennan MD;  Location: [facility]     LASER HOLMIUM LITHOTRIPSY BLADDER N/A 4/8/2015    Procedure: LASER HOLMIUM LITHOTRIPSY BLADDER;  Surgeon: Randy Reno MD;  Location: [facility]     ORTHOPEDIC SURGERY      hip fx surgery x 2       Social History   Substance Use Topics     Smoking status: Never Smoker     Smokeless tobacco: Never Used     Alcohol use No     Family History   Problem Relation Age of Onset     DIABETES Mother      DIABETES Sister      HEART DISEASE Son          Current Outpatient Prescriptions   Medication Sig Dispense Refill     acetaminophen (TYLENOL) 325 MG tablet Take 2 tablets (650 mg) by mouth every 6 hours as needed for mild pain 60 tablet 0     albuterol (PROAIR HFA, PROVENTIL HFA, VENTOLIN HFA) 108 (90 BASE) MCG/ACT inhaler Inhale 2 puffs into the lungs every 6 hours as needed for shortness of breath / dyspnea or wheezing 1 Inhaler 1     alendronate (FOSAMAX) 70 MG tablet Take 1 tablet (70 mg) by mouth every 7 days Take with over 8 ounces water and stay upright for at least 30 minutes after dose.  Take at least 60 minutes before breakfast 12 tablet 3     ferrous gluconate (FERGON) 324 (38 FE) MG tablet Take 1 tablet (324 mg) by mouth daily 30 tablet 10     multivitamin, therapeutic with minerals (MULTI-VITAMIN) TABS Take 1 tablet by mouth daily. 100 tablet 3     Nutritional Supplements (ENSURE) LIQD Take 0.5 Cans by mouth daily (strawberry) 15 each 11     order for DME Equipment being ordered: lumbar corset 1 Units 0     order for DME Equipment being ordered: abdominal binder 1 Units 0     pantoprazole (PROTONIX) 40 MG EC tablet Take 1 tablet (40 mg) by mouth every morning (before breakfast) Take 30-60 minutes before a meal. 90 tablet 1     ranitidine (ZANTAC) 300 MG tablet Take 1 tablet (300 mg) by mouth At  Bedtime 30 tablet 1     Allergies   Allergen Reactions     No Known Drug Allergies        Reviewed and updated as needed this visit by clinical staff       Reviewed and updated as needed this visit by Provider         ROS:  Positive for abnormal urinary symptoms   Constitutional, cardiovascular, pulmonary, ,  and psych systems are negative, except as otherwise noted.    This document serves as a record of the services and decisions personally performed and made by Susan Haase, CNP. It was created on her behalf by Jasmin Flores, a trained medical scribe. The creation of this document is based on the provider's statements to the medical scribe.  Jasmin Flores 1:47 PM June 18, 2018  OBJECTIVE:   /72 (BP Location: Right arm, Patient Position: Chair, Cuff Size: Adult Small)  Pulse 104  Temp 98  F (36.7  C) (Oral)  Resp 18  Wt 30.4 kg (67 lb)  SpO2 98%  BMI 19.62 kg/m2  Body mass index is 19.62 kg/(m^2).  GENERAL: healthy, alert and no distress  RESP: lungs clear to auscultation - no rales, rhonchi or wheezes  CV: regular rate and rhythm, normal S1 S2, no S3 or S4, no murmur, click or rub, no peripheral edema   BACK: no CVA tenderness. Paralumbar tenderness  PSYCH: mentation appears normal, affect normal/bright    ASSESSMENT/PLAN:   Nedra was seen today for urinary problem.    Diagnoses and all orders for this visit:    Dysuria: UA with 25-50 WBC, large leuk and blood. Will treat for UTI with bactrim bid x 3 days.  Last on antibiotic for UTI in 4/2018. Discussed increase in fluid intake.  -     *UA reflex to Microscopic and Culture ([place] [facility] (except [place] and [place])  -     Urine Microscopic  -     Urine Culture Aerobic Bacterial  -     sulfamethoxazole-trimethoprim (BACTRIM DS/SEPTRA DS) 800-160 MG per tablet; Take 1 tablet by mouth 2 times daily for 3 days    Nonspecific finding on examination of urine  -     Urine Culture Aerobic Bacterial    Follow up in 6 months with PCP for  routine visit, sooner as needed.   The information in this document, created by the medical scribe for me, accurately reflects the services I personally performed and the decisions made by me. I have reviewed and approved this document for accuracy prior to leaving the patient care area.  June 18, 2018 1:49 PM  Susan Haase, APRN [facility]

## 2018-06-18 NOTE — MR AVS SNAPSHOT
After Visit Summary   6/18/2018    Nedra Carr    MRN: [id number]           Patient Information     Date Of Birth          12/22/1932        Visit Information        Provider Department      6/18/2018 1:30 PM Haase, Susan Rachele, APRN CNP [facility]        Today's Diagnoses     Dysuria    -  1    Nonspecific finding on examination of urine           Follow-ups after your visit        Follow-up notes from your care team     Return in about 3 months (around 9/18/2018).      Who to contact     If you have questions or need follow up information about today's clinic visit or your schedule please contact [facility] directly at [phone].  Normal or non-critical lab and imaging results will be communicated to you by MyChart, letter or phone within 4 business days after the clinic has received the results. If you do not hear from us within 7 days, please contact the clinic through MyChart or phone. If you have a critical or abnormal lab result, we will notify you by phone as soon as possible.  Submit refill requests through [employer] or call your pharmacy and they will forward the refill request to us. Please allow 3 business days for your refill to be completed.          Additional Information About Your Visit        Care EveryWhere ID     This is your Care EveryWhere ID. This could be used by other organizations to access your [place] medical records  [id number]        Your Vitals Were     Pulse Temperature Respirations Pulse Oximetry BMI (Body Mass Index)       104 98  F (36.7  C) (Oral) 18 98% 19.62 kg/m2        Blood Pressure from Last 3 Encounters:   06/18/18 118/72   05/02/18 108/74   04/25/18 122/72    Weight from Last 3 Encounters:   06/18/18 67 lb (30.4 kg)   05/02/18 70 lb (31.8 kg)   04/25/18 69 lb 8 oz (31.5 kg)              We Performed the Following     *UA reflex to Microscopic and Culture ([place] [facility] (except [place] and  [place])     Urine Culture Aerobic Bacterial     Urine Microscopic          Today's Medication Changes          These changes are accurate as of 6/18/18  2:11 PM.  If you have any questions, ask your nurse or doctor.               Start taking these medicines.        Dose/Directions    sulfamethoxazole-trimethoprim 800-160 MG per tablet   Commonly known as:  BACTRIM DS/SEPTRA DS   Used for:  Dysuria   Started by:  Haase, Susan Rachele, APRN CNP        Dose:  1 tablet   Take 1 tablet by mouth 2 times daily for 3 days   Quantity:  6 tablet   Refills:  0         Stop taking these medicines if you haven't already. Please contact your care team if you have questions.     oxyCODONE IR 5 MG tablet   Commonly known as:  ROXICODONE   Stopped by:  Haase, Susan Rachele, APRN CNP                Where to get your medicines      These medications were sent to [facility] [address]  [address] [facility] [address]     Phone:  [phone]     sulfamethoxazole-trimethoprim 800-160 MG per tablet                Primary Care Provider Office Phone # Fax #    Juan A Rolando Solo PA-C [phone] [address] [facility] [address]        Equal Access to Services     ELAINA ANAYA : Hadii sravan alvaradoo Soomaali, waaxda luqadaha, qaybta kaalmada adeegyada, [place] mayes. So [facility] [phone].    ATENCIÓN: Si habla español, tiene a barrientos disposición servicios gratuitos de asistencia lingüística. Naomi al [phone].    We comply with applicable federal civil rights laws and [place] laws. We do not discriminate on the basis of race, color, national origin, age, disability, sex, sexual orientation, or gender identity.            Thank you!     Thank you for choosing [facility]  for your care. Our goal is always to provide you with excellent care. Hearing back from our patients is one way we can continue to improve our  services. Please take a few minutes to complete the written survey that you may receive in the mail after your visit with us. Thank you!             Your Updated Medication List - Protect others around you: Learn how to safely use, store and throw away your medicines at www.disposemymeds.org.          This list is accurate as of 6/18/18  2:11 PM.  Always use your most recent med list.                   Brand Name Dispense Instructions for use Diagnosis    acetaminophen 325 MG tablet    TYLENOL    60 tablet    Take 2 tablets (650 mg) by mouth every 6 hours as needed for mild pain    Abdominal pain, other specified site       albuterol 108 (90 Base) MCG/ACT Inhaler    PROAIR HFA/PROVENTIL HFA/VENTOLIN HFA    1 Inhaler    Inhale 2 puffs into the lungs every 6 hours as needed for shortness of breath / dyspnea or wheezing    Dyspnea       alendronate 70 MG tablet    FOSAMAX    12 tablet    Take 1 tablet (70 mg) by mouth every 7 days Take with over 8 ounces water and stay upright for at least 30 minutes after dose.  Take at least 60 minutes before breakfast    Age-related osteoporosis without current pathological fracture       ENSURE Liqd     15 each    Take 0.5 Cans by mouth daily (strawberry)    Perforated gastric ulcer, chronic or unspecified, Physical deconditioning, Weakness       ferrous gluconate 324 (38 Fe) MG tablet    FERGON    30 tablet    Take 1 tablet (324 mg) by mouth daily    Other iron deficiency anemia       Multi-vitamin Tabs tablet     100 tablet    Take 1 tablet by mouth daily.        * order for DME     1 Units    Equipment being ordered: abdominal binder    Lumbar radicular pain       * order for DME     1 Units    Equipment being ordered: lumbar corset    Lumbar radicular pain       pantoprazole 40 MG EC tablet    PROTONIX    90 tablet    Take 1 tablet (40 mg) by mouth every morning (before breakfast) Take 30-60 minutes before a meal.    Other acute gastritis without hemorrhage       ranitidine 300  MG tablet    ZANTAC    30 tablet    Take 1 tablet (300 mg) by mouth At Bedtime    Other acute gastritis without hemorrhage       sulfamethoxazole-trimethoprim 800-160 MG per tablet    BACTRIM DS/SEPTRA DS    6 tablet    Take 1 tablet by mouth 2 times daily for 3 days    Dysuria       * Notice:  This list has 2 medication(s) that are the same as other medications prescribed for you. Read the directions carefully, and ask your doctor or other care provider to review them with you.

## 2018-06-19 LAB
BACTERIA SPEC CULT: NORMAL
SPECIMEN SOURCE: NORMAL

## 2018-07-20 ENCOUNTER — PATIENT OUTREACH (OUTPATIENT)
Dept: CARE COORDINATION | Facility: CLINIC | Age: 83
End: 2018-07-20

## 2018-07-20 ASSESSMENT — ACTIVITIES OF DAILY LIVING (ADL): DEPENDENT_IADLS:: TRANSPORTATION

## 2018-07-20 NOTE — PROGRESS NOTES
Clinic Care Coordination Contact    Clinic Care Coordination Contact  OUTREACH    Referral Information:  Referral Source: Self-patient/Caregiver    Primary Diagnosis: GI Disorders    Chief Complaint   Patient presents with     Clinic Care Coordination - Follow-up     phone call from patient - opthalmology - [facility]         [place] Utilization:   Clinic Utilization  Difficulty keeping appointments:: No  Utilization    Last refreshed: 7/17/2018 11:14 AM:  No Show Count (past year) 0       Last refreshed: 7/17/2018 11:14 AM:  ED visits 0       Last refreshed: 7/17/2018 11:14 AM:  Hospital admissions 0          Current as of: 7/17/2018 11:14 AM           Clinical Concerns:  Current Medical Concerns:  Phone call from patient   Nedra states she needs help to find an ophthalmologist   Nedra reports that a Dr. Bishnu Smith from [place] has advised her she needs to see an ophthalmologist. Nedra reports that this doctor provided her with his card and on the back wrote DMG on the back of the card - she is not even certain what this means     CCRN gave patient [facility] in  - she will need to call her insurance to see if any coverage [phone]     Current Behavioral Concerns: no concerns noted     Education Provided to patient: Resources provided for ophthalmology     Pain  Chronic pain (GOAL):: No  Health Maintenance Reviewed: Due/Overdue Current   Clinical Pathway: None    Medication Management:  No concerns      Functional Status:  Dependent ADLs:: Ambulation-walker  Dependent IADLs:: Transportation  Bed or wheelchair confined:: No  Mobility Status: Independent w/Device    Living Situation:  Current living arrangement:: I live alone    Diet/Exercise/Sleep:  Diet:: Regular  Inadequate nutrition (GOAL):: No  Food Insecurity: No  Tube Feeding: No    Transportation:  Transportation concerns (GOAL):: No  Transportation means:: Regular car, Family     Psychosocial:  Informal Support system:: Family      Financial/Insurance:   Financial/Insurance concerns (GOAL):: No     Resources and Interventions:  Current Resources:   List of home care services:: Home Health Aid;      Equipment Currently Used at Home: walker, rolling    Advance Care Plan/Directive  Advanced Care Plans/Directives on file:: Yes  Type Advanced Care Plans/Directives: Advanced Directive - On File  Advanced Care Plan/Directive Status: Not Applicable       Goals: None     Patient/Caregiver understanding: yes        Plan:  CCRN discussed [facility] eye clinic with patient - she tried to call them this morning and they only have ophthalmology in [place] - [facility] in [place] was given to patient   No further care coordination at this time - patient to call in the future if needs arise     Rositasa Gaspar Care Coordinator RN  [facility]  [phone]  July 20, 2018

## 2018-07-20 NOTE — PROGRESS NOTES
Clinic Care Coordination Contact    CCRN received update from patient via voicemail   She called [facility] in [place] and they do accept her insurance - She made an appt. For 7/27/18     Rosita Gaspar Care Coordinator RN  [facility]  [phone]  July 20, 2018

## 2018-08-14 ENCOUNTER — TELEPHONE (OUTPATIENT)
Dept: FAMILY MEDICINE | Facility: CLINIC | Age: 83
End: 2018-08-14

## 2018-08-14 NOTE — TELEPHONE ENCOUNTER
Joanne calling from  Home Care for continuation of services  HHGA 2x/wk, RN 1x/mo    Approved per protocol    Jeanne Judd RN, BS  Clinical Nurse Triage.

## 2018-08-15 ENCOUNTER — OFFICE VISIT (OUTPATIENT)
Dept: FAMILY MEDICINE | Facility: CLINIC | Age: 83
End: 2018-08-15
Payer: COMMERCIAL

## 2018-08-15 VITALS
RESPIRATION RATE: 17 BRPM | HEART RATE: 106 BPM | BODY MASS INDEX: 21.35 KG/M2 | SYSTOLIC BLOOD PRESSURE: 130 MMHG | TEMPERATURE: 97.7 F | WEIGHT: 72.9 LBS | DIASTOLIC BLOOD PRESSURE: 76 MMHG | OXYGEN SATURATION: 97 %

## 2018-08-15 DIAGNOSIS — B02.9 HERPES ZOSTER WITHOUT COMPLICATION: Primary | ICD-10-CM

## 2018-08-15 PROCEDURE — 99213 OFFICE O/P EST LOW 20 MIN: CPT | Performed by: PHYSICIAN ASSISTANT

## 2018-08-15 RX ORDER — VALACYCLOVIR HYDROCHLORIDE 1 G/1
1000 TABLET, FILM COATED ORAL 3 TIMES DAILY
Qty: 30 TABLET | Refills: 0 | Status: SHIPPED | OUTPATIENT
Start: 2018-08-15 | End: 2019-01-01

## 2018-08-15 NOTE — PATIENT INSTRUCTIONS
Take the complete course of the medication.                     Shingles (Herpes Zoster)  What is shingles?   Shingles is an infection caused by the same virus that causes chickenpox. This virus is called varicella zoster. You cannot develop shingles unless you have had a previous infection of chickenpox (usually as a child).   Shingles is also called herpes zoster. This infection is most common in people over 50 years old, but young people can have it as well.   How does it occur?   If you have had chickenpox, you are at risk for later developing shingles. After you recover from chickenpox, the chickenpox virus stays in your body. It moves to the roots of your nerve cells (near the spinal cord) and becomes inactive (dormant). Later, if the virus becomes active again, shingles is the name given to the symptoms it causes.   What exactly causes the virus to become active is not known. A weakened immune system seems to allow reactivation of the virus. This may occur with normal aging, immune-suppressing medicines, or another illness, or after major surgery. It can also happen as a complication of cancer or AIDS or treatment of these illnesses. Chronic use of steroid drugs may trigger shingles. The virus may also become active again after the skin is injured or sunburned. Emotional stress seems to be a common trigger as well.   What are the symptoms?   The first sign of shingles is often burning, sharp pain, tingling, or numbness in your skin on one side of your body or face. The most common site is the back or upper abdomen. You may have severe itching or aching. You also may feel tired and ill with fever, chills, headache, and upset stomach or belly pain.   One to 14 days after you start feeling pain, you will notice a rash of small blisters on reddened skin. Within a few days after they appear, the blisters will turn yellow, then dry and crust over. Over the next 2 weeks the crusts drop off, and the skin continues to  heal over the next several days to weeks.   Because shingles usually follows nerve paths, the blisters are usually found in a line, often extending from the back or side around to the belly. The blisters are almost always on just one side of the body. Shingles usually doesn't cross the midline of the body. The rash also may appear on one side of your face or scalp. The painful rash may be in the area of your ear or eye. When shingles occurs on the head or scalp, symptoms can include headaches and weakness of one side of the face, which causes that side of the face to look droopy. The symptoms usually go away eventually, but it may take many months.   In some cases the pain can last for weeks, months, or years, long after the rash heals. This is called postherpetic neuralgia.   Is shingles contagious?   You cannot get shingles from someone else. However, if you have never had chickenpox, you may get chickenpox from close contact with someone who has shingles because the blisters contain chickenpox virus.   If you have shingles, make sure that anyone who has not had chickenpox or the chickenpox shot does not come into contact with your blisters until the blisters are completely dry. Once your blisters are crusted over, they are no longer contagious.   How is it diagnosed?   Your healthcare provider will ask about your medical history and symptoms and will examine you. The diagnosis is usually obvious from the appearance of the skin. To confirm the diagnosis, your provider may order lab tests to look for the virus in fluid from a blister.   How is it treated?   It is best to start treatment as soon as possible after you notice the rash. See your healthcare provider to discuss treatment with antiviral medicine, such as acyclovir. This medicine is most effective if you start taking it within the first 3 days of the rash. Antiviral medicine may speed your recovery and lessen the chance that the pain will last for a long  time.   Your provider may also recommend or prescribe:   medicine for pain   antibacterial salves or lotions to help prevent bacterial infection of the blisters   corticosteroids (if you are over 50).   How long will the effects last?   The rash from shingles will heal in 1 to 3 weeks and the pain or irritation will usually go away in 3 to 5 weeks. When shingles occurs on the head or scalp, the symptoms usually go away eventually, but it may take many months.   If the virus damages a nerve, you may have pain, numbness, or tingling for months or even years after the rash is healed. This is called postherpetic neuralgia. This chronic condition is most likely to occur after a shingles outbreak in people over 50 years old. Taking antiviral medicine as soon as the shingles is diagnosed may help prevent this problem.   How can I take care of myself?   Take a pain-relief medicine such as acetaminophen. Take other medicine as prescribed by your healthcare provider.   Put cool, moist washcloths on the rash.   Rest in bed during the early stages if you have fever and other symptoms.   Try not to let clothing or bed linens rub against the rash and irritate it.   Call your healthcare provider right away if:   You develop worsening pain or fever.   You develop a severe headache, stiff neck, hearing loss, or changes in your ability to think.   The blisters show signs of bacterial infection, such as increasing pain or redness, or milky yellow drainage from the blister sites.   The blisters are close to the eyes or you have pain in your eyes or trouble seeing.   You have trouble walking.   You have trouble breathing or a severe cough.   You have a fever higher than 101.5? F (38.6? C.)   You have a rash involving your eye or difficulty looking at bright light.   The blisters appear infected. Signs or symptoms of infection include:   Your skin is becoming redder or more painful.   You have red streaks from the blisters going toward  your heart.   The blister area gets very warm to touch.   Pus or other fluid starts leaking from the blisters.   You have chills, nausea, vomiting, or muscle aches.   How can I help prevent shingles?   If you have never had chickenpox, you can get a shot to help prevent infection with the chickenpox virus.   If you have had chickenpox, a vaccine, called Zostavax, is available for people 60 years of age and older. The vaccine can help prevent or lessen the symptoms of shingles. It cannot be used to treat shingles once you have it.   You can protect your immune system and lessen your chances of getting shingles by trying to keep stress under control, exercising regularly, and eating a healthy diet.     Published by [employer].  This content is reviewed periodically and is subject to change as new health information becomes available. The information is intended to inform and educate and is not a replacement for medical evaluation, advice, diagnosis or treatment by a healthcare professional.   Developed by [employer].   ? 2010 [facility] and/or its affiliates. All Rights Reserved.   Copyright   [employer] 2011

## 2018-08-15 NOTE — MR AVS SNAPSHOT
After Visit Summary   8/15/2018    Nedra Carr    MRN: [id number]           Patient Information     Date Of Birth          12/22/1932        Visit Information        Provider Department      8/15/2018 3:40 PM Riccardo Baca PA-C [facility]        Today's Diagnoses     Herpes zoster without complication    -  1      Care Instructions    Take the complete course of the medication.                     Shingles (Herpes Zoster)  What is shingles?   Shingles is an infection caused by the same virus that causes chickenpox. This virus is called varicella zoster. You cannot develop shingles unless you have had a previous infection of chickenpox (usually as a child).   Shingles is also called herpes zoster. This infection is most common in people over 50 years old, but young people can have it as well.   How does it occur?   If you have had chickenpox, you are at risk for later developing shingles. After you recover from chickenpox, the chickenpox virus stays in your body. It moves to the roots of your nerve cells (near the spinal cord) and becomes inactive (dormant). Later, if the virus becomes active again, shingles is the name given to the symptoms it causes.   What exactly causes the virus to become active is not known. A weakened immune system seems to allow reactivation of the virus. This may occur with normal aging, immune-suppressing medicines, or another illness, or after major surgery. It can also happen as a complication of cancer or AIDS or treatment of these illnesses. Chronic use of steroid drugs may trigger shingles. The virus may also become active again after the skin is injured or sunburned. Emotional stress seems to be a common trigger as well.   What are the symptoms?   The first sign of shingles is often burning, sharp pain, tingling, or numbness in your skin on one side of your body or face. The most common site is the back or upper abdomen. You may have severe  itching or aching. You also may feel tired and ill with fever, chills, headache, and upset stomach or belly pain.   One to 14 days after you start feeling pain, you will notice a rash of small blisters on reddened skin. Within a few days after they appear, the blisters will turn yellow, then dry and crust over. Over the next 2 weeks the crusts drop off, and the skin continues to heal over the next several days to weeks.   Because shingles usually follows nerve paths, the blisters are usually found in a line, often extending from the back or side around to the belly. The blisters are almost always on just one side of the body. Shingles usually doesn't cross the midline of the body. The rash also may appear on one side of your face or scalp. The painful rash may be in the area of your ear or eye. When shingles occurs on the head or scalp, symptoms can include headaches and weakness of one side of the face, which causes that side of the face to look droopy. The symptoms usually go away eventually, but it may take many months.   In some cases the pain can last for weeks, months, or years, long after the rash heals. This is called postherpetic neuralgia.   Is shingles contagious?   You cannot get shingles from someone else. However, if you have never had chickenpox, you may get chickenpox from close contact with someone who has shingles because the blisters contain chickenpox virus.   If you have shingles, make sure that anyone who has not had chickenpox or the chickenpox shot does not come into contact with your blisters until the blisters are completely dry. Once your blisters are crusted over, they are no longer contagious.   How is it diagnosed?   Your healthcare provider will ask about your medical history and symptoms and will examine you. The diagnosis is usually obvious from the appearance of the skin. To confirm the diagnosis, your provider may order lab tests to look for the virus in fluid from a blister.   How  is it treated?   It is best to start treatment as soon as possible after you notice the rash. See your healthcare provider to discuss treatment with antiviral medicine, such as acyclovir. This medicine is most effective if you start taking it within the first 3 days of the rash. Antiviral medicine may speed your recovery and lessen the chance that the pain will last for a long time.   Your provider may also recommend or prescribe:   medicine for pain   antibacterial salves or lotions to help prevent bacterial infection of the blisters   corticosteroids (if you are over 50).   How long will the effects last?   The rash from shingles will heal in 1 to 3 weeks and the pain or irritation will usually go away in 3 to 5 weeks. When shingles occurs on the head or scalp, the symptoms usually go away eventually, but it may take many months.   If the virus damages a nerve, you may have pain, numbness, or tingling for months or even years after the rash is healed. This is called postherpetic neuralgia. This chronic condition is most likely to occur after a shingles outbreak in people over 50 years old. Taking antiviral medicine as soon as the shingles is diagnosed may help prevent this problem.   How can I take care of myself?   Take a pain-relief medicine such as acetaminophen. Take other medicine as prescribed by your healthcare provider.   Put cool, moist washcloths on the rash.   Rest in bed during the early stages if you have fever and other symptoms.   Try not to let clothing or bed linens rub against the rash and irritate it.   Call your healthcare provider right away if:   You develop worsening pain or fever.   You develop a severe headache, stiff neck, hearing loss, or changes in your ability to think.   The blisters show signs of bacterial infection, such as increasing pain or redness, or milky yellow drainage from the blister sites.   The blisters are close to the eyes or you have pain in your eyes or trouble  seeing.   You have trouble walking.   You have trouble breathing or a severe cough.   You have a fever higher than 101.5? F (38.6? C.)   You have a rash involving your eye or difficulty looking at bright light.   The blisters appear infected. Signs or symptoms of infection include:   Your skin is becoming redder or more painful.   You have red streaks from the blisters going toward your heart.   The blister area gets very warm to touch.   Pus or other fluid starts leaking from the blisters.   You have chills, nausea, vomiting, or muscle aches.   How can I help prevent shingles?   If you have never had chickenpox, you can get a shot to help prevent infection with the chickenpox virus.   If you have had chickenpox, a vaccine, called Zostavax, is available for people 60 years of age and older. The vaccine can help prevent or lessen the symptoms of shingles. It cannot be used to treat shingles once you have it.   You can protect your immune system and lessen your chances of getting shingles by trying to keep stress under control, exercising regularly, and eating a healthy diet.     Published by [employer].  This content is reviewed periodically and is subject to change as new health information becomes available. The information is intended to inform and educate and is not a replacement for medical evaluation, advice, diagnosis or treatment by a healthcare professional.   Developed by [employer].   ? 2010 [facility] and/or its affiliates. All Rights Reserved.   Copyright   Clinical Reference Systems 2011              Follow-ups after your visit        Who to contact     If you have questions or need follow up information about today's clinic visit or your schedule please contact [facility] directly at [phone].  Normal or non-critical lab and imaging results will be communicated to you by MyChart, letter or phone within 4 business days after the clinic has received the results. If you do not  hear from us within 7 days, please contact the clinic through [employer] or phone. If you have a critical or abnormal lab result, we will notify you by phone as soon as possible.  Submit refill requests through [employer] or call your pharmacy and they will forward the refill request to us. Please allow 3 business days for your refill to be completed.          Additional Information About Your Visit        Care EveryWhere ID     This is your Care EveryWhere ID. This could be used by other organizations to access your [place] medical records  [id number]        Your Vitals Were     Pulse Temperature Respirations Pulse Oximetry BMI (Body Mass Index)       106 97.7  F (36.5  C) (Oral) 17 97% 21.35 kg/m2        Blood Pressure from Last 3 Encounters:   08/15/18 130/76   06/18/18 118/72   05/02/18 108/74    Weight from Last 3 Encounters:   08/15/18 72 lb 14.4 oz (33.1 kg)   06/18/18 67 lb (30.4 kg)   05/02/18 70 lb (31.8 kg)              Today, you had the following     No orders found for display         Today's Medication Changes          These changes are accurate as of 8/15/18  4:04 PM.  If you have any questions, ask your nurse or doctor.               Start taking these medicines.        Dose/Directions    valACYclovir 1000 mg tablet   Commonly known as:  VALTREX   Used for:  Herpes zoster without complication   Started by:  Riccardo Baca PA-C        Dose:  1000 mg   Take 1 tablet (1,000 mg) by mouth 3 times daily for 10 days   Quantity:  30 tablet   Refills:  0            Where to get your medicines      These medications were sent to [facility] [address]  [address] [facility] [address]     Phone:  [phone]     valACYclovir 1000 mg tablet                Primary Care Provider Office Phone # Fax #    Juan A Rolando Solo PA-C [phone] [address] [facility] [address]        Equal Access to Services     ELAINA ANAYA AH: Aleksandraii sravan shaw  daniela Hinton, warajida luqadaha, qaarista kamejia hood, [place] camejo barbyshanelle benton lajenarochet sugey. So [facility] [phone].    ATENCIÓN: Si domitilala shaggy, tiene a barrientos disposición servicios gratuitos de asistencia lingüística. Naomi al [phone].    We comply with applicable federal civil rights laws and [place] laws. We do not discriminate on the basis of race, color, national origin, age, disability, sex, sexual orientation, or gender identity.            Thank you!     Thank you for choosing [facility]  for your care. Our goal is always to provide you with excellent care. Hearing back from our patients is one way we can continue to improve our services. Please take a few minutes to complete the written survey that you may receive in the mail after your visit with us. Thank you!             Your Updated Medication List - Protect others around you: Learn how to safely use, store and throw away your medicines at www.disposemymeds.org.          This list is accurate as of 8/15/18  4:04 PM.  Always use your most recent med list.                   Brand Name Dispense Instructions for use Diagnosis    acetaminophen 325 MG tablet    TYLENOL    60 tablet    Take 2 tablets (650 mg) by mouth every 6 hours as needed for mild pain    Abdominal pain, other specified site       albuterol 108 (90 Base) MCG/ACT inhaler    PROAIR HFA/PROVENTIL HFA/VENTOLIN HFA    1 Inhaler    Inhale 2 puffs into the lungs every 6 hours as needed for shortness of breath / dyspnea or wheezing    Dyspnea       alendronate 70 MG tablet    FOSAMAX    12 tablet    Take 1 tablet (70 mg) by mouth every 7 days Take with over 8 ounces water and stay upright for at least 30 minutes after dose.  Take at least 60 minutes before breakfast    Age-related osteoporosis without current pathological fracture       ENSURE Liqd     15 each    Take 0.5 Cans by mouth daily (strawberry)    Perforated gastric ulcer, chronic or unspecified,  Physical deconditioning, Weakness       ferrous gluconate 324 (38 Fe) MG tablet    FERGON    30 tablet    Take 1 tablet (324 mg) by mouth daily    Other iron deficiency anemia       Multi-vitamin Tabs tablet     100 tablet    Take 1 tablet by mouth daily.        * order for DME     1 Units    Equipment being ordered: abdominal binder    Lumbar radicular pain       * order for DME     1 Units    Equipment being ordered: lumbar corset    Lumbar radicular pain       pantoprazole 40 MG EC tablet    PROTONIX    90 tablet    Take 1 tablet (40 mg) by mouth every morning (before breakfast) Take 30-60 minutes before a meal.    Other acute gastritis without hemorrhage       ranitidine 300 MG tablet    ZANTAC    30 tablet    Take 1 tablet (300 mg) by mouth At Bedtime    Other acute gastritis without hemorrhage       valACYclovir 1000 mg tablet    VALTREX    30 tablet    Take 1 tablet (1,000 mg) by mouth 3 times daily for 10 days    Herpes zoster without complication       * Notice:  This list has 2 medication(s) that are the same as other medications prescribed for you. Read the directions carefully, and ask your doctor or other care provider to review them with you.

## 2018-08-15 NOTE — PROGRESS NOTES
SUBJECTIVE:   Nedra Carr is a 85 year old female who presents to clinic today for the following health issues:      Rash  Onset: 3 days     Description:   Location: neck   Character: painful, red  Itching (Pruritis): YES    Progression of Symptoms:  worsening    Accompanying Signs & Symptoms:  Fever: no   Body aches or joint pain: no   Sore throat symptoms: no   Recent cold symptoms: no     History:   Previous similar rash: no     Precipitating factors:   Exposure to similar rash: no   New exposures: None   Recent travel: no     Alleviating factors:  none    Therapies Tried and outcome: neosporin which didn't help    Problem list and histories reviewed & adjusted, as indicated.  Additional history: as documented    Patient Active Problem List   Diagnosis     Hallux varus, acquired     Other hammer toe (acquired)     Osteoporosis     HTN (hypertension)     Cataract     CARDIOVASCULAR SCREENING; LDL GOAL LESS THAN 130     Advance Care Planning     Intractable chronic migraine without aura     Anemia     Perforated gastric ulcer (H)     Emphysematous cholecystitis     Health Care Home     Paroxysmal atrial fibrillation (H)     Abdominal pain, unspecified abdominal location     UTI (urinary tract infection)     Sepsis (H)     Headache     Physical deconditioning     Urinary bladder stone     S/P cystoscopy     Hypoxia     Hydronephrosis     Acute cholecystitis     Cardiogenic shock (H)     Closed compression fracture of thoracic vertebra (H)     Family history of other digestive disorders     Allergic rhinitis     Arthritis of hand     Esophageal stricture     Obstructive sleep apnea     Weakness     At high risk for falls     Multiple pelvic fractures (H)     Mobility impaired     Abdominal pain     Past Surgical History:   Procedure Laterality Date     BACK SURGERY       BRONCHOSCOPY FLEXIBLE N/A 11/21/2014    Procedure: BRONCHOSCOPY FLEXIBLE;  Surgeon: Rhonda Donohue MD;  Location: [facility]  NONSPECIFIC PROCEDURE      s/p dilation at [facility] by Dr. Radha FELIPE NONSPECIFIC PROCEDURE      s/p dilation at [facility] by Dr. Betty FELIPE NONSPECIFIC PROCEDURE      Vag hysterectomy     CHOLECYSTECTOMY N/A 11/15/2014    Procedure: CHOLECYSTECTOMY;  Surgeon: Yomi Brennan MD;  Location: [facility]     CYSTOSCOPY, LITHOLAPAXY, COMBINED N/A 4/8/2015    Procedure: COMBINED CYSTOSCOPY, LITHOLAPAXY;  Surgeon: Randy Reno MD;  Location: [facility] OR     ENT SURGERY       ESOPHAGOSCOPY, GASTROSCOPY, DUODENOSCOPY (EGD), COMBINED  11/21/2012    Procedure: COMBINED ESOPHAGOSCOPY, GASTROSCOPY, DUODENOSCOPY (EGD), BIOPSY SINGLE OR MULTIPLE;   ESOPHAGOSCOPY, GASTROSCOPY, DUODENOSCOPY (EGD)   with cold biopsy and dilalation with #15 savary;  Surgeon: Guillermo Arcos MD;  Location: [facility] GI     ESOPHAGOSCOPY, GASTROSCOPY, DUODENOSCOPY (EGD), COMBINED  2/22/2013    Procedure: COMBINED ESOPHAGOSCOPY, GASTROSCOPY, DUODENOSCOPY (EGD);  ESOPHAGOSCOPY, GASTROSCOPY, DUODENOSCOPY (EGD) ;  Surgeon: Lissett Posada MD;  Location: [facility]     ESOPHAGOSCOPY, GASTROSCOPY, DUODENOSCOPY (EGD), COMBINED N/A 10/29/2014    Procedure: COMBINED ESOPHAGOSCOPY, GASTROSCOPY, DUODENOSCOPY (EGD);  Surgeon: Dany Ambrocio MD;  Location: [facility]     ESOPHAGOSCOPY, GASTROSCOPY, DUODENOSCOPY (EGD), COMBINED N/A 1/13/2015    Procedure: COMBINED ESOPHAGOSCOPY, GASTROSCOPY, DUODENOSCOPY (EGD), BIOPSY SINGLE OR MULTIPLE;  Surgeon: Dany Ambrocio MD;  Location: [facility]     ESOPHAGOSCOPY, GASTROSCOPY, DUODENOSCOPY (EGD), COMBINED N/A 3/5/2015    Procedure: COMBINED ESOPHAGOSCOPY, GASTROSCOPY, DUODENOSCOPY (EGD);  Surgeon: Dany Ambrocio MD;  [facility]: [facility]     ESOPHAGOSCOPY, GASTROSCOPY, DUODENOSCOPY (EGD), COMBINED N/A 11/10/2015    Procedure: COMBINED ESOPHAGOSCOPY, GASTROSCOPY, DUODENOSCOPY (EGD);  Surgeon: Dany Ambrocio MD;  Location:  GI     EYE SURGERY       GYN SURGERY       LAPAROTOMY EXPLORATORY N/A 11/15/2014    Procedure:  LAPAROTOMY EXPLORATORY;  Surgeon: Yomi Brennan MD;  Location: [facility]     LASER HOLMIUM LITHOTRIPSY BLADDER N/A 4/8/2015    Procedure: LASER HOLMIUM LITHOTRIPSY BLADDER;  Surgeon: Randy Reno MD;  Location: [facility] OR     ORTHOPEDIC SURGERY      hip fx surgery x 2       Social History   Substance Use Topics     Smoking status: Never Smoker     Smokeless tobacco: Never Used     Alcohol use No     Family History   Problem Relation Age of Onset     Diabetes Mother      Diabetes Sister      HEART DISEASE Son          Current Outpatient Prescriptions   Medication Sig Dispense Refill     acetaminophen (TYLENOL) 325 MG tablet Take 2 tablets (650 mg) by mouth every 6 hours as needed for mild pain 60 tablet 0     albuterol (PROAIR HFA, PROVENTIL HFA, VENTOLIN HFA) 108 (90 BASE) MCG/ACT inhaler Inhale 2 puffs into the lungs every 6 hours as needed for shortness of breath / dyspnea or wheezing 1 Inhaler 1     alendronate (FOSAMAX) 70 MG tablet Take 1 tablet (70 mg) by mouth every 7 days Take with over 8 ounces water and stay upright for at least 30 minutes after dose.  Take at least 60 minutes before breakfast 12 tablet 3     ferrous gluconate (FERGON) 324 (38 FE) MG tablet Take 1 tablet (324 mg) by mouth daily 30 tablet 10     multivitamin, therapeutic with minerals (MULTI-VITAMIN) TABS Take 1 tablet by mouth daily. 100 tablet 3     order for DME Equipment being ordered: lumbar corset 1 Units 0     order for DME Equipment being ordered: abdominal binder 1 Units 0     pantoprazole (PROTONIX) 40 MG EC tablet Take 1 tablet (40 mg) by mouth every morning (before breakfast) Take 30-60 minutes before a meal. 90 tablet 1     ranitidine (ZANTAC) 300 MG tablet Take 1 tablet (300 mg) by mouth At Bedtime 30 tablet 1     Nutritional Supplements (ENSURE) LIQD Take 0.5 Cans by mouth daily (strawberry) 15 each 11     Allergies   Allergen Reactions     No Known Drug Allergies        Reviewed and updated as needed this visit by  clinical staff       Reviewed and updated as needed this visit by Provider         ROS:  Constitutional, HEENT, cardiovascular, pulmonary, gi and gu systems are negative, except as otherwise noted.    OBJECTIVE:     /76 (BP Location: Right arm, Patient Position: Chair, Cuff Size: Adult Regular)  Pulse 106  Temp 97.7  F (36.5  C) (Oral)  Resp 17  Wt 72 lb 14.4 oz (33.1 kg)  SpO2 97%  BMI 21.35 kg/m2  Body mass index is 21.35 kg/(m^2).  GENERAL: healthy, alert and no distress  EYES: Eyes grossly normal to inspection, PERRL and conjunctivae and sclerae normal  MS: no gross musculoskeletal defects noted, no edema  SKIN: Blistery rash on an erythematous base. Rash is tender to light palpation. Consistent with shingles.   NEURO: Normal strength and tone, mentation intact and speech normal  PSYCH: mentation appears normal, affect normal/bright    Diagnostic Test Results:  none     ASSESSMENT/PLAN:       (B02.9) Herpes zoster without complication  (primary encounter diagnosis)    Comment: Likely shingles. Start valtrex. Follow-up with primary care provider if pain does not improve after finishing medication.    Plan: valACYclovir (VALTREX) 1000 mg tablet                Patient Instructions   Take the complete course of the medication.                     Shingles (Herpes Zoster)  What is shingles?   Shingles is an infection caused by the same virus that causes chickenpox. This virus is called varicella zoster. You cannot develop shingles unless you have had a previous infection of chickenpox (usually as a child).   Shingles is also called herpes zoster. This infection is most common in people over 50 years old, but young people can have it as well.   How does it occur?   If you have had chickenpox, you are at risk for later developing shingles. After you recover from chickenpox, the chickenpox virus stays in your body. It moves to the roots of your nerve cells (near the spinal cord) and becomes inactive  (dormant). Later, if the virus becomes active again, shingles is the name given to the symptoms it causes.   What exactly causes the virus to become active is not known. A weakened immune system seems to allow reactivation of the virus. This may occur with normal aging, immune-suppressing medicines, or another illness, or after major surgery. It can also happen as a complication of cancer or AIDS or treatment of these illnesses. Chronic use of steroid drugs may trigger shingles. The virus may also become active again after the skin is injured or sunburned. Emotional stress seems to be a common trigger as well.   What are the symptoms?   The first sign of shingles is often burning, sharp pain, tingling, or numbness in your skin on one side of your body or face. The most common site is the back or upper abdomen. You may have severe itching or aching. You also may feel tired and ill with fever, chills, headache, and upset stomach or belly pain.   One to 14 days after you start feeling pain, you will notice a rash of small blisters on reddened skin. Within a few days after they appear, the blisters will turn yellow, then dry and crust over. Over the next 2 weeks the crusts drop off, and the skin continues to heal over the next several days to weeks.   Because shingles usually follows nerve paths, the blisters are usually found in a line, often extending from the back or side around to the belly. The blisters are almost always on just one side of the body. Shingles usually doesn't cross the midline of the body. The rash also may appear on one side of your face or scalp. The painful rash may be in the area of your ear or eye. When shingles occurs on the head or scalp, symptoms can include headaches and weakness of one side of the face, which causes that side of the face to look droopy. The symptoms usually go away eventually, but it may take many months.   In some cases the pain can last for weeks, months, or years, long  after the rash heals. This is called postherpetic neuralgia.   Is shingles contagious?   You cannot get shingles from someone else. However, if you have never had chickenpox, you may get chickenpox from close contact with someone who has shingles because the blisters contain chickenpox virus.   If you have shingles, make sure that anyone who has not had chickenpox or the chickenpox shot does not come into contact with your blisters until the blisters are completely dry. Once your blisters are crusted over, they are no longer contagious.   How is it diagnosed?   Your healthcare provider will ask about your medical history and symptoms and will examine you. The diagnosis is usually obvious from the appearance of the skin. To confirm the diagnosis, your provider may order lab tests to look for the virus in fluid from a blister.   How is it treated?   It is best to start treatment as soon as possible after you notice the rash. See your healthcare provider to discuss treatment with antiviral medicine, such as acyclovir. This medicine is most effective if you start taking it within the first 3 days of the rash. Antiviral medicine may speed your recovery and lessen the chance that the pain will last for a long time.   Your provider may also recommend or prescribe:   medicine for pain   antibacterial salves or lotions to help prevent bacterial infection of the blisters   corticosteroids (if you are over 50).   How long will the effects last?   The rash from shingles will heal in 1 to 3 weeks and the pain or irritation will usually go away in 3 to 5 weeks. When shingles occurs on the head or scalp, the symptoms usually go away eventually, but it may take many months.   If the virus damages a nerve, you may have pain, numbness, or tingling for months or even years after the rash is healed. This is called postherpetic neuralgia. This chronic condition is most likely to occur after a shingles outbreak in people over 50 years  old. Taking antiviral medicine as soon as the shingles is diagnosed may help prevent this problem.   How can I take care of myself?   Take a pain-relief medicine such as acetaminophen. Take other medicine as prescribed by your healthcare provider.   Put cool, moist washcloths on the rash.   Rest in bed during the early stages if you have fever and other symptoms.   Try not to let clothing or bed linens rub against the rash and irritate it.   Call your healthcare provider right away if:   You develop worsening pain or fever.   You develop a severe headache, stiff neck, hearing loss, or changes in your ability to think.   The blisters show signs of bacterial infection, such as increasing pain or redness, or milky yellow drainage from the blister sites.   The blisters are close to the eyes or you have pain in your eyes or trouble seeing.   You have trouble walking.   You have trouble breathing or a severe cough.   You have a fever higher than 101.5? F (38.6? C.)   You have a rash involving your eye or difficulty looking at bright light.   The blisters appear infected. Signs or symptoms of infection include:   Your skin is becoming redder or more painful.   You have red streaks from the blisters going toward your heart.   The blister area gets very warm to touch.   Pus or other fluid starts leaking from the blisters.   You have chills, nausea, vomiting, or muscle aches.   How can I help prevent shingles?   If you have never had chickenpox, you can get a shot to help prevent infection with the chickenpox virus.   If you have had chickenpox, a vaccine, called Zostavax, is available for people 60 years of age and older. The vaccine can help prevent or lessen the symptoms of shingles. It cannot be used to treat shingles once you have it.   You can protect your immune system and lessen your chances of getting shingles by trying to keep stress under control, exercising regularly, and eating a healthy diet.     Published by  [employer].  This content is reviewed periodically and is subject to change as new health information becomes available. The information is intended to inform and educate and is not a replacement for medical evaluation, advice, diagnosis or treatment by a healthcare professional.   Developed by [employer].   ? 2010 [employer] and/or its affiliates. All Rights Reserved.   Copyright   [employer] Reference [employer] 2011          Riccardo Baca PA-C  [facility]

## 2018-08-16 ASSESSMENT — PATIENT HEALTH QUESTIONNAIRE - PHQ9: SUM OF ALL RESPONSES TO PHQ QUESTIONS 1-9: 1

## 2018-10-12 ENCOUNTER — TELEPHONE (OUTPATIENT)
Dept: FAMILY MEDICINE | Facility: CLINIC | Age: 83
End: 2018-10-12

## 2018-10-12 NOTE — TELEPHONE ENCOUNTER
Home care calls, verbal orders provided for HHA 2 times a week x 2 weeks and nursing to manage HHA, RITESH PERAZA sent  Britta Lucas RN, BSN  Message handled by Nurse Triage.

## 2018-10-15 ENCOUNTER — ALLIED HEALTH/NURSE VISIT (OUTPATIENT)
Dept: NURSING | Facility: CLINIC | Age: 83
End: 2018-10-15
Payer: COMMERCIAL

## 2018-10-15 DIAGNOSIS — Z23 NEED FOR PROPHYLACTIC VACCINATION AND INOCULATION AGAINST INFLUENZA: Primary | ICD-10-CM

## 2018-10-15 PROCEDURE — 90662 IIV NO PRSV INCREASED AG IM: CPT

## 2018-10-15 PROCEDURE — 90471 IMMUNIZATION ADMIN: CPT

## 2018-10-15 PROCEDURE — 99207 ZZC NO CHARGE NURSE ONLY: CPT

## 2018-10-15 NOTE — PROGRESS NOTES

## 2018-10-15 NOTE — MR AVS SNAPSHOT
After Visit Summary   10/15/2018    Nedra Carr    MRN: [id number]           Patient Information     Date Of Birth          12/22/1932        Visit Information        Provider Department      10/15/2018 10:30 AM CLEMENT RIVERO/LPN [facility]        Today's Diagnoses     Need for prophylactic vaccination and inoculation against influenza    -  1       Follow-ups after your visit        Who to contact     If you have questions or need follow up information about today's clinic visit or your schedule please contact [facility] directly at [phone].  Normal or non-critical lab and imaging results will be communicated to you by MyChart, letter or phone within 4 business days after the clinic has received the results. If you do not hear from us within 7 days, please contact the clinic through MyChart or phone. If you have a critical or abnormal lab result, we will notify you by phone as soon as possible.  Submit refill requests through [employer] or call your pharmacy and they will forward the refill request to us. Please allow 3 business days for your refill to be completed.          Additional Information About Your Visit        Care EveryWhere ID     This is your Care EveryWhere ID. This could be used by other organizations to access your [place] medical records  [id number]         Blood Pressure from Last 3 Encounters:   08/15/18 130/76   06/18/18 118/72   05/02/18 108/74    Weight from Last 3 Encounters:   08/15/18 72 lb 14.4 oz (33.1 kg)   06/18/18 67 lb (30.4 kg)   05/02/18 70 lb (31.8 kg)              We Performed the Following     FLU VACCINE, INCREASED ANTIGEN, PRESV FREE, AGE 65+ [69103]     Vaccine Administration, Initial [50851]        Primary Care Provider Office Phone # Fax #    Juan A Rolando Solo PA-C [phone] [address] [facility] [address]        Equal Access to Services     ELAINA CERVANTES: Albino Hinton,  warajida ovidio, qaybta kamejia hood, [place] laguerreaachet ah. So [facility] [phone].    ATENCIÓN: Si laureen coon, tiene a barrientos disposición servicios gratuitos de asistencia lingüística. Naomi al [phone].    We comply with applicable federal civil rights laws and [place] laws. We do not discriminate on the basis of race, color, national origin, age, disability, sex, sexual orientation, or gender identity.            Thank you!     Thank you for choosing [facility]  for your care. Our goal is always to provide you with excellent care. Hearing back from our patients is one way we can continue to improve our services. Please take a few minutes to complete the written survey that you may receive in the mail after your visit with us. Thank you!             Your Updated Medication List - Protect others around you: Learn how to safely use, store and throw away your medicines at www.disposemymeds.org.          This list is accurate as of 10/15/18 12:05 PM.  Always use your most recent med list.                   Brand Name Dispense Instructions for use Diagnosis    acetaminophen 325 MG tablet    TYLENOL    60 tablet    Take 2 tablets (650 mg) by mouth every 6 hours as needed for mild pain    Abdominal pain, other specified site       albuterol 108 (90 Base) MCG/ACT inhaler    PROAIR HFA/PROVENTIL HFA/VENTOLIN HFA    1 Inhaler    Inhale 2 puffs into the lungs every 6 hours as needed for shortness of breath / dyspnea or wheezing    Dyspnea       alendronate 70 MG tablet    FOSAMAX    12 tablet    Take 1 tablet (70 mg) by mouth every 7 days Take with over 8 ounces water and stay upright for at least 30 minutes after dose.  Take at least 60 minutes before breakfast    Age-related osteoporosis without current pathological fracture       ENSURE Liqd     15 each    Take 0.5 Cans by mouth daily (strawberry)    Perforated gastric ulcer, chronic or unspecified, Physical deconditioning,  Weakness       ferrous gluconate 324 (38 Fe) MG tablet    FERGON    30 tablet    Take 1 tablet (324 mg) by mouth daily    Other iron deficiency anemia       Multi-vitamin Tabs tablet     100 tablet    Take 1 tablet by mouth daily.        * order for DME     1 Units    Equipment being ordered: abdominal binder    Lumbar radicular pain       * order for DME     1 Units    Equipment being ordered: lumbar corset    Lumbar radicular pain       pantoprazole 40 MG EC tablet    PROTONIX    90 tablet    Take 1 tablet (40 mg) by mouth every morning (before breakfast) Take 30-60 minutes before a meal.    Other acute gastritis without hemorrhage       ranitidine 300 MG tablet    ZANTAC    30 tablet    Take 1 tablet (300 mg) by mouth At Bedtime    Other acute gastritis without hemorrhage       valACYclovir 1000 mg tablet    VALTREX    30 tablet    Take 1 tablet (1,000 mg) by mouth 3 times daily for 10 days    Herpes zoster without complication       * Notice:  This list has 2 medication(s) that are the same as other medications prescribed for you. Read the directions carefully, and ask your doctor or other care provider to review them with you.

## 2018-11-05 ENCOUNTER — TELEPHONE (OUTPATIENT)
Dept: FAMILY MEDICINE | Facility: CLINIC | Age: 83
End: 2018-11-05

## 2018-11-05 ENCOUNTER — OFFICE VISIT (OUTPATIENT)
Dept: FAMILY MEDICINE | Facility: CLINIC | Age: 83
End: 2018-11-05
Payer: COMMERCIAL

## 2018-11-05 VITALS
SYSTOLIC BLOOD PRESSURE: 124 MMHG | WEIGHT: 70 LBS | OXYGEN SATURATION: 97 % | HEART RATE: 95 BPM | BODY MASS INDEX: 20.5 KG/M2 | DIASTOLIC BLOOD PRESSURE: 70 MMHG | TEMPERATURE: 97.5 F | RESPIRATION RATE: 22 BRPM

## 2018-11-05 DIAGNOSIS — N30.01 ACUTE CYSTITIS WITH HEMATURIA: ICD-10-CM

## 2018-11-05 DIAGNOSIS — R82.90 NONSPECIFIC FINDING ON EXAMINATION OF URINE: ICD-10-CM

## 2018-11-05 DIAGNOSIS — I48.0 PAROXYSMAL ATRIAL FIBRILLATION (H): ICD-10-CM

## 2018-11-05 DIAGNOSIS — M41.20 OTHER IDIOPATHIC SCOLIOSIS, UNSPECIFIED SPINAL REGION: Primary | ICD-10-CM

## 2018-11-05 DIAGNOSIS — G89.29 CHRONIC BILATERAL LOW BACK PAIN WITHOUT SCIATICA: ICD-10-CM

## 2018-11-05 DIAGNOSIS — M54.50 CHRONIC BILATERAL LOW BACK PAIN WITHOUT SCIATICA: ICD-10-CM

## 2018-11-05 LAB
ALBUMIN UR-MCNC: 30 MG/DL
APPEARANCE UR: ABNORMAL
BACTERIA #/AREA URNS HPF: ABNORMAL /HPF
BILIRUB UR QL STRIP: NEGATIVE
COLOR UR AUTO: YELLOW
GLUCOSE UR STRIP-MCNC: NEGATIVE MG/DL
HGB UR QL STRIP: ABNORMAL
KETONES UR STRIP-MCNC: NEGATIVE MG/DL
LEUKOCYTE ESTERASE UR QL STRIP: ABNORMAL
NITRATE UR QL: NEGATIVE
NON-SQ EPI CELLS #/AREA URNS LPF: ABNORMAL /LPF
PH UR STRIP: 5.5 PH (ref 5–7)
RBC #/AREA URNS AUTO: ABNORMAL /HPF
SOURCE: ABNORMAL
SP GR UR STRIP: 1.02 (ref 1–1.03)
UROBILINOGEN UR STRIP-ACNC: 0.2 EU/DL (ref 0.2–1)
WBC #/AREA URNS AUTO: >100 /HPF
WBC CLUMPS #/AREA URNS HPF: PRESENT /HPF
YEAST #/AREA URNS HPF: ABNORMAL /HPF

## 2018-11-05 PROCEDURE — 99214 OFFICE O/P EST MOD 30 MIN: CPT | Performed by: PHYSICIAN ASSISTANT

## 2018-11-05 PROCEDURE — 80307 DRUG TEST PRSMV CHEM ANLYZR: CPT | Mod: 90 | Performed by: PHYSICIAN ASSISTANT

## 2018-11-05 PROCEDURE — 87086 URINE CULTURE/COLONY COUNT: CPT | Performed by: PHYSICIAN ASSISTANT

## 2018-11-05 PROCEDURE — 81001 URINALYSIS AUTO W/SCOPE: CPT | Performed by: PHYSICIAN ASSISTANT

## 2018-11-05 PROCEDURE — 99000 SPECIMEN HANDLING OFFICE-LAB: CPT | Performed by: PHYSICIAN ASSISTANT

## 2018-11-05 RX ORDER — TRAMADOL HYDROCHLORIDE 50 MG/1
50 TABLET ORAL EVERY 6 HOURS PRN
Qty: 30 TABLET | Refills: 0 | Status: SHIPPED | OUTPATIENT
Start: 2018-11-05 | End: 2018-11-13

## 2018-11-05 RX ORDER — CIPROFLOXACIN 250 MG/1
250 TABLET, FILM COATED ORAL 2 TIMES DAILY
Qty: 14 TABLET | Refills: 0 | Status: SHIPPED | OUTPATIENT
Start: 2018-11-05 | End: 2019-01-01

## 2018-11-05 NOTE — MR AVS SNAPSHOT
After Visit Summary   11/5/2018    Nedra Carr    MRN: [id number]           Patient Information     Date Of Birth          12/22/1932        Visit Information        Provider Department      11/5/2018 9:45 AM Juan A Solo PA-C [facility]        Today's Diagnoses     Other idiopathic scoliosis, unspecified spinal region    -  1    Chronic bilateral low back pain without sciatica           Follow-ups after your visit        Who to contact     If you have questions or need follow up information about today's clinic visit or your schedule please contact [facility] directly at [phone].  Normal or non-critical lab and imaging results will be communicated to you by MyChart, letter or phone within 4 business days after the clinic has received the results. If you do not hear from us within 7 days, please contact the clinic through MyChart or phone. If you have a critical or abnormal lab result, we will notify you by phone as soon as possible.  Submit refill requests through bttn or call your pharmacy and they will forward the refill request to us. Please allow 3 business days for your refill to be completed.          Additional Information About Your Visit        Care EveryWhere ID     This is your Care EveryWhere ID. This could be used by other organizations to access your [facility] medical records  [id number]        Your Vitals Were     Pulse Temperature Respirations Pulse Oximetry BMI (Body Mass Index)       95 97.5  F (36.4  C) (Oral) 22 97% 20.5 kg/m2        Blood Pressure from Last 3 Encounters:   11/05/18 124/70   08/15/18 130/76   06/18/18 118/72    Weight from Last 3 Encounters:   11/05/18 70 lb (31.8 kg)   08/15/18 72 lb 14.4 oz (33.1 kg)   06/18/18 67 lb (30.4 kg)              We Performed the Following     *UA reflex to Microscopic and Culture ([place] and [facility] (except [place] and [place])     Drug  Screen Comprehensive,  Urine w/o Reported Meds (Pain Care Package)          Today's Medication Changes          These changes are accurate as of 11/5/18 10:13 AM.  If you have any questions, ask your nurse or doctor.               Start taking these medicines.        Dose/Directions    traMADol 50 MG tablet   Commonly known as:  ULTRAM   Used for:  Other idiopathic scoliosis, unspecified spinal region, Chronic bilateral low back pain without sciatica   Started by:  Juan A Solo PA-C        Dose:  50 mg   Take 1 tablet (50 mg) by mouth every 6 hours as needed for severe pain   Quantity:  30 tablet   Refills:  0            Where to get your medicines      Some of these will need a paper prescription and others can be bought over the counter.  Ask your nurse if you have questions.     Bring a paper prescription for each of these medications     traMADol 50 MG tablet               Information about OPIOIDS     PRESCRIPTION OPIOIDS: WHAT YOU NEED TO KNOW   We gave you an opioid (narcotic) pain medicine. It is important to manage your pain, but opioids are not always the best choice. You should first try all the other options your care team gave you. Take this medicine for as short a time (and as few doses) as possible.    Some activities can increase your pain, such as bandage changes or therapy sessions. It may help to take your pain medicine 30 to 60 minutes before these activities. Reduce your stress by getting enough sleep, working on hobbies you enjoy and practicing relaxation or meditation. Talk to your care team about ways to manage your pain beyond prescription opioids.    These medicines have risks:    DO NOT drive when on new or higher doses of pain medicine. These medicines can affect your alertness and reaction times, and you could be arrested for driving under the influence (DUI). If you need to use opioids long-term, talk to your care team about driving.    DO NOT operate heavy machinery    DO NOT do any other dangerous  activities while taking these medicines.    DO NOT drink any alcohol while taking these medicines.     If the opioid prescribed includes acetaminophen, DO NOT take with any other medicines that contain acetaminophen. Read all labels carefully. Look for the word  acetaminophen  or  Tylenol.  Ask your pharmacist if you have questions or are unsure.    You can get addicted to pain medicines, especially if you have a history of addiction (chemical, alcohol or substance dependence). Talk to your care team about ways to reduce this risk.    All opioids tend to cause constipation. Drink plenty of water and eat foods that have a lot of fiber, such as fruits, vegetables, prune juice, apple juice and high-fiber cereal. Take a laxative (Miralax, milk of magnesia, Colace, Senna) if you don t move your bowels at least every other day. Other side effects include upset stomach, sleepiness, dizziness, throwing up, tolerance (needing more of the medicine to have the same effect), physical dependence and slowed breathing.    Store your pills in a secure place, locked if possible. We will not replace any lost or stolen medicine. If you don t finish your medicine, please throw away (dispose) as directed by your pharmacist. The [place] Pollution Control Agency has more information about safe disposal: [email or web address]://[email or web address]/living-[email or web address]/[email or web address]-[email or web address]-medications         Primary Care Provider Office Phone # Fax #    Juan A Rolando Solo PA-C [phone] [address] [facility] [address]        Equal Access to Services     ELAINA ANAYA : Hadii sravan alvaradoo Soayad, waaxda luqadaha, qaybta kaalmada funmilayoyavirginia, [place] mayes. So [facility] [phone].    ATENCIÓN: Si habla español, tiene a barrientos disposición servicios gratuitos de asistencia lingüística. Llame al [phone].    We comply with applicable federal civil rights laws and [place] laws. We do not discriminate on the  basis of race, color, national origin, age, disability, sex, sexual orientation, or gender identity.            Thank you!     Thank you for choosing [facility]  for your care. Our goal is always to provide you with excellent care. Hearing back from our patients is one way we can continue to improve our services. Please take a few minutes to complete the written survey that you may receive in the mail after your visit with us. Thank you!             Your Updated Medication List - Protect others around you: Learn how to safely use, store and throw away your medicines at www.disposemymeds.org.          This list is accurate as of 11/5/18 10:13 AM.  Always use your most recent med list.                   Brand Name Dispense Instructions for use Diagnosis    acetaminophen 325 MG tablet    TYLENOL    60 tablet    Take 2 tablets (650 mg) by mouth every 6 hours as needed for mild pain    Abdominal pain, other specified site       albuterol 108 (90 Base) MCG/ACT inhaler    PROAIR HFA/PROVENTIL HFA/VENTOLIN HFA    1 Inhaler    Inhale 2 puffs into the lungs every 6 hours as needed for shortness of breath / dyspnea or wheezing    Dyspnea       alendronate 70 MG tablet    FOSAMAX    12 tablet    Take 1 tablet (70 mg) by mouth every 7 days Take with over 8 ounces water and stay upright for at least 30 minutes after dose.  Take at least 60 minutes before breakfast    Age-related osteoporosis without current pathological fracture       ENSURE Liqd     15 each    Take 0.5 Cans by mouth daily (strawberry)    Perforated gastric ulcer, chronic or unspecified, Physical deconditioning, Weakness       ferrous gluconate 324 (38 Fe) MG tablet    FERGON    30 tablet    Take 1 tablet (324 mg) by mouth daily    Other iron deficiency anemia       Multi-vitamin Tabs tablet     100 tablet    Take 1 tablet by mouth daily.        * order for DME     1 Units    Equipment being ordered: abdominal binder    Lumbar radicular pain        * order for DME     1 Units    Equipment being ordered: lumbar corset    Lumbar radicular pain       pantoprazole 40 MG EC tablet    PROTONIX    90 tablet    Take 1 tablet (40 mg) by mouth every morning (before breakfast) Take 30-60 minutes before a meal.    Other acute gastritis without hemorrhage       ranitidine 300 MG tablet    ZANTAC    30 tablet    Take 1 tablet (300 mg) by mouth At Bedtime    Other acute gastritis without hemorrhage       traMADol 50 MG tablet    ULTRAM    30 tablet    Take 1 tablet (50 mg) by mouth every 6 hours as needed for severe pain    Other idiopathic scoliosis, unspecified spinal region, Chronic bilateral low back pain without sciatica       valACYclovir 1000 mg tablet    VALTREX    30 tablet    Take 1 tablet (1,000 mg) by mouth 3 times daily for 10 days    Herpes zoster without complication       * Notice:  This list has 2 medication(s) that are the same as other medications prescribed for you. Read the directions carefully, and ask your doctor or other care provider to review them with you.

## 2018-11-05 NOTE — LETTER
[facility]    11/05/18    Patient: Nedra Carr  YOB: 1932  Medical Record Number: [id number]                                                                  Controlled Substance Agreement  I understand that my care provider has prescribed controlled substances (narcotics, tranquilizers, and/or stimulants) to help manage my condition(s).  I am taking this medicine to help me function or work.  I know that this is strong medicine.  It could have serious side effects and even cause a dependency on the drug.  If I stop these medicines suddenly, I could have severe withdrawal symptoms.    The risks, benefits, and side effects of these medication(s) were explained to me.  I agree that:  1. I will take part in other treatments as advised by my provider.  This may be psychiatry or counseling, physical therapy, behavioral therapy, group treatment, or a referral to a pain clinic.  I will reduce or stop my medicine when my provider tells me to do so.   2. I will take my medicines as prescribed.  I will not change the dose or schedule unless my provider tells me to.  There will be no refills if I  run out early.   I may be contacted at any time without warning and asked to complete a drug test or pill count.   3. I will keep all my appointments at the clinic.  If I miss appointments or fail to follow instructions, my provider may stop my medicine.  4. I will not ask other providers to prescribe controlled substances. And I will not accept controlled substances from other people. If I need another prescribed controlled substance for a new reason, I will notify my provider within one business day.  5. If I enroll in the [place] Medical Marijuana program, I will tell my provider.  I will also sign an agreement to share my medical records with my provider.  6. I will use one pharmacy to fill all of my controlled substance prescriptions.  If my prescription is mailed to my pharmacy, it may  take 5 to 7 days for my medicine to be ready.  7. I understand that my provider, clinic care team, and pharmacy can track controlled substance prescriptions from other providers through a central database (prescription monitoring program).  8. I will bring in my list of medications (or my medicine bottles) each time I come to the clinic.  [id number] REV-  07/2018                                                                                                                                   Page 1 of 2      [facility]    11/05/18    Patient: Nedra Carr  YOB: 1932  Medical Record Number: [id number]    9. Refills of controlled substances will be made only during office hours.  It is up to me to make sure that I do not run out of my medicines on weekends or holidays.    10. I am responsible for my prescriptions.  If the medicine/prescription is lost or stolen, it will not be replaced.   I also agree not to share these medicines with anyone.  11. I agree to not use ANY illegal or recreational drugs.  This includes marijuana, cocaine, bath salts or other drugs.  I agree not to use alcohol unless my provider says I may.  I agree to give urine samples whenever asked.  If I fail to give a urine sample, the provider may stop my medicine.     12. I will tell my nurse or provider right away if I become pregnant or have a new medical problem treated outside of [facility].  13. I understand that this medicine can affect my thinking and judgment.  It may be unsafe for me to drive, use machinery and do dangerous tasks.  I will not do any of these things until I know how the medicine affects me.  If my dose changes, I will wait to see how it affects me.  I will contact my provider if I have concerns about medicine side effects.  I understand that if I do not follow any of the conditions above, my prescriptions or treatment may be stopped.    I agree that my provider, clinic care team, and  pharmacy may work with any city, state or federal law enforcement agency that investigates the misuse, sale, or other diversion of my controlled medicine. I will allow my provider to discuss my care with or share a copy of this agreement with any other treating provider, pharmacy or emergency room where I receive care.  I agree to give up (waive) any right of privacy or confidentiality with respect to these authorizations.   I have read this agreement and have asked questions about anything I did not understand.   ___________________________________    ___________________________  Patient Signature                                                           Date and Time  ___________________________________     ____________________________  Witness                                                                            Date and Time  ___________________________________  Juan A Solo PA-C  [id number] REV-  07/2018                                                                                                                                                   Page 2 of 2

## 2018-11-05 NOTE — TELEPHONE ENCOUNTER
Please call patient. Her Urine did result in signs of a UTI. I have sent an antibiotic called cipro to be taken twice daily for 1 week. I would then like her to rtc in 1 week to recheck her urine. This is likely a major cause of her back pain. If her symptoms fail to improve in 3 days or worsen, she should rtc. If fever, dizziness, or confusion develop patient should go to ER.     -Bert Solo, PAC

## 2018-11-05 NOTE — PROGRESS NOTES
SUBJECTIVE:   Nedra Carr is a 85 year old female who presents to clinic today for the following health issues:      Back Pain       Duration: x 1 week        Specific cause: none    Description:   Location of pain: low back -right   Character of pain: sharp  Pain radiation:none  New numbness or weakness in legs, not attributed to pain:  no     Intensity: Currently 5/10, At its worst 9/10    History:   Pain interferes with job: n/a  History of back problems: YES  Any previous MRI or X-rays: Yes  Sees a specialist for back pain:  No  Therapies tried without relief: tylenol, heating pad    Alleviating factors:   Improved by: none      Precipitating factors:  Worsened by: bending, standing      Accompanying Signs & Symptoms:  Risk of Fracture:  Osteoporosis and Age >64  Risk of Cauda Equina:  None  Risk of Infection:  None  Risk of Cancer:  None  Risk of Ankylosing Spondylitis:  Onset at age <35, male, AND morning back stiffness. no          Denies urination changes. No fever or chills. History of compression fractures of thoracic spine in the past. Denies any known injury.     Of note, history of a fib in the past. No current symptoms of dizziness or palpitations. No recurrence.     Problem list and histories reviewed & adjusted, as indicated.  Additional history: as documented    Patient Active Problem List   Diagnosis     Hallux varus, acquired     Other hammer toe (acquired)     Osteoporosis     HTN (hypertension)     Cataract     CARDIOVASCULAR SCREENING; LDL GOAL LESS THAN 130     Advance Care Planning     Intractable chronic migraine without aura     Anemia     Perforated gastric ulcer (H)     Emphysematous cholecystitis     Health Care Home     Paroxysmal atrial fibrillation (H)     Abdominal pain, unspecified abdominal location     UTI (urinary tract infection)     Sepsis (H)     Headache     Physical deconditioning     Urinary bladder stone     S/P cystoscopy     Hypoxia     Hydronephrosis     Acute  cholecystitis     Cardiogenic shock (H)     Closed compression fracture of thoracic vertebra (H)     Family history of other digestive disorders     Allergic rhinitis     Arthritis of hand     Esophageal stricture     Obstructive sleep apnea     Weakness     At high risk for falls     Multiple pelvic fractures     Mobility impaired     Abdominal pain     Other idiopathic scoliosis, unspecified spinal region     Past Surgical History:   Procedure Laterality Date     BACK SURGERY       BRONCHOSCOPY FLEXIBLE N/A 11/21/2014    Procedure: BRONCHOSCOPY FLEXIBLE;  Surgeon: Rhonda Donohue MD;  Location: [facility] GI     C NONSPECIFIC PROCEDURE      s/p dilation at [facility] by Dr. Radha FELIPE NONSPECIFIC PROCEDURE      s/p dilation at [facility] by Dr. Betty FELIPE NONSPECIFIC PROCEDURE      Vag hysterectomy     CHOLECYSTECTOMY N/A 11/15/2014    Procedure: CHOLECYSTECTOMY;  Surgeon: Yomi Brennan MD;  Location: [facility] OR     CYSTOSCOPY, LITHOLAPAXY, COMBINED N/A 4/8/2015    Procedure: COMBINED CYSTOSCOPY, LITHOLAPAXY;  Surgeon: Randy Reno MD;  Location: [facility] OR     ENT SURGERY       ESOPHAGOSCOPY, GASTROSCOPY, DUODENOSCOPY (EGD), COMBINED  11/21/2012    Procedure: COMBINED ESOPHAGOSCOPY, GASTROSCOPY, DUODENOSCOPY (EGD), BIOPSY SINGLE OR MULTIPLE;   ESOPHAGOSCOPY, GASTROSCOPY, DUODENOSCOPY (EGD)   with cold biopsy and dilalation with #15 savary;  Surgeon: Guillermo Arcos MD;  Location:  GI     ESOPHAGOSCOPY, GASTROSCOPY, DUODENOSCOPY (EGD), COMBINED  2/22/2013    Procedure: COMBINED ESOPHAGOSCOPY, GASTROSCOPY, DUODENOSCOPY (EGD);  ESOPHAGOSCOPY, GASTROSCOPY, DUODENOSCOPY (EGD) ;  Surgeon: Lissett Posada MD;  Location:  GI     ESOPHAGOSCOPY, GASTROSCOPY, DUODENOSCOPY (EGD), COMBINED N/A 10/29/2014    Procedure: COMBINED ESOPHAGOSCOPY, GASTROSCOPY, DUODENOSCOPY (EGD);  Surgeon: Dany Ambrocio MD;  Location:  GI     ESOPHAGOSCOPY, GASTROSCOPY, DUODENOSCOPY (EGD), COMBINED N/A 1/13/2015    Procedure:  COMBINED ESOPHAGOSCOPY, GASTROSCOPY, DUODENOSCOPY (EGD), BIOPSY SINGLE OR MULTIPLE;  Surgeon: Dany Ambrocio MD;  Location: [facility] GI     ESOPHAGOSCOPY, GASTROSCOPY, DUODENOSCOPY (EGD), COMBINED N/A 3/5/2015    Procedure: COMBINED ESOPHAGOSCOPY, GASTROSCOPY, DUODENOSCOPY (EGD);  Surgeon: Dany Ambrocio MD;  Location: [facility] GI     ESOPHAGOSCOPY, GASTROSCOPY, DUODENOSCOPY (EGD), COMBINED N/A 11/10/2015    Procedure: COMBINED ESOPHAGOSCOPY, GASTROSCOPY, DUODENOSCOPY (EGD);  Surgeon: Dany Ambrocio MD;  Location: [facility] GI     EYE SURGERY       GYN SURGERY       LAPAROTOMY EXPLORATORY N/A 11/15/2014    Procedure: LAPAROTOMY EXPLORATORY;  Surgeon: Yomi Brennan MD;  Location: [facility] OR     LASER HOLMIUM LITHOTRIPSY BLADDER N/A 4/8/2015    Procedure: LASER HOLMIUM LITHOTRIPSY BLADDER;  Surgeon: Randy Reno MD;  Location: [facility] OR     ORTHOPEDIC SURGERY      hip fx surgery x 2       Social History   Substance Use Topics     Smoking status: Never Smoker     Smokeless tobacco: Never Used     Alcohol use No     Family History   Problem Relation Age of Onset     Diabetes Mother      Diabetes Sister      HEART DISEASE Son          Current Outpatient Prescriptions   Medication Sig Dispense Refill     traMADol (ULTRAM) 50 MG tablet Take 1 tablet (50 mg) by mouth every 6 hours as needed for severe pain 30 tablet 0     acetaminophen (TYLENOL) 325 MG tablet Take 2 tablets (650 mg) by mouth every 6 hours as needed for mild pain 60 tablet 0     albuterol (PROAIR HFA, PROVENTIL HFA, VENTOLIN HFA) 108 (90 BASE) MCG/ACT inhaler Inhale 2 puffs into the lungs every 6 hours as needed for shortness of breath / dyspnea or wheezing 1 Inhaler 1     alendronate (FOSAMAX) 70 MG tablet Take 1 tablet (70 mg) by mouth every 7 days Take with over 8 ounces water and stay upright for at least 30 minutes after dose.  Take at least 60 minutes before breakfast 12 tablet 3     ferrous gluconate (FERGON) 324 (38 FE) MG tablet Take 1 tablet (324  mg) by mouth daily 30 tablet 10     multivitamin, therapeutic with minerals (MULTI-VITAMIN) TABS Take 1 tablet by mouth daily. 100 tablet 3     Nutritional Supplements (ENSURE) LIQD Take 0.5 Cans by mouth daily (strawberry) 15 each 11     order for DME Equipment being ordered: lumbar corset 1 Units 0     order for DME Equipment being ordered: abdominal binder 1 Units 0     pantoprazole (PROTONIX) 40 MG EC tablet Take 1 tablet (40 mg) by mouth every morning (before breakfast) Take 30-60 minutes before a meal. 90 tablet 1     ranitidine (ZANTAC) 300 MG tablet Take 1 tablet (300 mg) by mouth At Bedtime 30 tablet 1     valACYclovir (VALTREX) 1000 mg tablet Take 1 tablet (1,000 mg) by mouth 3 times daily for 10 days 30 tablet 0     Allergies   Allergen Reactions     No Known Drug Allergies      BP Readings from Last 3 Encounters:   11/05/18 124/70   08/15/18 130/76   06/18/18 118/72    Wt Readings from Last 3 Encounters:   11/05/18 70 lb (31.8 kg)   08/15/18 72 lb 14.4 oz (33.1 kg)   06/18/18 67 lb (30.4 kg)                      Reviewed and updated as needed this visit by clinical staff  Tobacco  Allergies  Meds  Problems  Med Hx  Surg Hx  Fam Hx  Soc Hx        Reviewed and updated as needed this visit by Provider  Allergies  Meds  Problems         ROS:  Constitutional, msk, neuro, skin, cardiovascular, pulmonary, gi and gu systems are negative, except as otherwise noted.    OBJECTIVE:     /70 (BP Location: Right arm, Patient Position: Chair, Cuff Size: Adult Small)  Pulse 95  Temp 97.5  F (36.4  C) (Oral)  Resp 22  Wt 70 lb (31.8 kg)  SpO2 97%  BMI 20.5 kg/m2  Body mass index is 20.5 kg/(m^2).  GENERAL: alert, no distress, frail and elderly  ABDOMEN: soft, nontender, no hepatosplenomegaly, no masses and bowel sounds normal  BACK: no CVA tenderness, tenderness to palpation along lumbar and thoracic vertebra with worst point tenderness to palpation along right SI joint.   PSYCH: mentation appears  normal, affect normal/bright    Diagnostic Test Results:  none     ASSESSMENT/PLAN:     (M41.20) Other idiopathic scoliosis, unspecified spinal region  (primary encounter diagnosis)  Comment: history of significant scoliosis resulting in chronic back pain from secondary OA and also history of osteoporosis with compression fractures. This does result in flares of pain intermittently. Has responded well to prn tramadol in the past without side effects. Given failure of home tylenol, will give small amount of tramadol and will also assess for atypical UTI presentation. If normal, will have see physical therapy. If symptoms fail to improve with this as they have in the past, will have follow back up with ortho spine. If worsening, patient should RTC.   Plan: traMADol (ULTRAM) 50 MG tablet, *UA reflex to         Microscopic and Culture ([place] and [place]         Clinics (except [place] and [place]), Drug         Screen Comprehensive, Urine w/o Reported Meds         (Pain Care Package), Urine Microscopic, Urine         Culture Aerobic Bacterial        -Medication use and side effects discussed with the patient. Patient is in complete understanding and agreement with plan.       (M54.5,  G89.29) Chronic bilateral low back pain without sciatica  Comment: as above   Plan: traMADol (ULTRAM) 50 MG tablet, *UA reflex to         Microscopic and Culture ([place] and [place]         Clinics (except [place] and [place]), Drug         Screen Comprehensive, Urine w/o Reported Meds         (Pain Care Package), Urine Culture Aerobic         Bacterial            (R82.90) Nonspecific finding on examination of urine  Comment:   Plan: Urine Culture Aerobic Bacterial            (I48.0) Paroxysmal atrial fibrillation (H)  Comment: history of this. No current symptoms. Stable.   Plan:     Follow up: as above     Juan A Solo PA-C  [facility]

## 2018-11-06 LAB
BACTERIA SPEC CULT: NORMAL
SPECIMEN SOURCE: NORMAL

## 2018-11-08 ENCOUNTER — PATIENT OUTREACH (OUTPATIENT)
Dept: CARE COORDINATION | Facility: CLINIC | Age: 83
End: 2018-11-08

## 2018-11-08 LAB — COMPREHEN DRUG ANALYSIS UR: NORMAL

## 2018-11-08 NOTE — PROGRESS NOTES
Clinic Care Coordination Contact  [facility]/Voicemail    Referral Source: Self-patient/Caregiver  RN CC receive message from patient stating that she continues to have back pain.   RN CC chart review notes PCP recommendation  To RTC if symptoms persist.  RN CC left message with clinic scheduling information as well as RN CC call back number for status update.    Clinical Data: Care Coordinator Outreach  Outreach attempted x 1.  Left message on voicemail with call back information and requested return call.  Plan:   Care Coordinator will try to reach patient again in 1-2 business days.      Tanna Santiago RN  Clinic Care Coordinator  Office [phone]  [email or web address]

## 2018-11-10 ENCOUNTER — APPOINTMENT (OUTPATIENT)
Dept: CT IMAGING | Facility: CLINIC | Age: 83
End: 2018-11-10
Attending: INTERNAL MEDICINE
Payer: COMMERCIAL

## 2018-11-10 ENCOUNTER — HOSPITAL ENCOUNTER (EMERGENCY)
Facility: CLINIC | Age: 83
Discharge: HOME | End: 2018-11-10
Attending: INTERNAL MEDICINE | Admitting: INTERNAL MEDICINE
Payer: COMMERCIAL

## 2018-11-10 VITALS
OXYGEN SATURATION: 97 % | SYSTOLIC BLOOD PRESSURE: 137 MMHG | RESPIRATION RATE: 20 BRPM | DIASTOLIC BLOOD PRESSURE: 80 MMHG | HEART RATE: 118 BPM | TEMPERATURE: 98.2 F

## 2018-11-10 DIAGNOSIS — M54.50 RIGHT-SIDED LOW BACK PAIN WITHOUT SCIATICA, UNSPECIFIED CHRONICITY: ICD-10-CM

## 2018-11-10 LAB
ALBUMIN SERPL-MCNC: 3.7 G/DL (ref 3.4–5)
ALP SERPL-CCNC: 110 U/L (ref 40–150)
ALT SERPL W P-5'-P-CCNC: 24 U/L (ref 0–50)
ANION GAP SERPL CALCULATED.3IONS-SCNC: 8 MMOL/L (ref 3–14)
AST SERPL W P-5'-P-CCNC: 24 U/L (ref 0–45)
BASOPHILS # BLD AUTO: 0.1 10E9/L (ref 0–0.2)
BASOPHILS NFR BLD AUTO: 0.8 %
BILIRUB SERPL-MCNC: 0.4 MG/DL (ref 0.2–1.3)
BUN SERPL-MCNC: 18 MG/DL (ref 7–30)
CALCIUM SERPL-MCNC: 9 MG/DL (ref 8.5–10.1)
CHLORIDE SERPL-SCNC: 105 MMOL/L (ref 94–109)
CO2 SERPL-SCNC: 27 MMOL/L (ref 20–32)
CREAT SERPL-MCNC: 0.7 MG/DL (ref 0.52–1.04)
DIFFERENTIAL METHOD BLD: ABNORMAL
EOSINOPHIL # BLD AUTO: 0.1 10E9/L (ref 0–0.7)
EOSINOPHIL NFR BLD AUTO: 1.1 %
ERYTHROCYTE [DISTWIDTH] IN BLOOD BY AUTOMATED COUNT: 14.6 % (ref 10–15)
GFR SERPL CREATININE-BSD FRML MDRD: 79 ML/MIN/1.7M2
GLUCOSE SERPL-MCNC: 102 MG/DL (ref 70–99)
HCT VFR BLD AUTO: 37.6 % (ref 35–47)
HGB BLD-MCNC: 11.5 G/DL (ref 11.7–15.7)
IMM GRANULOCYTES # BLD: 0 10E9/L (ref 0–0.4)
IMM GRANULOCYTES NFR BLD: 0.5 %
LYMPHOCYTES # BLD AUTO: 2.1 10E9/L (ref 0.8–5.3)
LYMPHOCYTES NFR BLD AUTO: 32.4 %
MCH RBC QN AUTO: 29.1 PG (ref 26.5–33)
MCHC RBC AUTO-ENTMCNC: 30.6 G/DL (ref 31.5–36.5)
MCV RBC AUTO: 95 FL (ref 78–100)
MONOCYTES # BLD AUTO: 0.7 10E9/L (ref 0–1.3)
MONOCYTES NFR BLD AUTO: 10.7 %
NEUTROPHILS # BLD AUTO: 3.5 10E9/L (ref 1.6–8.3)
NEUTROPHILS NFR BLD AUTO: 54.5 %
NRBC # BLD AUTO: 0 10*3/UL
NRBC BLD AUTO-RTO: 0 /100
PLATELET # BLD AUTO: 529 10E9/L (ref 150–450)
POTASSIUM SERPL-SCNC: 4.6 MMOL/L (ref 3.4–5.3)
PROT SERPL-MCNC: 7.7 G/DL (ref 6.8–8.8)
RBC # BLD AUTO: 3.95 10E12/L (ref 3.8–5.2)
SODIUM SERPL-SCNC: 140 MMOL/L (ref 133–144)
WBC # BLD AUTO: 6.4 10E9/L (ref 4–11)

## 2018-11-10 PROCEDURE — 25000128 H RX IP 250 OP 636: Performed by: INTERNAL MEDICINE

## 2018-11-10 PROCEDURE — 99285 EMERGENCY DEPT VISIT HI MDM: CPT | Mod: 25

## 2018-11-10 PROCEDURE — 74177 CT ABD & PELVIS W/CONTRAST: CPT

## 2018-11-10 PROCEDURE — 96374 THER/PROPH/DIAG INJ IV PUSH: CPT | Mod: 59

## 2018-11-10 PROCEDURE — 80053 COMPREHEN METABOLIC PANEL: CPT | Performed by: INTERNAL MEDICINE

## 2018-11-10 PROCEDURE — 85025 COMPLETE CBC W/AUTO DIFF WBC: CPT | Performed by: INTERNAL MEDICINE

## 2018-11-10 RX ORDER — IOPAMIDOL 755 MG/ML
35 INJECTION, SOLUTION INTRAVASCULAR ONCE
Status: COMPLETED | OUTPATIENT
Start: 2018-11-10 | End: 2018-11-10

## 2018-11-10 RX ORDER — OXYCODONE HYDROCHLORIDE 5 MG/1
2.5 TABLET ORAL EVERY 6 HOURS PRN
Qty: 12 TABLET | Refills: 0 | Status: SHIPPED | OUTPATIENT
Start: 2018-11-10 | End: 2018-11-13

## 2018-11-10 RX ORDER — MORPHINE SULFATE 2 MG/ML
2 INJECTION, SOLUTION INTRAMUSCULAR; INTRAVENOUS
Status: DISCONTINUED | OUTPATIENT
Start: 2018-11-10 | End: 2018-11-10 | Stop reason: HOSPADM

## 2018-11-10 RX ADMIN — SODIUM CHLORIDE 49 ML: 9 INJECTION, SOLUTION INTRAVENOUS at 15:13

## 2018-11-10 RX ADMIN — MORPHINE SULFATE 2 MG: 2 INJECTION, SOLUTION INTRAMUSCULAR; INTRAVENOUS at 13:52

## 2018-11-10 RX ADMIN — IOPAMIDOL 35 ML: 755 INJECTION, SOLUTION INTRAVENOUS at 15:13

## 2018-11-10 ASSESSMENT — ENCOUNTER SYMPTOMS
BACK PAIN: 1
APPETITE CHANGE: 0
FEVER: 0
ABDOMINAL PAIN: 0

## 2018-11-10 NOTE — ED PROVIDER NOTES
History     Chief Complaint:  Back Pain      HPI   Nedra Carr is a 85 year old female who presents to the emergency department for evaluation of lumbar back pain. The patient developed back pain 2 weeks ago without trauma or injury. She feels as though her pain is getting worse, and she can hardly do the things she needs to do. She has decreased her activities as a result of her pain. The patient notes that she has a history of arthritis that she is taking pain medication for that is not helping her current pain. She denies any fever, appetite change, or abdominal pain. Of note the patient lives in assisted living.     Allergies:  No known Drug Allergies     Medications:    acetaminophen (TYLENOL) 325 MG tablet  albuterol (PROAIR HFA, PROVENTIL HFA, VENTOLIN HFA) 108 (90 BASE) MCG/ACT inhaler  alendronate (FOSAMAX) 70 MG tablet  ciprofloxacin (CIPRO) 250 MG tablet  ferrous gluconate (FERGON) 324 (38 FE) MG tablet  multivitamin, therapeutic with minerals (MULTI-VITAMIN) TABS  Nutritional Supplements (ENSURE) LIQD  pantoprazole (PROTONIX) 40 MG EC tablet  ranitidine (ZANTAC) 300 MG tablet  tramadol (ULTRAM) 50 MG tablet  valacyclovir (VALTREX) 1000 mg tablet    Past Medical History:    Atrial fibrillation  Dysphagia   Hypertension  Migraine  Obstructive sleep apnea  Osteoporosis   Polymyalgia rheumatica   Renal stones     Past Surgical History:    Back surgery  Bronchoscopy   S/p dilation X2  Hysterectomy  Cholecystectomy  litholapaxy cystoscopy  ENT surgery  EGD X6  Eye surgery  Exploratory laparotomy   Lithotripsy bladder  Hip fracture surgery X2    Family History:    Mother- diabetes  Sister- diabetes  Son- heart disease     Social History:  Marital Status:   [5]  Social History   Substance Use Topics     Smoking status: Never Smoker     Smokeless tobacco: Never Used     Alcohol use No        Review of Systems   Constitutional: Negative for appetite change and fever.   Gastrointestinal: Negative for  abdominal pain.   Musculoskeletal: Positive for back pain.   All other systems reviewed and are negative.    Physical Exam   First Vitals:  BP: (!) 133/95  Pulse: 118  Heart Rate: 118  Temp: 98.2  F (36.8  C)  Resp: 20  SpO2: 94 %      Physical Exam   Constitutional: She is cooperative.   HENT:   Right Ear: Tympanic membrane normal.   Left Ear: Tympanic membrane normal.   Mouth/Throat: Oropharynx is clear and moist and mucous membranes are normal.   Eyes: Conjunctivae are normal.   Neck: Normal range of motion.   Cardiovascular: Regular rhythm and normal heart sounds.    Pulmonary/Chest: Effort normal and breath sounds normal.   Abdominal: Soft. Normal appearance and bowel sounds are normal. There is no rebound and no guarding.   Musculoskeletal:   Significant kyphoscoliosis. Tender low back, dave right sacroiliac area   Lymphadenopathy:     She has no cervical adenopathy.   Neurological: She is alert.   Skin: Skin is warm and dry.   Psychiatric: She has a normal mood and affect.       Emergency Department Course   Imaging:  Radiology findings were communicated with the patient who voiced understanding of the findings.    CT Abdomen Pelvis w Contrast  Final Result  IMPRESSION:   1. No acute abnormality identified.  2. Stable biliary dilatation.  3. Nonobstructing left renal calculus.  4. Old lumbar vertebral compression fractures and inferior pubic ramus  fractures.     ANIKA BOONE MD  Reading per radiology     Laboratory:  Laboratory findings were communicated with the patient who voiced understanding of the findings.    CBC: WBC 6.4, HGB 11.5 (L),  (H)  CMP: Glucose 102 (H) o/w WNL (Creatinine 0.7)    Interventions:  1352 morphine 2 mg IV    Emergency Department Course:  Nursing notes and vitals reviewed.  I performed an exam of the patient as documented above.   I discussed the treatment plan with the patient. They expressed understanding of this plan and consented to discharge. They will be discharged  home with instructions for care and follow up. In addition, the patient will return to the emergency department if their symptoms persist, worsen, if new symptoms arise or if there is any concern.  All questions were answered.    I personally reviewed the imaging and lab results with the Patient and daughter answered all related questions prior to discharge.  Impression & Plan      Medical Decision Making:    Nedra Carr is a 85 year old female who presents with ongoing pain in the low back.  When she was seen in clinic this was attributed to UTI and she was started on antibiotics.  However her symptoms have failed to improve on Cipro.  I reviewed her urine culture which actually did not show any significant growth.  In retrospect I do not think this was the cause of her pain.  I considered a broader differential.  CT was obtained.  This shows no evidence of new pelvic fracture or other retroperitoneal or intraperitoneal cause of her symptoms.  With morphine 2 mg IV she noted remarkable improvement in pain and was able to ambulate.  I consulted pharmacy about discharge medications.  They felt that a tablet of oxycodone would present less risk of missed dosage than liquid morphine.  I will have the patient take 2.5 mg of oxycodone every 6 hours as needed for pain.  Return if pain not controlled or other new symptoms, otherwise follow-up in 2 days in clinic.      Diagnosis:    ICD-10-CM    1. Right-sided low back pain without sciatica, unspecified chronicity M54.5      Disposition:   Discharged     Discharge Medications:  New Prescriptions    OXYCODONE IR (ROXICODONE) 5 MG TABLET    Take 0.5 tablets (2.5 mg) by mouth every 6 hours as needed for pain       Scribe Disclosure:  I, Miguel A Casas, am serving as a scribe at 3:50 PM on 11/10/2018 to document services personally performed by Yuly Sage MD, based on my observations and the provider's statements to me.     [facility] EMERGENCY  DEPARTMENT       Yuly Sage MD  11/11/18 [id number]

## 2018-11-10 NOTE — ED TRIAGE NOTES
Patient presents with complaints of right lower back pain which has been ongoing for the past two weeks. Patient states that the pain has gotten worse. Denies any injury. Patient was seen by her PCP on Monday and started on antibiotics for a UTI. ABC intact without need for intervention at this time.

## 2018-11-10 NOTE — ED AVS SNAPSHOT
[facility]    [address]    [facility] [address]    Phone:  [phone]    Fax:  [phone]                                       Nedra Carr   MRN: [id number]    Department:  [facility]   Date of Visit:  11/10/2018           After Visit Summary Signature Page     I have received my discharge instructions, and my questions have been answered. I have discussed any challenges I see with this plan with the nurse or doctor.    ..........................................................................................................................................  Patient/Patient Representative Signature      ..........................................................................................................................................  Patient Representative Print Name and Relationship to Patient    ..................................................               ................................................  Date                                   Time    ..........................................................................................................................................  Reviewed by Signature/Title    ...................................................              ..............................................  Date                                               Time          22EPIC Rev 08/18

## 2018-11-10 NOTE — ED AVS SNAPSHOT
[facility] Emergency Department    [address]    [facility] [address]    Phone:  [phone]    Fax:  [phone]                                       Nedra Carr   MRN: [id number]    Department:  [facility] Emergency Department   Date of Visit:  11/10/2018           Patient Information     Date Of Birth          12/22/1932        Your diagnoses for this visit were:     Right-sided low back pain without sciatica, unspecified chronicity        You were seen by Yuly Sage MD.      Follow-up Information     Follow up with Juan A Solo PA-C In 2 days.    Specialty:  Physician Assistant    Contact information:    [address]  [facility] [address] [phone]          Discharge Instructions         Discharge Instructions  Back Pain  You were seen today for back pain. Back pain can have many causes, but most will get better without surgery or other specific treatment. Sometimes there is a herniated ( slipped ) disc. We don t usually do MRI scans to look for these right away, since most herniated discs will get better on their own with time.  Today, we did not find any evidence that your back pain was caused by a serious condition, such as an infection, fracture, or tumor. However, sometimes symptoms develop over time and cannot be found during an emergency visit, so it is very important that you follow up with your primary doctor.  Return to the Emergency Department if:    You develop a fever with your back pain.     You have weakness or change in sensation in one or both legs.    You lose control of your bowels or bladder, or can t empty your bladder.    Your pain gets much worse.     Follow-up with your doctor:    Unless your pain has completely gone away, please make an appointment with your doctor within one week.  You may need further management of your back pain, such as more pain medication, imaging such as an X-ray or MRI, or physical  therapy.    What can I do to help myself?    Remain Active -- People are often afraid that they will hurt their back further or delay recovery by remaining active, but this is one of the best things you can do for your back. In fact, prolonged bed rest is not recommended. Studies have shown that people with low back pain recover faster when they remain active. Movement helps to bring blood flow to the muscles and relieve muscle spasms as well as preventing loss of muscle strength.    Heat -- Using a heating pad can help with low back pain during the first few weeks. Do not sleep with a heating pad, as you can be burned.     Pain medications - You may take a pain medication such as Tylenol  (acetaminophen), Advil , Nuprin  (ibuprofen) or Aleve  (naproxen).  If you have been given a narcotic such as Vicodin  (hydrocodone with acetaminophen), Percocet  (oxycodone with acetaminophen), codeine, or a muscle relaxant such as Flexeril  (cyclobenzaprine) or Soma  (carisoprodol), do not drive for four hours after you have taken it. If the narcotic contains Tylenol  (acetaminophen), do not take Tylenol  with it. All narcotics will cause constipation, so eat a high fiber diet.   If you were given a prescription for medicine here today, be sure to read all of the information (including the package insert) that comes with your prescription.  This will include important information about the medicine, its side effects, and any warnings that you need to know about.  The pharmacist who fills the prescription can provide more information and answer questions you may have about the medicine.  If you have questions or concerns that the pharmacist cannot address, please call or return to the Emergency Department.   Opioid Medication Information    Pain medications are among the most commonly prescribed medicines, so we are including this information for all our patients. If you did not receive pain medication or get a prescription for  pain medicine, you can ignore it.     You may have been given a prescription for an opioid (narcotic) pain medicine and/or have received a pain medicine while here in the Emergency Department. These medicines can make you drowsy or impaired. You must not drive, operate dangerous equipment, or engage in any other dangerous activities while taking these medications. If you drive while taking these medications, you could be arrested for DUI, or driving under the influence. Do not drink any alcohol while you are taking these medications.     Opioid pain medications can cause addiction. If you have a history of chemical dependency of any type, you are at a higher risk of becoming addicted to pain medications.  Only take these prescribed medications to treat your pain when all other options have been tried. Take it for as short a time and as few doses as possible. Store your pain pills in a secure place, as they are frequently stolen and provide a dangerous opportunity for children or visitors in your house to start abusing these powerful medications. We will not replace any lost or stolen medicine.  As soon as your pain is better, you should flush all your remaining medication.     Many prescription pain medications contain Tylenol  (acetaminophen), including Vicodin , Tylenol #3 , Norco , Lortab , and Percocet .  You should not take any extra pills of Tylenol  if you are using these prescription medications or you can get very sick.  Do not ever take more than 3000 mg of acetaminophen in any 24 hour period.    All opioids tend to cause constipation. Drink plenty of water and eat foods that have a lot of fiber, such as fruits, vegetables, prune juice, apple juice and high fiber cereal.  Take a laxative if you don t move your bowels at least every other day. Miralax , Milk of Magnesia, Colace , or Senna  can be used to keep you regular.      Remember that you can always come back to the Emergency Department if you are not  able to see your regular doctor in the amount of time listed above, if you get any new symptoms, or if there is anything that worries you.      24 Hour Appointment Hotline       To make an appointment at any [facility] clinic, call [phone] ([phone]). If you don't have a family doctor or clinic, we will help you find one. [facility] clinics are conveniently located to serve the needs of you and your family.             Review of your medicines      START taking        Dose / Directions Last dose taken    oxyCODONE IR 5 MG tablet   Commonly known as:  ROXICODONE   Dose:  2.5 mg   Quantity:  12 tablet        Take 0.5 tablets (2.5 mg) by mouth every 6 hours as needed for pain   Refills:  0          Our records show that you are taking the medicines listed below. If these are incorrect, please call your family doctor or clinic.        Dose / Directions Last dose taken    acetaminophen 325 MG tablet   Commonly known as:  TYLENOL   Dose:  650 mg   Quantity:  60 tablet        Take 2 tablets (650 mg) by mouth every 6 hours as needed for mild pain   Refills:  0        albuterol 108 (90 Base) MCG/ACT inhaler   Commonly known as:  PROAIR HFA/PROVENTIL HFA/VENTOLIN HFA   Dose:  2 puff   Quantity:  1 Inhaler        Inhale 2 puffs into the lungs every 6 hours as needed for shortness of breath / dyspnea or wheezing   Refills:  1        alendronate 70 MG tablet   Commonly known as:  FOSAMAX   Dose:  70 mg   Quantity:  12 tablet        Take 1 tablet (70 mg) by mouth every 7 days Take with over 8 ounces water and stay upright for at least 30 minutes after dose.  Take at least 60 minutes before breakfast   Refills:  3        ciprofloxacin 250 MG tablet   Commonly known as:  CIPRO   Dose:  250 mg   Quantity:  14 tablet        Take 1 tablet (250 mg) by mouth 2 times daily   Refills:  0        ENSURE Liqd   Dose:  0.5 Can   Quantity:  15 each        Take 0.5 Cans by mouth daily (strawberry)   Refills:  11        ferrous  gluconate 324 (38 Fe) MG tablet   Commonly known as:  FERGON   Dose:  1 tablet   Quantity:  30 tablet        Take 1 tablet (324 mg) by mouth daily   Refills:  10        Multi-vitamin Tabs tablet   Dose:  1 tablet   Quantity:  100 tablet        Take 1 tablet by mouth daily.   Refills:  3        * order for DME   Quantity:  1 Units        Equipment being ordered: abdominal binder   Refills:  0        * order for DME   Quantity:  1 Units        Equipment being ordered: lumbar corset   Refills:  0        pantoprazole 40 MG EC tablet   Commonly known as:  PROTONIX   Dose:  40 mg   Quantity:  90 tablet        Take 1 tablet (40 mg) by mouth every morning (before breakfast) Take 30-60 minutes before a meal.   Refills:  1        ranitidine 300 MG tablet   Commonly known as:  ZANTAC   Dose:  300 mg   Quantity:  30 tablet        Take 1 tablet (300 mg) by mouth At Bedtime   Refills:  1        traMADol 50 MG tablet   Commonly known as:  ULTRAM   Dose:  50 mg   Quantity:  30 tablet        Take 1 tablet (50 mg) by mouth every 6 hours as needed for severe pain   Refills:  0        valACYclovir 1000 mg tablet   Commonly known as:  VALTREX   Dose:  1000 mg   Quantity:  30 tablet        Take 1 tablet (1,000 mg) by mouth 3 times daily for 10 days   Refills:  0        * Notice:  This list has 2 medication(s) that are the same as other medications prescribed for you. Read the directions carefully, and ask your doctor or other care provider to review them with you.            Information about OPIOIDS     PRESCRIPTION OPIOIDS: WHAT YOU NEED TO KNOW   We gave you an opioid (narcotic) pain medicine. It is important to manage your pain, but opioids are not always the best choice. You should first try all the other options your care team gave you. Take this medicine for as short a time (and as few doses) as possible.    Some activities can increase your pain, such as bandage changes or therapy sessions. It may help to take your pain medicine  30 to 60 minutes before these activities. Reduce your stress by getting enough sleep, working on hobbies you enjoy and practicing relaxation or meditation. Talk to your care team about ways to manage your pain beyond prescription opioids.    These medicines have risks:    DO NOT drive when on new or higher doses of pain medicine. These medicines can affect your alertness and reaction times, and you could be arrested for driving under the influence (DUI). If you need to use opioids long-term, talk to your care team about driving.    DO NOT operate heavy machinery    DO NOT do any other dangerous activities while taking these medicines.    DO NOT drink any alcohol while taking these medicines.     If the opioid prescribed includes acetaminophen, DO NOT take with any other medicines that contain acetaminophen. Read all labels carefully. Look for the word  acetaminophen  or  Tylenol.  Ask your pharmacist if you have questions or are unsure.    You can get addicted to pain medicines, especially if you have a history of addiction (chemical, alcohol or substance dependence). Talk to your care team about ways to reduce this risk.    All opioids tend to cause constipation. Drink plenty of water and eat foods that have a lot of fiber, such as fruits, vegetables, prune juice, apple juice and high-fiber cereal. Take a laxative (Miralax, milk of magnesia, Colace, Senna) if you don t move your bowels at least every other day. Other side effects include upset stomach, sleepiness, dizziness, throwing up, tolerance (needing more of the medicine to have the same effect), physical dependence and slowed breathing.    Store your pills in a secure place, locked if possible. We will not replace any lost or stolen medicine. If you don t finish your medicine, please throw away (dispose) as directed by your pharmacist. The [place] [employer]ution [employer] has more information about safe disposal:  https://www.pca.[facility].[place].us/living-green/managing-unwanted-medications        Prescriptions were sent or printed at these locations (1 Prescription)                   Other Prescriptions                Printed at Department/Unit printer (1 of 1)         oxyCODONE IR (ROXICODONE) 5 MG tablet                Procedures and tests performed during your visit     CBC with platelets differential    CT Abdomen Pelvis w Contrast    Comprehensive metabolic panel      Orders Needing Specimen Collection     Ordered          11/10/18 1323  UA reflex to Microscopic and Culture - STAT, [facility]: STAT, Status: Sent     Scheduled Task Status   11/10/18 1322 [facility] Reminder: Open   Order Class:  PCU Collect                11/10/18 1323  Urine Culture Aerobic Bacterial - ROUTINE, [facility]: Routine, Status: Sent     Scheduled Task Status   11/10/18 1322 [facility] Reminder: Open   Order Class:  PCU Collect                  Pending Results     No orders found from 11/8/2018 to 11/11/2018.            Pending Culture Results     No orders found from 11/8/2018 to 11/11/2018.            Pending Results Instructions     If you had any lab results that were not finalized at the time of your Discharge, you can call the ED Lab Result RN at [phone]. You will be contacted by this team for any positive Lab results or changes in treatment. The nurses are available 7 days a week from 10A to 6:30P.  You can leave a message 24 hours per day and they will return your call.        Test Results From Your Hospital Stay        11/10/2018  1:48 PM      Component Results     Component Value Ref Range & Units Status    WBC 6.4 4.0 - 11.0 10e9/L Final    RBC Count 3.95 3.8 - 5.2 10e12/L Final    Hemoglobin 11.5 (L) 11.7 - 15.7 g/dL Final    Hematocrit 37.6 35.0 - 47.0 % Final    MCV 95 78 - 100 fl Final    MCH 29.1 26.5 - 33.0 pg Final    MCHC 30.6 (L) 31.5 - 36.5 g/dL Final    RDW 14.6 10.0 - 15.0 % Final    Platelet Count 529 (H) 150 - 450 10e9/L Final     Diff Method Automated Method  Final    % Neutrophils 54.5 % Final    % Lymphocytes 32.4 % Final    % Monocytes 10.7 % Final    % Eosinophils 1.1 % Final    % Basophils 0.8 % Final    % Immature Granulocytes 0.5 % Final    Nucleated RBCs 0 0 /100 Final    Absolute Neutrophil 3.5 1.6 - 8.3 10e9/L Final    Absolute Lymphocytes 2.1 0.8 - 5.3 10e9/L Final    Absolute Monocytes 0.7 0.0 - 1.3 10e9/L Final    Absolute Eosinophils 0.1 0.0 - 0.7 10e9/L Final    Absolute Basophils 0.1 0.0 - 0.2 10e9/L Final    Abs Immature Granulocytes 0.0 0 - 0.4 10e9/L Final    Absolute Nucleated RBC 0.0  Final         11/10/2018  2:07 PM      Component Results     Component Value Ref Range & Units Status    Sodium 140 133 - 144 mmol/L Final    Potassium 4.6 3.4 - 5.3 mmol/L Final    Chloride 105 94 - 109 mmol/L Final    Carbon Dioxide 27 20 - 32 mmol/L Final    Anion Gap 8 3 - 14 mmol/L Final    Glucose 102 (H) 70 - 99 mg/dL Final    Urea Nitrogen 18 7 - 30 mg/dL Final    Creatinine 0.70 0.52 - 1.04 mg/dL Final    GFR Estimate 79 >60 mL/min/1.7m2 Final    Non  GFR Calc    GFR Estimate If Black >90 >60 mL/min/1.7m2 Final    African American GFR Calc    Calcium 9.0 8.5 - 10.1 mg/dL Final    Bilirubin Total 0.4 0.2 - 1.3 mg/dL Final    Albumin 3.7 3.4 - 5.0 g/dL Final    Protein Total 7.7 6.8 - 8.8 g/dL Final    Alkaline Phosphatase 110 40 - 150 U/L Final    ALT 24 0 - 50 U/L Final    AST 24 0 - 45 U/L Final         11/10/2018  3:59 PM      Narrative     CT ABDOMEN PELVIS WITH CONTRAST   11/10/2018 3:20 PM     HISTORY: Low back pain, evaluate for fracture, retroperitoneal  problem.     TECHNIQUE:   35mL Isovue-370. Radiation dose for this scan was reduced  using automated exposure control, adjustment of the mA and/or kV  according to patient size, or iterative reconstruction technique.    COMPARISON: 5/11/2017.    FINDINGS:  There is mild atelectasis or scarring in the lung bases.  There are postoperative changes of  cholecystectomy. Intra and  extrahepatic biliary dilatation is not significantly changed. The  spleen, pancreas, adrenal glands, and right kidney are unremarkable.  Nonobstructing left renal calculus. The left kidney is atrophic. The  bowel is normal in caliber. Moderate amount of stool throughout the  colon. No bowel inflammatory changes are seen. Atherosclerotic changes  in the aorta but no evidence of aneurysm. No evidence of  retroperitoneal hemorrhage. There is fixation hardware in both hips  which causes beam hardening artifact and limits evaluation of the  pelvis. There are old bilateral pubic ramus fractures. Extensive  osteopenia. There is marked thoracolumbar scoliosis. There are  multiple vertebral compression fractures which appear unchanged.  However, if there is strong clinical suspicion for lumbar spine  fracture then dedicated lumbar spine CT would be recommended.        Impression     IMPRESSION:   1. No acute abnormality identified.  2. Stable biliary dilatation.  3. Nonobstructing left renal calculus.  4. Old lumbar vertebral compression fractures and inferior pubic ramus  fractures.     ANIKA BOONE MD                Clinical Quality Measure: Blood Pressure Screening     Your blood pressure was checked while you were in the emergency department today. The last reading we obtained was  BP: (!) 133/95 . Please read the guidelines below about what these numbers mean and what you should do about them.  If your systolic blood pressure (the top number) is less than 120 and your diastolic blood pressure (the bottom number) is less than 80, then your blood pressure is normal. There is nothing more that you need to do about it.  If your systolic blood pressure (the top number) is 120-139 or your diastolic blood pressure (the bottom number) is 80-89, your blood pressure may be higher than it should be. You should have your blood pressure rechecked within a year by a primary care provider.  If your  systolic blood pressure (the top number) is 140 or greater or your diastolic blood pressure (the bottom number) is 90 or greater, you may have high blood pressure. High blood pressure is treatable, but if left untreated over time it can put you at risk for heart attack, stroke, or kidney failure. You should have your blood pressure rechecked by a primary care provider within the next 4 weeks.  If your provider in the emergency department today gave you specific instructions to follow-up with your doctor or provider even sooner than that, you should follow that instruction and not wait for up to 4 weeks for your follow-up visit.        Thank you for choosing [facility]       Thank you for choosing [facility] for your care. Our goal is always to provide you with excellent care. Hearing back from our patients is one way we can continue to improve our services. Please take a few minutes to complete the written survey that you may receive in the mail after you visit with us. Thank you!        Care EveryWhere ID     This is your Care EveryWhere ID. This could be used by other organizations to access your [facility] medical records  [id number]        Equal Access to Services     ELAINA ANAYA : Albino Hinton, warajida lunancy, qaybta kaalgrace hood, cleveland mayes. So [facility] [phone].    ATENCIÓN: Si habla español, tiene a barrientos disposición servicios gratuitos de asistencia lingüística. Llame al [phone].    We comply with applicable federal civil rights laws and [place] laws. We do not discriminate on the basis of race, color, national origin, age, disability, sex, sexual orientation, or gender identity.            After Visit Summary       This is your record. Keep this with you and show to your community pharmacist(s) and doctor(s) at your next visit.

## 2018-11-12 ENCOUNTER — PATIENT OUTREACH (OUTPATIENT)
Dept: CARE COORDINATION | Facility: CLINIC | Age: 83
End: 2018-11-12

## 2018-11-12 ASSESSMENT — ACTIVITIES OF DAILY LIVING (ADL): DEPENDENT_IADLS:: TRANSPORTATION

## 2018-11-12 NOTE — PROGRESS NOTES
Clinic Care Coordination Contact    Clinic Care Coordination Contact  OUTREACH      Chief Complaint   Patient presents with     Clinic Care Coordination - Post Hospital     ED followup       Clinical Concerns:  RN CC outreach for status update, post ED visit.   Patient was evaluated for right sided lower back pain.   Patient states that she has been taking pain med every six hours with only minimal relief. Patient rates her pain at 6/10 during call.  next dose of pain med in one hour.   Patient aware of follow up visit schedule with PCP and did not have any additional pre visit question or concerns.    Psychosocial:  Informal Support system:: Family     Equipment Currently Used at Home: walker, rolling    Patient/Caregiver understanding: Patient verbalized understanding, engaged in AIDET communication behavior during encounter.         Future Appointments              Tomorrow Juan A Solo PA-C [facility]          Plan: Patient will attend scheduled  visit with  PCP as recommended.   No further outreaches will be made at this time unless a new referral is made or a change in the pt's status occurs. Patient was provided with this writer's contact information and encouraged to call with any questions or concerns.      Tanna Santiago RN  Clinic Care Coordinator  Office [phone]  [email or web address]

## 2018-11-13 ENCOUNTER — OFFICE VISIT (OUTPATIENT)
Dept: FAMILY MEDICINE | Facility: CLINIC | Age: 83
End: 2018-11-13
Payer: COMMERCIAL

## 2018-11-13 VITALS
BODY MASS INDEX: 20.5 KG/M2 | SYSTOLIC BLOOD PRESSURE: 137 MMHG | TEMPERATURE: 97.6 F | HEART RATE: 108 BPM | DIASTOLIC BLOOD PRESSURE: 81 MMHG | RESPIRATION RATE: 16 BRPM | WEIGHT: 70 LBS

## 2018-11-13 DIAGNOSIS — G89.29 CHRONIC RIGHT-SIDED LOW BACK PAIN WITHOUT SCIATICA: Primary | ICD-10-CM

## 2018-11-13 DIAGNOSIS — M54.50 CHRONIC RIGHT-SIDED LOW BACK PAIN WITHOUT SCIATICA: Primary | ICD-10-CM

## 2018-11-13 DIAGNOSIS — K29.00 OTHER ACUTE GASTRITIS WITHOUT HEMORRHAGE: ICD-10-CM

## 2018-11-13 PROCEDURE — 99213 OFFICE O/P EST LOW 20 MIN: CPT | Performed by: PHYSICIAN ASSISTANT

## 2018-11-13 RX ORDER — OXYCODONE HYDROCHLORIDE 5 MG/1
2.5 TABLET ORAL EVERY 6 HOURS PRN
Qty: 20 TABLET | Refills: 0 | Status: SHIPPED | OUTPATIENT
Start: 2018-11-13 | End: 2019-01-01

## 2018-11-13 RX ORDER — PANTOPRAZOLE SODIUM 40 MG/1
40 TABLET, DELAYED RELEASE ORAL
Qty: 90 TABLET | Refills: 1 | Status: SHIPPED | OUTPATIENT
Start: 2018-11-13 | End: 2019-01-01

## 2018-11-13 NOTE — MR AVS SNAPSHOT
After Visit Summary   11/13/2018    Nedra Carr    MRN: [id number]           Patient Information     Date Of Birth          12/22/1932        Visit Information        Provider Department      11/13/2018 2:00 PM Jua nA Solo PA-C [facility]        Today's Diagnoses     Chronic right-sided low back pain without sciatica    -  1       Follow-ups after your visit        Additional Services     [facility] PT, HAND, AND CHIROPRACTIC REFERRAL       Physical Therapy, Hand Therapy and Chiropractic Care are available through:  *[facility]  *Hand Therapy (Occupational Therapy or Physical Therapy)  *[facility]    Call one number to schedule at any of the above locations: [phone].    Physical therapy, Hand therapy and/or Chiropractic care has been recommended by your physician as an excellent treatment option to reduce pain and help people return to normal activities, including sports.  Therapy and/or chiropractic care services are a great complement or alternative to expensive and invasive surgery, injections, or long-term use of prescription medications. The primary goal is to identify the underlying problem and provide you the tools to manage your condition on your own.     Please be aware that coverage of these services is subject to the terms and limitations of your health insurance plan.  Call member services at your health plan with any benefit or coverage questions.      Please bring the following to your appointment:  *Your personal calendar for scheduling future appointments  *Comfortable clothing                  Future tests that were ordered for you today     Open Future Orders        Priority Expected Expires Ordered    [facility] PT, HAND, AND CHIROPRACTIC REFERRAL Routine  11/13/2019 11/13/2018            Who to contact     If you have questions or need follow up information about today's clinic visit or your schedule please  contact [facility] directly at [phone].  Normal or non-critical lab and imaging results will be communicated to you by MyChart, letter or phone within 4 business days after the clinic has received the results. If you do not hear from us within 7 days, please contact the clinic through MyChart or phone. If you have a critical or abnormal lab result, we will notify you by phone as soon as possible.  Submit refill requests through [employer] or call your pharmacy and they will forward the refill request to us. Please allow 3 business days for your refill to be completed.          Additional Information About Your Visit        Care EveryWhere ID     This is your Care EveryWhere ID. This could be used by other organizations to access your [facility] medical records  [id number]        Your Vitals Were     Pulse Temperature Respirations BMI (Body Mass Index)          108 97.6  F (36.4  C) (Oral) 16 20.5 kg/m2         Blood Pressure from Last 3 Encounters:   11/13/18 137/81   11/10/18 137/80   11/05/18 124/70    Weight from Last 3 Encounters:   11/13/18 70 lb (31.8 kg)   11/05/18 70 lb (31.8 kg)   08/15/18 72 lb 14.4 oz (33.1 kg)                 Today's Medication Changes          These changes are accurate as of 11/13/18  2:24 PM.  If you have any questions, ask your nurse or doctor.               These medicines have changed or have updated prescriptions.        Dose/Directions    * oxyCODONE IR 5 MG tablet   Commonly known as:  ROXICODONE   This may have changed:  Another medication with the same name was added. Make sure you understand how and when to take each.   Changed by:  Juan A Solo PA-C        Dose:  2.5 mg   Take 0.5 tablets (2.5 mg) by mouth every 6 hours as needed for pain   Quantity:  12 tablet   Refills:  0       * oxyCODONE IR 5 MG tablet   Commonly known as:  ROXICODONE   This may have changed:  You were already taking a medication with the same name, and this prescription  was added. Make sure you understand how and when to take each.   Used for:  Chronic right-sided low back pain without sciatica   Changed by:  Juan A Solo PA-C        Dose:  2.5 mg   Take 0.5 tablets (2.5 mg) by mouth every 6 hours as needed for pain   Quantity:  20 tablet   Refills:  0       * Notice:  This list has 2 medication(s) that are the same as other medications prescribed for you. Read the directions carefully, and ask your doctor or other care provider to review them with you.         Where to get your medicines      Some of these will need a paper prescription and others can be bought over the counter.  Ask your nurse if you have questions.     Bring a paper prescription for each of these medications     oxyCODONE IR 5 MG tablet               Information about OPIOIDS     PRESCRIPTION OPIOIDS: WHAT YOU NEED TO KNOW   We gave you an opioid (narcotic) pain medicine. It is important to manage your pain, but opioids are not always the best choice. You should first try all the other options your care team gave you. Take this medicine for as short a time (and as few doses) as possible.    Some activities can increase your pain, such as bandage changes or therapy sessions. It may help to take your pain medicine 30 to 60 minutes before these activities. Reduce your stress by getting enough sleep, working on hobbies you enjoy and practicing relaxation or meditation. Talk to your care team about ways to manage your pain beyond prescription opioids.    These medicines have risks:    DO NOT drive when on new or higher doses of pain medicine. These medicines can affect your alertness and reaction times, and you could be arrested for driving under the influence (DUI). If you need to use opioids long-term, talk to your care team about driving.    DO NOT operate heavy machinery    DO NOT do any other dangerous activities while taking these medicines.    DO NOT drink any alcohol while taking these medicines.      If the opioid prescribed includes acetaminophen, DO NOT take with any other medicines that contain acetaminophen. Read all labels carefully. Look for the word  acetaminophen  or  Tylenol.  Ask your pharmacist if you have questions or are unsure.    You can get addicted to pain medicines, especially if you have a history of addiction (chemical, alcohol or substance dependence). Talk to your care team about ways to reduce this risk.    All opioids tend to cause constipation. Drink plenty of water and eat foods that have a lot of fiber, such as fruits, vegetables, prune juice, apple juice and high-fiber cereal. Take a laxative (Miralax, milk of magnesia, Colace, Senna) if you don t move your bowels at least every other day. Other side effects include upset stomach, sleepiness, dizziness, throwing up, tolerance (needing more of the medicine to have the same effect), physical dependence and slowed breathing.    Store your pills in a secure place, locked if possible. We will not replace any lost or stolen medicine. If you don t finish your medicine, please throw away (dispose) as directed by your pharmacist. The [place] Pollution Control Agency has more information about safe disposal: [email or web address]://[email or web address].[facility].[place].us/living-green/managing-unwanted-medications         Primary Care Provider Office Phone # Fax #    Juan A Rolando Solo PA-C [phone] [address] [facility] [address]        Equal Access to Services     ELAINA ANAYA : Hadnito suarez Soayad, waaxda luqadaha, qaybta kaalclevel[place] rivera. So [facility] [phone].    ATENCIÓN: Si habla español, tiene a barrientos disposición servicios gratuitos de asistencia lingüística. Naomi cantu [phone].    We comply with applicable federal civil rights laws and [place] laws. We do not discriminate on the basis of race, color, national origin, age, disability, sex, sexual orientation, or gender  identity.            Thank you!     Thank you for choosing [facility]  for your care. Our goal is always to provide you with excellent care. Hearing back from our patients is one way we can continue to improve our services. Please take a few minutes to complete the written survey that you may receive in the mail after your visit with us. Thank you!             Your Updated Medication List - Protect others around you: Learn how to safely use, store and throw away your medicines at www.disposemymeds.org.          This list is accurate as of 11/13/18  2:24 PM.  Always use your most recent med list.                   Brand Name Dispense Instructions for use Diagnosis    acetaminophen 325 MG tablet    TYLENOL    60 tablet    Take 2 tablets (650 mg) by mouth every 6 hours as needed for mild pain    Abdominal pain, other specified site       albuterol 108 (90 Base) MCG/ACT inhaler    PROAIR HFA/PROVENTIL HFA/VENTOLIN HFA    1 Inhaler    Inhale 2 puffs into the lungs every 6 hours as needed for shortness of breath / dyspnea or wheezing    Dyspnea       alendronate 70 MG tablet    FOSAMAX    12 tablet    Take 1 tablet (70 mg) by mouth every 7 days Take with over 8 ounces water and stay upright for at least 30 minutes after dose.  Take at least 60 minutes before breakfast    Age-related osteoporosis without current pathological fracture       ciprofloxacin 250 MG tablet    CIPRO    14 tablet    Take 1 tablet (250 mg) by mouth 2 times daily    Acute cystitis with hematuria       ENSURE Liqd     15 each    Take 0.5 Cans by mouth daily (strawberry)    Perforated gastric ulcer, chronic or unspecified, Physical deconditioning, Weakness       ferrous gluconate 324 (38 Fe) MG tablet    FERGON    30 tablet    Take 1 tablet (324 mg) by mouth daily    Other iron deficiency anemia       Multi-vitamin Tabs tablet     100 tablet    Take 1 tablet by mouth daily.        * order for DME     1 Units    Equipment being  ordered: abdominal binder    Lumbar radicular pain       * order for DME     1 Units    Equipment being ordered: lumbar corset    Lumbar radicular pain       * oxyCODONE IR 5 MG tablet    ROXICODONE    12 tablet    Take 0.5 tablets (2.5 mg) by mouth every 6 hours as needed for pain        * oxyCODONE IR 5 MG tablet    ROXICODONE    20 tablet    Take 0.5 tablets (2.5 mg) by mouth every 6 hours as needed for pain    Chronic right-sided low back pain without sciatica       pantoprazole 40 MG EC tablet    PROTONIX    90 tablet    Take 1 tablet (40 mg) by mouth every morning (before breakfast) Take 30-60 minutes before a meal.    Other acute gastritis without hemorrhage       ranitidine 300 MG tablet    ZANTAC    30 tablet    Take 1 tablet (300 mg) by mouth At Bedtime    Other acute gastritis without hemorrhage       traMADol 50 MG tablet    ULTRAM    30 tablet    Take 1 tablet (50 mg) by mouth every 6 hours as needed for severe pain    Other idiopathic scoliosis, unspecified spinal region, Chronic bilateral low back pain without sciatica       valACYclovir 1000 mg tablet    VALTREX    30 tablet    Take 1 tablet (1,000 mg) by mouth 3 times daily for 10 days    Herpes zoster without complication       * Notice:  This list has 4 medication(s) that are the same as other medications prescribed for you. Read the directions carefully, and ask your doctor or other care provider to review them with you.

## 2018-11-13 NOTE — PROGRESS NOTES
SUBJECTIVE:   Nedra Carr is a 85 year old female who presents to clinic today for the following health issues:      ED/UC Followup:    Facility:  [facility]  Date of visit: 11/10/2018  Reason for visit: back pain  Current Status: no change from ER visit- 8/10-currently; 10/10-worst. States the oxycodone that was given to her aids greatly in symptoms. Requesting a refill. No improvement with tramadol.  Of note, at last visit, UA concerning for UTI. Urine culture was negative for infection. Symptoms did not improve with abx. No urination changes or fever since last visit with myself. Has yet to start physical therapy.        Problem list and histories reviewed & adjusted, as indicated.  Additional history: as documented    Patient Active Problem List   Diagnosis     Hallux varus, acquired     Other hammer toe (acquired)     Osteoporosis     HTN (hypertension)     Cataract     CARDIOVASCULAR SCREENING; LDL GOAL LESS THAN 130     Advance Care Planning     Intractable chronic migraine without aura     Anemia     Perforated gastric ulcer (H)     Emphysematous cholecystitis     Health Care Home     Paroxysmal atrial fibrillation (H)     Abdominal pain, unspecified abdominal location     UTI (urinary tract infection)     Sepsis (H)     Headache     Physical deconditioning     Urinary bladder stone     S/P cystoscopy     Hypoxia     Hydronephrosis     Acute cholecystitis     Cardiogenic shock (H)     Closed compression fracture of thoracic vertebra (H)     Family history of other digestive disorders     Allergic rhinitis     Arthritis of hand     Esophageal stricture     Obstructive sleep apnea     Weakness     At high risk for falls     Multiple pelvic fractures     Mobility impaired     Abdominal pain     Other idiopathic scoliosis, unspecified spinal region     Past Surgical History:   Procedure Laterality Date     BACK SURGERY       BRONCHOSCOPY FLEXIBLE N/A 11/21/2014    Procedure: BRONCHOSCOPY FLEXIBLE;  Surgeon:  Rhonda Donohue MD;  Location: [facility]     C NONSPECIFIC PROCEDURE      s/p dilation at [facility] by Dr. Radha FELIPE NONSPECIFIC PROCEDURE      s/p dilation at [facility] by Dr. Betty FELIPE NONSPECIFIC PROCEDURE      Vag hysterectomy     CHOLECYSTECTOMY N/A 11/15/2014    Procedure: CHOLECYSTECTOMY;  Surgeon: Yomi Brennan MD;  Location: [facility] OR     CYSTOSCOPY, LITHOLAPAXY, COMBINED N/A 4/8/2015    Procedure: COMBINED CYSTOSCOPY, LITHOLAPAXY;  Surgeon: Randy Reno MD;  Location: [facility] OR     ENT SURGERY       ESOPHAGOSCOPY, GASTROSCOPY, DUODENOSCOPY (EGD), COMBINED  11/21/2012    Procedure: COMBINED ESOPHAGOSCOPY, GASTROSCOPY, DUODENOSCOPY (EGD), BIOPSY SINGLE OR MULTIPLE;   ESOPHAGOSCOPY, GASTROSCOPY, DUODENOSCOPY (EGD)   with cold biopsy and dilalation with #15 savary;  Surgeon: Guillermo Arcos MD;  Location: [facility]     ESOPHAGOSCOPY, GASTROSCOPY, DUODENOSCOPY (EGD), COMBINED  2/22/2013    Procedure: COMBINED ESOPHAGOSCOPY, GASTROSCOPY, DUODENOSCOPY (EGD);  ESOPHAGOSCOPY, GASTROSCOPY, DUODENOSCOPY (EGD) ;  Surgeon: Lissett Posada MD;  Location: [facility]     ESOPHAGOSCOPY, GASTROSCOPY, DUODENOSCOPY (EGD), COMBINED N/A 10/29/2014    Procedure: COMBINED ESOPHAGOSCOPY, GASTROSCOPY, DUODENOSCOPY (EGD);  Surgeon: Dany Ambrocio MD;  Location: [facility]     ESOPHAGOSCOPY, GASTROSCOPY, DUODENOSCOPY (EGD), COMBINED N/A 1/13/2015    Procedure: COMBINED ESOPHAGOSCOPY, GASTROSCOPY, DUODENOSCOPY (EGD), BIOPSY SINGLE OR MULTIPLE;  Surgeon: Dany Ambrocio MD;  Location: [facility]     ESOPHAGOSCOPY, GASTROSCOPY, DUODENOSCOPY (EGD), COMBINED N/A 3/5/2015    Procedure: COMBINED ESOPHAGOSCOPY, GASTROSCOPY, DUODENOSCOPY (EGD);  Surgeon: Dany Ambrocio MD;  Location: [facility]     ESOPHAGOSCOPY, GASTROSCOPY, DUODENOSCOPY (EGD), COMBINED N/A 11/10/2015    Procedure: COMBINED ESOPHAGOSCOPY, GASTROSCOPY, DUODENOSCOPY (EGD);  Surgeon: Dany Ambrocio MD;  Location:  GI     EYE SURGERY       GYN SURGERY       LAPAROTOMY  EXPLORATORY N/A 11/15/2014    Procedure: LAPAROTOMY EXPLORATORY;  Surgeon: Yomi Brennan MD;  Location: [facility]     LASER HOLMIUM LITHOTRIPSY BLADDER N/A 4/8/2015    Procedure: LASER HOLMIUM LITHOTRIPSY BLADDER;  Surgeon: Randy Reno MD;  Location: [facility]     ORTHOPEDIC SURGERY      hip fx surgery x 2       Social History   Substance Use Topics     Smoking status: Never Smoker     Smokeless tobacco: Never Used     Alcohol use No     Family History   Problem Relation Age of Onset     Diabetes Mother      Diabetes Sister      HEART DISEASE Son          Current Outpatient Prescriptions   Medication Sig Dispense Refill     acetaminophen (TYLENOL) 325 MG tablet Take 2 tablets (650 mg) by mouth every 6 hours as needed for mild pain 60 tablet 0     albuterol (PROAIR HFA, PROVENTIL HFA, VENTOLIN HFA) 108 (90 BASE) MCG/ACT inhaler Inhale 2 puffs into the lungs every 6 hours as needed for shortness of breath / dyspnea or wheezing 1 Inhaler 1     alendronate (FOSAMAX) 70 MG tablet Take 1 tablet (70 mg) by mouth every 7 days Take with over 8 ounces water and stay upright for at least 30 minutes after dose.  Take at least 60 minutes before breakfast 12 tablet 3     ciprofloxacin (CIPRO) 250 MG tablet Take 1 tablet (250 mg) by mouth 2 times daily 14 tablet 0     ferrous gluconate (FERGON) 324 (38 FE) MG tablet Take 1 tablet (324 mg) by mouth daily 30 tablet 10     multivitamin, therapeutic with minerals (MULTI-VITAMIN) TABS Take 1 tablet by mouth daily. 100 tablet 3     Nutritional Supplements (ENSURE) LIQD Take 0.5 Cans by mouth daily (strawberry) 15 each 11     order for DME Equipment being ordered: lumbar corset 1 Units 0     order for DME Equipment being ordered: abdominal binder 1 Units 0     oxyCODONE IR (ROXICODONE) 5 MG tablet Take 0.5 tablets (2.5 mg) by mouth every 6 hours as needed for pain 20 tablet 0     pantoprazole (PROTONIX) 40 MG EC tablet Take 1 tablet (40 mg) by mouth every morning (before  breakfast) Take 30-60 minutes before a meal. 90 tablet 1     ranitidine (ZANTAC) 300 MG tablet Take 1 tablet (300 mg) by mouth At Bedtime 30 tablet 1     valACYclovir (VALTREX) 1000 mg tablet Take 1 tablet (1,000 mg) by mouth 3 times daily for 10 days 30 tablet 0     Allergies   Allergen Reactions     No Known Drug Allergies      BP Readings from Last 3 Encounters:   11/13/18 137/81   11/10/18 137/80   11/05/18 124/70    Wt Readings from Last 3 Encounters:   11/13/18 70 lb (31.8 kg)   11/05/18 70 lb (31.8 kg)   08/15/18 72 lb 14.4 oz (33.1 kg)                    Reviewed and updated as needed this visit by clinical staff  Tobacco  Allergies  Meds  Problems  Med Hx  Surg Hx  Fam Hx  Soc Hx        Reviewed and updated as needed this visit by Provider  Allergies  Meds  Problems         ROS:  Constitutional, msk, neuro, cardiovascular, pulmonary, gi and gu systems are negative, except as otherwise noted.    OBJECTIVE:     /81 (BP Location: Right arm, Patient Position: Chair, Cuff Size: Adult Regular)  Pulse 108  Temp 97.6  F (36.4  C) (Oral)  Resp 16  Wt 70 lb (31.8 kg)  BMI 20.5 kg/m2  Body mass index is 20.5 kg/(m^2).  GENERAL: alert, mild distress, frail and elderly  RESP: lungs clear to auscultation - no rales, rhonchi or wheezes  CV: regular rates and rhythm  ABDOMEN: soft, nontender, no hepatosplenomegaly, no masses and bowel sounds normal  MS: tenderness to palpation over right SI joint and surrounding muscular.   PSYCH: mentation appears normal, affect normal/bright    Diagnostic Test Results:  none     ASSESSMENT/PLAN:     (M54.5,  G89.29) Chronic right-sided low back pain without sciatica  (primary encounter diagnosis)  Comment: acute on chronic. No improvement with tramadol, but improvement with oxycodone. Will continue at low dose and follow up with physical therapy. If no improvement in 2 weeks, will have see ortho spine.   Plan: oxyCODONE IR (ROXICODONE) 5 MG tablet, [facility] PT,          HAND, AND CHIROPRACTIC REFERRAL        -Medication use and side effects discussed with the patient. Patient is in complete understanding and agreement with plan.         Follow up: as above     Juan A Solo PA-C  [facility]

## 2018-11-20 ENCOUNTER — THERAPY VISIT (OUTPATIENT)
Dept: PHYSICAL THERAPY | Facility: CLINIC | Age: 83
End: 2018-11-20
Attending: PHYSICIAN ASSISTANT
Payer: COMMERCIAL

## 2018-11-20 DIAGNOSIS — M54.50 CHRONIC RIGHT-SIDED LOW BACK PAIN WITHOUT SCIATICA: ICD-10-CM

## 2018-11-20 DIAGNOSIS — M54.50 RIGHT-SIDED LOW BACK PAIN WITHOUT SCIATICA: Primary | ICD-10-CM

## 2018-11-20 DIAGNOSIS — G89.29 CHRONIC RIGHT-SIDED LOW BACK PAIN WITHOUT SCIATICA: ICD-10-CM

## 2018-11-20 PROCEDURE — 97110 THERAPEUTIC EXERCISES: CPT | Mod: GP | Performed by: PHYSICAL THERAPIST

## 2018-11-20 PROCEDURE — 97161 PT EVAL LOW COMPLEX 20 MIN: CPT | Mod: GP | Performed by: PHYSICAL THERAPIST

## 2018-11-20 PROCEDURE — 97035 APP MDLTY 1+ULTRASOUND EA 15: CPT | Mod: GP | Performed by: PHYSICAL THERAPIST

## 2018-11-20 NOTE — MR AVS SNAPSHOT
After Visit Summary   11/20/2018    Nedra Carr    MRN: [id number]           Patient Information     Date Of Birth          12/22/1932        Visit Information        Provider Department      11/20/2018 11:00 AM Allen Johns, PT [facility] Physical Therapy        Today's Diagnoses     Right-sided low back pain without sciatica    -  1    Chronic right-sided low back pain without sciatica           Follow-ups after your visit        Your next 10 appointments already scheduled     Nov 27, 2018 12:20 PM CST   EDILMA Spine with Allen Johns PT   [facility] Therapy ([facility])    [address]  [place] [address]   [phone]            Dec 04, 2018 11:40 AM CST   EDILMA Spine with Allen Johns PT   [facility] ([facility])    [address]  [place] [address] [phone]              Who to contact     If you have questions or need follow up information about today's clinic visit or your schedule please contact [facility] PHYSICAL THERAPY directly at [phone].  Normal or non-critical lab and imaging results will be communicated to you by MyChart, letter or phone within 4 business days after the clinic has received the results. If you do not hear from us within 7 days, please contact the clinic through MyChart or phone. If you have a critical or abnormal lab result, we will notify you by phone as soon as possible.  Submit refill requests through [employer] or call your pharmacy and they will forward the refill request to us. Please allow 3 business days for your refill to be completed.          Additional Information About Your Visit        Care EveryWhere ID     This is your Care EveryWhere ID. This could be used by other organizations to access your [facility]  records  [id number]         Blood Pressure from Last 3 Encounters:   11/13/18 137/81   11/10/18 137/80   11/05/18 124/70    Weight from Last 3 Encounters:   11/13/18 31.8 kg (70 lb)   11/05/18 31.8 kg (70 lb)   08/15/18 33.1 kg (72 lb 14.4 oz)              We Performed the Following     HC PT EVAL, LOW COMPLEXITY     EDILMA INITIAL EVAL REPORT     EDILMA PT, HAND, AND CHIROPRACTIC REFERRAL     THERAPEUTIC EXERCISES     ULTRASOUND THERAPY        Primary Care Provider Office Phone # Fax #    Juan A Rolando Solo PA-C [phone] [address] [facility] [address]        Equal Access to Services     ELAINA ANAYA : Albino alvaradoo Mary Jane, waaxda luqadaha, qaybta kaalmada adeegyavirginia, [place] mayes. So [facility] [phone].    ATENCIÓN: Si habla español, tiene a barrientos disposición servicios gratuitos de asistencia lingüística. [facility] [phone].    We comply with applicable federal civil rights laws and [place] laws. We do not discriminate on the basis of race, color, national origin, age, disability, sex, sexual orientation, or gender identity.            Thank you!     Thank you for choosing [facility] PHYSICAL THERAPY  for your care. Our goal is always to provide you with excellent care. Hearing back from our patients is one way we can continue to improve our services. Please take a few minutes to complete the written survey that you may receive in the mail after your visit with us. Thank you!             Your Updated Medication List - Protect others around you: Learn how to safely use, store and throw away your medicines at www.disposemymeds.org.          This list is accurate as of 11/20/18 12:21 PM.  Always use your most recent med list.                   Brand Name Dispense Instructions for use Diagnosis    acetaminophen 325 MG tablet    TYLENOL    60 tablet    Take 2 tablets (650 mg) by mouth every 6 hours as needed for mild  pain    Abdominal pain, other specified site       albuterol 108 (90 Base) MCG/ACT inhaler    PROAIR HFA/PROVENTIL HFA/VENTOLIN HFA    1 Inhaler    Inhale 2 puffs into the lungs every 6 hours as needed for shortness of breath / dyspnea or wheezing    Dyspnea       alendronate 70 MG tablet    FOSAMAX    12 tablet    Take 1 tablet (70 mg) by mouth every 7 days Take with over 8 ounces water and stay upright for at least 30 minutes after dose.  Take at least 60 minutes before breakfast    Age-related osteoporosis without current pathological fracture       ciprofloxacin 250 MG tablet    CIPRO    14 tablet    Take 1 tablet (250 mg) by mouth 2 times daily    Acute cystitis with hematuria       ENSURE Liqd     15 each    Take 0.5 Cans by mouth daily (strawberry)    Perforated gastric ulcer, chronic or unspecified, Physical deconditioning, Weakness       ferrous gluconate 324 (38 Fe) MG tablet    FERGON    30 tablet    Take 1 tablet (324 mg) by mouth daily    Other iron deficiency anemia       Multi-vitamin Tabs tablet     100 tablet    Take 1 tablet by mouth daily.        * order for DME     1 Units    Equipment being ordered: abdominal binder    Lumbar radicular pain       * order for DME     1 Units    Equipment being ordered: lumbar corset    Lumbar radicular pain       oxyCODONE IR 5 MG tablet    ROXICODONE    20 tablet    Take 0.5 tablets (2.5 mg) by mouth every 6 hours as needed for pain    Chronic right-sided low back pain without sciatica       pantoprazole 40 MG EC tablet    PROTONIX    90 tablet    Take 1 tablet (40 mg) by mouth every morning (before breakfast) Take 30-60 minutes before a meal.    Other acute gastritis without hemorrhage       ranitidine 300 MG tablet    ZANTAC    30 tablet    Take 1 tablet (300 mg) by mouth At Bedtime    Other acute gastritis without hemorrhage       valACYclovir 1000 mg tablet    VALTREX    30 tablet    Take 1 tablet (1,000 mg) by mouth 3 times daily for 10 days    Herpes  zoster without complication       * Notice:  This list has 2 medication(s) that are the same as other medications prescribed for you. Read the directions carefully, and ask your doctor or other care provider to review them with you.

## 2018-11-20 NOTE — PROGRESS NOTES
[facility] Initial Evaluation  Subjective:  Patient is a 85 year old female presenting with rehab back hpi. The history is provided by the patient. No  was used.   Nedra Carr is a 85 year old female with a lumbar condition.  Condition occurred with:  Insidious onset.  Condition occurred: for unknown reasons.  This is a new condition  Pt reports having right LBP that has been present for 3 weeks.  No known cause.  She went to [facility] ED due to substantial pain on 11/10/18.  MD appt on 11/14/18..    Patient reports pain:  Lumbar spine right.  Radiates to:  No radiation.  Pain is described as aching and is intermittent and reported as 6/10.  Associated symptoms:  Loss of motion. Pain is the same all the time.  Symptoms are exacerbated by bending, standing, sitting and walking and relieved by heat.  Since onset symptoms are unchanged.  Special tests:  CT scan.      General health as reported by patient is fair.  Pertinent medical history includes:  Osteoporosis and osteoarthritis.  Medical allergies: no.  Other surgeries include:  Orthopedic surgery (B CYNTHIA).  Current medications:  Meds to increase bone density.  Current occupation is Retired  .        Barriers include:  None as reported by the patient.    Red flags:  None as reported by the patient.                        Objective:  Standing Alignment:        Lumbar:  Convex scoliosis L  Pelvic:  Iliac crest high R                         Lumbar/SI Evaluation  ROM:    AROM Lumbar:   Flexion:          25% with PDM  Ext:                    25% with PDM   Side Bend:        Left:  25% with PDM    Right:  25% wiht PDM  Rotation:           Left:     Right:   Side Glide:        Left:     Right:           Lumbar Myotomes:  normal                  Neural Tension/Mobility:  Lumbar:  Normal        Lumbar Palpation:      Tenderness not present at Left:    Quadratus Lumborum; Erector Spinae; Piriformis; PSIS or Vertebral  Tenderness  present at Right: Quadratus Lumborum  Tenderness not present at Right:  Erector Spinae; Piriformis; PSIS or Vertebral                                                     General     ROS    Assessment/Plan:    Patient is a 85 year old female with lumbar complaints.    Patient has the following significant findings with corresponding treatment plan.                Diagnosis 1:  R LBP  Pain -  self management, education, directional preference exercise and home program  Decreased ROM/flexibility - manual therapy and therapeutic exercise  Impaired muscle performance - neuro re-education  Decreased function - therapeutic activities    Therapy Evaluation Codes:   1) History comprised of:   Personal factors that impact the plan of care:      Age.    Comorbidity factors that impact the plan of care are:      Osteoarthritis.     Medications impacting care: Bone density meds.  2) Examination of Body Systems comprised of:   Body structures and functions that impact the plan of care:      Lumbar spine.   Activity limitations that impact the plan of care are:      Bending, Lifting, Sitting, Standing and Walking.  3) Clinical presentation characteristics are:   Stable/Uncomplicated.  4) Decision-Making    Low complexity using standardized patient assessment instrument and/or measureable assessment of functional outcome.  Cumulative Therapy Evaluation is: Low complexity.    Previous and current functional limitations:  (See Goal Flow Sheet for this information)    Short term and Long term goals: (See Goal Flow Sheet for this information)     Communication ability:  Patient appears to be able to clearly communicate and understand verbal and written communication and follow directions correctly.  Treatment Explanation - The following has been discussed with the patient:   RX ordered/plan of care  Anticipated outcomes  Possible risks and side effects  This patient would benefit from PT intervention to resume normal activities.    Rehab potential is fair.    Frequency:  1 X week, once daily  Duration:  for 6 weeks  Discharge Plan:  Achieve all LTG.  Independent in home treatment program.  Reach maximal therapeutic benefit.    Please refer to the daily flowsheet for treatment today, total treatment time and time spent performing 1:1 timed codes.

## 2018-11-27 ENCOUNTER — THERAPY VISIT (OUTPATIENT)
Dept: PHYSICAL THERAPY | Facility: CLINIC | Age: 83
End: 2018-11-27
Payer: COMMERCIAL

## 2018-11-27 DIAGNOSIS — M54.50 CHRONIC RIGHT-SIDED LOW BACK PAIN WITHOUT SCIATICA: ICD-10-CM

## 2018-11-27 DIAGNOSIS — G89.29 CHRONIC RIGHT-SIDED LOW BACK PAIN WITHOUT SCIATICA: ICD-10-CM

## 2018-11-27 PROCEDURE — 97035 APP MDLTY 1+ULTRASOUND EA 15: CPT | Mod: GP | Performed by: PHYSICAL THERAPIST

## 2018-11-27 PROCEDURE — 97110 THERAPEUTIC EXERCISES: CPT | Mod: GP | Performed by: PHYSICAL THERAPIST

## 2018-12-04 ENCOUNTER — THERAPY VISIT (OUTPATIENT)
Dept: PHYSICAL THERAPY | Facility: CLINIC | Age: 83
End: 2018-12-04
Payer: COMMERCIAL

## 2018-12-04 DIAGNOSIS — M54.50 CHRONIC RIGHT-SIDED LOW BACK PAIN WITHOUT SCIATICA: ICD-10-CM

## 2018-12-04 DIAGNOSIS — G89.29 CHRONIC RIGHT-SIDED LOW BACK PAIN WITHOUT SCIATICA: ICD-10-CM

## 2018-12-04 PROCEDURE — 97035 APP MDLTY 1+ULTRASOUND EA 15: CPT | Mod: GP | Performed by: PHYSICAL THERAPIST

## 2018-12-04 PROCEDURE — 97110 THERAPEUTIC EXERCISES: CPT | Mod: GP | Performed by: PHYSICAL THERAPIST

## 2018-12-04 NOTE — PROGRESS NOTES
Subjective:  HPI                    Objective:  System    Physical Exam    General     ROS    Assessment/Plan:    DISCHARGE REPORT    Progress reporting period is from eval to 12/4/18.       SUBJECTIVE  Subjective changes noted by patient:  .  Subjective: Pt reports not having any sx's in past week.     Current pain level is  Current Pain level: 0/10.     Previous pain level was   Initial Pain level: 6/10.   Changes in function:  Yes (See Goal flowsheet attached for changes in current functional level)  Adverse reaction to treatment or activity: None    OBJECTIVE  Changes noted in objective findings:  Yes,   Objective: No restrictions at today's visit.     ASSESSMENT/PLAN  Updated problem list and treatment plan: Diagnosis 1:  R LBP  Pain -  self management, education, directional preference exercise and home program  Decreased ROM/flexibility - manual therapy and therapeutic exercise  Impaired muscle performance - neuro re-education  Decreased function - therapeutic activities  STG/LTGs have been met or progress has been made towards goals:  Yes (See Goal flow sheet completed today.)  Assessment of Progress: The patient has met all of their long term goals.  Self Management Plans:  Patient has been instructed in a home treatment program.  Patient is independent in a home treatment program.  I have re-evaluated this patient and find that the nature, scope, duration and intensity of the therapy is appropriate for the medical condition of the patient.      Recommendations:  This patient is ready to be discharged from therapy and continue their home treatment program.    Please refer to the daily flowsheet for treatment today, total treatment time and time spent performing 1:1 timed codes.

## 2018-12-04 NOTE — MR AVS SNAPSHOT
After Visit Summary   12/4/2018    Nedra Carr    MRN: [id number]           Patient Information     Date Of Birth          12/22/1932        Visit Information        Provider Department      12/4/2018 11:40 AM Allen Johns PT [facility] Physical Therapy        Today's Diagnoses     Chronic right-sided low back pain without sciatica           Follow-ups after your visit        Who to contact     If you have questions or need follow up information about today's clinic visit or your schedule please contact [facility] PHYSICAL THERAPY directly at [phone].  Normal or non-critical lab and imaging results will be communicated to you by MyChart, letter or phone within 4 business days after the clinic has received the results. If you do not hear from us within 7 days, please contact the clinic through MyChart or phone. If you have a critical or abnormal lab result, we will notify you by phone as soon as possible.  Submit refill requests through [employer] or call your pharmacy and they will forward the refill request to us. Please allow 3 business days for your refill to be completed.          Additional Information About Your Visit        Care EveryWhere ID     This is your Care EveryWhere ID. This could be used by other organizations to access your [facility] medical records  [id number]         Blood Pressure from Last 3 Encounters:   11/13/18 137/81   11/10/18 137/80   11/05/18 124/70    Weight from Last 3 Encounters:   11/13/18 31.8 kg (70 lb)   11/05/18 31.8 kg (70 lb)   08/15/18 33.1 kg (72 lb 14.4 oz)              We Performed the Following     EDILMA PROGRESS NOTES REPORT     THERAPEUTIC EXERCISES     ULTRASOUND THERAPY        Primary Care Provider Office Phone # Fax #    Juan A Rolando Solo PA-C [phone]       [address] [facility] [address]        Equal Access to Services     ELAINA ANAYA AH:  Hadii aad ku hadiviso Solulaali, waaxda luqadaha, qaybta kaalgrace hood, [place] lemuelbinh mayes. So [facility] [phone].    ATENCIÓN: Si laureen coon, tiene a barrientos disposición servicios gratuitos de asistencia lingüística. Llame al [phone].    We comply with applicable federal civil rights laws and [place] laws. We do not discriminate on the basis of race, color, national origin, age, disability, sex, sexual orientation, or gender identity.            Thank you!     Thank you for choosing [facility] [place] [facility] PHYSICAL THERAP[facility]  for your care. Our goal is always to provide you with excellent care. Hearing back from our patients is one way we can continue to improve our services. Please take a few minutes to complete the written survey that you may receive in the mail after your visit with us. Thank you!             Your Updated Medication List - Protect others around you: Learn how to safely use, store and throw away your medicines at www.disposemymeds.org.          This list is accurate as of 12/4/18 11:58 AM.  Always use your most recent med list.                   Brand Name Dispense Instructions for use Diagnosis    acetaminophen 325 MG tablet    TYLENOL    60 tablet    Take 2 tablets (650 mg) by mouth every 6 hours as needed for mild pain    Abdominal pain, other specified site       albuterol 108 (90 Base) MCG/ACT inhaler    PROAIR HFA/PROVENTIL HFA/VENTOLIN HFA    1 Inhaler    Inhale 2 puffs into the lungs every 6 hours as needed for shortness of breath / dyspnea or wheezing    Dyspnea       alendronate 70 MG tablet    FOSAMAX    12 tablet    Take 1 tablet (70 mg) by mouth every 7 days Take with over 8 ounces water and stay upright for at least 30 minutes after dose.  Take at least 60 minutes before breakfast    Age-related osteoporosis without current pathological fracture       ciprofloxacin 250 MG tablet    CIPRO    14 tablet    Take 1 tablet (250 mg) by  mouth 2 times daily    Acute cystitis with hematuria       ENSURE Liqd     15 each    Take 0.5 Cans by mouth daily (strawberry)    Perforated gastric ulcer, chronic or unspecified, Physical deconditioning, Weakness       ferrous gluconate 324 (38 Fe) MG tablet    FERGON    30 tablet    Take 1 tablet (324 mg) by mouth daily    Other iron deficiency anemia       Multi-vitamin tablet     100 tablet    Take 1 tablet by mouth daily.        * order for DME     1 Units    Equipment being ordered: abdominal binder    Lumbar radicular pain       * order for DME     1 Units    Equipment being ordered: lumbar corset    Lumbar radicular pain       oxyCODONE 5 MG tablet    ROXICODONE    20 tablet    Take 0.5 tablets (2.5 mg) by mouth every 6 hours as needed for pain    Chronic right-sided low back pain without sciatica       pantoprazole 40 MG EC tablet    PROTONIX    90 tablet    Take 1 tablet (40 mg) by mouth every morning (before breakfast) Take 30-60 minutes before a meal.    Other acute gastritis without hemorrhage       ranitidine 300 MG tablet    ZANTAC    30 tablet    Take 1 tablet (300 mg) by mouth At Bedtime    Other acute gastritis without hemorrhage       valACYclovir 1000 mg tablet    VALTREX    30 tablet    Take 1 tablet (1,000 mg) by mouth 3 times daily for 10 days    Herpes zoster without complication       * Notice:  This list has 2 medication(s) that are the same as other medications prescribed for you. Read the directions carefully, and ask your doctor or other care provider to review them with you.

## 2018-12-12 ENCOUNTER — OFFICE VISIT (OUTPATIENT)
Dept: FAMILY MEDICINE | Facility: CLINIC | Age: 83
End: 2018-12-12
Payer: COMMERCIAL

## 2018-12-12 ENCOUNTER — TELEPHONE (OUTPATIENT)
Dept: FAMILY MEDICINE | Facility: CLINIC | Age: 83
End: 2018-12-12

## 2018-12-12 VITALS
BODY MASS INDEX: 20.5 KG/M2 | WEIGHT: 70 LBS | HEART RATE: 124 BPM | TEMPERATURE: 97.9 F | OXYGEN SATURATION: 97 % | DIASTOLIC BLOOD PRESSURE: 62 MMHG | SYSTOLIC BLOOD PRESSURE: 108 MMHG | RESPIRATION RATE: 16 BRPM

## 2018-12-12 DIAGNOSIS — N12 PYELONEPHRITIS: Primary | ICD-10-CM

## 2018-12-12 DIAGNOSIS — B37.31 YEAST INFECTION OF THE VAGINA: ICD-10-CM

## 2018-12-12 LAB
ALBUMIN UR-MCNC: 30 MG/DL
APPEARANCE UR: ABNORMAL
BACTERIA #/AREA URNS HPF: ABNORMAL /HPF
BILIRUB UR QL STRIP: NEGATIVE
COLOR UR AUTO: YELLOW
GLUCOSE UR STRIP-MCNC: NEGATIVE MG/DL
HGB UR QL STRIP: ABNORMAL
KETONES UR STRIP-MCNC: NEGATIVE MG/DL
LEUKOCYTE ESTERASE UR QL STRIP: ABNORMAL
NITRATE UR QL: NEGATIVE
NON-SQ EPI CELLS #/AREA URNS LPF: ABNORMAL /LPF
PH UR STRIP: 5.5 PH (ref 5–7)
RBC #/AREA URNS AUTO: ABNORMAL /HPF
SOURCE: ABNORMAL
SP GR UR STRIP: 1.02 (ref 1–1.03)
UROBILINOGEN UR STRIP-ACNC: 0.2 EU/DL (ref 0.2–1)
WBC #/AREA URNS AUTO: >100 /HPF
YEAST #/AREA URNS HPF: ABNORMAL /HPF

## 2018-12-12 PROCEDURE — 99214 OFFICE O/P EST MOD 30 MIN: CPT | Performed by: PHYSICIAN ASSISTANT

## 2018-12-12 PROCEDURE — 87106 FUNGI IDENTIFICATION YEAST: CPT | Performed by: PHYSICIAN ASSISTANT

## 2018-12-12 PROCEDURE — 87086 URINE CULTURE/COLONY COUNT: CPT | Performed by: PHYSICIAN ASSISTANT

## 2018-12-12 PROCEDURE — 81001 URINALYSIS AUTO W/SCOPE: CPT | Performed by: PHYSICIAN ASSISTANT

## 2018-12-12 RX ORDER — CIPROFLOXACIN 250 MG/1
250 TABLET, FILM COATED ORAL 2 TIMES DAILY
Qty: 14 TABLET | Refills: 0 | Status: SHIPPED | OUTPATIENT
Start: 2018-12-12 | End: 2019-01-01

## 2018-12-12 NOTE — PATIENT INSTRUCTIONS
"  Patient Education     Kidney Infection (Adult Female)    An infection in one or both kidneys is called \"pyelonephritis.\" It usually happens when bacteria (or rarely, viruses, fungi, or other disease-causing organisms) get into the kidney. The bacteria (or other disease-causing organisms) can enter the kidneys from the bladder or blood traveling from other parts of the body. A kidney infection can become serious. It can cause severe illness, scarring of the kidneys, or kidney failure if not treated properly.  Common causes for this problem include:    Not keeping the genital area clean and dry, which promotes the growth of bacteria    Wiping back to front which drags bacteria from the rectum toward the urinary opening (urethra)    Wearing tight pants or underwear (this lets moisture build up in the genital area, which helps bacteria grow)    Holding urine in for long periods of time    Dehydration  Kidney infections can cause symptoms similar to a bladder infection. Symptoms include:    Pain (or burning) when urinating    Having to urinate more often than usual    Blood in the urine (pink or red)    Abdominal pain or discomfort, usually in the lower abdomen    Pain in the side or back    Pain above the pubic bone    Fever or chills    Vomiting    Loss of appetite  Treatment is oral antibiotics, or in more severe cases, intramuscular or IV antibiotics. These are started right away and may be changed once urine culture results determine the infecting organisms. Treatment helps prevent a more serious kidney infection.  Medicines  Medicines can help in the treatment of a bladder infection:    Take antibiotics until they are used up, even if you feel better. It is important to finish them to make sure the infection is gone.    Unless another medicine was prescribed, you can use over-the-counter medicines for pain, fever, or discomfort. If you have chronic liver of kidney disease, talk with your healthcare provider " before using these medicines. Also talk with your provider if you've ever had a stomach ulcer or gastrointestinal (GI) bleeding, or are taking blood thinners.  Home care  The following are general care guidelines:    Stay home from work or school. Rest in bed until your fever breaks and you are feeling better, or as advised by your healthcare provider.    Drink lots of fluid unless you must restrict fluids for other medical reasons. This will force the medicine into your urinary system and flush the bacteria out of your body. Ask your healthcare provider how much you should drink.    Don't have sex until you have finished all of your medicine and your symptoms are gone.    Don't have caffeine, alcohol, or spicy foods. These foods may irritate the kidneys and bladder.    Don't take bubble baths. Sensitivity to the chemicals in bubble baths can irritate the urethra.    Make sure you wipe from front to back after using the toilet.    Wear loose cloths and cotton underwear.  Prevention  These self-care steps can help prevent future infections:    Drink plenty of fluids to prevent dehydration and flush out the bladder. Do this unless you must restrict fluids for other health reasons, or your healthcare provider told you not to.    Proper cleaning after going to the bathroom in important. Make sure you wipe from front to back after using the toilet.    Urinate more often. Don't try to hold urine in for a long time.    Don't wear tight-fitting pants and underwear.    Improve your diet to prevent constipation. Eat more fruits, vegetables, and fiber. Eat less junk and fatty foods. Constipation can make a urinary tract infection more likely. Talk with your healthcare provider if you have trouble with bowel movements.    Urinate right after intercourse to flush out the bladder.  Follow-up care  Follow up with your healthcare provider, or as advised. Additional testing may be needed to make sure the infection has cleared. Close  follow-up and further testing is very important to find the cause and to prevent future infections.  If a urine culture was done, you will be contacted if your treatment needs to be changed. If directed, you may call to find out the results.  If you had an X-ray, CT scan, or other diagnostic test, you will be notified of any new findings that may affect your care.  Call 911  Call 911 if any of the following occur:    Trouble breathing    Fainting or loss of consciousness    Rapid or very slow heart rate    Weakness, dizziness, or fainting    Difficulty arousing or confusion  When to seek medical advice  Call your healthcare provider right away if any of these occur:    Fever 100.4 F (38 C) or higher, or as directed by your healthcare provider    Not feeling better within 1 to 2 days after starting antibiotics    Any symptom that continues after 3 days of treatment    Increasing pain in the stomach, back, side, or groin area    Repeated vomiting    Not able to take prescribed medicine due to nausea or another reason    Bloody, dark-colored, or foul smelling urine    Trouble urinating or decreased urine output    No urine for 8 hours, no tears when crying, sunken eyes, or dry mouth  Date Last Reviewed: 10/1/2016    2406-5946 [employer]. [address] [place] [address], [place] [address]. All rights reserved. This information is not intended as a substitute for professional medical care. Always follow your healthcare professional's instructions.

## 2018-12-12 NOTE — TELEPHONE ENCOUNTER
Joanne MCDONNELL with [facility] [phone]  Requests VO   HHA 2 x week with nursing supervisory visits 1 x month. (confirmed)  Informed approved per standing orders.   Home Care staff will fax these orders for MD signature.   Blayne Lopez RN

## 2018-12-12 NOTE — PROGRESS NOTES
SUBJECTIVE:   Nedra Carr is a 85 year old female who presents to clinic today for the following health issues:      URINARY TRACT SYMPTOMS  Onset: 1 week     Description:   Painful urination (Dysuria): YES   Blood in urine (Hematuria): no   Delay in urine (Hesitency): no  Increased urinary frequency and urgency     Intensity: moderate    Progression of Symptoms:  same    Accompanying Signs & Symptoms:  Fever/chills: no   Flank pain no   Nausea and vomiting: no   Any vaginal symptoms: burning and back pain   Abdominal/Pelvic Pain: no     History:   History of frequent UTI's: YES  History of kidney stones: no   Sexually Active: no   Possibility of pregnancy: No    Precipitating factors:       Therapies Tried and outcome: Cranberry juice prn (contraindicated in Coumadin patients)      Problem list and histories reviewed & adjusted, as indicated.  Additional history: as documented    Patient Active Problem List   Diagnosis     Hallux varus, acquired     Other hammer toe (acquired)     Osteoporosis     HTN (hypertension)     Cataract     CARDIOVASCULAR SCREENING; LDL GOAL LESS THAN 130     Advance Care Planning     Intractable chronic migraine without aura     Anemia     Perforated gastric ulcer (H)     Emphysematous cholecystitis     Health Care Home     Paroxysmal atrial fibrillation (H)     Abdominal pain, unspecified abdominal location     UTI (urinary tract infection)     Sepsis (H)     Headache     Physical deconditioning     Urinary bladder stone     S/P cystoscopy     Hypoxia     Hydronephrosis     Acute cholecystitis     Cardiogenic shock (H)     Closed compression fracture of thoracic vertebra (H)     Family history of other digestive disorders     Allergic rhinitis     Arthritis of hand     Esophageal stricture     Obstructive sleep apnea     Weakness     At high risk for falls     Multiple pelvic fractures     Mobility impaired     Abdominal pain     Other idiopathic scoliosis, unspecified spinal  region     Past Surgical History:   Procedure Laterality Date     BACK SURGERY       BRONCHOSCOPY FLEXIBLE N/A 11/21/2014    Procedure: BRONCHOSCOPY FLEXIBLE;  Surgeon: Rhonda Donohue MD;  Location: [facility]     C NONSPECIFIC PROCEDURE      [place] [facility] [place] Rad[place] FELIPE NONSPECIFIC PROCEDURE      s/p dilation at [facility] by Dr. Betty FELIPE NONSPECIFIC PROCEDURE      Vag hysterectomy     CHOLECYSTECTOMY N/A 11/15/2014    Procedure: CHOLECYSTECTOMY;  Surgeon: Yomi Brennan MD;  Location: [facility]     CYSTOSCOPY, LITHOLAPAXY, COMBINED N/A 4/8/2015    Procedure: COMBINED CYSTOSCOPY, LITHOLAPAXY;  Surgeon: Randy Reno MD;  Location: [facility]     ENT SURGERY       ESOPHAGOSCOPY, GASTROSCOPY, DUODENOSCOPY (EGD), COMBINED  11/21/2012    Procedure: COMBINED ESOPHAGOSCOPY, GASTROSCOPY, DUODENOSCOPY (EGD), BIOPSY SINGLE OR MULTIPLE;   ESOPHAGOSCOPY, GASTROSCOPY, DUODENOSCOPY (EGD)   with cold biopsy and dilalation with #15 savary;  Surgeon: Guillermo Arcos MD;  Location: [facility]     ESOPHAGOSCOPY, GASTROSCOPY, DUODENOSCOPY (EGD), COMBINED  2/22/2013    Procedure: COMBINED ESOPHAGOSCOPY, GASTROSCOPY, DUODENOSCOPY (EGD);  ESOPHAGOSCOPY, GASTROSCOPY, DUODENOSCOPY (EGD) ;  Surgeon: Lissett Posada MD;  Location: [facility]     ESOPHAGOSCOPY, GASTROSCOPY, DUODENOSCOPY (EGD), COMBINED N/A 10/29/2014    Procedure: COMBINED ESOPHAGOSCOPY, GASTROSCOPY, DUODENOSCOPY (EGD);  Surgeon: Dany Ambrocio MD;  Location: [facility]     ESOPHAGOSCOPY, GASTROSCOPY, DUODENOSCOPY (EGD), COMBINED N/A 1/13/2015    Procedure: COMBINED ESOPHAGOSCOPY, GASTROSCOPY, DUODENOSCOPY (EGD), BIOPSY SINGLE OR MULTIPLE;  Surgeon: Dany Ambrocio MD;  Location: [facility]     ESOPHAGOSCOPY, GASTROSCOPY, DUODENOSCOPY (EGD), COMBINED N/A 3/5/2015    Procedure: COMBINED ESOPHAGOSCOPY, GASTROSCOPY, DUODENOSCOPY (EGD);  Surgeon: Dany Ambrocio MD;  [facility]: [facility]     ESOPHAGOSCOPY, GASTROSCOPY, DUODENOSCOPY (EGD), COMBINED N/A 11/10/2015     Procedure: COMBINED ESOPHAGOSCOPY, GASTROSCOPY, DUODENOSCOPY (EGD);  Surgeon: Dany Ambrocio MD;  Location: [facility]     EYE SURGERY       GYN SURGERY       LAPAROTOMY EXPLORATORY N/A 11/15/2014    Procedure: LAPAROTOMY EXPLORATORY;  Surgeon: Yomi Brennan MD;  Location: [facility]     LASER HOLMIUM LITHOTRIPSY BLADDER N/A 4/8/2015    Procedure: LASER HOLMIUM LITHOTRIPSY BLADDER;  Surgeon: Randy Reno MD;  Location: [facility]     ORTHOPEDIC SURGERY      hip fx surgery x 2       Social History     Tobacco Use     Smoking status: Never Smoker     Smokeless tobacco: Never Used   Substance Use Topics     Alcohol use: No     Alcohol/week: 0.0 oz     Family History   Problem Relation Age of Onset     Diabetes Mother      Diabetes Sister      Heart Disease Son          Current Outpatient Medications   Medication Sig Dispense Refill     acetaminophen (TYLENOL) 325 MG tablet Take 2 tablets (650 mg) by mouth every 6 hours as needed for mild pain 60 tablet 0     albuterol (PROAIR HFA, PROVENTIL HFA, VENTOLIN HFA) 108 (90 BASE) MCG/ACT inhaler Inhale 2 puffs into the lungs every 6 hours as needed for shortness of breath / dyspnea or wheezing 1 Inhaler 1     alendronate (FOSAMAX) 70 MG tablet Take 1 tablet (70 mg) by mouth every 7 days Take with over 8 ounces water and stay upright for at least 30 minutes after dose.  Take at least 60 minutes before breakfast 12 tablet 3     ciprofloxacin (CIPRO) 250 MG tablet Take 1 tablet (250 mg) by mouth 2 times daily 14 tablet 0     ferrous gluconate (FERGON) 324 (38 FE) MG tablet Take 1 tablet (324 mg) by mouth daily 30 tablet 10     multivitamin, therapeutic with minerals (MULTI-VITAMIN) TABS Take 1 tablet by mouth daily. 100 tablet 3     Nutritional Supplements (ENSURE) LIQD Take 0.5 Cans by mouth daily (strawberry) 15 each 11     order for DME Equipment being ordered: lumbar corset 1 Units 0     order for DME Equipment being ordered: abdominal binder 1 Units 0     oxyCODONE IR  (ROXICODONE) 5 MG tablet Take 0.5 tablets (2.5 mg) by mouth every 6 hours as needed for pain 20 tablet 0     ranitidine (ZANTAC) 300 MG tablet Take 1 tablet (300 mg) by mouth At Bedtime 30 tablet 1     pantoprazole (PROTONIX) 40 MG EC tablet Take 1 tablet (40 mg) by mouth every morning (before breakfast) Take 30-60 minutes before a meal. 90 tablet 1     valACYclovir (VALTREX) 1000 mg tablet Take 1 tablet (1,000 mg) by mouth 3 times daily for 10 days 30 tablet 0     Allergies   Allergen Reactions     No Known Drug Allergies        Reviewed and updated as needed this visit by clinical staff       Reviewed and updated as needed this visit by Provider         ROS:  Constitutional, HEENT, cardiovascular, pulmonary, gi and gu systems are negative, except as otherwise noted.    OBJECTIVE:     /62 (BP Location: Right arm, Patient Position: Chair, Cuff Size: Adult Small)   Pulse 124   Temp 97.9  F (36.6  C) (Oral)   Resp 16   Wt 31.8 kg (70 lb)   SpO2 97%   BMI 20.50 kg/m    Body mass index is 20.5 kg/m .  GENERAL: healthy, alert and no distress  EYES: Eyes grossly normal to inspection, PERRL and conjunctivae and sclerae normal  RESP: lungs clear to auscultation - no rales, rhonchi or wheezes  CV: regular rate and rhythm, normal S1 S2, no S3 or S4, no murmur, click or rub, no peripheral edema and peripheral pulses strong  ABDOMEN: soft, nontender, no hepatosplenomegaly, no masses and bowel sounds normal  MS: no gross musculoskeletal defects noted, no edema  SKIN: no suspicious lesions or rashes  NEURO: Normal strength and tone, mentation intact and speech normal  BACK: mild right CVA tenderness  PSYCH: mentation appears normal, affect normal/bright    Diagnostic Test Results:  Results for orders placed or performed in visit on 12/12/18 (from the past 24 hour(s))   UA reflex to Microscopic and Culture   Result Value Ref Range    Color Urine Yellow     Appearance Urine Cloudy     Glucose Urine Negative NEG^Negative  "mg/dL    Bilirubin Urine Negative NEG^Negative    Ketones Urine Negative NEG^Negative mg/dL    Specific Gravity Urine 1.020 1.003 - 1.035    Blood Urine Small (A) NEG^Negative    pH Urine 5.5 5.0 - 7.0 pH    Protein Albumin Urine 30 (A) NEG^Negative mg/dL    Urobilinogen Urine 0.2 0.2 - 1.0 EU/dL    Nitrite Urine Negative NEG^Negative    Leukocyte Esterase Urine Large (A) NEG^Negative    Source Midstream Urine    Urine Microscopic   Result Value Ref Range    WBC Urine >100 (A) OTO5^0 - 5 /HPF    RBC Urine O - 2 OTO2^O - 2 /HPF    Squamous Epithelial /LPF Urine Moderate (A) FEW^Few /LPF    Bacteria Urine Moderate (A) NEG^Negative /HPF    Yeast Urine Few (A) NEG^Negative /HPF       ASSESSMENT/PLAN:       (N12) Pyelonephritis  (primary encounter diagnosis)    Comment: Will treat as pyelonephritis for now since she has right CVA tenderness on exam. She also has a history of right sided low back pain so could be related to that as well.     Plan: UA reflex to Microscopic and Culture, Urine         Microscopic, Urine Culture Aerobic Bacterial,         ciprofloxacin (CIPRO) 250 MG tablet              Patient Instructions     Patient Education     Kidney Infection (Adult Female)    An infection in one or both kidneys is called \"pyelonephritis.\" It usually happens when bacteria (or rarely, viruses, fungi, or other disease-causing organisms) get into the kidney. The bacteria (or other disease-causing organisms) can enter the kidneys from the bladder or blood traveling from other parts of the body. A kidney infection can become serious. It can cause severe illness, scarring of the kidneys, or kidney failure if not treated properly.  Common causes for this problem include:    Not keeping the genital area clean and dry, which promotes the growth of bacteria    Wiping back to front which drags bacteria from the rectum toward the urinary opening (urethra)    Wearing tight pants or underwear (this lets moisture build up in the " genital area, which helps bacteria grow)    Holding urine in for long periods of time    Dehydration  Kidney infections can cause symptoms similar to a bladder infection. Symptoms include:    Pain (or burning) when urinating    Having to urinate more often than usual    Blood in the urine (pink or red)    Abdominal pain or discomfort, usually in the lower abdomen    Pain in the side or back    Pain above the pubic bone    Fever or chills    Vomiting    Loss of appetite  Treatment is oral antibiotics, or in more severe cases, intramuscular or IV antibiotics. These are started right away and may be changed once urine culture results determine the infecting organisms. Treatment helps prevent a more serious kidney infection.  Medicines  Medicines can help in the treatment of a bladder infection:    Take antibiotics until they are used up, even if you feel better. It is important to finish them to make sure the infection is gone.    Unless another medicine was prescribed, you can use over-the-counter medicines for pain, fever, or discomfort. If you have chronic liver of kidney disease, talk with your healthcare provider before using these medicines. Also talk with your provider if you've ever had a stomach ulcer or gastrointestinal (GI) bleeding, or are taking blood thinners.  Home care  The following are general care guidelines:    Stay home from work or school. Rest in bed until your fever breaks and you are feeling better, or as advised by your healthcare provider.    Drink lots of fluid unless you must restrict fluids for other medical reasons. This will force the medicine into your urinary system and flush the bacteria out of your body. Ask your healthcare provider how much you should drink.    Don't have sex until you have finished all of your medicine and your symptoms are gone.    Don't have caffeine, alcohol, or spicy foods. These foods may irritate the kidneys and bladder.    Don't take bubble baths.  Sensitivity to the chemicals in bubble baths can irritate the urethra.    Make sure you wipe from front to back after using the toilet.    Wear loose cloths and cotton underwear.  Prevention  These self-care steps can help prevent future infections:    Drink plenty of fluids to prevent dehydration and flush out the bladder. Do this unless you must restrict fluids for other health reasons, or your healthcare provider told you not to.    Proper cleaning after going to the bathroom in important. Make sure you wipe from front to back after using the toilet.    Urinate more often. Don't try to hold urine in for a long time.    Don't wear tight-fitting pants and underwear.    Improve your diet to prevent constipation. Eat more fruits, vegetables, and fiber. Eat less junk and fatty foods. Constipation can make a urinary tract infection more likely. Talk with your healthcare provider if you have trouble with bowel movements.    Urinate right after intercourse to flush out the bladder.  Follow-up care  Follow up with your healthcare provider, or as advised. Additional testing may be needed to make sure the infection has cleared. Close follow-up and further testing is very important to find the cause and to prevent future infections.  If a urine culture was done, you will be contacted if your treatment needs to be changed. If directed, you may call to find out the results.  If you had an X-ray, CT scan, or other diagnostic test, you will be notified of any new findings that may affect your care.  Call 911  Call 911 if any of the following occur:    Trouble breathing    Fainting or loss of consciousness    Rapid or very slow heart rate    Weakness, dizziness, or fainting    Difficulty arousing or confusion  When to seek medical advice  Call your healthcare provider right away if any of these occur:    Fever 100.4 F (38 C) or higher, or as directed by your healthcare provider    Not feeling better within 1 to 2 days after  starting antibiotics    Any symptom that continues after 3 days of treatment    Increasing pain in the stomach, back, side, or groin area    Repeated vomiting    Not able to take prescribed medicine due to nausea or another reason    Bloody, dark-colored, or foul smelling urine    Trouble urinating or decreased urine output    No urine for 8 hours, no tears when crying, sunken eyes, or dry mouth  Date Last Reviewed: 10/1/2016    4285-9348 [employer]. [address] [place], [place] [id number]. All rights reserved. This information is not intended as a substitute for professional medical care. Always follow your healthcare professional's instructions.               Riccardo Baca PA-C  [facility]

## 2018-12-14 LAB
BACTERIA SPEC CULT: ABNORMAL
BACTERIA SPEC CULT: ABNORMAL
SPECIMEN SOURCE: ABNORMAL

## 2018-12-14 RX ORDER — FLUCONAZOLE 150 MG/1
150 TABLET ORAL ONCE
Qty: 1 TABLET | Refills: 0 | Status: SHIPPED | OUTPATIENT
Start: 2018-12-14 | End: 2019-01-01

## 2019-01-01 ENCOUNTER — APPOINTMENT (OUTPATIENT)
Dept: GENERAL RADIOLOGY | Facility: CLINIC | Age: 84
End: 2019-01-01
Attending: EMERGENCY MEDICINE
Payer: COMMERCIAL

## 2019-01-01 ENCOUNTER — DOCUMENTATION ONLY (OUTPATIENT)
Dept: FAMILY MEDICINE | Facility: CLINIC | Age: 84
End: 2019-01-01

## 2019-01-01 ENCOUNTER — COMMUNICATION - HEALTHEAST (OUTPATIENT)
Dept: TELEHEALTH | Facility: CLINIC | Age: 84
End: 2019-01-01

## 2019-01-01 ENCOUNTER — OFFICE VISIT - HEALTHEAST (OUTPATIENT)
Dept: GERIATRICS | Facility: CLINIC | Age: 84
End: 2019-01-01

## 2019-01-01 ENCOUNTER — AMBULATORY - HEALTHEAST (OUTPATIENT)
Dept: GERIATRICS | Facility: CLINIC | Age: 84
End: 2019-01-01

## 2019-01-01 ENCOUNTER — APPOINTMENT (OUTPATIENT)
Dept: ULTRASOUND IMAGING | Facility: CLINIC | Age: 84
End: 2019-01-01
Attending: PHYSICIAN ASSISTANT
Payer: COMMERCIAL

## 2019-01-01 ENCOUNTER — HOSPITAL ENCOUNTER (OUTPATIENT)
Facility: CLINIC | Age: 84
Discharge: HOME | End: 2019-09-03
Attending: COLON & RECTAL SURGERY | Admitting: COLON & RECTAL SURGERY
Payer: COMMERCIAL

## 2019-01-01 ENCOUNTER — HOSPITAL ENCOUNTER (OUTPATIENT)
Dept: CT IMAGING | Facility: CLINIC | Age: 84
Discharge: HOME | End: 2019-09-11
Attending: PHYSICIAN ASSISTANT | Admitting: PHYSICIAN ASSISTANT
Payer: COMMERCIAL

## 2019-01-01 ENCOUNTER — TELEPHONE (OUTPATIENT)
Dept: FAMILY MEDICINE | Facility: CLINIC | Age: 84
End: 2019-01-01

## 2019-01-01 ENCOUNTER — HOSPITAL ENCOUNTER (OUTPATIENT)
Dept: CT IMAGING | Facility: CLINIC | Age: 84
Discharge: HOME | End: 2019-08-12
Attending: PHYSICIAN ASSISTANT | Admitting: PHYSICIAN ASSISTANT
Payer: COMMERCIAL

## 2019-01-01 ENCOUNTER — OFFICE VISIT (OUTPATIENT)
Dept: FAMILY MEDICINE | Facility: CLINIC | Age: 84
End: 2019-01-01
Payer: COMMERCIAL

## 2019-01-01 ENCOUNTER — APPOINTMENT (OUTPATIENT)
Dept: SPEECH THERAPY | Facility: CLINIC | Age: 84
End: 2019-01-01
Attending: PHYSICIAN ASSISTANT
Payer: COMMERCIAL

## 2019-01-01 ENCOUNTER — ANCILLARY PROCEDURE (OUTPATIENT)
Dept: GENERAL RADIOLOGY | Facility: CLINIC | Age: 84
End: 2019-01-01
Attending: PHYSICIAN ASSISTANT
Payer: COMMERCIAL

## 2019-01-01 ENCOUNTER — HOSPITAL ENCOUNTER (OUTPATIENT)
Facility: CLINIC | Age: 84
End: 2019-01-01
Attending: INTERNAL MEDICINE | Admitting: INTERNAL MEDICINE
Payer: COMMERCIAL

## 2019-01-01 ENCOUNTER — RECORDS - HEALTHEAST (OUTPATIENT)
Dept: LAB | Facility: CLINIC | Age: 84
End: 2019-01-01

## 2019-01-01 ENCOUNTER — DOCUMENTATION ONLY (OUTPATIENT)
Dept: CARE COORDINATION | Facility: CLINIC | Age: 84
End: 2019-01-01

## 2019-01-01 ENCOUNTER — APPOINTMENT (OUTPATIENT)
Dept: CARDIOLOGY | Facility: CLINIC | Age: 84
End: 2019-01-01
Attending: INTERNAL MEDICINE
Payer: COMMERCIAL

## 2019-01-01 ENCOUNTER — APPOINTMENT (OUTPATIENT)
Dept: SPEECH THERAPY | Facility: CLINIC | Age: 84
End: 2019-01-01
Attending: INTERNAL MEDICINE
Payer: COMMERCIAL

## 2019-01-01 ENCOUNTER — APPOINTMENT (OUTPATIENT)
Dept: CT IMAGING | Facility: CLINIC | Age: 84
End: 2019-01-01
Attending: EMERGENCY MEDICINE
Payer: COMMERCIAL

## 2019-01-01 ENCOUNTER — PATIENT OUTREACH (OUTPATIENT)
Dept: FAMILY MEDICINE | Facility: CLINIC | Age: 84
End: 2019-01-01

## 2019-01-01 ENCOUNTER — HOSPITAL ENCOUNTER (INPATIENT)
Facility: CLINIC | Age: 84
LOS: 3 days | Discharge: HOME HEALTH | End: 2019-10-09
Attending: EMERGENCY MEDICINE | Admitting: INTERNAL MEDICINE
Payer: COMMERCIAL

## 2019-01-01 ENCOUNTER — TELEPHONE (OUTPATIENT)
Dept: CARE COORDINATION | Facility: CLINIC | Age: 84
End: 2019-01-01

## 2019-01-01 ENCOUNTER — RECORDS - HEALTHEAST (OUTPATIENT)
Dept: ADMINISTRATIVE | Facility: OTHER | Age: 84
End: 2019-01-01

## 2019-01-01 ENCOUNTER — HOSPITAL ENCOUNTER (OUTPATIENT)
Dept: RADIOLOGY | Facility: HOSPITAL | Age: 84
Discharge: HOME | End: 2019-12-02
Attending: NURSE PRACTITIONER

## 2019-01-01 ENCOUNTER — PATIENT OUTREACH (OUTPATIENT)
Dept: CARE COORDINATION | Facility: CLINIC | Age: 84
End: 2019-01-01

## 2019-01-01 ENCOUNTER — HOSPITAL ENCOUNTER (OUTPATIENT)
Facility: CLINIC | Age: 84
Setting detail: OBSERVATION
Discharge: NURSING, REHAB OR LONG-TERM CARE | End: 2019-10-17
Attending: INTERNAL MEDICINE | Admitting: INTERNAL MEDICINE
Payer: COMMERCIAL

## 2019-01-01 ENCOUNTER — APPOINTMENT (OUTPATIENT)
Dept: CT IMAGING | Facility: CLINIC | Age: 84
End: 2019-01-01
Attending: PHYSICIAN ASSISTANT
Payer: COMMERCIAL

## 2019-01-01 VITALS
OXYGEN SATURATION: 93 % | TEMPERATURE: 98.3 F | HEIGHT: 55 IN | RESPIRATION RATE: 18 BRPM | WEIGHT: 60 LBS | BODY MASS INDEX: 13.89 KG/M2 | SYSTOLIC BLOOD PRESSURE: 101 MMHG | HEART RATE: 104 BPM | DIASTOLIC BLOOD PRESSURE: 62 MMHG

## 2019-01-01 VITALS
DIASTOLIC BLOOD PRESSURE: 79 MMHG | OXYGEN SATURATION: 97 % | WEIGHT: 66 LBS | HEART RATE: 94 BPM | BODY MASS INDEX: 15.28 KG/M2 | RESPIRATION RATE: 23 BRPM | SYSTOLIC BLOOD PRESSURE: 159 MMHG | HEIGHT: 55 IN

## 2019-01-01 VITALS
SYSTOLIC BLOOD PRESSURE: 92 MMHG | OXYGEN SATURATION: 97 % | WEIGHT: 66 LBS | TEMPERATURE: 97.6 F | BODY MASS INDEX: 16.52 KG/M2 | HEART RATE: 114 BPM | DIASTOLIC BLOOD PRESSURE: 58 MMHG | RESPIRATION RATE: 16 BRPM

## 2019-01-01 VITALS
RESPIRATION RATE: 20 BRPM | HEIGHT: 55 IN | OXYGEN SATURATION: 97 % | WEIGHT: 62.8 LBS | BODY MASS INDEX: 14.54 KG/M2 | DIASTOLIC BLOOD PRESSURE: 56 MMHG | TEMPERATURE: 96.3 F | HEART RATE: 86 BPM | SYSTOLIC BLOOD PRESSURE: 130 MMHG

## 2019-01-01 VITALS
TEMPERATURE: 97.7 F | BODY MASS INDEX: 20.5 KG/M2 | OXYGEN SATURATION: 99 % | WEIGHT: 70 LBS | HEART RATE: 113 BPM | SYSTOLIC BLOOD PRESSURE: 129 MMHG | RESPIRATION RATE: 12 BRPM | DIASTOLIC BLOOD PRESSURE: 79 MMHG

## 2019-01-01 VITALS
WEIGHT: 68 LBS | DIASTOLIC BLOOD PRESSURE: 62 MMHG | TEMPERATURE: 97.4 F | HEART RATE: 60 BPM | BODY MASS INDEX: 19.91 KG/M2 | RESPIRATION RATE: 12 BRPM | SYSTOLIC BLOOD PRESSURE: 100 MMHG | OXYGEN SATURATION: 95 %

## 2019-01-01 VITALS — HEIGHT: 55 IN | BODY MASS INDEX: 16.52 KG/M2

## 2019-01-01 VITALS
BODY MASS INDEX: 15.02 KG/M2 | WEIGHT: 60 LBS | SYSTOLIC BLOOD PRESSURE: 100 MMHG | DIASTOLIC BLOOD PRESSURE: 62 MMHG | HEART RATE: 124 BPM | OXYGEN SATURATION: 94 % | TEMPERATURE: 94.4 F

## 2019-01-01 VITALS
BODY MASS INDEX: 19.33 KG/M2 | HEART RATE: 92 BPM | WEIGHT: 66 LBS | TEMPERATURE: 97.6 F | RESPIRATION RATE: 16 BRPM | DIASTOLIC BLOOD PRESSURE: 68 MMHG | SYSTOLIC BLOOD PRESSURE: 110 MMHG

## 2019-01-01 DIAGNOSIS — R53.1 WEAKNESS: ICD-10-CM

## 2019-01-01 DIAGNOSIS — G43.719 INTRACTABLE CHRONIC MIGRAINE WITHOUT AURA: ICD-10-CM

## 2019-01-01 DIAGNOSIS — R19.5 POSITIVE FIT (FECAL IMMUNOCHEMICAL TEST): Primary | ICD-10-CM

## 2019-01-01 DIAGNOSIS — N20.1 CALCULUS OF URETEROVESICAL JUNCTION (UVJ): ICD-10-CM

## 2019-01-01 DIAGNOSIS — R05.9 COUGHING: ICD-10-CM

## 2019-01-01 DIAGNOSIS — D50.8 OTHER IRON DEFICIENCY ANEMIA: ICD-10-CM

## 2019-01-01 DIAGNOSIS — K81.0 EMPHYSEMATOUS CHOLECYSTITIS: ICD-10-CM

## 2019-01-01 DIAGNOSIS — S22.000S CLOSED COMPRESSION FRACTURE OF THORACIC VERTEBRA, SEQUELA: ICD-10-CM

## 2019-01-01 DIAGNOSIS — I48.0 PAROXYSMAL ATRIAL FIBRILLATION (H): ICD-10-CM

## 2019-01-01 DIAGNOSIS — R82.90 NONSPECIFIC FINDING ON EXAMINATION OF URINE: ICD-10-CM

## 2019-01-01 DIAGNOSIS — Z71.89 ADVANCED DIRECTIVES, COUNSELING/DISCUSSION: Chronic | ICD-10-CM

## 2019-01-01 DIAGNOSIS — J30.9 ALLERGIC RHINITIS: ICD-10-CM

## 2019-01-01 DIAGNOSIS — R00.0 TACHYCARDIA: ICD-10-CM

## 2019-01-01 DIAGNOSIS — J18.9 PNEUMONIA DUE TO INFECTIOUS ORGANISM, UNSPECIFIED LATERALITY, UNSPECIFIED PART OF LUNG: ICD-10-CM

## 2019-01-01 DIAGNOSIS — D64.9 ANEMIA, UNSPECIFIED TYPE: ICD-10-CM

## 2019-01-01 DIAGNOSIS — R35.0 FREQUENT URINATION: ICD-10-CM

## 2019-01-01 DIAGNOSIS — D50.8 OTHER IRON DEFICIENCY ANEMIA: Primary | ICD-10-CM

## 2019-01-01 DIAGNOSIS — E43 SEVERE MALNUTRITION (H): ICD-10-CM

## 2019-01-01 DIAGNOSIS — I10 ESSENTIAL HYPERTENSION: ICD-10-CM

## 2019-01-01 DIAGNOSIS — N39.0 UTI (URINARY TRACT INFECTION): ICD-10-CM

## 2019-01-01 DIAGNOSIS — M20.30: ICD-10-CM

## 2019-01-01 DIAGNOSIS — R10.13 EPIGASTRIC PAIN: ICD-10-CM

## 2019-01-01 DIAGNOSIS — R11.0 NAUSEA: ICD-10-CM

## 2019-01-01 DIAGNOSIS — N30.01 ACUTE CYSTITIS WITH HEMATURIA: ICD-10-CM

## 2019-01-01 DIAGNOSIS — R53.81 DEBILITY: ICD-10-CM

## 2019-01-01 DIAGNOSIS — K27.9 PUD (PEPTIC ULCER DISEASE): ICD-10-CM

## 2019-01-01 DIAGNOSIS — K81.0 ACUTE CHOLECYSTITIS: ICD-10-CM

## 2019-01-01 DIAGNOSIS — I10 HTN (HYPERTENSION): ICD-10-CM

## 2019-01-01 DIAGNOSIS — R10.13 EPIGASTRIC PAIN: Primary | ICD-10-CM

## 2019-01-01 DIAGNOSIS — N39.0 URINARY TRACT INFECTION WITH HEMATURIA, SITE UNSPECIFIED: ICD-10-CM

## 2019-01-01 DIAGNOSIS — N20.0 KIDNEY STONE: Primary | ICD-10-CM

## 2019-01-01 DIAGNOSIS — G47.33 OBSTRUCTIVE SLEEP APNEA: ICD-10-CM

## 2019-01-01 DIAGNOSIS — N20.0 KIDNEY STONE: ICD-10-CM

## 2019-01-01 DIAGNOSIS — K29.00 OTHER ACUTE GASTRITIS WITHOUT HEMORRHAGE: ICD-10-CM

## 2019-01-01 DIAGNOSIS — B37.31 YEAST INFECTION OF THE VAGINA: ICD-10-CM

## 2019-01-01 DIAGNOSIS — R10.9 ABDOMINAL PAIN: ICD-10-CM

## 2019-01-01 DIAGNOSIS — J18.9 PNEUMONIA DUE TO INFECTIOUS ORGANISM, UNSPECIFIED LATERALITY, UNSPECIFIED PART OF LUNG: Primary | ICD-10-CM

## 2019-01-01 DIAGNOSIS — I48.91 ATRIAL FIBRILLATION, UNSPECIFIED TYPE (H): ICD-10-CM

## 2019-01-01 DIAGNOSIS — R57.0 CARDIOGENIC SHOCK (H): ICD-10-CM

## 2019-01-01 DIAGNOSIS — Z76.89 HEALTH CARE HOME: ICD-10-CM

## 2019-01-01 DIAGNOSIS — Z74.09 MOBILITY IMPAIRED: ICD-10-CM

## 2019-01-01 DIAGNOSIS — N30.01 ACUTE CYSTITIS WITH HEMATURIA: Primary | ICD-10-CM

## 2019-01-01 DIAGNOSIS — J18.9 COMMUNITY ACQUIRED PNEUMONIA, UNSPECIFIED LATERALITY: ICD-10-CM

## 2019-01-01 DIAGNOSIS — N13.30 HYDRONEPHROSIS: ICD-10-CM

## 2019-01-01 DIAGNOSIS — M41.20 OTHER IDIOPATHIC SCOLIOSIS, UNSPECIFIED SPINAL REGION: ICD-10-CM

## 2019-01-01 DIAGNOSIS — R31.9 URINARY TRACT INFECTION WITH HEMATURIA, SITE UNSPECIFIED: ICD-10-CM

## 2019-01-01 DIAGNOSIS — M81.0 AGE-RELATED OSTEOPOROSIS WITHOUT CURRENT PATHOLOGICAL FRACTURE: ICD-10-CM

## 2019-01-01 DIAGNOSIS — A41.9 SEPSIS (H): ICD-10-CM

## 2019-01-01 DIAGNOSIS — R09.02 HYPOXIA: ICD-10-CM

## 2019-01-01 DIAGNOSIS — N21.0 URINARY BLADDER STONE: ICD-10-CM

## 2019-01-01 DIAGNOSIS — N30.00 ACUTE CYSTITIS WITHOUT HEMATURIA: ICD-10-CM

## 2019-01-01 DIAGNOSIS — K22.2 ESOPHAGEAL STRICTURE: ICD-10-CM

## 2019-01-01 DIAGNOSIS — R10.9 ABDOMINAL PAIN, UNSPECIFIED ABDOMINAL LOCATION: ICD-10-CM

## 2019-01-01 DIAGNOSIS — K25.5: ICD-10-CM

## 2019-01-01 DIAGNOSIS — G47.00 INSOMNIA, UNSPECIFIED TYPE: ICD-10-CM

## 2019-01-01 DIAGNOSIS — R51.9 HEADACHE: ICD-10-CM

## 2019-01-01 DIAGNOSIS — Z83.79 FAMILY HISTORY OF OTHER DIGESTIVE DISORDERS: ICD-10-CM

## 2019-01-01 DIAGNOSIS — M19.049 ARTHRITIS OF HAND: ICD-10-CM

## 2019-01-01 DIAGNOSIS — R06.09 DOE (DYSPNEA ON EXERTION): Primary | ICD-10-CM

## 2019-01-01 DIAGNOSIS — R82.90 NONSPECIFIC FINDING ON EXAMINATION OF URINE: Primary | ICD-10-CM

## 2019-01-01 DIAGNOSIS — Z98.890 S/P CYSTOSCOPY: ICD-10-CM

## 2019-01-01 DIAGNOSIS — Z13.6 CARDIOVASCULAR SCREENING; LDL GOAL LESS THAN 130: ICD-10-CM

## 2019-01-01 DIAGNOSIS — D75.839 THROMBOCYTOSIS: ICD-10-CM

## 2019-01-01 DIAGNOSIS — R13.10 DYSPHAGIA, UNSPECIFIED TYPE: ICD-10-CM

## 2019-01-01 DIAGNOSIS — J18.9 COMMUNITY ACQUIRED PNEUMONIA OF LEFT LOWER LOBE OF LUNG: ICD-10-CM

## 2019-01-01 DIAGNOSIS — H26.9 CATARACT: ICD-10-CM

## 2019-01-01 DIAGNOSIS — J18.9 CAP (COMMUNITY ACQUIRED PNEUMONIA): ICD-10-CM

## 2019-01-01 DIAGNOSIS — B37.49 CANDIDA UTI: ICD-10-CM

## 2019-01-01 DIAGNOSIS — R13.12 OROPHARYNGEAL DYSPHAGIA: ICD-10-CM

## 2019-01-01 DIAGNOSIS — Z91.81 AT HIGH RISK FOR FALLS: ICD-10-CM

## 2019-01-01 DIAGNOSIS — R53.81 PHYSICAL DECONDITIONING: ICD-10-CM

## 2019-01-01 LAB
ALBUMIN SERPL-MCNC: 3.2 G/DL (ref 3.4–5)
ALBUMIN SERPL-MCNC: 3.5 G/DL (ref 3.4–5)
ALBUMIN UR-MCNC: 100 MG/DL
ALBUMIN UR-MCNC: 30 MG/DL
ALBUMIN UR-MCNC: 30 MG/DL
ALBUMIN UR-MCNC: 50 MG/DL
ALBUMIN UR-MCNC: >=300 MG/DL
ALP SERPL-CCNC: 167 U/L (ref 40–150)
ALP SERPL-CCNC: 57 U/L (ref 40–150)
ALT SERPL W P-5'-P-CCNC: 15 U/L (ref 0–50)
ALT SERPL W P-5'-P-CCNC: 18 U/L (ref 0–50)
AMYLASE SERPL-CCNC: 73 U/L (ref 30–110)
ANION GAP SERPL CALCULATED.3IONS-SCNC: 5 MMOL/L (ref 3–14)
ANION GAP SERPL CALCULATED.3IONS-SCNC: 6 MMOL/L (ref 3–14)
ANION GAP SERPL CALCULATED.3IONS-SCNC: 7 MMOL/L (ref 3–14)
ANION GAP SERPL CALCULATED.3IONS-SCNC: 8 MMOL/L (ref 3–14)
ANION GAP SERPL CALCULATED.3IONS-SCNC: 8 MMOL/L (ref 5–18)
APPEARANCE UR: ABNORMAL
AST SERPL W P-5'-P-CCNC: 16 U/L (ref 0–45)
AST SERPL W P-5'-P-CCNC: 17 U/L (ref 0–45)
BACTERIA #/AREA URNS HPF: ABNORMAL /HPF
BACTERIA SPEC CULT: ABNORMAL
BACTERIA SPEC CULT: NO GROWTH
BACTERIA SPEC CULT: NO GROWTH
BACTERIA SPEC CULT: NORMAL
BACTERIA SPEC CULT: NORMAL
BASE EXCESS BLDV CALC-SCNC: 3.1 MMOL/L
BASOPHILS # BLD AUTO: 0 10E9/L (ref 0–0.2)
BASOPHILS NFR BLD AUTO: 0.2 %
BASOPHILS NFR BLD AUTO: 0.3 %
BASOPHILS NFR BLD AUTO: 0.7 %
BILIRUB SERPL-MCNC: 0.1 MG/DL (ref 0.2–1.3)
BILIRUB SERPL-MCNC: 0.3 MG/DL (ref 0.2–1.3)
BILIRUB UR QL STRIP: NEGATIVE
BUN SERPL-MCNC: 12 MG/DL (ref 8–28)
BUN SERPL-MCNC: 13 MG/DL (ref 7–30)
BUN SERPL-MCNC: 14 MG/DL (ref 7–30)
BUN SERPL-MCNC: 18 MG/DL (ref 7–30)
BUN SERPL-MCNC: 21 MG/DL (ref 7–30)
BUN SERPL-MCNC: 23 MG/DL (ref 7–30)
BUN SERPL-MCNC: 29 MG/DL (ref 7–30)
CALCIUM SERPL-MCNC: 8.7 MG/DL (ref 8.5–10.1)
CALCIUM SERPL-MCNC: 8.7 MG/DL (ref 8.5–10.1)
CALCIUM SERPL-MCNC: 9 MG/DL (ref 8.5–10.1)
CALCIUM SERPL-MCNC: 9 MG/DL (ref 8.5–10.1)
CALCIUM SERPL-MCNC: 9.1 MG/DL (ref 8.5–10.1)
CALCIUM SERPL-MCNC: 9.2 MG/DL (ref 8.5–10.1)
CALCIUM SERPL-MCNC: 9.9 MG/DL (ref 8.5–10.5)
CHLORIDE BLD-SCNC: 98 MMOL/L (ref 98–107)
CHLORIDE SERPL-SCNC: 102 MMOL/L (ref 94–109)
CHLORIDE SERPL-SCNC: 103 MMOL/L (ref 94–109)
CHLORIDE SERPL-SCNC: 107 MMOL/L (ref 94–109)
CHLORIDE SERPL-SCNC: 107 MMOL/L (ref 94–109)
CHLORIDE SERPL-SCNC: 108 MMOL/L (ref 94–109)
CHLORIDE SERPL-SCNC: 108 MMOL/L (ref 94–109)
CO2 BLDCOV-SCNC: 28 MMOL/L (ref 21–28)
CO2 SERPL-SCNC: 23 MMOL/L (ref 20–32)
CO2 SERPL-SCNC: 25 MMOL/L (ref 20–32)
CO2 SERPL-SCNC: 27 MMOL/L (ref 20–32)
CO2 SERPL-SCNC: 28 MMOL/L (ref 20–32)
CO2 SERPL-SCNC: 28 MMOL/L (ref 20–32)
CO2 SERPL-SCNC: 29 MMOL/L (ref 20–32)
CO2 SERPL-SCNC: 31 MMOL/L (ref 22–31)
COLONOSCOPY: NORMAL
COLOR UR AUTO: YELLOW
COPATH REPORT: NORMAL
COPATH REPORT: NORMAL
CREAT SERPL-MCNC: 0.5 MG/DL (ref 0.52–1.04)
CREAT SERPL-MCNC: 0.55 MG/DL (ref 0.52–1.04)
CREAT SERPL-MCNC: 0.59 MG/DL (ref 0.6–1.1)
CREAT SERPL-MCNC: 0.66 MG/DL (ref 0.52–1.04)
CREAT SERPL-MCNC: 0.7 MG/DL (ref 0.52–1.04)
CREAT SERPL-MCNC: 0.78 MG/DL (ref 0.52–1.04)
CREAT SERPL-MCNC: 0.84 MG/DL (ref 0.52–1.04)
DIFFERENTIAL METHOD BLD: ABNORMAL
EOSINOPHIL # BLD AUTO: 0 10E9/L (ref 0–0.7)
EOSINOPHIL # BLD AUTO: 0 10E9/L (ref 0–0.7)
EOSINOPHIL # BLD AUTO: 0.1 10E9/L (ref 0–0.7)
EOSINOPHIL NFR BLD AUTO: 0.2 %
EOSINOPHIL NFR BLD AUTO: 0.3 %
EOSINOPHIL NFR BLD AUTO: 1.5 %
ERYTHROCYTE [DISTWIDTH] IN BLOOD BY AUTOMATED COUNT: 13.7 % (ref 10–15)
ERYTHROCYTE [DISTWIDTH] IN BLOOD BY AUTOMATED COUNT: 13.8 % (ref 11–14.5)
ERYTHROCYTE [DISTWIDTH] IN BLOOD BY AUTOMATED COUNT: 13.9 % (ref 11–14.5)
ERYTHROCYTE [DISTWIDTH] IN BLOOD BY AUTOMATED COUNT: 14 % (ref 11–14.5)
ERYTHROCYTE [DISTWIDTH] IN BLOOD BY AUTOMATED COUNT: 14.1 % (ref 10–15)
ERYTHROCYTE [DISTWIDTH] IN BLOOD BY AUTOMATED COUNT: 14.2 % (ref 10–15)
ERYTHROCYTE [DISTWIDTH] IN BLOOD BY AUTOMATED COUNT: 14.2 % (ref 11–14.5)
ERYTHROCYTE [DISTWIDTH] IN BLOOD BY AUTOMATED COUNT: 14.3 % (ref 10–15)
ERYTHROCYTE [DISTWIDTH] IN BLOOD BY AUTOMATED COUNT: 14.3 % (ref 11–14.5)
ERYTHROCYTE [DISTWIDTH] IN BLOOD BY AUTOMATED COUNT: 14.4 % (ref 10–15)
ERYTHROCYTE [DISTWIDTH] IN BLOOD BY AUTOMATED COUNT: 14.6 % (ref 10–15)
ERYTHROCYTE [DISTWIDTH] IN BLOOD BY AUTOMATED COUNT: 14.9 % (ref 10–15)
FLUAV+FLUBV RNA SPEC QL NAA+PROBE: NEGATIVE
FLUAV+FLUBV RNA SPEC QL NAA+PROBE: NEGATIVE
GFR SERPL CREATININE-BSD FRML MDRD: 63 ML/MIN/{1.73_M2}
GFR SERPL CREATININE-BSD FRML MDRD: 68 ML/MIN/{1.73_M2}
GFR SERPL CREATININE-BSD FRML MDRD: 78 ML/MIN/{1.73_M2}
GFR SERPL CREATININE-BSD FRML MDRD: 80 ML/MIN/{1.73_M2}
GFR SERPL CREATININE-BSD FRML MDRD: 85 ML/MIN/{1.73_M2}
GFR SERPL CREATININE-BSD FRML MDRD: 87 ML/MIN/{1.73_M2}
GFR SERPL CREATININE-BSD FRML MDRD: >60 ML/MIN/1.73M2
GLUCOSE BLD-MCNC: 99 MG/DL (ref 70–125)
GLUCOSE SERPL-MCNC: 106 MG/DL (ref 70–99)
GLUCOSE SERPL-MCNC: 111 MG/DL (ref 70–99)
GLUCOSE SERPL-MCNC: 118 MG/DL (ref 70–99)
GLUCOSE SERPL-MCNC: 126 MG/DL (ref 70–99)
GLUCOSE SERPL-MCNC: 136 MG/DL (ref 70–99)
GLUCOSE SERPL-MCNC: 82 MG/DL (ref 70–99)
GLUCOSE UR STRIP-MCNC: NEGATIVE MG/DL
HCO3 BLDV-SCNC: 29 MMOL/L (ref 21–28)
HCT VFR BLD AUTO: 25.4 % (ref 35–47)
HCT VFR BLD AUTO: 25.8 % (ref 35–47)
HCT VFR BLD AUTO: 27.1 % (ref 35–47)
HCT VFR BLD AUTO: 28 % (ref 35–47)
HCT VFR BLD AUTO: 28.4 % (ref 35–47)
HCT VFR BLD AUTO: 28.8 % (ref 35–47)
HCT VFR BLD AUTO: 29.3 % (ref 35–47)
HCT VFR BLD AUTO: 29.4 % (ref 35–47)
HCT VFR BLD AUTO: 32.2 % (ref 35–47)
HCT VFR BLD AUTO: 33 % (ref 35–47)
HCT VFR BLD AUTO: 33.3 % (ref 35–47)
HCT VFR BLD AUTO: 33.5 % (ref 35–47)
HEMOCCULT STL QL IA: POSITIVE
HGB BLD-MCNC: 10 G/DL (ref 11.7–15.7)
HGB BLD-MCNC: 10 G/DL (ref 11.7–15.7)
HGB BLD-MCNC: 10.1 G/DL (ref 11.7–15.7)
HGB BLD-MCNC: 7.5 G/DL (ref 12–16)
HGB BLD-MCNC: 7.6 G/DL (ref 12–16)
HGB BLD-MCNC: 7.8 G/DL (ref 12–16)
HGB BLD-MCNC: 8.1 G/DL (ref 12–16)
HGB BLD-MCNC: 8.3 G/DL (ref 12–16)
HGB BLD-MCNC: 8.4 G/DL (ref 11.7–15.7)
HGB BLD-MCNC: 8.9 G/DL (ref 11.7–15.7)
HGB BLD-MCNC: 9.1 G/DL (ref 11.7–15.7)
HGB BLD-MCNC: 9.9 G/DL (ref 11.7–15.7)
HGB UR QL STRIP: ABNORMAL
HYALINE CASTS #/AREA URNS LPF: 2 /LPF (ref 0–2)
HYALINE CASTS #/AREA URNS LPF: ABNORMAL /LPF
HYALINE CASTS #/AREA URNS LPF: ABNORMAL /LPF
IMM GRANULOCYTES # BLD: 0 10E9/L (ref 0–0.4)
IMM GRANULOCYTES NFR BLD: 0.5 %
INTERPRETATION ECG - MUSE: NORMAL
KETONES UR STRIP-MCNC: 10 MG/DL
KETONES UR STRIP-MCNC: ABNORMAL MG/DL
KETONES UR STRIP-MCNC: ABNORMAL MG/DL
KETONES UR STRIP-MCNC: NEGATIVE MG/DL
LACTATE BLD-SCNC: 1.2 MMOL/L (ref 0.7–2.1)
LACTATE BLD-SCNC: 1.7 MMOL/L (ref 0.7–2)
LACTATE BLD-SCNC: 2.5 MMOL/L (ref 0.7–2)
LACTATE BLD-SCNC: 2.7 MMOL/L (ref 0.7–2)
LEUKOCYTE ESTERASE UR QL STRIP: ABNORMAL
LIPASE SERPL-CCNC: 198 U/L (ref 73–393)
LIPASE SERPL-CCNC: 82 U/L (ref 73–393)
LYMPHOCYTES # BLD AUTO: 0.6 10E9/L (ref 0.8–5.3)
LYMPHOCYTES # BLD AUTO: 1.2 10E9/L (ref 0.8–5.3)
LYMPHOCYTES # BLD AUTO: 2.6 10E9/L (ref 0.8–5.3)
LYMPHOCYTES NFR BLD AUTO: 19.1 %
LYMPHOCYTES NFR BLD AUTO: 29 %
LYMPHOCYTES NFR BLD AUTO: 5.5 %
Lab: ABNORMAL
Lab: ABNORMAL
Lab: NORMAL
Lab: NORMAL
MCH RBC QN AUTO: 27.8 PG (ref 27–34)
MCH RBC QN AUTO: 27.8 PG (ref 27–34)
MCH RBC QN AUTO: 27.9 PG (ref 27–34)
MCH RBC QN AUTO: 28.1 PG (ref 27–34)
MCH RBC QN AUTO: 28.1 PG (ref 27–34)
MCH RBC QN AUTO: 28.6 PG (ref 26.5–33)
MCH RBC QN AUTO: 28.7 PG (ref 26.5–33)
MCH RBC QN AUTO: 28.7 PG (ref 26.5–33)
MCH RBC QN AUTO: 28.9 PG (ref 26.5–33)
MCH RBC QN AUTO: 29.4 PG (ref 26.5–33)
MCHC RBC AUTO-ENTMCNC: 28.8 G/DL (ref 32–36)
MCHC RBC AUTO-ENTMCNC: 28.9 G/DL (ref 32–36)
MCHC RBC AUTO-ENTMCNC: 29.2 G/DL (ref 31.5–36.5)
MCHC RBC AUTO-ENTMCNC: 29.2 G/DL (ref 32–36)
MCHC RBC AUTO-ENTMCNC: 29.5 G/DL (ref 32–36)
MCHC RBC AUTO-ENTMCNC: 29.5 G/DL (ref 32–36)
MCHC RBC AUTO-ENTMCNC: 29.9 G/DL (ref 31.5–36.5)
MCHC RBC AUTO-ENTMCNC: 30 G/DL (ref 31.5–36.5)
MCHC RBC AUTO-ENTMCNC: 30.3 G/DL (ref 31.5–36.5)
MCHC RBC AUTO-ENTMCNC: 30.6 G/DL (ref 31.5–36.5)
MCHC RBC AUTO-ENTMCNC: 30.7 G/DL (ref 31.5–36.5)
MCHC RBC AUTO-ENTMCNC: 31.1 G/DL (ref 31.5–36.5)
MCV RBC AUTO: 94 FL (ref 80–100)
MCV RBC AUTO: 95 FL (ref 78–100)
MCV RBC AUTO: 96 FL (ref 78–100)
MCV RBC AUTO: 96 FL (ref 80–100)
MCV RBC AUTO: 98 FL (ref 78–100)
MCV RBC AUTO: 99 FL (ref 78–100)
MONOCYTES # BLD AUTO: 0.5 10E9/L (ref 0–1.3)
MONOCYTES # BLD AUTO: 0.6 10E9/L (ref 0–1.3)
MONOCYTES # BLD AUTO: 1 10E9/L (ref 0–1.3)
MONOCYTES NFR BLD AUTO: 11 %
MONOCYTES NFR BLD AUTO: 5.6 %
MONOCYTES NFR BLD AUTO: 7.7 %
MUCOUS THREADS #/AREA URNS LPF: PRESENT /LPF
NEUTROPHILS # BLD AUTO: 4.4 10E9/L (ref 1.6–8.3)
NEUTROPHILS # BLD AUTO: 5.2 10E9/L (ref 1.6–8.3)
NEUTROPHILS # BLD AUTO: 9.7 10E9/L (ref 1.6–8.3)
NEUTROPHILS NFR BLD AUTO: 58.2 %
NEUTROPHILS NFR BLD AUTO: 71.8 %
NEUTROPHILS NFR BLD AUTO: 88.4 %
NITRATE UR QL: NEGATIVE
NON-SQ EPI CELLS #/AREA URNS LPF: ABNORMAL /LPF
NRBC # BLD AUTO: 0 10*3/UL
NRBC BLD AUTO-RTO: 0 /100
NT-PROBNP SERPL-MCNC: 2724 PG/ML (ref 0–1800)
O2/TOTAL GAS SETTING VFR VENT: ABNORMAL %
PCO2 BLDV: 49 MM HG (ref 40–50)
PCO2 BLDV: 51 MM HG (ref 40–50)
PH BLDV: 7.36 PH (ref 7.32–7.43)
PH BLDV: 7.37 PH (ref 7.32–7.43)
PH UR STRIP: 5 PH (ref 5–7)
PH UR STRIP: 5 PH (ref 5–7)
PH UR STRIP: 5.5 PH (ref 5–7)
PLATELET # BLD AUTO: 156 10E9/L (ref 150–450)
PLATELET # BLD AUTO: 429 10E9/L (ref 150–450)
PLATELET # BLD AUTO: 469 THOU/UL (ref 140–440)
PLATELET # BLD AUTO: 479 THOU/UL (ref 140–440)
PLATELET # BLD AUTO: 482 10E9/L (ref 150–450)
PLATELET # BLD AUTO: 482 THOU/UL (ref 140–440)
PLATELET # BLD AUTO: 511 THOU/UL (ref 140–440)
PLATELET # BLD AUTO: 515 THOU/UL (ref 140–440)
PLATELET # BLD AUTO: 549 10E9/L (ref 150–450)
PLATELET # BLD AUTO: 608 10E9/L (ref 150–450)
PLATELET # BLD AUTO: 614 10E9/L (ref 150–450)
PLATELET # BLD AUTO: 723 10E9/L (ref 150–450)
PMV BLD AUTO: 10 FL (ref 8.5–12.5)
PMV BLD AUTO: 10.2 FL (ref 8.5–12.5)
PMV BLD AUTO: 10.3 FL (ref 8.5–12.5)
PO2 BLDV: 28 MM HG (ref 25–47)
PO2 BLDV: 30 MM HG (ref 25–47)
POTASSIUM BLD-SCNC: 4.6 MMOL/L (ref 3.5–5)
POTASSIUM SERPL-SCNC: 3.9 MMOL/L (ref 3.4–5.3)
POTASSIUM SERPL-SCNC: 4 MMOL/L (ref 3.4–5.3)
POTASSIUM SERPL-SCNC: 4.1 MMOL/L (ref 3.4–5.3)
POTASSIUM SERPL-SCNC: 4.4 MMOL/L (ref 3.4–5.3)
POTASSIUM SERPL-SCNC: 4.6 MMOL/L (ref 3.4–5.3)
POTASSIUM SERPL-SCNC: 4.6 MMOL/L (ref 3.4–5.3)
PROCALCITONIN SERPL-MCNC: 0.07 NG/ML
PROT SERPL-MCNC: 7 G/DL (ref 6.8–8.8)
PROT SERPL-MCNC: 7.8 G/DL (ref 6.8–8.8)
RBC # BLD AUTO: 2.69 MILL/UL (ref 3.8–5.4)
RBC # BLD AUTO: 2.7 MILL/UL (ref 3.8–5.4)
RBC # BLD AUTO: 2.81 MILL/UL (ref 3.8–5.4)
RBC # BLD AUTO: 2.91 MILL/UL (ref 3.8–5.4)
RBC # BLD AUTO: 2.94 10E12/L (ref 3.8–5.2)
RBC # BLD AUTO: 2.95 MILL/UL (ref 3.8–5.4)
RBC # BLD AUTO: 3.1 10E12/L (ref 3.8–5.2)
RBC # BLD AUTO: 3.1 10E12/L (ref 3.8–5.2)
RBC # BLD AUTO: 3.37 10E12/L (ref 3.8–5.2)
RBC # BLD AUTO: 3.4 10E12/L (ref 3.8–5.2)
RBC # BLD AUTO: 3.49 10E12/L (ref 3.8–5.2)
RBC # BLD AUTO: 3.49 10E12/L (ref 3.8–5.2)
RBC #/AREA URNS AUTO: 100 /HPF (ref 0–2)
RBC #/AREA URNS AUTO: 9 /HPF (ref 0–2)
RBC #/AREA URNS AUTO: ABNORMAL /HPF
RENAL EPI CELLS #/AREA URNS HPF: ABNORMAL /HPF
RSV RNA SPEC NAA+PROBE: NEGATIVE
SAO2 % BLDV FROM PO2: 50 %
SODIUM SERPL-SCNC: 135 MMOL/L (ref 133–144)
SODIUM SERPL-SCNC: 137 MMOL/L (ref 136–145)
SODIUM SERPL-SCNC: 139 MMOL/L (ref 133–144)
SODIUM SERPL-SCNC: 139 MMOL/L (ref 133–144)
SODIUM SERPL-SCNC: 140 MMOL/L (ref 133–144)
SODIUM SERPL-SCNC: 141 MMOL/L (ref 133–144)
SODIUM SERPL-SCNC: 143 MMOL/L (ref 133–144)
SOURCE: ABNORMAL
SP GR UR STRIP: 1.01 (ref 1–1.03)
SP GR UR STRIP: 1.02 (ref 1–1.03)
SP GR UR STRIP: >1.03 (ref 1–1.03)
SPECIMEN SOURCE: ABNORMAL
SPECIMEN SOURCE: NORMAL
SQUAMOUS #/AREA URNS AUTO: 6 /HPF (ref 0–1)
SQUAMOUS #/AREA URNS AUTO: 9 /HPF (ref 0–1)
TRANS CELLS #/AREA URNS HPF: 1 /HPF (ref 0–1)
TROPONIN I SERPL-MCNC: <0.015 UG/L (ref 0–0.04)
TROPONIN I SERPL-MCNC: <0.015 UG/L (ref 0–0.04)
UPPER GI ENDOSCOPY: NORMAL
UROBILINOGEN UR STRIP-ACNC: 0.2 EU/DL (ref 0.2–1)
UROBILINOGEN UR STRIP-MCNC: NORMAL MG/DL (ref 0–2)
UROBILINOGEN UR STRIP-MCNC: NORMAL MG/DL (ref 0–2)
WBC # BLD AUTO: 10 10E9/L (ref 4–11)
WBC # BLD AUTO: 10.9 10E9/L (ref 4–11)
WBC # BLD AUTO: 13.1 10E9/L (ref 4–11)
WBC # BLD AUTO: 6.1 10E9/L (ref 4–11)
WBC # BLD AUTO: 7.5 10E9/L (ref 4–11)
WBC # BLD AUTO: 8.3 10E9/L (ref 4–11)
WBC # BLD AUTO: 8.9 10E9/L (ref 4–11)
WBC #/AREA URNS AUTO: >100 /HPF
WBC #/AREA URNS AUTO: >100 /HPF
WBC #/AREA URNS AUTO: >182 /HPF (ref 0–5)
WBC #/AREA URNS AUTO: >182 /HPF (ref 0–5)
WBC #/AREA URNS AUTO: ABNORMAL /HPF
WBC CLUMPS #/AREA URNS HPF: PRESENT /HPF
WBC: 6.2 THOU/UL (ref 4–11)
WBC: 6.3 THOU/UL (ref 4–11)
WBC: 7.2 THOU/UL (ref 4–11)
WBC: 8 THOU/UL (ref 4–11)
WBC: 8.5 THOU/UL (ref 4–11)
YEAST #/AREA URNS HPF: ABNORMAL /HPF

## 2019-01-01 PROCEDURE — 99285 EMERGENCY DEPT VISIT HI MDM: CPT | Mod: 25

## 2019-01-01 PROCEDURE — 87086 URINE CULTURE/COLONY COUNT: CPT | Performed by: PHYSICIAN ASSISTANT

## 2019-01-01 PROCEDURE — 94640 AIRWAY INHALATION TREATMENT: CPT

## 2019-01-01 PROCEDURE — 40000274 ZZH STATISTIC RCP CONSULT EA 30 MIN

## 2019-01-01 PROCEDURE — 25000128 H RX IP 250 OP 636: Performed by: EMERGENCY MEDICINE

## 2019-01-01 PROCEDURE — 87106 FUNGI IDENTIFICATION YEAST: CPT | Performed by: EMERGENCY MEDICINE

## 2019-01-01 PROCEDURE — 99214 OFFICE O/P EST MOD 30 MIN: CPT | Performed by: PHYSICIAN ASSISTANT

## 2019-01-01 PROCEDURE — 80048 BASIC METABOLIC PNL TOTAL CA: CPT | Performed by: INTERNAL MEDICINE

## 2019-01-01 PROCEDURE — 85027 COMPLETE CBC AUTOMATED: CPT | Performed by: PHYSICIAN ASSISTANT

## 2019-01-01 PROCEDURE — G0378 HOSPITAL OBSERVATION PER HR: HCPCS

## 2019-01-01 PROCEDURE — 85025 COMPLETE CBC W/AUTO DIFF WBC: CPT | Performed by: EMERGENCY MEDICINE

## 2019-01-01 PROCEDURE — 87040 BLOOD CULTURE FOR BACTERIA: CPT | Performed by: EMERGENCY MEDICINE

## 2019-01-01 PROCEDURE — 25000128 H RX IP 250 OP 636: Performed by: PHYSICIAN ASSISTANT

## 2019-01-01 PROCEDURE — 88305 TISSUE EXAM BY PATHOLOGIST: CPT | Performed by: COLON & RECTAL SURGERY

## 2019-01-01 PROCEDURE — 85025 COMPLETE CBC W/AUTO DIFF WBC: CPT | Performed by: PHYSICIAN ASSISTANT

## 2019-01-01 PROCEDURE — 74176 CT ABD & PELVIS W/O CONTRAST: CPT

## 2019-01-01 PROCEDURE — 25000131 ZZH RX MED GY IP 250 OP 636 PS 637: Performed by: PHYSICIAN ASSISTANT

## 2019-01-01 PROCEDURE — 40000275 ZZH STATISTIC RCP TIME EA 10 MIN

## 2019-01-01 PROCEDURE — 71275 CT ANGIOGRAPHY CHEST: CPT

## 2019-01-01 PROCEDURE — 82803 BLOOD GASES ANY COMBINATION: CPT | Performed by: EMERGENCY MEDICINE

## 2019-01-01 PROCEDURE — 40000104 ZZH STATISTIC MODERATE SEDATION < 10 MIN: Performed by: INTERNAL MEDICINE

## 2019-01-01 PROCEDURE — 80053 COMPREHEN METABOLIC PANEL: CPT | Performed by: EMERGENCY MEDICINE

## 2019-01-01 PROCEDURE — 25000125 ZZHC RX 250: Performed by: INTERNAL MEDICINE

## 2019-01-01 PROCEDURE — 93306 TTE W/DOPPLER COMPLETE: CPT | Mod: 26 | Performed by: INTERNAL MEDICINE

## 2019-01-01 PROCEDURE — 87086 URINE CULTURE/COLONY COUNT: CPT | Performed by: INTERNAL MEDICINE

## 2019-01-01 PROCEDURE — 36415 COLL VENOUS BLD VENIPUNCTURE: CPT | Performed by: INTERNAL MEDICINE

## 2019-01-01 PROCEDURE — 80048 BASIC METABOLIC PNL TOTAL CA: CPT | Performed by: PHYSICIAN ASSISTANT

## 2019-01-01 PROCEDURE — 83690 ASSAY OF LIPASE: CPT | Performed by: PHYSICIAN ASSISTANT

## 2019-01-01 PROCEDURE — 99233 SBSQ HOSP IP/OBS HIGH 50: CPT | Performed by: INTERNAL MEDICINE

## 2019-01-01 PROCEDURE — 92610 EVALUATE SWALLOWING FUNCTION: CPT | Mod: GN

## 2019-01-01 PROCEDURE — 25800030 ZZH RX IP 258 OP 636: Performed by: INTERNAL MEDICINE

## 2019-01-01 PROCEDURE — 25000128 H RX IP 250 OP 636: Performed by: INTERNAL MEDICINE

## 2019-01-01 PROCEDURE — 80053 COMPREHEN METABOLIC PANEL: CPT | Performed by: PHYSICIAN ASSISTANT

## 2019-01-01 PROCEDURE — 25000132 ZZH RX MED GY IP 250 OP 250 PS 637: Performed by: INTERNAL MEDICINE

## 2019-01-01 PROCEDURE — 25500064 ZZH RX 255 OP 636: Performed by: INTERNAL MEDICINE

## 2019-01-01 PROCEDURE — 74177 CT ABD & PELVIS W/CONTRAST: CPT

## 2019-01-01 PROCEDURE — 94640 AIRWAY INHALATION TREATMENT: CPT | Mod: 76

## 2019-01-01 PROCEDURE — 81001 URINALYSIS AUTO W/SCOPE: CPT | Performed by: PHYSICIAN ASSISTANT

## 2019-01-01 PROCEDURE — 81001 URINALYSIS AUTO W/SCOPE: CPT | Performed by: EMERGENCY MEDICINE

## 2019-01-01 PROCEDURE — 12000000 ZZH R&B MED SURG/OB

## 2019-01-01 PROCEDURE — 88305 TISSUE EXAM BY PATHOLOGIST: CPT | Mod: 26 | Performed by: INTERNAL MEDICINE

## 2019-01-01 PROCEDURE — 25000131 ZZH RX MED GY IP 250 OP 636 PS 637: Performed by: INTERNAL MEDICINE

## 2019-01-01 PROCEDURE — 99213 OFFICE O/P EST LOW 20 MIN: CPT | Performed by: FAMILY MEDICINE

## 2019-01-01 PROCEDURE — 83605 ASSAY OF LACTIC ACID: CPT | Performed by: INTERNAL MEDICINE

## 2019-01-01 PROCEDURE — 45380 COLONOSCOPY AND BIOPSY: CPT | Performed by: COLON & RECTAL SURGERY

## 2019-01-01 PROCEDURE — 36415 COLL VENOUS BLD VENIPUNCTURE: CPT | Performed by: PHYSICIAN ASSISTANT

## 2019-01-01 PROCEDURE — 71046 X-RAY EXAM CHEST 2 VIEWS: CPT

## 2019-01-01 PROCEDURE — 40000141 ZZH STATISTIC PERIPHERAL IV START W/O US GUIDANCE

## 2019-01-01 PROCEDURE — 87086 URINE CULTURE/COLONY COUNT: CPT | Performed by: EMERGENCY MEDICINE

## 2019-01-01 PROCEDURE — 36415 COLL VENOUS BLD VENIPUNCTURE: CPT | Performed by: EMERGENCY MEDICINE

## 2019-01-01 PROCEDURE — 99207 ZZC CDG-MDM COMPONENT: MEETS LOW - DOWN CODED: CPT | Performed by: INTERNAL MEDICINE

## 2019-01-01 PROCEDURE — 93005 ELECTROCARDIOGRAM TRACING: CPT

## 2019-01-01 PROCEDURE — 93000 ELECTROCARDIOGRAM COMPLETE: CPT | Performed by: PHYSICIAN ASSISTANT

## 2019-01-01 PROCEDURE — 83605 ASSAY OF LACTIC ACID: CPT

## 2019-01-01 PROCEDURE — 82274 ASSAY TEST FOR BLOOD FECAL: CPT | Performed by: PHYSICIAN ASSISTANT

## 2019-01-01 PROCEDURE — 99207 ZZC CDG-MDM COMPONENT: MEETS MODERATE - UP CODED: CPT | Performed by: INTERNAL MEDICINE

## 2019-01-01 PROCEDURE — 99215 OFFICE O/P EST HI 40 MIN: CPT | Performed by: PHYSICIAN ASSISTANT

## 2019-01-01 PROCEDURE — 87631 RESP VIRUS 3-5 TARGETS: CPT | Performed by: EMERGENCY MEDICINE

## 2019-01-01 PROCEDURE — 99217 ZZC OBSERVATION CARE DISCHARGE: CPT | Performed by: PHYSICIAN ASSISTANT

## 2019-01-01 PROCEDURE — 84484 ASSAY OF TROPONIN QUANT: CPT | Performed by: PHYSICIAN ASSISTANT

## 2019-01-01 PROCEDURE — 99239 HOSP IP/OBS DSCHRG MGMT >30: CPT | Performed by: INTERNAL MEDICINE

## 2019-01-01 PROCEDURE — 87106 FUNGI IDENTIFICATION YEAST: CPT | Performed by: PHYSICIAN ASSISTANT

## 2019-01-01 PROCEDURE — 83880 ASSAY OF NATRIURETIC PEPTIDE: CPT | Performed by: EMERGENCY MEDICINE

## 2019-01-01 PROCEDURE — 40000264 ECHOCARDIOGRAM COMPLETE

## 2019-01-01 PROCEDURE — 99225 ZZC SUBSEQUENT OBSERVATION CARE,LEVEL II: CPT | Performed by: PHYSICIAN ASSISTANT

## 2019-01-01 PROCEDURE — 99223 1ST HOSP IP/OBS HIGH 75: CPT | Mod: AI | Performed by: INTERNAL MEDICINE

## 2019-01-01 PROCEDURE — 84484 ASSAY OF TROPONIN QUANT: CPT | Performed by: EMERGENCY MEDICINE

## 2019-01-01 PROCEDURE — 88305 TISSUE EXAM BY PATHOLOGIST: CPT | Mod: 26,59 | Performed by: COLON & RECTAL SURGERY

## 2019-01-01 PROCEDURE — 85027 COMPLETE CBC AUTOMATED: CPT | Performed by: INTERNAL MEDICINE

## 2019-01-01 PROCEDURE — 43239 EGD BIOPSY SINGLE/MULTIPLE: CPT | Performed by: INTERNAL MEDICINE

## 2019-01-01 PROCEDURE — 99219 ZZC INITIAL OBSERVATION CARE,LEVL II: CPT | Performed by: PHYSICIAN ASSISTANT

## 2019-01-01 PROCEDURE — 87086 URINE CULTURE/COLONY COUNT: CPT | Performed by: FAMILY MEDICINE

## 2019-01-01 PROCEDURE — 25000132 ZZH RX MED GY IP 250 OP 250 PS 637: Performed by: PHYSICIAN ASSISTANT

## 2019-01-01 PROCEDURE — 92526 ORAL FUNCTION THERAPY: CPT | Mod: GN

## 2019-01-01 PROCEDURE — 99213 OFFICE O/P EST LOW 20 MIN: CPT | Performed by: PHYSICIAN ASSISTANT

## 2019-01-01 PROCEDURE — 76705 ECHO EXAM OF ABDOMEN: CPT

## 2019-01-01 PROCEDURE — 90662 IIV NO PRSV INCREASED AG IM: CPT | Performed by: INTERNAL MEDICINE

## 2019-01-01 PROCEDURE — 74019 RADEX ABDOMEN 2 VIEWS: CPT

## 2019-01-01 PROCEDURE — 25000125 ZZHC RX 250: Performed by: EMERGENCY MEDICINE

## 2019-01-01 PROCEDURE — 87088 URINE BACTERIA CULTURE: CPT | Performed by: INTERNAL MEDICINE

## 2019-01-01 PROCEDURE — 40000893 ZZH STATISTIC PT IP EVAL DEFER: Performed by: PHYSICAL THERAPIST

## 2019-01-01 PROCEDURE — 96365 THER/PROPH/DIAG IV INF INIT: CPT

## 2019-01-01 PROCEDURE — 99232 SBSQ HOSP IP/OBS MODERATE 35: CPT | Performed by: INTERNAL MEDICINE

## 2019-01-01 PROCEDURE — 99221 1ST HOSP IP/OBS SF/LOW 40: CPT | Performed by: UROLOGY

## 2019-01-01 PROCEDURE — 82150 ASSAY OF AMYLASE: CPT | Performed by: PHYSICIAN ASSISTANT

## 2019-01-01 PROCEDURE — 83690 ASSAY OF LIPASE: CPT | Performed by: EMERGENCY MEDICINE

## 2019-01-01 PROCEDURE — 96367 TX/PROPH/DG ADDL SEQ IV INF: CPT

## 2019-01-01 PROCEDURE — 25000125 ZZHC RX 250: Performed by: PHYSICIAN ASSISTANT

## 2019-01-01 PROCEDURE — 88305 TISSUE EXAM BY PATHOLOGIST: CPT | Performed by: INTERNAL MEDICINE

## 2019-01-01 PROCEDURE — 87088 URINE BACTERIA CULTURE: CPT | Performed by: PHYSICIAN ASSISTANT

## 2019-01-01 PROCEDURE — 82803 BLOOD GASES ANY COMBINATION: CPT

## 2019-01-01 PROCEDURE — 84145 PROCALCITONIN (PCT): CPT | Performed by: EMERGENCY MEDICINE

## 2019-01-01 RX ORDER — IPRATROPIUM BROMIDE AND ALBUTEROL SULFATE 2.5; .5 MG/3ML; MG/3ML
3 SOLUTION RESPIRATORY (INHALATION) ONCE
Status: COMPLETED | OUTPATIENT
Start: 2019-01-01 | End: 2019-01-01

## 2019-01-01 RX ORDER — CEFTRIAXONE 2 G/1
2 INJECTION, POWDER, FOR SOLUTION INTRAMUSCULAR; INTRAVENOUS EVERY 24 HOURS
Status: DISCONTINUED | OUTPATIENT
Start: 2019-01-01 | End: 2019-01-01 | Stop reason: HOSPADM

## 2019-01-01 RX ORDER — FERROUS GLUCONATE 324(38)MG
324 TABLET ORAL
Status: DISCONTINUED | OUTPATIENT
Start: 2019-01-01 | End: 2019-01-01

## 2019-01-01 RX ORDER — ONDANSETRON 2 MG/ML
4 INJECTION INTRAMUSCULAR; INTRAVENOUS EVERY 6 HOURS PRN
Status: CANCELLED | OUTPATIENT
Start: 2019-01-01

## 2019-01-01 RX ORDER — ONDANSETRON 4 MG/1
4 TABLET, ORALLY DISINTEGRATING ORAL EVERY 6 HOURS PRN
Status: CANCELLED | OUTPATIENT
Start: 2019-01-01

## 2019-01-01 RX ORDER — CEFTRIAXONE 1 G/1
1 INJECTION, POWDER, FOR SOLUTION INTRAMUSCULAR; INTRAVENOUS ONCE
Status: COMPLETED | OUTPATIENT
Start: 2019-01-01 | End: 2019-01-01

## 2019-01-01 RX ORDER — ONDANSETRON 4 MG/1
4 TABLET, ORALLY DISINTEGRATING ORAL EVERY 6 HOURS PRN
Status: DISCONTINUED | OUTPATIENT
Start: 2019-01-01 | End: 2019-01-01

## 2019-01-01 RX ORDER — IOPAMIDOL 755 MG/ML
47 INJECTION, SOLUTION INTRAVASCULAR ONCE
Status: COMPLETED | OUTPATIENT
Start: 2019-01-01 | End: 2019-01-01

## 2019-01-01 RX ORDER — ONDANSETRON 4 MG/1
4 TABLET, ORALLY DISINTEGRATING ORAL EVERY 6 HOURS PRN
Status: DISCONTINUED | OUTPATIENT
Start: 2019-01-01 | End: 2019-01-01 | Stop reason: HOSPADM

## 2019-01-01 RX ORDER — POLYETHYLENE GLYCOL 3350 17 G/17G
17 POWDER, FOR SOLUTION ORAL DAILY PRN
Status: DISCONTINUED | OUTPATIENT
Start: 2019-01-01 | End: 2019-01-01 | Stop reason: HOSPADM

## 2019-01-01 RX ORDER — NALOXONE HYDROCHLORIDE 0.4 MG/ML
.1-.4 INJECTION, SOLUTION INTRAMUSCULAR; INTRAVENOUS; SUBCUTANEOUS
Status: DISCONTINUED | OUTPATIENT
Start: 2019-01-01 | End: 2019-01-01

## 2019-01-01 RX ORDER — SULFAMETHOXAZOLE/TRIMETHOPRIM 800-160 MG
1 TABLET ORAL 2 TIMES DAILY
Qty: 6 TABLET | Refills: 0 | Status: SHIPPED | OUTPATIENT
Start: 2019-01-01 | End: 2019-01-01

## 2019-01-01 RX ORDER — PANTOPRAZOLE SODIUM 40 MG/1
40 TABLET, DELAYED RELEASE ORAL
Status: DISCONTINUED | OUTPATIENT
Start: 2019-01-01 | End: 2019-01-01 | Stop reason: HOSPADM

## 2019-01-01 RX ORDER — FLUCONAZOLE 150 MG/1
150 TABLET ORAL DAILY
Qty: 3 TABLET | Refills: 0 | Status: SHIPPED | OUTPATIENT
Start: 2019-01-01 | End: 2019-01-01

## 2019-01-01 RX ORDER — FLUCONAZOLE 200 MG/1
200 TABLET ORAL DAILY
Qty: 14 TABLET | Refills: 0 | Status: SHIPPED | OUTPATIENT
Start: 2019-01-01 | End: 2019-01-01

## 2019-01-01 RX ORDER — IPRATROPIUM BROMIDE AND ALBUTEROL SULFATE 2.5; .5 MG/3ML; MG/3ML
3 SOLUTION RESPIRATORY (INHALATION)
Status: DISCONTINUED | OUTPATIENT
Start: 2019-01-01 | End: 2019-01-01 | Stop reason: HOSPADM

## 2019-01-01 RX ORDER — ALBUTEROL SULFATE 0.83 MG/ML
3 SOLUTION RESPIRATORY (INHALATION) EVERY 4 HOURS PRN
Status: DISCONTINUED | OUTPATIENT
Start: 2019-01-01 | End: 2019-01-01 | Stop reason: HOSPADM

## 2019-01-01 RX ORDER — NALOXONE HYDROCHLORIDE 0.4 MG/ML
.1-.4 INJECTION, SOLUTION INTRAMUSCULAR; INTRAVENOUS; SUBCUTANEOUS
Status: CANCELLED | OUTPATIENT
Start: 2019-01-01 | End: 2019-01-01

## 2019-01-01 RX ORDER — FLUMAZENIL 0.1 MG/ML
0.2 INJECTION, SOLUTION INTRAVENOUS
Status: DISCONTINUED | OUTPATIENT
Start: 2019-01-01 | End: 2019-01-01

## 2019-01-01 RX ORDER — FERROUS GLUCONATE 324(38)MG
324 TABLET ORAL
COMMUNITY

## 2019-01-01 RX ORDER — PANTOPRAZOLE SODIUM 40 MG/1
40 TABLET, DELAYED RELEASE ORAL
Status: DISCONTINUED | OUTPATIENT
Start: 2019-01-01 | End: 2019-01-01

## 2019-01-01 RX ORDER — ONDANSETRON 2 MG/ML
4 INJECTION INTRAMUSCULAR; INTRAVENOUS
Status: DISCONTINUED | OUTPATIENT
Start: 2019-01-01 | End: 2019-01-01 | Stop reason: HOSPADM

## 2019-01-01 RX ORDER — PANTOPRAZOLE SODIUM 40 MG/1
40 TABLET, DELAYED RELEASE ORAL
Qty: 90 TABLET | Refills: 1 | Status: SHIPPED | OUTPATIENT
Start: 2019-01-01

## 2019-01-01 RX ORDER — ONDANSETRON 2 MG/ML
4 INJECTION INTRAMUSCULAR; INTRAVENOUS EVERY 6 HOURS PRN
Status: DISCONTINUED | OUTPATIENT
Start: 2019-01-01 | End: 2019-01-01 | Stop reason: HOSPADM

## 2019-01-01 RX ORDER — DOXYCYCLINE HYCLATE 100 MG
100 TABLET ORAL 2 TIMES DAILY
Qty: 10 TABLET | Refills: 0 | Status: SHIPPED | OUTPATIENT
Start: 2019-01-01 | End: 2019-01-01

## 2019-01-01 RX ORDER — PROCHLORPERAZINE MALEATE 5 MG
5 TABLET ORAL EVERY 6 HOURS PRN
Status: CANCELLED | OUTPATIENT
Start: 2019-01-01

## 2019-01-01 RX ORDER — ACETAMINOPHEN 325 MG/1
650 TABLET ORAL EVERY 4 HOURS PRN
Status: DISCONTINUED | OUTPATIENT
Start: 2019-01-01 | End: 2019-01-01 | Stop reason: HOSPADM

## 2019-01-01 RX ORDER — FAMOTIDINE 20 MG/1
20 TABLET, FILM COATED ORAL 2 TIMES DAILY PRN
Status: DISCONTINUED | OUTPATIENT
Start: 2019-01-01 | End: 2019-01-01

## 2019-01-01 RX ORDER — SUCRALFATE 1 G/1
1 TABLET ORAL 4 TIMES DAILY
Status: DISCONTINUED | OUTPATIENT
Start: 2019-01-01 | End: 2019-01-01

## 2019-01-01 RX ORDER — CEPHALEXIN 500 MG/1
500 CAPSULE ORAL 2 TIMES DAILY
Qty: 10 CAPSULE | Refills: 0 | Status: SHIPPED | OUTPATIENT
Start: 2019-01-01 | End: 2019-01-01

## 2019-01-01 RX ORDER — NALOXONE HYDROCHLORIDE 0.4 MG/ML
.1-.4 INJECTION, SOLUTION INTRAMUSCULAR; INTRAVENOUS; SUBCUTANEOUS
Status: DISCONTINUED | OUTPATIENT
Start: 2019-01-01 | End: 2019-01-01 | Stop reason: HOSPADM

## 2019-01-01 RX ORDER — ALBUTEROL SULFATE 0.83 MG/ML
3 SOLUTION RESPIRATORY (INHALATION)
Status: DISCONTINUED | OUTPATIENT
Start: 2019-01-01 | End: 2019-01-01

## 2019-01-01 RX ORDER — LIDOCAINE 40 MG/G
CREAM TOPICAL
Status: DISCONTINUED | OUTPATIENT
Start: 2019-01-01 | End: 2019-01-01 | Stop reason: HOSPADM

## 2019-01-01 RX ORDER — ALUMINA, MAGNESIA, AND SIMETHICONE 2400; 2400; 240 MG/30ML; MG/30ML; MG/30ML
30 SUSPENSION ORAL EVERY 4 HOURS PRN
Status: DISCONTINUED | OUTPATIENT
Start: 2019-01-01 | End: 2019-01-01 | Stop reason: HOSPADM

## 2019-01-01 RX ORDER — DOXYCYCLINE 100 MG/10ML
100 INJECTION, POWDER, LYOPHILIZED, FOR SOLUTION INTRAVENOUS EVERY 12 HOURS
Status: DISCONTINUED | OUTPATIENT
Start: 2019-01-01 | End: 2019-01-01

## 2019-01-01 RX ORDER — FLUCONAZOLE 150 MG/1
150 TABLET ORAL ONCE
Qty: 1 TABLET | Refills: 0 | Status: SHIPPED | OUTPATIENT
Start: 2019-01-01 | End: 2019-01-01

## 2019-01-01 RX ORDER — FAMOTIDINE 10 MG
10 TABLET ORAL DAILY PRN
Status: DISCONTINUED | OUTPATIENT
Start: 2019-01-01 | End: 2019-01-01 | Stop reason: HOSPADM

## 2019-01-01 RX ORDER — ACETAMINOPHEN 325 MG/1
325 TABLET ORAL EVERY 4 HOURS PRN
Status: DISCONTINUED | OUTPATIENT
Start: 2019-01-01 | End: 2019-01-01 | Stop reason: HOSPADM

## 2019-01-01 RX ORDER — SUCRALFATE ORAL 1 G/10ML
1 SUSPENSION ORAL
Status: DISCONTINUED | OUTPATIENT
Start: 2019-01-01 | End: 2019-01-01 | Stop reason: HOSPADM

## 2019-01-01 RX ORDER — PANTOPRAZOLE SODIUM 20 MG/1
20 TABLET, DELAYED RELEASE ORAL
Status: DISCONTINUED | OUTPATIENT
Start: 2019-01-01 | End: 2019-01-01 | Stop reason: HOSPADM

## 2019-01-01 RX ORDER — DOXYCYCLINE 100 MG/10ML
100 INJECTION, POWDER, LYOPHILIZED, FOR SOLUTION INTRAVENOUS ONCE
Status: COMPLETED | OUTPATIENT
Start: 2019-01-01 | End: 2019-01-01

## 2019-01-01 RX ORDER — AMOXICILLIN 250 MG
2 CAPSULE ORAL 2 TIMES DAILY PRN
Status: DISCONTINUED | OUTPATIENT
Start: 2019-01-01 | End: 2019-01-01 | Stop reason: HOSPADM

## 2019-01-01 RX ORDER — DIPHENHYDRAMINE HCL 25 MG
25 CAPSULE ORAL EVERY 4 HOURS PRN
Status: CANCELLED | OUTPATIENT
Start: 2019-01-01

## 2019-01-01 RX ORDER — IOPAMIDOL 755 MG/ML
500 INJECTION, SOLUTION INTRAVASCULAR ONCE
Status: COMPLETED | OUTPATIENT
Start: 2019-01-01 | End: 2019-01-01

## 2019-01-01 RX ORDER — SUCRALFATE 1 G/1
1 TABLET ORAL 4 TIMES DAILY
Qty: 120 TABLET | Refills: 0 | Status: SHIPPED | OUTPATIENT
Start: 2019-01-01

## 2019-01-01 RX ORDER — FERROUS GLUCONATE 324(38)MG
324 TABLET ORAL DAILY
Status: DISCONTINUED | OUTPATIENT
Start: 2019-01-01 | End: 2019-01-01 | Stop reason: HOSPADM

## 2019-01-01 RX ORDER — FLUMAZENIL 0.1 MG/ML
0.2 INJECTION, SOLUTION INTRAVENOUS
Status: CANCELLED | OUTPATIENT
Start: 2019-01-01 | End: 2019-01-01

## 2019-01-01 RX ORDER — AMOXICILLIN 250 MG
1 CAPSULE ORAL 2 TIMES DAILY PRN
Status: DISCONTINUED | OUTPATIENT
Start: 2019-01-01 | End: 2019-01-01 | Stop reason: HOSPADM

## 2019-01-01 RX ORDER — FLUCONAZOLE 150 MG/1
TABLET ORAL
Qty: 3 TABLET | Refills: 0 | Status: SHIPPED | OUTPATIENT
Start: 2019-01-01 | End: 2019-01-01

## 2019-01-01 RX ORDER — ACETAMINOPHEN 325 MG/1
650 TABLET ORAL EVERY 4 HOURS PRN
Status: DISCONTINUED | OUTPATIENT
Start: 2019-01-01 | End: 2019-01-01

## 2019-01-01 RX ORDER — VIT C/E/ZN/COPPR/LUTEIN/ZEAXAN 60 MG-6 MG
1 CAPSULE ORAL DAILY
COMMUNITY

## 2019-01-01 RX ORDER — MULTIPLE VITAMINS W/ MINERALS TAB 9MG-400MCG
1 TAB ORAL DAILY
Status: DISCONTINUED | OUTPATIENT
Start: 2019-01-01 | End: 2019-01-01 | Stop reason: HOSPADM

## 2019-01-01 RX ORDER — FERROUS GLUCONATE 324(38)MG
TABLET ORAL
Qty: 30 TABLET | Refills: 10 | Status: SHIPPED | OUTPATIENT
Start: 2019-01-01 | End: 2019-01-01

## 2019-01-01 RX ORDER — DIPHENHYDRAMINE HYDROCHLORIDE 50 MG/ML
25 INJECTION INTRAMUSCULAR; INTRAVENOUS EVERY 4 HOURS PRN
Status: CANCELLED | OUTPATIENT
Start: 2019-01-01

## 2019-01-01 RX ORDER — ONDANSETRON 2 MG/ML
4 INJECTION INTRAMUSCULAR; INTRAVENOUS EVERY 6 HOURS PRN
Status: DISCONTINUED | OUTPATIENT
Start: 2019-01-01 | End: 2019-01-01

## 2019-01-01 RX ORDER — ACETAMINOPHEN 325 MG/1
650 TABLET ORAL EVERY 6 HOURS PRN
Status: DISCONTINUED | OUTPATIENT
Start: 2019-01-01 | End: 2019-01-01

## 2019-01-01 RX ADMIN — DOXYCYCLINE 50 MG: 100 INJECTION, POWDER, LYOPHILIZED, FOR SOLUTION INTRAVENOUS at 06:03

## 2019-01-01 RX ADMIN — CEFTRIAXONE 2 G: 2 INJECTION, POWDER, FOR SOLUTION INTRAMUSCULAR; INTRAVENOUS at 16:24

## 2019-01-01 RX ADMIN — IPRATROPIUM BROMIDE AND ALBUTEROL SULFATE 3 ML: .5; 3 SOLUTION RESPIRATORY (INHALATION) at 16:01

## 2019-01-01 RX ADMIN — PANTOPRAZOLE SODIUM 40 MG: 40 TABLET, DELAYED RELEASE ORAL at 07:03

## 2019-01-01 RX ADMIN — IPRATROPIUM BROMIDE AND ALBUTEROL SULFATE 3 ML: .5; 3 SOLUTION RESPIRATORY (INHALATION) at 07:54

## 2019-01-01 RX ADMIN — DOXYCYCLINE 100 MG: 100 INJECTION, POWDER, LYOPHILIZED, FOR SOLUTION INTRAVENOUS at 06:07

## 2019-01-01 RX ADMIN — IPRATROPIUM BROMIDE AND ALBUTEROL SULFATE 3 ML: .5; 3 SOLUTION RESPIRATORY (INHALATION) at 15:53

## 2019-01-01 RX ADMIN — IPRATROPIUM BROMIDE AND ALBUTEROL SULFATE 3 ML: .5; 3 SOLUTION RESPIRATORY (INHALATION) at 16:16

## 2019-01-01 RX ADMIN — FERROUS GLUCONATE 324 MG: 324 TABLET ORAL at 08:50

## 2019-01-01 RX ADMIN — ONDANSETRON 4 MG: 4 TABLET, ORALLY DISINTEGRATING ORAL at 09:30

## 2019-01-01 RX ADMIN — IPRATROPIUM BROMIDE AND ALBUTEROL SULFATE 3 ML: .5; 3 SOLUTION RESPIRATORY (INHALATION) at 07:06

## 2019-01-01 RX ADMIN — SUCRALFATE 1 G: 1 SUSPENSION ORAL at 08:02

## 2019-01-01 RX ADMIN — SODIUM CHLORIDE 500 ML: 9 INJECTION, SOLUTION INTRAVENOUS at 15:23

## 2019-01-01 RX ADMIN — ACETAMINOPHEN 325 MG: 325 TABLET, FILM COATED ORAL at 11:39

## 2019-01-01 RX ADMIN — IPRATROPIUM BROMIDE AND ALBUTEROL SULFATE 3 ML: .5; 3 SOLUTION RESPIRATORY (INHALATION) at 08:13

## 2019-01-01 RX ADMIN — SUCRALFATE 1 G: 1 SUSPENSION ORAL at 17:52

## 2019-01-01 RX ADMIN — SUCRALFATE 1 G: 1 SUSPENSION ORAL at 11:40

## 2019-01-01 RX ADMIN — FERROUS GLUCONATE 324 MG: 324 TABLET ORAL at 09:38

## 2019-01-01 RX ADMIN — PANTOPRAZOLE SODIUM 20 MG: 20 TABLET, DELAYED RELEASE ORAL at 08:08

## 2019-01-01 RX ADMIN — SUCRALFATE 1 G: 1 SUSPENSION ORAL at 21:26

## 2019-01-01 RX ADMIN — SUCRALFATE 1 G: 1 SUSPENSION ORAL at 11:58

## 2019-01-01 RX ADMIN — IPRATROPIUM BROMIDE AND ALBUTEROL SULFATE 3 ML: .5; 3 SOLUTION RESPIRATORY (INHALATION) at 19:14

## 2019-01-01 RX ADMIN — IPRATROPIUM BROMIDE AND ALBUTEROL SULFATE 3 ML: .5; 3 SOLUTION RESPIRATORY (INHALATION) at 11:42

## 2019-01-01 RX ADMIN — HUMAN ALBUMIN MICROSPHERES AND PERFLUTREN 3 ML: 10; .22 INJECTION, SOLUTION INTRAVENOUS at 10:45

## 2019-01-01 RX ADMIN — SUCRALFATE 1 G: 1 SUSPENSION ORAL at 21:55

## 2019-01-01 RX ADMIN — SUCRALFATE 1 G: 1 SUSPENSION ORAL at 14:27

## 2019-01-01 RX ADMIN — IPRATROPIUM BROMIDE AND ALBUTEROL SULFATE 3 ML: .5; 3 SOLUTION RESPIRATORY (INHALATION) at 11:32

## 2019-01-01 RX ADMIN — FERROUS GLUCONATE 324 MG: 324 TABLET ORAL at 09:05

## 2019-01-01 RX ADMIN — IOPAMIDOL 47 ML: 755 INJECTION, SOLUTION INTRAVENOUS at 14:38

## 2019-01-01 RX ADMIN — FERROUS GLUCONATE 324 MG: 324 TABLET ORAL at 18:30

## 2019-01-01 RX ADMIN — DOXYCYCLINE 50 MG: 100 INJECTION, POWDER, LYOPHILIZED, FOR SOLUTION INTRAVENOUS at 17:39

## 2019-01-01 RX ADMIN — CEFTRIAXONE 2 G: 2 INJECTION, POWDER, FOR SOLUTION INTRAMUSCULAR; INTRAVENOUS at 16:11

## 2019-01-01 RX ADMIN — ACETAMINOPHEN 650 MG: 325 TABLET, FILM COATED ORAL at 17:49

## 2019-01-01 RX ADMIN — PANTOPRAZOLE SODIUM 20 MG: 20 TABLET, DELAYED RELEASE ORAL at 08:01

## 2019-01-01 RX ADMIN — DOXYCYCLINE 100 MG: 100 INJECTION, POWDER, LYOPHILIZED, FOR SOLUTION INTRAVENOUS at 16:43

## 2019-01-01 RX ADMIN — IOPAMIDOL 33 ML: 755 INJECTION, SOLUTION INTRAVENOUS at 18:24

## 2019-01-01 RX ADMIN — IPRATROPIUM BROMIDE AND ALBUTEROL SULFATE 3 ML: .5; 3 SOLUTION RESPIRATORY (INHALATION) at 11:30

## 2019-01-01 RX ADMIN — IPRATROPIUM BROMIDE AND ALBUTEROL SULFATE 3 ML: .5; 3 SOLUTION RESPIRATORY (INHALATION) at 19:12

## 2019-01-01 RX ADMIN — INFLUENZA A VIRUS A/MICHIGAN/45/2015 X-275 (H1N1) ANTIGEN (FORMALDEHYDE INACTIVATED), INFLUENZA A VIRUS A/SINGAPORE/INFIMH-16-0019/2016 IVR-186 (H3N2) ANTIGEN (FORMALDEHYDE INACTIVATED), AND INFLUENZA B VIRUS B/MARYLAND/15/2016 BX-69A (A B/COLORADO/6/2017-LIKE VIRUS) ANTIGEN (FORMALDEHYDE INACTIVATED) 0.5 ML: 60; 60; 60 INJECTION, SUSPENSION INTRAMUSCULAR at 14:42

## 2019-01-01 RX ADMIN — IPRATROPIUM BROMIDE AND ALBUTEROL SULFATE 3 ML: .5; 3 SOLUTION RESPIRATORY (INHALATION) at 12:36

## 2019-01-01 RX ADMIN — MULTIPLE VITAMINS W/ MINERALS TAB 1 TABLET: TAB at 14:26

## 2019-01-01 RX ADMIN — MULTIPLE VITAMINS W/ MINERALS TAB 1 TABLET: TAB at 09:38

## 2019-01-01 RX ADMIN — PANTOPRAZOLE SODIUM 40 MG: 40 TABLET, DELAYED RELEASE ORAL at 09:05

## 2019-01-01 RX ADMIN — CEFTRIAXONE SODIUM 1 G: 1 INJECTION, POWDER, FOR SOLUTION INTRAMUSCULAR; INTRAVENOUS at 15:24

## 2019-01-01 RX ADMIN — ONDANSETRON 4 MG: 4 TABLET, ORALLY DISINTEGRATING ORAL at 07:00

## 2019-01-01 RX ADMIN — SUCRALFATE 1 G: 1 SUSPENSION ORAL at 08:10

## 2019-01-01 RX ADMIN — DOXYCYCLINE 50 MG: 100 INJECTION, POWDER, LYOPHILIZED, FOR SOLUTION INTRAVENOUS at 05:37

## 2019-01-01 RX ADMIN — SODIUM CHLORIDE 48 ML: 9 INJECTION, SOLUTION INTRAVENOUS at 18:24

## 2019-01-01 RX ADMIN — ACETAMINOPHEN 650 MG: 325 TABLET, FILM COATED ORAL at 16:11

## 2019-01-01 ASSESSMENT — MIFFLIN-ST. JEOR
SCORE: 522.54
SCORE: 549.75
SCORE: 538.41
SCORE: 535.24
SCORE: 524.35
SCORE: 525.26

## 2019-01-01 ASSESSMENT — ACTIVITIES OF DAILY LIVING (ADL)
ADLS_ACUITY_SCORE: 20
ADLS_ACUITY_SCORE: 18
ADLS_ACUITY_SCORE: 20
ADLS_ACUITY_SCORE: 18
ADLS_ACUITY_SCORE: 20
ADLS_ACUITY_SCORE: 18
ADLS_ACUITY_SCORE: 18
ADLS_ACUITY_SCORE: 20
ADLS_ACUITY_SCORE: 18
ADLS_ACUITY_SCORE: 20

## 2019-01-28 DIAGNOSIS — M81.0 AGE-RELATED OSTEOPOROSIS WITHOUT CURRENT PATHOLOGICAL FRACTURE: ICD-10-CM

## 2019-01-28 NOTE — TELEPHONE ENCOUNTER
"Requested Prescriptions   Pending Prescriptions Disp Refills     alendronate (FOSAMAX) 70 MG tablet [Pharmacy Med Name: ALENDRONATE SODIUM 70MG TABS] 12 tablet 3     Sig: TAKE 1 TABLET (70MG) BY MOUTH EVERY 7 DAYS. TAKE 60 MINUTES BEFORE MORNING MEAL WITH 8 OUNCES OF WATER. REMAIN UPRIGHT FOR 30 MINUTES.    Bisphosphonates Passed - 1/28/2019  4:48 PM       Passed - Recent (12 mo) or future (30 days) visit within the authorizing provider's specialty    Patient had office visit in the last 12 months or has a visit in the next 30 days with authorizing provider or within the authorizing provider's specialty.  See \"Patient Info\" tab in inbasket, or \"Choose Columns\" in Meds & Orders section of the refill encounter.             Passed - Dexa on file within past 2 years    Please review last Dexa result.          Passed - Medication is active on med list       Passed - Patient is age 18 or older       Passed - Normal serum creatinine on file within past 12 months    Recent Labs   Lab Test 11/10/18  1340   CR 0.70               Last Written Prescription Date:  2/2/2018  Last Fill Quantity: 12,  # refills: 3   Last office visit: 12/12/2018 with prescribing provider:  Riccardo Baca   Future Office Visit:      " negative

## 2019-01-30 RX ORDER — ALENDRONATE SODIUM 70 MG/1
TABLET ORAL
Qty: 12 TABLET | Refills: 2 | Status: SHIPPED | OUTPATIENT
Start: 2019-01-30

## 2019-01-30 NOTE — TELEPHONE ENCOUNTER
Prescription approved per [facility] Refill Protocol.  Blayne Lopez RN

## 2019-02-08 ENCOUNTER — TELEPHONE (OUTPATIENT)
Dept: FAMILY MEDICINE | Facility: CLINIC | Age: 84
End: 2019-02-08

## 2019-02-08 NOTE — TELEPHONE ENCOUNTER
Joanne MCDONNELL with [facility] [phone]  Requests VO   HHA 2 x week with nursing supervisory visits 1 x month. (confirmed)  Informed approved per standing orders.   Home Care staff will fax these orders for MD signature.       Sanna Fitch RN Flex

## 2019-03-08 NOTE — TELEPHONE ENCOUNTER
"Requested Prescriptions   Pending Prescriptions Disp Refills     ferrous gluconate (FERGON) 324 (38 Fe) MG tablet [Pharmacy Med Name: FERROUS GLUCONATE 324MG TABS]    Last Written Prescription Date: 2/2/18   Last Fill Quantity: 30 tablet,  # refills: 10   Last office visit: Department has no specialty with prescribing provider:  12/12/18 Keven  Future Office Visit:     30 tablet 10     Sig: TAKE ONE TABLET BY MOUTH EVERY DAY    Iron Supplements Passed - 3/8/2019 12:09 PM       Passed - Patient is 12 years of age or older       Passed - Recent (12 mo) or future (30 days) visit within the authorizing provider's specialty    Patient had office visit in the last 12 months or has a visit in the next 30 days with authorizing provider or within the authorizing provider's specialty.  See \"Patient Info\" tab in inbasket, or \"Choose Columns\" in Meds & Orders section of the refill encounter.             Passed - Hgb OR Hct on record within the past 12 mos.    Patient need only have had a HGB or HCT on file in the past 12 mos. That result does not need to be normal.    Recent Labs   Lab Test 11/10/18  1340 02/06/18  1332 10/26/17  1318   HGB 11.5* 10.9* 11.3*     Recent Labs   Lab Test 11/10/18  1340 10/26/17  1318 05/14/17  0542   HCT 37.6 36.4 31.5*       Please verify a HGB or HCT has been checked SINCE THE LAST DOSE CHANGE.           Passed - Medication is active on med list          "

## 2019-03-11 NOTE — TELEPHONE ENCOUNTER
Bert is out of office this week.   Prescription approved per [facility] Refill Protocol. X 30 day. Will ask ZB to review chart with next month's refill.   Blayne Lopez RN

## 2019-04-02 NOTE — PATIENT INSTRUCTIONS
"  Patient Education     Bladder Infection, Female (Adult)    Urine is normally doesn't have any bacteria in it. But bacteria can get into the urinary tract from the skin around the rectum. Or they can travel in the blood from elsewhere in the body. Once they are in your urinary tract, they can cause infection in the urethra (urethritis), the bladder (cystitis), or the kidneys (pyelonephritis).  The most common place for an infection is in the bladder. This is called a bladder infection. This is one of the most common infections in women. Most bladder infections are easily treated. They are not serious unless the infection spreads to the kidney.  The phrases \"bladder infection,\" \"UTI,\" and \"cystitis\" are often used to describe the same thing. But they are not always the same. Cystitis is an inflammation of the bladder. The most common cause of cystitis is an infection.  Symptoms  The infection causes inflammation in the urethra and bladder. This causes many of the symptoms. The most common symptoms of a bladder infection are:    Pain or burning when urinating    Having to urinate more often than usual    Urgent need to urinate    Only a small amount of urine comes out    Blood in urine    Abdominal discomfort. This is usually in the lower abdomen above the pubic bone.    Cloudy urine    Strong- or bad-smelling urine    Unable to urinate (urinary retention)    Unable to hold urine in (urinary incontinence)    Fever    Loss of appetite    Confusion (in older adults)  Causes  Bladder infections are not contagious. You can't get one from someone else, from a toilet seat, or from sharing a bath.  The most common cause of bladder infections is bacteria from the bowels. The bacteria get onto the skin around the opening of the urethra. From there, they can get into the urine and travel up to the bladder, causing inflammation and infection. This usually happens because of:    Wiping improperly after urinating. Always wipe " from front to back.    Bowel incontinence    Pregnancy    Procedures such as having a catheter inserted    Older age    Not emptying your bladder. This can allow bacteria a chance to grow in your urine.    Dehydration    Constipation    Sex    Use of a diaphragm for birth control   Treatment  Bladder infections are diagnosed by a urine test. They are treated with antibiotics and usually clear up quickly without complications. Treatment helps prevent a more serious kidney infection.  Medicines  Medicines can help in the treatment of a bladder infection:    Take antibiotics until they are used up, even if you feel better. It is important to finish them to make sure the infection has cleared.    You can use acetaminophen or ibuprofen for pain, fever, or discomfort, unless another medicine was prescribed. If you have chronic liver or kidney disease, talk with your healthcare provider before using these medicines. Also talk with your provider if you've ever had a stomach ulcer or gastrointestinal bleeding, or are taking blood-thinner medicines.    If you are given phenazopydridine to reduce burning with urination, it will cause your urine to become a bright orange color. This can stain clothing.  Care and prevention  These self-care steps can help prevent future infections:    Drink plenty of fluids to prevent dehydration and flush out your bladder. Do this unless you must restrict fluids for other health reasons, or your doctor told you not to.    Proper cleaning after going to the bathroom is important. Wipe from front to back after using the toilet to prevent the spread of bacteria.    Urinate more often. Don't try to hold urine in for a long time.    Wear loose-fitting clothes and cotton underwear. Avoid tight-fitting pants.    Improve your diet and prevent constipation. Eat more fresh fruit and vegetables, and fiber, and less junk and fatty foods.    Avoid sex until your symptoms are gone.    Avoid caffeine,  alcohol, and spicy foods. These can irritate your bladder.    Urinate right after intercourse to flush out your bladder.    If you use birth control pills and have frequent bladder infections, discuss it with your doctor.  Follow-up care  Call your healthcare provider if all symptoms are not gone after 3 days of treatment. This is especially important if you have repeat infections.  If a culture was done, you will be told if your treatment needs to be changed. If directed, you can call to find out the results.  If X-rays were done, you will be told if the results will affect your treatment.  Call 911  Call 911 if any of the following occur:    Trouble breathing    Hard to wake up or confusion    Fainting or loss of consciousness    Rapid heart rate  When to seek medical advice  Call your healthcare provider right away if any of these occur:    Fever of 100.4 F (38.0 C) or higher, or as directed by your healthcare provider    Symptoms are not better by the third day of treatment    Back or belly (abdominal) pain that gets worse    Repeated vomiting, or unable to keep medicine down    Weakness or dizziness    Vaginal discharge    Pain, redness, or swelling in the outer vaginal area (labia)  Date Last Reviewed: 10/1/2016    1814-9585 [employer]. [address] [place] [address], [place] [address]. All rights reserved. This information is not intended as a substitute for professional medical care. Always follow your healthcare professional's instructions.

## 2019-04-02 NOTE — PROGRESS NOTES
SUBJECTIVE:   Nedra Carr is a 86 year old female who presents to clinic today for the following health issues:      URINARY TRACT SYMPTOMS  Onset: X 1 week    Description:   Painful urination (Dysuria): YES  Blood in urine (Hematuria): no   Delay in urine (Hesitency): no     Intensity: moderate    Progression of Symptoms:  worsening    Accompanying Signs & Symptoms:  Fever/chills: no   Flank pain no   Nausea and vomiting: no   Any vaginal symptoms: none  Abdominal/Pelvic Pain: no     History:   History of frequent UTI's: YES  History of kidney stones: YES  Sexually Active: no   Possibility of pregnancy: No    Precipitating factors:   none    Therapies Tried and outcome: Cranberry juice prn (contraindicated in Coumadin patients) and OTC advil or tylenol       Problem list and histories reviewed & adjusted, as indicated.  Additional history: as documented    Patient Active Problem List   Diagnosis     Hallux varus, acquired     Other hammer toe (acquired)     Osteoporosis     HTN (hypertension)     Cataract     CARDIOVASCULAR SCREENING; LDL GOAL LESS THAN 130     Advance Care Planning     Intractable chronic migraine without aura     Anemia     Perforated gastric ulcer (H)     Emphysematous cholecystitis     Health Care Home     Paroxysmal atrial fibrillation (H)     Abdominal pain, unspecified abdominal location     UTI (urinary tract infection)     Sepsis (H)     Headache     Physical deconditioning     Urinary bladder stone     S/P cystoscopy     Hypoxia     Hydronephrosis     Acute cholecystitis     Cardiogenic shock (H)     Closed compression fracture of thoracic vertebra (H)     Family history of other digestive disorders     Allergic rhinitis     Arthritis of hand     Esophageal stricture     Obstructive sleep apnea     Weakness     At high risk for falls     Multiple pelvic fractures     Mobility impaired     Abdominal pain     Other idiopathic scoliosis, unspecified spinal region     Past Surgical  History:   Procedure Laterality Date     BACK SURGERY       BRONCHOSCOPY FLEXIBLE N/A 11/21/2014    Procedure: BRONCHOSCOPY FLEXIBLE;  Surgeon: Rhonda Donohue MD;  Location: [facility]     C NONSPECIFIC PROCEDURE      s/p dilation at [facility] by Dr. Radha FELIPE NONSPECIFIC PROCEDURE      s/p dilation at [facility] by Dr. Betty FELIPE NONSPECIFIC PROCEDURE      Vag hysterectomy     CHOLECYSTECTOMY N/A 11/15/2014    Procedure: CHOLECYSTECTOMY;  Surgeon: Yomi Brennan MD;  Location: [facility]     CYSTOSCOPY, LITHOLAPAXY, COMBINED N/A 4/8/2015    Procedure: COMBINED CYSTOSCOPY, LITHOLAPAXY;  Surgeon: Randy Reno MD;  Location: [facility]     ENT SURGERY       ESOPHAGOSCOPY, GASTROSCOPY, DUODENOSCOPY (EGD), COMBINED  11/21/2012    Procedure: COMBINED ESOPHAGOSCOPY, GASTROSCOPY, DUODENOSCOPY (EGD), BIOPSY SINGLE OR MULTIPLE;   ESOPHAGOSCOPY, GASTROSCOPY, DUODENOSCOPY (EGD)   with cold biopsy and dilalation with #15 savary;  Surgeon: Guillermo Arcos MD;  Location: [facility]     ESOPHAGOSCOPY, GASTROSCOPY, DUODENOSCOPY (EGD), COMBINED  2/22/2013    Procedure: COMBINED ESOPHAGOSCOPY, GASTROSCOPY, DUODENOSCOPY (EGD);  ESOPHAGOSCOPY, GASTROSCOPY, DUODENOSCOPY (EGD) ;  Surgeon: Lissett Posada MD;  Location: [facility]     ESOPHAGOSCOPY, GASTROSCOPY, DUODENOSCOPY (EGD), COMBINED N/A 10/29/2014    Procedure: COMBINED ESOPHAGOSCOPY, GASTROSCOPY, DUODENOSCOPY (EGD);  Surgeon: Dany Ambrocio MD;  Location: [facility]     ESOPHAGOSCOPY, GASTROSCOPY, DUODENOSCOPY (EGD), COMBINED N/A 1/13/2015    Procedure: COMBINED ESOPHAGOSCOPY, GASTROSCOPY, DUODENOSCOPY (EGD), BIOPSY SINGLE OR MULTIPLE;  Surgeon: Dany Ambrocio MD;  Location: [facility]     ESOPHAGOSCOPY, GASTROSCOPY, DUODENOSCOPY (EGD), COMBINED N/A 3/5/2015    Procedure: COMBINED ESOPHAGOSCOPY, GASTROSCOPY, DUODENOSCOPY (EGD);  Surgeon: Dany Ambrocio MD;  [facility]: [facility]     ESOPHAGOSCOPY, GASTROSCOPY, DUODENOSCOPY (EGD), COMBINED N/A 11/10/2015    Procedure: COMBINED  ESOPHAGOSCOPY, GASTROSCOPY, DUODENOSCOPY (EGD);  Surgeon: Dany Ambrocio MD;  Location: [facility]     EYE SURGERY       GYN SURGERY       LAPAROTOMY EXPLORATORY N/A 11/15/2014    Procedure: LAPAROTOMY EXPLORATORY;  Surgeon: Yomi Brennan MD;  Location: [facility]     LASER HOLMIUM LITHOTRIPSY BLADDER N/A 4/8/2015    Procedure: LASER HOLMIUM LITHOTRIPSY BLADDER;  Surgeon: Randy Reno MD;  Location: [facility]     ORTHOPEDIC SURGERY      hip fx surgery x 2       Social History     Tobacco Use     Smoking status: Never Smoker     Smokeless tobacco: Never Used   Substance Use Topics     Alcohol use: No     Alcohol/week: 0.0 oz     Family History   Problem Relation Age of Onset     Diabetes Mother      Diabetes Sister      Heart Disease Son          Current Outpatient Medications   Medication Sig Dispense Refill     acetaminophen (TYLENOL) 325 MG tablet Take 2 tablets (650 mg) by mouth every 6 hours as needed for mild pain 60 tablet 0     albuterol (PROAIR HFA, PROVENTIL HFA, VENTOLIN HFA) 108 (90 BASE) MCG/ACT inhaler Inhale 2 puffs into the lungs every 6 hours as needed for shortness of breath / dyspnea or wheezing 1 Inhaler 1     alendronate (FOSAMAX) 70 MG tablet TAKE 1 TABLET (70MG) BY MOUTH EVERY 7 DAYS. TAKE 60 MINUTES BEFORE MORNING MEAL WITH 8 OUNCES OF WATER. REMAIN UPRIGHT FOR 30 MINUTES. 12 tablet 2     ferrous gluconate (FERGON) 324 (38 Fe) MG tablet TAKE ONE TABLET BY MOUTH EVERY DAY 30 tablet 10     multivitamin, therapeutic with minerals (MULTI-VITAMIN) TABS Take 1 tablet by mouth daily. 100 tablet 3     Nutritional Supplements (ENSURE) LIQD Take 0.5 Cans by mouth daily (strawberry) 15 each 11     order for DME Equipment being ordered: lumbar corset 1 Units 0     order for DME Equipment being ordered: abdominal binder 1 Units 0     oxyCODONE IR (ROXICODONE) 5 MG tablet Take 0.5 tablets (2.5 mg) by mouth every 6 hours as needed for pain 20 tablet 0     pantoprazole (PROTONIX) 40 MG EC tablet Take 1  tablet (40 mg) by mouth every morning (before breakfast) Take 30-60 minutes before a meal. 90 tablet 1     ranitidine (ZANTAC) 300 MG tablet Take 1 tablet (300 mg) by mouth At Bedtime 30 tablet 1     sulfamethoxazole-trimethoprim (BACTRIM DS/SEPTRA DS) 800-160 MG tablet Take 1 tablet by mouth 2 times daily 6 tablet 0     valACYclovir (VALTREX) 1000 mg tablet Take 1 tablet (1,000 mg) by mouth 3 times daily for 10 days 30 tablet 0     Allergies   Allergen Reactions     No Known Drug Allergies      BP Readings from Last 3 Encounters:   04/02/19 129/79   12/12/18 108/62   11/13/18 137/81    Wt Readings from Last 3 Encounters:   04/02/19 31.8 kg (70 lb)   12/12/18 31.8 kg (70 lb)   11/13/18 31.8 kg (70 lb)                    Reviewed and updated as needed this visit by clinical staff  Tobacco  Allergies  Meds  Problems  Med Hx  Surg Hx  Fam Hx  Soc Hx        Reviewed and updated as needed this visit by Provider  Tobacco  Allergies  Meds  Problems  Med Hx  Surg Hx  Fam Hx         ROS:  Constitutional, HEENT, cardiovascular, pulmonary, gi and gu systems are negative, except as otherwise noted.    OBJECTIVE:     /79 (BP Location: Right arm, Patient Position: Chair, Cuff Size: Adult Regular)   Pulse 113   Temp 97.7  F (36.5  C) (Oral)   Resp 12   Wt 31.8 kg (70 lb)   SpO2 99%   BMI 20.50 kg/m    Body mass index is 20.5 kg/m .  GENERAL: alert, no distress, frail and elderly  RESP: lungs clear to auscultation - no rales, rhonchi or wheezes  CV: regular rates and rhythm  ABDOMEN: soft, nontender and bowel sounds normal  PSYCH: mentation appears normal, affect normal/bright    Diagnostic Test Results:  Results for orders placed or performed in visit on 04/02/19 (from the past 24 hour(s))   *UA reflex to Microscopic and Culture ([place] and [facility] (except [place] and [place])   Result Value Ref Range    Color Urine Yellow     Appearance Urine Slightly Cloudy     Glucose Urine Negative  NEG^Negative mg/dL    Bilirubin Urine Negative NEG^Negative    Ketones Urine Negative NEG^Negative mg/dL    Specific Gravity Urine 1.025 1.003 - 1.035    Blood Urine Small (A) NEG^Negative    pH Urine 5.5 5.0 - 7.0 pH    Protein Albumin Urine 30 (A) NEG^Negative mg/dL    Urobilinogen Urine 0.2 0.2 - 1.0 EU/dL    Nitrite Urine Negative NEG^Negative    Leukocyte Esterase Urine Large (A) NEG^Negative    Source Midstream Urine    Urine Microscopic   Result Value Ref Range    WBC Urine  (A) OTO5^0 - 5 /HPF    RBC Urine 2-5 (A) OTO2^O - 2 /HPF    WBC Clumps Present (A) NEG^Negative /HPF    Hyaline Casts O - 2 OTO2^O - 2 /LPF    Squamous Epithelial /LPF Urine Moderate (A) FEW^Few /LPF    Bacteria Urine Few (A) NEG^Negative /HPF       ASSESSMENT/PLAN:     (N30.01) Acute cystitis with hematuria  (primary encounter diagnosis)  Comment: UA suggests this as diagnosis, though has been seen 2-3 times within the last year where culture grew normal barrera. Was treated with and tolerated well bactrim last check and renal function was normal on last check ~5 months ago. Will prescribe this again and await culture. If no growth, will have see urology given recurrence.   Plan: *UA reflex to Microscopic and Culture ([place]         and [facility] (except [facility] and         [place]), Urine Microscopic,         sulfamethoxazole-trimethoprim (BACTRIM         DS/SEPTRA DS) 800-160 MG tablet, Urine Culture         Aerobic Bacterial, CANCELED: UA reflex to         Microscopic        -Medication use and side effects discussed with the patient. Patient is in complete understanding and agreement with plan.       (R82.90) Nonspecific finding on examination of urine  Comment:   Plan: Urine Culture Aerobic Bacterial            (I48.0) Paroxysmal atrial fibrillation (H)  Comment: stable. Present during hospitalization in the past. No recurrence. No management required.   Plan:     Follow up: as above     Juan A Solo  CANDE  [facility]

## 2019-04-09 NOTE — PROGRESS NOTES
[facility] now requests orders and shares plan of care/discharge summaries for some patients through [employer].  Please REPLY TO THIS MESSAGE OR ROUTE BACK TO THE AUTHOR in order to give authorization for orders when needed.  This is considered a verbal order, you will still receive a faxed copy of orders for signature.  Thank you for your assistance in improving collaboration for our patients.    ORDER//   [facility] twice weekly, RN to supervise per agency protocol.       SUMMARY TO MD  SITUATION// 60 day recertification for continued home care services.   BACKGROUND// 85 yo female with Hx of compression Fxs, generalized weakness, Hx of falls, perforated prepyloric ulcer, stomach repair with gallbladder removal, polymagia rheumatica, HTN, chronic anemia, migraine, osteoporosis, sleep apnea, hx kidney stones, hx c diff. Lives alone in senior apartment. Denies pain today, does state chronic back pain occurs daily but not constant. Dependent on caregiver for bathing and personal cares due to fall risk and limited mobility. Does have some SOB with moderate exertion, a/o x4, able to make needs known. Occasional incontinence,  pt is currently being treated with abx for UTI.  Adequate appetite. Continues to use wheeled walker.   ANALYSIS// Pt requires ongoing supportive services to remain safe in home setting.   RECOMMENDATION// Continue HHA visits 2x/week for assistance with bathing, personal cares and light housekeeping.     Thank you!

## 2019-06-07 NOTE — TELEPHONE ENCOUNTER
[facility] now requests orders and shares plan of care/discharge summaries for some patients through [employer].  Please REPLY TO THIS MESSAGE OR ROUTE BACK TO THE AUTHOR in order to give authorization for orders when needed.  This is considered a verbal order, you will still receive a faxed copy of orders for signature.  Thank you for your assistance in improving collaboration for our patients.    ORDER    60 day recert, continue 2x/wk HHA for personal cares and bathing, RN to supervise. No change in plan.     Thanks!

## 2019-06-17 NOTE — PROGRESS NOTES
Subjective     Nedra Carr is a 86 year old female who presents to clinic today for the following health issues:    HPI   URINARY TRACT SYMPTOMS  Onset: 5-6 days    Description:   Painful urination (Dysuria): YES  Blood in urine (Hematuria): no   Delay in urine (Hesitency): YES    Intensity: mild, moderate    Progression of Symptoms:  worsening    Accompanying Signs & Symptoms:  Fever/chills: no   Flank pain YES  Nausea and vomiting: no   Any vaginal symptoms: none  Abdominal/Pelvic Pain: no     History:   History of frequent UTI's: YES  History of kidney stones: YES  Sexually Active: no   Possibility of pregnancy: No    Precipitating factors:       Therapies Tried and outcome: Cranberry juice  - didn't help      Previous 2 urine Cx with yeast    Reviewed and updated as needed this visit by Provider         Review of Systems         Objective    /62 (BP Location: Right arm, Patient Position: Sitting, Cuff Size: Adult Regular)   Pulse 60   Temp 97.4  F (36.3  C) (Oral)   Resp 12   Wt 30.8 kg (68 lb)   SpO2 95%   BMI 19.91 kg/m    Body mass index is 19.91 kg/m .  Physical Exam   Results for orders placed or performed in visit on 06/17/19   *UA reflex to Microscopic and Culture ([place] and [facility] (except [place] and [place])   Result Value Ref Range    Color Urine Yellow     Appearance Urine Cloudy     Glucose Urine Negative NEG^Negative mg/dL    Bilirubin Urine Negative NEG^Negative    Ketones Urine Negative NEG^Negative mg/dL    Specific Gravity Urine 1.020 1.003 - 1.035    Blood Urine Moderate (A) NEG^Negative    pH Urine 5.5 5.0 - 7.0 pH    Protein Albumin Urine 100 (A) NEG^Negative mg/dL    Urobilinogen Urine 0.2 0.2 - 1.0 EU/dL    Nitrite Urine Negative NEG^Negative    Leukocyte Esterase Urine Moderate (A) NEG^Negative    Source Midstream Urine    Urine Microscopic   Result Value Ref Range    WBC Urine  (A) OTO5^0 - 5 /HPF    RBC Urine 10-25 (A) OTO2^O - 2 /HPF    WBC Clumps  Present (A) NEG^Negative /HPF    Squamous Epithelial /LPF Urine Few FEW^Few /LPF    Bacteria Urine Few (A) NEG^Negative /HPF       ASSESSMENT / PLAN:  (R82.90) Nonspecific finding on examination of urine  (primary encounter diagnosis)  Comment: yeast likely  Plan: Urine Culture Aerobic Bacterial, fluconazole         (DIFLUCAN) 150 MG tablet        Rx additional for future recurrence    (N30.01) Acute cystitis with hematuria  Comment: cx   Plan: Urine Microscopic, Urine Culture Aerobic         Bacterial, fluconazole (DIFLUCAN) 150 MG tablet                  Patel Grullon MD

## 2019-08-05 NOTE — LETTER
Appointments:     CT scan Monday August 12 1:30 PM    [address]  [facility]  [address] [place] [address]       Office visit with Dr. Reno   Friday September 30 at 12:45   [address]   [address] [place]  (next to the [facility])  [place], [place]

## 2019-08-05 NOTE — PROGRESS NOTES
Subjective     Nedra Carr is a 86 year old female who presents to clinic today for the following health issues:    HPI     URINARY TRACT SYMPTOMS  Onset: x 1 month    Description:   Painful urination (Dysuria): no            Frequency: YES  Blood in urine (Hematuria): no   Delay in urine (Hesitency): no     Intensity: mild    Progression of Symptoms:  same    Accompanying Signs & Symptoms:  Fever/chills: no   Flank pain: YES-left. History of chronic back pain. Patient is unaware if related to urinary symptoms or not. No trauma.   Nausea and vomiting: no   Any vaginal symptoms: none  Abdominal/Pelvic Pain: no     History:   History of frequent UTI's: YES  History of kidney stones: YES  Sexually Active: no   Possibility of pregnancy: No    Therapies Tried and outcome: none    History of recurrent candida UTI infections over the last year.     Patient Active Problem List   Diagnosis     Hallux varus, acquired     Other hammer toe (acquired)     Osteoporosis     HTN (hypertension)     Cataract     CARDIOVASCULAR SCREENING; LDL GOAL LESS THAN 130     Advance Care Planning     Intractable chronic migraine without aura     Anemia     Perforated gastric ulcer (H)     Emphysematous cholecystitis     Health Care Home     Paroxysmal atrial fibrillation (H)     Abdominal pain, unspecified abdominal location     UTI (urinary tract infection)     Sepsis (H)     Headache     Physical deconditioning     Urinary bladder stone     S/P cystoscopy     Hypoxia     Hydronephrosis     Acute cholecystitis     Cardiogenic shock (H)     Closed compression fracture of thoracic vertebra (H)     Family history of other digestive disorders     Allergic rhinitis     Arthritis of hand     Esophageal stricture     Obstructive sleep apnea     Weakness     At high risk for falls     Multiple pelvic fractures     Mobility impaired     Abdominal pain     Other idiopathic scoliosis, unspecified spinal region     Past Surgical History:   Procedure  Laterality Date     BACK SURGERY       BRONCHOSCOPY FLEXIBLE N/A 11/21/2014    Procedure: BRONCHOSCOPY FLEXIBLE;  Surgeon: Rhonda Donohue MD;  Location: [facility]     C NONSPECIFIC PROCEDURE      s/p dilation at [facility] by Dr. Radha FELIPE NONSPECIFIC PROCEDURE      s/p dilation at [facility] by Dr. Betty FELIPE NONSPECIFIC PROCEDURE      Vag hysterectomy     CHOLECYSTECTOMY N/A 11/15/2014    Procedure: CHOLECYSTECTOMY;  Surgeon: Yomi Brennan MD;  Location: [facility]     CYSTOSCOPY, LITHOLAPAXY, COMBINED N/A 4/8/2015    Procedure: COMBINED CYSTOSCOPY, LITHOLAPAXY;  Surgeon: Randy Reno MD;  Location: [facility]     ENT SURGERY       ESOPHAGOSCOPY, GASTROSCOPY, DUODENOSCOPY (EGD), COMBINED  11/21/2012    Procedure: COMBINED ESOPHAGOSCOPY, GASTROSCOPY, DUODENOSCOPY (EGD), BIOPSY SINGLE OR MULTIPLE;   ESOPHAGOSCOPY, GASTROSCOPY, DUODENOSCOPY (EGD)   with cold biopsy and dilalation with #15 savary;  Surgeon: Guillermo Arcos MD;  Location: [facility]     ESOPHAGOSCOPY, GASTROSCOPY, DUODENOSCOPY (EGD), COMBINED  2/22/2013    Procedure: COMBINED ESOPHAGOSCOPY, GASTROSCOPY, DUODENOSCOPY (EGD);  ESOPHAGOSCOPY, GASTROSCOPY, DUODENOSCOPY (EGD) ;  Surgeon: Lissett Posada MD;  Location: [facility]     ESOPHAGOSCOPY, GASTROSCOPY, DUODENOSCOPY (EGD), COMBINED N/A 10/29/2014    Procedure: COMBINED ESOPHAGOSCOPY, GASTROSCOPY, DUODENOSCOPY (EGD);  Surgeon: Dany Ambrocio MD;  [facility]     ESOPHAGOSCOPY, GASTROSCOPY, DUODENOSCOPY (EGD), COMBINED N/A 1/13/2015    Procedure: COMBINED ESOPHAGOSCOPY, GASTROSCOPY, DUODENOSCOPY (EGD), BIOPSY SINGLE OR MULTIPLE;  Surgeon: Dany Ambrocio MD;  [facility]: [facility]     ESOPHAGOSCOPY, GASTROSCOPY, DUODENOSCOPY (EGD), COMBINED N/A 3/5/2015    Procedure: COMBINED ESOPHAGOSCOPY, GASTROSCOPY, DUODENOSCOPY (EGD);  Surgeon: Dany Ambrocio MD;  [facility]     ESOPHAGOSCOPY, GASTROSCOPY, DUODENOSCOPY (EGD), COMBINED N/A 11/10/2015    Procedure: COMBINED ESOPHAGOSCOPY,  GASTROSCOPY, DUODENOSCOPY (EGD);  Surgeon: Dany Ambrocio MD;  [facility]: [facility]     EYE SURGERY       GYN SURGERY       LAPAROTOMY EXPLORATORY N/A 11/15/2014    Procedure: LAPAROTOMY EXPLORATORY;  Surgeon: Yomi Brennan MD;  [facility]: [facility]     LASER HOLMIUM LITHOTRIPSY BLADDER N/A 4/8/2015    Procedure: LASER HOLMIUM LITHOTRIPSY BLADDER;  Surgeon: Randy Reno MD;  Location: [facility]     ORTHOPEDIC SURGERY      hip fx surgery x 2       Social History     Tobacco Use     Smoking status: Never Smoker     Smokeless tobacco: Never Used   Substance Use Topics     Alcohol use: No     Alcohol/week: 0.0 oz     Family History   Problem Relation Age of Onset     Diabetes Mother      Diabetes Sister      Heart Disease Son          Current Outpatient Medications   Medication Sig Dispense Refill     acetaminophen (TYLENOL) 325 MG tablet Take 2 tablets (650 mg) by mouth every 6 hours as needed for mild pain 60 tablet 0     alendronate (FOSAMAX) 70 MG tablet TAKE 1 TABLET (70MG) BY MOUTH EVERY 7 DAYS. TAKE 60 MINUTES BEFORE MORNING MEAL WITH 8 OUNCES OF WATER. REMAIN UPRIGHT FOR 30 MINUTES. 12 tablet 2     cephALEXin (KEFLEX) 500 MG capsule Take 1 capsule (500 mg) by mouth 2 times daily for 5 days 10 capsule 0     fluconazole (DIFLUCAN) 150 MG tablet Take 1 tablet (150 mg) by mouth daily for 3 days 3 tablet 0     multivitamin, therapeutic with minerals (MULTI-VITAMIN) TABS Take 1 tablet by mouth daily. 100 tablet 3     Nutritional Supplements (ENSURE) LIQD Take 0.5 Cans by mouth daily (strawberry) 15 each 11     pantoprazole (PROTONIX) 40 MG EC tablet Take 1 tablet (40 mg) by mouth every morning (before breakfast) Take 30-60 minutes before a meal. 90 tablet 1     ferrous gluconate (FERGON) 324 (38 Fe) MG tablet TAKE ONE TABLET BY MOUTH EVERY DAY 30 tablet 10     Allergies   Allergen Reactions     No Known Drug Allergies      BP Readings from Last 3 Encounters:   08/05/19 110/68   06/17/19 100/62   04/02/19 129/79     Wt Readings from Last 3 Encounters:   08/05/19 (!) 29.9 kg (66 lb)   06/17/19 30.8 kg (68 lb)   04/02/19 31.8 kg (70 lb)                  Reviewed and updated as needed this visit by Provider  Tobacco  Allergies  Meds  Problems  Med Hx  Surg Hx  Fam Hx         Review of Systems   ROS COMP: Constitutional, HEENT, cardiovascular, pulmonary, GI, , musculoskeletal, neuro, skin, endocrine and psych systems are negative, except as otherwise noted.      Objective    /68 (BP Location: Right arm, Patient Position: Chair, Cuff Size: Child)   Pulse 92   Temp 97.6  F (36.4  C) (Oral)   Resp 16   Wt (!) 29.9 kg (66 lb)   BMI 19.33 kg/m    Body mass index is 19.33 kg/m .  Physical Exam   GENERAL: alert, no distress, frail and elderly  RESP: lungs clear to auscultation - no rales, rhonchi or wheezes  NEURO: Normal strength and tone, mentation intact and speech normal  BACK: no CVA tenderness, no paralumbar tenderness  PSYCH: mentation appears normal, affect normal/bright    Diagnostic Test Results:  Results for orders placed or performed in visit on 08/05/19 (from the past 24 hour(s))   *UA reflex to Microscopic and Culture ([place] and [facility] (except [place] and [place])   Result Value Ref Range    Color Urine Yellow     Appearance Urine Cloudy     Glucose Urine Negative NEG^Negative mg/dL    Bilirubin Urine Negative NEG^Negative    Ketones Urine Negative NEG^Negative mg/dL    Specific Gravity Urine 1.025 1.003 - 1.035    Blood Urine Moderate (A) NEG^Negative    pH Urine 5.0 5.0 - 7.0 pH    Protein Albumin Urine 100 (A) NEG^Negative mg/dL    Urobilinogen Urine 0.2 0.2 - 1.0 EU/dL    Nitrite Urine Negative NEG^Negative    Leukocyte Esterase Urine Large (A) NEG^Negative    Source Midstream Urine    Urine Microscopic   Result Value Ref Range    WBC Urine >100 (A) OTO5^0 - 5 /HPF    RBC Urine 2-5 (A) OTO2^O - 2 /HPF    WBC Clumps Present (A) NEG^Negative /HPF    Squamous Epithelial /LPF Urine Few  FEW^Few /LPF    Bacteria Urine Few (A) NEG^Negative /HPF    Yeast Urine Moderate (A) NEG^Negative /HPF   CBC with platelets   Result Value Ref Range    WBC 7.5 4.0 - 11.0 10e9/L    RBC Count 3.37 (L) 3.8 - 5.2 10e12/L    Hemoglobin 9.9 (L) 11.7 - 15.7 g/dL    Hematocrit 32.2 (L) 35.0 - 47.0 %    MCV 96 78 - 100 fl    MCH 29.4 26.5 - 33.0 pg    MCHC 30.7 (L) 31.5 - 36.5 g/dL    RDW 13.7 10.0 - 15.0 %    Platelet Count 429 150 - 450 10e9/L           Assessment & Plan     (N30.01) Acute cystitis with hematuria  (primary encounter diagnosis)  Comment: history of this in the past and evidence of this on UA today. Yeast present. Urine culture is pending. Will treat with diflucan and keflex BID for 5 days pending urine culture. Given recurrence, will have see urology as well. Of note, does have left renal stone nonobstructing on CT in nov. Recommending bmp and CT repeat given left flank pain on history though no pain on exam. Possible msk given severe scoliosis and history of compression fractures however. Would like this obtained prior to urology evaluation.   Plan: cephALEXin (KEFLEX) 500 MG capsule, fluconazole        (DIFLUCAN) 150 MG tablet, UROLOGY ADULT         REFERRAL, CT Abdomen Pelvis w/o Contrast, Basic        metabolic panel        -Medication use and side effects discussed with the patient. Patient is in complete understanding and agreement with plan.       (R35.0) Frequent urination  Comment: as above  Plan: *UA reflex to Microscopic and Culture ([facility]         and [facility] (except [facility] and         [place]), Urine Culture Aerobic Bacterial,         Urine Microscopic, cephALEXin (KEFLEX) 500 MG         capsule, fluconazole (DIFLUCAN) 150 MG tablet,         UROLOGY ADULT REFERRAL, CT Abdomen Pelvis w/o         Contrast            (R82.90) Nonspecific finding on examination of urine  Comment:   Plan: Urine Culture Aerobic Bacterial            (D50.8) Other iron deficiency anemia  Comment: history  of this. On chronic iron. Recommending recheck today given last check 9 months previously.   Plan: CBC with platelets                 Follow up: as above     Return in about 6 months (around 2/5/2020) for Routine Visit.    Juan A Solo PA-C  [facility]

## 2019-08-05 NOTE — PATIENT INSTRUCTIONS
"  Patient Education     Bladder Infection, Female (Adult)    Urine is normally doesn't have any bacteria in it. But bacteria can get into the urinary tract from the skin around the rectum. Or they can travel in the blood from elsewhere in the body. Once they are in your urinary tract, they can cause infection in the urethra (urethritis), the bladder (cystitis), or the kidneys (pyelonephritis).  The most common place for an infection is in the bladder. This is called a bladder infection. This is one of the most common infections in women. Most bladder infections are easily treated. They are not serious unless the infection spreads to the kidney.  The phrases \"bladder infection,\" \"UTI,\" and \"cystitis\" are often used to describe the same thing. But they are not always the same. Cystitis is an inflammation of the bladder. The most common cause of cystitis is an infection.  Symptoms  The infection causes inflammation in the urethra and bladder. This causes many of the symptoms. The most common symptoms of a bladder infection are:    Pain or burning when urinating    Having to urinate more often than usual    Urgent need to urinate    Only a small amount of urine comes out    Blood in urine    Abdominal discomfort. This is usually in the lower abdomen above the pubic bone.    Cloudy urine    Strong- or bad-smelling urine    Unable to urinate (urinary retention)    Unable to hold urine in (urinary incontinence)    Fever    Loss of appetite    Confusion (in older adults)  Causes  Bladder infections are not contagious. You can't get one from someone else, from a toilet seat, or from sharing a bath.  The most common cause of bladder infections is bacteria from the bowels. The bacteria get onto the skin around the opening of the urethra. From there, they can get into the urine and travel up to the bladder, causing inflammation and infection. This usually happens because of:    Wiping improperly after urinating. Always wipe " from front to back.    Bowel incontinence    Pregnancy    Procedures such as having a catheter inserted    Older age    Not emptying your bladder. This can allow bacteria a chance to grow in your urine.    Dehydration    Constipation    Sex    Use of a diaphragm for birth control   Treatment  Bladder infections are diagnosed by a urine test. They are treated with antibiotics and usually clear up quickly without complications. Treatment helps prevent a more serious kidney infection.  Medicines  Medicines can help in the treatment of a bladder infection:    Take antibiotics until they are used up, even if you feel better. It is important to finish them to make sure the infection has cleared.    You can use acetaminophen or ibuprofen for pain, fever, or discomfort, unless another medicine was prescribed. If you have chronic liver or kidney disease, talk with your healthcare provider before using these medicines. Also talk with your provider if you've ever had a stomach ulcer or gastrointestinal bleeding, or are taking blood-thinner medicines.    If you are given phenazopydridine to reduce burning with urination, it will cause your urine to become a bright orange color. This can stain clothing.  Care and prevention  These self-care steps can help prevent future infections:    Drink plenty of fluids to prevent dehydration and flush out your bladder. Do this unless you must restrict fluids for other health reasons, or your doctor told you not to.    Proper cleaning after going to the bathroom is important. Wipe from front to back after using the toilet to prevent the spread of bacteria.    Urinate more often. Don't try to hold urine in for a long time.    Wear loose-fitting clothes and cotton underwear. Avoid tight-fitting pants.    Improve your diet and prevent constipation. Eat more fresh fruit and vegetables, and fiber, and less junk and fatty foods.    Avoid sex until your symptoms are gone.    Avoid caffeine,  alcohol, and spicy foods. These can irritate your bladder.    Urinate right after intercourse to flush out your bladder.    If you use birth control pills and have frequent bladder infections, discuss it with your doctor.  Follow-up care  Call your healthcare provider if all symptoms are not gone after 3 days of treatment. This is especially important if you have repeat infections.  If a culture was done, you will be told if your treatment needs to be changed. If directed, you can call to find out the results.  If X-rays were done, you will be told if the results will affect your treatment.  Call 911  Call 911 if any of the following occur:    Trouble breathing    Hard to wake up or confusion    Fainting or loss of consciousness    Rapid heart rate  When to seek medical advice  Call your healthcare provider right away if any of these occur:    Fever of 100.4 F (38.0 C) or higher, or as directed by your healthcare provider    Symptoms are not better by the third day of treatment    Back or belly (abdominal) pain that gets worse    Repeated vomiting, or unable to keep medicine down    Weakness or dizziness    Vaginal discharge    Pain, redness, or swelling in the outer vaginal area (labia)  Date Last Reviewed: 10/1/2016    2272-2620 [employer]. [address] [place] [address], [place] [address]. All rights reserved. This information is not intended as a substitute for professional medical care. Always follow your healthcare professional's instructions.

## 2019-08-06 NOTE — TELEPHONE ENCOUNTER
Urine culture also showed no bacteria. Her antibiotic can be stopped. Should continue her diflucan.     -Bert Solo, PAC

## 2019-08-06 NOTE — TELEPHONE ENCOUNTER
"Called pt, informs still taking daily iron, will confirm with ZB and call pt back, see phone encounter today, ZB please confirm FIT test order, route again for pt call back    \"Is she still taking her ferrous gluconate (iron)? If so, we will need to do a stool test to make sure there is no blood in her stool. If not, then I would recommend restarting this and rechecking her value in 2-4 weeks at a lab only\".     -Bert Solo, DAYLIN Lucas RN, BSN  Message handled by Nurse Triage.      "

## 2019-08-09 NOTE — TELEPHONE ENCOUNTER
[facility] now requests orders and shares plan of care/discharge summaries for some patients through [employer].  Please REPLY TO THIS MESSAGE OR ROUTE BACK TO THE AUTHOR in order to give authorization for orders when needed.  This is considered a verbal order, you will still receive a faxed copy of orders for signature.  Thank you for your assistance in improving collaboration for our patients.     ORDER//   60 day recert of home care to continue the following:     [facility] twice weekly, RN to supervise per agency protocol.       Thank you.

## 2019-08-16 NOTE — OR NURSING
scheduled colonoscopy cancelled per dr amezcua and he spoke to pt primary care Md pt is high risk also called pt and informed her of the cancellation

## 2019-08-16 NOTE — TELEPHONE ENCOUNTER
Received call from Dr. Ambrocio regarding patients colonoscopy. Patient weighs 69 lbs. Dr. Ambrocio stated that patient is too frail to do the colonoscopy.     Diane Franklin, [facility]

## 2019-08-19 NOTE — TELEPHONE ENCOUNTER
Cosme Cisneros,    Can we call [facility] and see what is recommended for a 69 lb 86 yoF with worsening anemia and evidence of blood in her stool?      Thanks,     -Bert Solo, PAC

## 2019-08-20 NOTE — TELEPHONE ENCOUNTER
I called . It takes about one day for referral processing.  I will call them again on Wednesday (tomorrow) afternoon to confirm appointment scheduled. [phone]  Blayne Lopez RN

## 2019-08-20 NOTE — TELEPHONE ENCOUNTER
Called [phone]   We were directed to call [facility]  [phone]  Bert's message relayed. They prefer provider to provider discuss. A GI provider will call Bert at [facility].    Blayne Lopez, RN

## 2019-08-20 NOTE — TELEPHONE ENCOUNTER
Spoke to colorectal surgery. Recommended pursuing colonoscopy through them. Referral placed. Please help set this up with them and patient.     Thanks,    Bert Solo, PAC

## 2019-08-21 NOTE — TELEPHONE ENCOUNTER
Per [facility] pt is not yet scheduled for colonoscopy.   We called [facility] they have not received order.  Faxed referral to [phone] they will call Nedra to schedule.   Blayne Lopez RN

## 2019-08-22 NOTE — TELEPHONE ENCOUNTER
Reviewed medical history.  [facility] will call Nedra to relay appointment time and colon prep instructions.   Blayne Lopez RN

## 2019-08-22 NOTE — TELEPHONE ENCOUNTER
Pt has an appointment with [facility] 8/30 @ 3:15pm with an arrival time 2:30pm.    [facility] needs a triage nurse from [facility] to call them and answer medical questions about this pt before the procedure.     Pt has not been informed of this appointment.    [facility] [phone] [id number]    Noemi-Referrals

## 2019-09-03 NOTE — H&P
Pre-Endoscopy History and Physical     Nedra Carr MRN# [id number]   YOB: 1932 Age: 86 year old     Date of Procedure: 9/3/2019  Primary care provider: Juan A Solo  Type of Endoscopy: Colonoscopy  Reason for Procedure: anemia, FIT positive  Type of Anesthesia Anticipated: Moderate Sedation    HPI:    Nedra is a 86 year old female who will be undergoing the above procedure.      A history and physical has been performed. The patient's medications and allergies have been reviewed. The risks and benefits of the procedure and the sedation options and risks were discussed with the patient.  All questions were answered and informed consent was obtained.      She denies a personal or family history of anesthesia complications or bleeding disorders.     Allergies   Allergen Reactions     No Known Drug Allergies           No current facility-administered medications on file prior to encounter.   Current Outpatient Medications on File Prior to Encounter:  acetaminophen (TYLENOL) 325 MG tablet Take 2 tablets (650 mg) by mouth every 6 hours as needed for mild pain   alendronate (FOSAMAX) 70 MG tablet TAKE 1 TABLET (70MG) BY MOUTH EVERY 7 DAYS. TAKE 60 MINUTES BEFORE MORNING MEAL WITH 8 OUNCES OF WATER. REMAIN UPRIGHT FOR 30 MINUTES.   ferrous gluconate (FERGON) 324 (38 Fe) MG tablet TAKE ONE TABLET BY MOUTH EVERY DAY   [] fluconazole (DIFLUCAN) 150 MG tablet Take 1 tablet (150 mg) by mouth daily for 3 days   [] fluconazole (DIFLUCAN) 150 MG tablet Take 1 tablet (150 mg) by mouth once for 1 dose   multivitamin, therapeutic with minerals (MULTI-VITAMIN) TABS Take 1 tablet by mouth daily.   Nutritional Supplements (ENSURE) LIQD Take 0.5 Cans by mouth daily (strawberry)   pantoprazole (PROTONIX) 40 MG EC tablet Take 1 tablet (40 mg) by mouth every morning (before breakfast) Take 30-60 minutes before a meal.       Patient Active Problem List   Diagnosis     Hallux varus, acquired      Other hammer toe (acquired)     Osteoporosis     HTN (hypertension)     Cataract     CARDIOVASCULAR SCREENING; LDL GOAL LESS THAN 130     Advance Care Planning     Intractable chronic migraine without aura     Anemia     Perforated gastric ulcer (H)     Emphysematous cholecystitis     Health Care Home     Paroxysmal atrial fibrillation (H)     Abdominal pain, unspecified abdominal location     UTI (urinary tract infection)     Sepsis (H)     Headache     Physical deconditioning     Urinary bladder stone     S/P cystoscopy     Hypoxia     Hydronephrosis     Acute cholecystitis     Cardiogenic shock (H)     Closed compression fracture of thoracic vertebra (H)     Family history of other digestive disorders     Allergic rhinitis     Arthritis of hand     Esophageal stricture     Obstructive sleep apnea     Weakness     At high risk for falls     Multiple pelvic fractures     Mobility impaired     Abdominal pain     Other idiopathic scoliosis, unspecified spinal region        Past Medical History:   Diagnosis Date     Atrial fibrillation (H)      Dysphagia 2000    Had duodenal strcture dilated at [facility] in 2000 and by Dr. Hernández in 2001     HTN (hypertension)      Migraine, unspecified, without mention of intractable migraine without mention of status migrainosus     beta blocker prophylaxis     Obstructive sleep apnea (adult) (pediatric) 4/9/2015     Osteoporosis, unspecified      Pain in joint, shoulder region      Polymyalgia rheumatica (H)      Renal stones         Past Surgical History:   Procedure Laterality Date     BACK SURGERY       BRONCHOSCOPY FLEXIBLE N/A 11/21/2014    Procedure: BRONCHOSCOPY FLEXIBLE;  Surgeon: Rhonda Donohue MD;  Location: [facility] GI     C NONSPECIFIC PROCEDURE      s/p dilation at [facility] by Dr. Radha FELIPE NONSPECIFIC PROCEDURE      s/p dilation at [facility] by Dr. Betty FELIPE NONSPECIFIC PROCEDURE      Vag hysterectomy     CHOLECYSTECTOMY N/A 11/15/2014    Procedure: CHOLECYSTECTOMY;  Surgeon:  Yomi Brennan MD;  Location: [facility] OR     CYSTOSCOPY, LITHOLAPAXY, COMBINED N/A 4/8/2015    Procedure: COMBINED CYSTOSCOPY, LITHOLAPAXY;  Surgeon: Randy Reno MD;  Location:  OR     ENT SURGERY       ESOPHAGOSCOPY, GASTROSCOPY, DUODENOSCOPY (EGD), COMBINED  11/21/2012    Procedure: COMBINED ESOPHAGOSCOPY, GASTROSCOPY, DUODENOSCOPY (EGD), BIOPSY SINGLE OR MULTIPLE;   ESOPHAGOSCOPY, GASTROSCOPY, DUODENOSCOPY (EGD)   with cold biopsy and dilalation with #15 savary;  Surgeon: Guillermo Arcos MD;  Location:  GI     ESOPHAGOSCOPY, GASTROSCOPY, DUODENOSCOPY (EGD), COMBINED  2/22/2013    Procedure: COMBINED ESOPHAGOSCOPY, GASTROSCOPY, DUODENOSCOPY (EGD);  ESOPHAGOSCOPY, GASTROSCOPY, DUODENOSCOPY (EGD) ;  Surgeon: Lissett Posada MD;  Location:  GI     ESOPHAGOSCOPY, GASTROSCOPY, DUODENOSCOPY (EGD), COMBINED N/A 10/29/2014    Procedure: COMBINED ESOPHAGOSCOPY, GASTROSCOPY, DUODENOSCOPY (EGD);  Surgeon: Dany Ambrocio MD;  Location:  GI     ESOPHAGOSCOPY, GASTROSCOPY, DUODENOSCOPY (EGD), COMBINED N/A 1/13/2015    Procedure: COMBINED ESOPHAGOSCOPY, GASTROSCOPY, DUODENOSCOPY (EGD), BIOPSY SINGLE OR MULTIPLE;  Surgeon: Dany Ambrocio MD;  Location:  GI     ESOPHAGOSCOPY, GASTROSCOPY, DUODENOSCOPY (EGD), COMBINED N/A 3/5/2015    Procedure: COMBINED ESOPHAGOSCOPY, GASTROSCOPY, DUODENOSCOPY (EGD);  Surgeon: Dany Ambrocio MD;  Location:  GI     ESOPHAGOSCOPY, GASTROSCOPY, DUODENOSCOPY (EGD), COMBINED N/A 11/10/2015    Procedure: COMBINED ESOPHAGOSCOPY, GASTROSCOPY, DUODENOSCOPY (EGD);  Surgeon: Dany Ambrocio MD;  Location:  GI     EYE SURGERY       GYN SURGERY       LAPAROTOMY EXPLORATORY N/A 11/15/2014    Procedure: LAPAROTOMY EXPLORATORY;  Surgeon: Yomi Brennan MD;  Location:  OR     LASER HOLMIUM LITHOTRIPSY BLADDER N/A 4/8/2015    Procedure: LASER HOLMIUM LITHOTRIPSY BLADDER;  Surgeon: Randy Reno MD;  Location: [facility] OR     ORTHOPEDIC SURGERY      hip  "fx surgery x 2       Social History     Tobacco Use     Smoking status: Never Smoker     Smokeless tobacco: Never Used   Substance Use Topics     Alcohol use: No     Alcohol/week: 0.0 oz       Family History   Problem Relation Age of Onset     Diabetes Mother      Diabetes Sister      Heart Disease Son      Colon Cancer No family hx of        REVIEW OF SYSTEMS:     5 point ROS negative except as noted above in HPI, including Gen., Resp., CV, GI &  system review.      PHYSICAL EXAM:   There were no vitals taken for this visit. Estimated body mass index is 16.52 kg/m  as calculated from the following:    Height as of 8/16/19: 1.346 m (4' 5\").    Weight as of 8/5/19: 29.9 kg (66 lb).   GENERAL APPEARANCE: healthy and alert  MENTAL STATUS: alert  AIRWAY EXAM: Mallampatti Class II (visualization of the soft palate, fauces, and uvula)  RESP: lungs clear to auscultation - no rales, rhonchi or wheezes  CV: regular rates and rhythm      IMPRESSION   ASA Class 3 - Severe systemic disease, but not incapacitating        PLAN:     Plan for colonoscopy. We discussed the risks, benefits and alternatives and the patient wished to proceed.    The above has been forwarded to the consulting provider.      Symone Berg MD  [facility]  Phone: [phone]  Fax: [phone]  September 3, 2019  "

## 2019-09-03 NOTE — OP NOTE
See Provation Note In Chart    Symone Berg MD  [facility]  Office Phone # [phone]

## 2019-09-11 NOTE — PROGRESS NOTES
Subjective     Nedra Carr is a 86 year old female who presents to clinic today for the following health issues:    HPI   ABDOMINAL   PAIN     Onset: week    Description:   Character: Sharp  Location: epigastric region  Radiation: None    Intensity: moderate    Progression of Symptoms: same and worse at night    Accompanying Signs & Symptoms:  Fever/Chills?: no   Gas/Bloating: no   Nausea: no   Vomitting: no   Diarrhea?: no   Constipation: no   Dysuria or Hematuria: no    History:   Trauma: no   Previous similar pain: no    Previous tests done: Sig    Precipitating factors:   Does the pain change with:     Food: no      BM: no     Urination: no     Alleviating factors:  none    Therapies Tried and outcome: Pepto, and Tylenol-not effective    LMP:  not applicable   Had a flexible sigmoidoscopy 1 week ago and pain started after that. Taking protonix daily.        Patient Active Problem List   Diagnosis     Hallux varus, acquired     Other hammer toe (acquired)     Osteoporosis     HTN (hypertension)     Cataract     CARDIOVASCULAR SCREENING; LDL GOAL LESS THAN 130     Advance Care Planning     Intractable chronic migraine without aura     Anemia     Perforated gastric ulcer (H)     Emphysematous cholecystitis     Health Care Home     Paroxysmal atrial fibrillation (H)     Abdominal pain, unspecified abdominal location     UTI (urinary tract infection)     Sepsis (H)     Headache     Physical deconditioning     Urinary bladder stone     S/P cystoscopy     Hypoxia     Hydronephrosis     Acute cholecystitis     Cardiogenic shock (H)     Closed compression fracture of thoracic vertebra (H)     Family history of other digestive disorders     Allergic rhinitis     Arthritis of hand     Esophageal stricture     Obstructive sleep apnea     Weakness     At high risk for falls     Multiple pelvic fractures     Mobility impaired     Abdominal pain     Other idiopathic scoliosis, unspecified spinal region     Past Surgical  History:   Procedure Laterality Date     BACK SURGERY       BRONCHOSCOPY FLEXIBLE N/A 11/21/2014    Procedure: BRONCHOSCOPY FLEXIBLE;  Surgeon: Rhonda Donohue MD;  Location: [facility]     C NONSPECIFIC PROCEDURE      [place] [facility] [place] Radha FELIPE NONSPECIFIC PROCEDURE      s/p dilation at [facility] by Dr. Betty FELIPE NONSPECIFIC PROCEDURE      Vag hysterectomy     CHOLECYSTECTOMY N/A 11/15/2014    Procedure: CHOLECYSTECTOMY;  Surgeon: Yomi Brennan MD;  Location: [facility] OR     COLONOSCOPY N/A 9/3/2019    Procedure: Colonoscopy  with biopsies using cold forceps with no sedation;  Surgeon: Anastasia Berg MD;  Location:  GI     CYSTOSCOPY, LITHOLAPAXY, COMBINED N/A 4/8/2015    Procedure: COMBINED CYSTOSCOPY, LITHOLAPAXY;  Surgeon: Randy Reno MD;  Location: [facility] OR     ENT SURGERY       ESOPHAGOSCOPY, GASTROSCOPY, DUODENOSCOPY (EGD), COMBINED  11/21/2012    Procedure: COMBINED ESOPHAGOSCOPY, GASTROSCOPY, DUODENOSCOPY (EGD), BIOPSY SINGLE OR MULTIPLE;   ESOPHAGOSCOPY, GASTROSCOPY, DUODENOSCOPY (EGD)   with cold biopsy and dilalation with #15 savary;  Surgeon: Guillermo Arcos MD;  Location:  GI     ESOPHAGOSCOPY, GASTROSCOPY, DUODENOSCOPY (EGD), COMBINED  2/22/2013    Procedure: COMBINED ESOPHAGOSCOPY, GASTROSCOPY, DUODENOSCOPY (EGD);  ESOPHAGOSCOPY, GASTROSCOPY, DUODENOSCOPY (EGD) ;  Surgeon: Lissett Posada MD;  Location:  GI     ESOPHAGOSCOPY, GASTROSCOPY, DUODENOSCOPY (EGD), COMBINED N/A 10/29/2014    Procedure: COMBINED ESOPHAGOSCOPY, GASTROSCOPY, DUODENOSCOPY (EGD);  Surgeon: Dany Ambrocio MD;  Location:  GI     ESOPHAGOSCOPY, GASTROSCOPY, DUODENOSCOPY (EGD), COMBINED N/A 1/13/2015    Procedure: COMBINED ESOPHAGOSCOPY, GASTROSCOPY, DUODENOSCOPY (EGD), BIOPSY SINGLE OR MULTIPLE;  Surgeon: Dany Ambrocio MD;  Location:  GI     ESOPHAGOSCOPY, GASTROSCOPY, DUODENOSCOPY (EGD), COMBINED N/A 3/5/2015    Procedure: COMBINED ESOPHAGOSCOPY, GASTROSCOPY, DUODENOSCOPY  (EGD);  Surgeon: Dany Ambrocio MD;  Location:  GI     ESOPHAGOSCOPY, GASTROSCOPY, DUODENOSCOPY (EGD), COMBINED N/A 11/10/2015    Procedure: COMBINED ESOPHAGOSCOPY, GASTROSCOPY, DUODENOSCOPY (EGD);  Surgeon: Dany Ambrocio MD;  Location:  GI     EYE SURGERY       GYN SURGERY       LAPAROTOMY EXPLORATORY N/A 11/15/2014    Procedure: LAPAROTOMY EXPLORATORY;  Surgeon: Yomi Brennan MD;  Location: [facility]     LASER HOLMIUM LITHOTRIPSY BLADDER N/A 4/8/2015    Procedure: LASER HOLMIUM LITHOTRIPSY BLADDER;  Surgeon: Randy Reno MD;  Location: [facility]     ORTHOPEDIC SURGERY      hip fx surgery x 2       Social History     Tobacco Use     Smoking status: Never Smoker     Smokeless tobacco: Never Used   Substance Use Topics     Alcohol use: No     Alcohol/week: 0.0 oz     Family History   Problem Relation Age of Onset     Diabetes Mother      Diabetes Sister      Heart Disease Son      Colon Cancer No family hx of          Current Outpatient Medications   Medication Sig Dispense Refill     acetaminophen (TYLENOL) 325 MG tablet Take 2 tablets (650 mg) by mouth every 6 hours as needed for mild pain 60 tablet 0     alendronate (FOSAMAX) 70 MG tablet TAKE 1 TABLET (70MG) BY MOUTH EVERY 7 DAYS. TAKE 60 MINUTES BEFORE MORNING MEAL WITH 8 OUNCES OF WATER. REMAIN UPRIGHT FOR 30 MINUTES. 12 tablet 2     ferrous gluconate (FERGON) 324 (38 Fe) MG tablet TAKE ONE TABLET BY MOUTH EVERY DAY 30 tablet 10     multivitamin, therapeutic with minerals (MULTI-VITAMIN) TABS Take 1 tablet by mouth daily. 100 tablet 3     Nutritional Supplements (ENSURE) LIQD Take 0.5 Cans by mouth daily (strawberry) 15 each 11     pantoprazole (PROTONIX) 40 MG EC tablet Take 1 tablet (40 mg) by mouth every morning (before breakfast) Take 30-60 minutes before a meal. 90 tablet 1     Allergies   Allergen Reactions     No Known Drug Allergies          Reviewed and updated as needed this visit by Provider         Review of Systems   ROS COMP:  Constitutional, HEENT, cardiovascular, pulmonary, GI, , musculoskeletal, neuro, skin, endocrine and psych systems are negative, except as otherwise noted.      Objective    BP 92/58 (BP Location: Right arm, Patient Position: Chair, Cuff Size: Child)   Pulse 114   Temp 97.6  F (36.4  C) (Oral)   Resp 16   Wt (!) 29.9 kg (66 lb)   SpO2 97%   BMI 16.52 kg/m    Body mass index is 16.52 kg/m .         Physical Exam   GENERAL: healthy, alert and no distress  EYES: Eyes grossly normal to inspection, PERRL and conjunctivae and sclerae normal  RESP: lungs clear to auscultation - no rales, rhonchi or wheezes  CV: regular rate and rhythm, normal S1 S2, no S3 or S4, no murmur, click or rub, no peripheral edema and peripheral pulses strong  ABDOMEN: Mildly tender to palpation in epigastric area. No rebound tenderness or guarding. soft, otherwise nontender, no hepatosplenomegaly, no masses and bowel sounds normal  MS: no gross musculoskeletal defects noted, no edema  SKIN: no suspicious lesions or rashes  NEURO: Normal strength and tone, mentation intact and speech normal  BACK: no CVA tenderness  PSYCH: mentation appears normal, affect normal/bright    Diagnostic Test Results:  Results for orders placed or performed in visit on 09/11/19 (from the past 24 hour(s))   UA reflex to Microscopic and Culture   Result Value Ref Range    Color Urine Yellow     Appearance Urine Cloudy     Glucose Urine Negative NEG^Negative mg/dL    Bilirubin Urine Negative NEG^Negative    Ketones Urine Trace (A) NEG^Negative mg/dL    Specific Gravity Urine >1.030 1.003 - 1.035    Blood Urine Moderate (A) NEG^Negative    pH Urine 5.5 5.0 - 7.0 pH    Protein Albumin Urine >=300 (A) NEG^Negative mg/dL    Urobilinogen Urine 0.2 0.2 - 1.0 EU/dL    Nitrite Urine Negative NEG^Negative    Leukocyte Esterase Urine Large (A) NEG^Negative    Source Midstream Urine    CBC with platelets differential   Result Value Ref Range    WBC 8.9 4.0 - 11.0 10e9/L     RBC Count 3.40 (L) 3.8 - 5.2 10e12/L    Hemoglobin 10.0 (L) 11.7 - 15.7 g/dL    Hematocrit 33.5 (L) 35.0 - 47.0 %    MCV 99 78 - 100 fl    MCH 29.4 26.5 - 33.0 pg    MCHC 29.9 (L) 31.5 - 36.5 g/dL    RDW 14.4 10.0 - 15.0 %    Platelet Count 156 150 - 450 10e9/L    % Neutrophils 58.2 %    % Lymphocytes 29.0 %    % Monocytes 11.0 %    % Eosinophils 1.5 %    % Basophils 0.3 %    Absolute Neutrophil 5.2 1.6 - 8.3 10e9/L    Absolute Lymphocytes 2.6 0.8 - 5.3 10e9/L    Absolute Monocytes 1.0 0.0 - 1.3 10e9/L    Absolute Eosinophils 0.1 0.0 - 0.7 10e9/L    Absolute Basophils 0.0 0.0 - 0.2 10e9/L    Diff Method Automated Method    Comprehensive metabolic panel   Result Value Ref Range    Sodium 141 133 - 144 mmol/L    Potassium 4.6 3.4 - 5.3 mmol/L    Chloride 108 94 - 109 mmol/L    Carbon Dioxide 28 20 - 32 mmol/L    Anion Gap 5 3 - 14 mmol/L    Glucose 111 (H) 70 - 99 mg/dL    Urea Nitrogen 21 7 - 30 mg/dL    Creatinine 0.78 0.52 - 1.04 mg/dL    GFR Estimate 68 >60 mL/min/[1.73_m2]    GFR Estimate If Black 79 >60 mL/min/[1.73_m2]    Calcium 8.7 8.5 - 10.1 mg/dL    Bilirubin Total 0.1 (L) 0.2 - 1.3 mg/dL    Albumin 3.5 3.4 - 5.0 g/dL    Protein Total 7.0 6.8 - 8.8 g/dL    Alkaline Phosphatase 57 40 - 150 U/L    ALT 15 0 - 50 U/L    AST 16 0 - 45 U/L   Lipase   Result Value Ref Range    Lipase 82 73 - 393 U/L   Amylase   Result Value Ref Range    Amylase 73 30 - 110 U/L   Urine Microscopic   Result Value Ref Range    WBC Urine >100 (A) OTO5^0 - 5 /HPF    RBC Urine 2-5 (A) OTO2^O - 2 /HPF    WBC Clumps Present (A) NEG^Negative /HPF    Bacteria Urine Few (A) NEG^Negative /HPF    Yeast Urine Moderate (A) NEG^Negative /HPF           Assessment & Plan     (R10.13) Epigastric pain  (primary encounter diagnosis)    Comment: Unclear cause. Hemoglobin stable at 10. Recent flexible sigmoidoscopy was unremarkable although FIT test positive. Await labs and CT scan ordered. Will follow-up with results tomorrow unless emergent tonight.  Patient notified. UTI present as below.    Plan: UA reflex to Microscopic and Culture, CBC with         platelets differential, Comprehensive metabolic        panel, Lipase, Amylase, Urine Culture Aerobic         Bacterial, CT Abdomen Pelvis w Contrast, Urine         Microscopic            (R82.90) Nonspecific finding on examination of urine  Comment:   Plan: Urine Culture Aerobic Bacterial            (N30.00) Acute cystitis without hematuria    Comment: UTI shown on urine with yeast present again. Treat with keflex and diflucan again as this seems to work well. She has an upcoming appointment with urology.    Plan: cephALEXin (KEFLEX) 500 MG capsule, fluconazole        (DIFLUCAN) 150 MG tablet                 Patient Instructions   Take the complete course of the antibiotic.    We will call you tomorrow with the results of the CT scan and the rest of your blood tests.      No follow-ups on file.    Riccardo Baca PA-C  [facility]

## 2019-09-11 NOTE — PATIENT INSTRUCTIONS
Take the complete course of the antibiotic.    We will call you tomorrow with the results of the CT scan and the rest of your blood tests.

## 2019-10-06 PROBLEM — J18.9 CAP (COMMUNITY ACQUIRED PNEUMONIA): Status: ACTIVE | Noted: 2019-01-01

## 2019-10-06 NOTE — PROGRESS NOTES
Care Transition Initial Assessment - RN        Met with: Patient and Family (demetrio El [phone]).  DATA   Active Problems:    * No active hospital problems. *       Cognitive Status: awake, alert and oriented.  Primary Care Clinic [facility]: [facility]  Primary Care MD Name: Juan A Solo  Contact information and PCP information verified: Yes  Lives With: alone      Quality of Family Relationships: helpful, supportive              Insurance concerns: No Insurance issues identified  ASSESSMENT  Patient currently receives the following services:  HC (RN 1x/month, HHA 2x/wk).   Also has MOW's, life alert, and housekeeping.   Uses a walker to ambulate.        Identified issues/concerns regarding health management: increased level of care at home due to current illness.     PLAN  Financial costs for the patient include none at this time .  Patient given options and choices for discharge .  Patient/family is agreeable to the plan?  Yes  Patient anticipates discharging to home with increased level of home care services.            Patient anticipates needs for home equipment: No  Transportation/person available to transport on day of discharge  is family.  Plan/Disposition: Home   Appointments:       Care  (CTS) will continue to follow as needed.    Zhane Reyes RN, LICSW, [facility]  RN Care Coordinator  [facility], Emergency Department  Phone  [phone]

## 2019-10-06 NOTE — PHARMACY-ADMISSION MEDICATION HISTORY
Admission medication history interview status for this patient is complete. See [facility] admission navigator for allergy information, prior to admission medications and immunization status.     Medication history interview source(s):Patient  Medication history resources (including written lists, pill bottles, clinic record):None  Primary pharmacy: [facility]    Changes made to PTA medication list:  Added: Ocuvit  Deleted: none  Changed: none    Actions taken by pharmacist (provider contacted, etc):None     Additional medication history information:None    Medication reconciliation/reorder completed by provider prior to medication history? Yes     Do you take OTC medications (eg tylenol, ibuprofen, fish oil, eye/ear drops, etc)? Yes       Prior to Admission medications    Medication Sig Last Dose Taking? Auth Provider   acetaminophen (TYLENOL) 325 MG tablet Take 2 tablets (650 mg) by mouth every 6 hours as needed for mild pain 10/6/2019 at 0830 Yes Dane Christina MD   alendronate (FOSAMAX) 70 MG tablet TAKE 1 TABLET (70MG) BY MOUTH EVERY 7 DAYS. TAKE 60 MINUTES BEFORE MORNING MEAL WITH 8 OUNCES OF WATER. REMAIN UPRIGHT FOR 30 MINUTES. 9/29/2019 at AM Yes Juan A Solo PA-C   ferrous gluconate (FERGON) 324 (38 Fe) MG tablet Take 324 mg by mouth daily (with breakfast) Past Week Yes Reported, Patient   multivitamin  with lutein (OCUVITE WITH LTEIN) CAPS per capsule Take 1 capsule by mouth daily Past Week Yes Reported, Patient   multivitamin, therapeutic with minerals (MULTI-VITAMIN) TABS Take 1 tablet by mouth daily. Past Week Yes Houston Zhang MD   Nutritional Supplements (ENSURE) LIQD Take 0.5 Cans by mouth daily (strawberry) Past Week Yes Juan A Solo PA-C   pantoprazole (PROTONIX) 40 MG EC tablet Take 1 tablet (40 mg) by mouth every morning (before breakfast) Take 30-60 minutes before a meal. Past Week Yes Juan A Solo PA-C

## 2019-10-06 NOTE — H&P
[facility]  Hospitalist Admission Note  Name: Nedra Carr    MRN: [id number]  YOB: 1932    Age: 86 year old  Date of admission: 10/6/2019  Primary care provider: Juan A Solo    Chief Complaint:  cough    Assessment and Plan:   Suspect CAP vs viral bronchitis: Minimally productive cough for the past 2 weeks along with progressive dyspnea on exertion.  No fevers or chills.  No sick contacts.  Afebrile here no leukocytosis.  CT chest ruled out PE, but does show some by basilar opacities.  With her severe scoliosis difficult to say whether this is simply atelectasis versus true bacterial infection.  Viral process is certainly possible, influenza and RSV PCR negative.  Based on persistence of symptoms she has been initiated ceftriaxone and doxycycline in case of community acquired pneumonia.  Does have history of some diastolic dysfunction on TTE in 2014.  BNP is elevated, although lower than it was years ago.  -Obtain TTE to evaluate for any sign of CHF, hold on any Lasix for now as she is tachycardic  -Add on procalcitonin  -Guaifenesin as needed for cough  -Considered full viral respiratory PCR panel, however unlikely to  given possible bilateral opacity seen on CT so for now will continue ceftriaxone and doxycycline, recommend short 5-day course    Possible UTI with L ureterovesical stone: History of recurrent UTIs.  Also history of nephrolithiasis requiring lithotripsy.  Missed her recent appointment with Dr. Reno from urology.  CT abdomen pelvis shows a possible 0.3 x 0.4 cm left ureterovesicular stone, but no hydronephrosis.  Does have some blood in the urine along with > 182 WBCs and large LE so possibility of UTI.  Most recent urine culture grew mixed urogenital barrera and Candida.  Currently denying any dysuria or gross hematuria.  She does have chronic left-sided back/flank pain for years unclear if related to her stone or not.  -Consult urology,  n.p.o. at midnight in case cystoscopy indicated  -Continue ceftriaxone  -Urine culture in process    Abdominal pain: For the past 2 to 3 days she has had some intermittent epigastric abdominal pain that she cannot describe the character of, but it is moderate in intensity.  Occasionally has loose stools but this is normal for her.  No nausea or vomiting.  Denies any recent dysphasia.  No melena or hematochezia.  I do note she had a colonoscopy 1 month ago and pathology at that time showed inflammation and colitis in the area of the cecum and transverse colon of unclear significance.  Abdominal pain may be secondary to this.  Does have history of PUD with perforated gastric ulcer a few years ago as well, is on daily Protonix.  -Try GI cocktail if abdominal pain returns to see if effective  -Antibiotics as above in case related to acute infection  -Could consider GI consult if not improving, although given recent EGD and colonoscopy unclear that this would  anyway    Severe scoliosis: Has some chronic pain for which he uses acetaminophen for this.    Thrombocytosis, chronic anemia: Hemoglobin at baseline of 10.  She does take iron daily.  Platelet count elevated at 723.  Possibility of acute infective process of may be reactive.  She has had elevated platelets in the 500 range in the past, although most recently was down to 156 last month.  -Continue daily iron  -CBC tomorrow    PUD, dysphagia: hx perforated gastric ulcer.  Hx esophageal stricture dilated multiple times.  Is on daily protonix.      Hx paroxysmal afib: History of perforated gastric ulcer so I assume this is why she is not on any anticoagulation.  Currently sinus tachycardia on EKG with heart rate 110.    HTN: Blood pressure 140-160 systolic.  Not currently on any medications for this.  -PRN IV hydralazine for severely elevated BP.    PMR Per chart review: Not on any current medications for this.      DVT Prophylaxis: Pneumatic  Compression Devices, hold off on lovenox given hx of perforated gastric ulcer  Code Status: DNR in the event of cardiac arrest, discussed with the patient on admission.  She is unsure about intubation for respiratory failure so for now if impending respiratory failure would plan on intubation  FEN: regular diet, no IV fluids  Discharge Dispo: home  Estimated Disch Date / # of Days until Disch: admit inpatient for suspected pneumonia, anticipate minimal 2 night hospitalization      History of Present Illness:  Nedra Carr is a 86 year old female with PMH including paroxysmal A. fib not on anticoagulation, dysphagia w/ multiple dilations, nephrolithiasis, recurrent UTIs, HTN, PMR, severe scoliosis, perforated gastric ulcer, and anemia who presents with cough and shortness of breath.  Primarily here for 2 weeks of an intermittent cough that is minimally productive along with worsening dyspnea on exertion.  Also having some orthopnea.  No fevers or chills noted.  Denies any sick contacts or recent travel.  Also for the past 3 days she has had some epigastric abdominal pain that is intermittent and moderate in intensity.  She is unable to describe the character of the pain.  She has had this in the past, although is not sure what it is from  She did have a colonoscopy 1 month ago with biopsy showing some inflammation in the cecum and transverse colon.  She does recall having similar pain then.  Has had some loose stools which is baseline for her.  Denies any hematochezia or melena.  No recent swallowing difficulties following her last dilation.  Reports chronic left-sided back and flank pain for years that is unchanged from baseline.  Denies any dysuria or hematuria.  Does note that she missed her recent appointment with urology with Dr. Reno for recurrent UTIs.    History obtained from patient, medical record, and from Dr. Dao in the emergency department.  Blood pressure 140-160 systolic and heart rate 100-1  10 in the ED.  EKG shows sinus tachycardia.  Temperature is 97.8.  Oxygen is in the low 90s on room air.  Her CMP is unremarkable except for albumin 3.2 and alk phos of 167.  Her BNP is elevated to 74, although lower than previous.  Lipase is 198 and troponin is undetectable.  Lactic acid is 1.2.  VBG within normal limits.  Hemoglobin at baseline of 10.1.  White blood cell count 6.1.  Platelet count is elevated at 7.3.  Urinalysis shows 100 RBCs, small blood, large LE, and > 182 WBCs.  Blood and urine cultures were obtained.  CT chest abdomen pelvis was obtained that ruled out PE and showed some bibasilar infiltrate so she was given ceftriaxone and doxycycline for possible immune acquired pneumonia.  There is also a 0.3 x 0.4 m likely left ureterovesicular stone without hydronephrosis.  Influenza and RSV PCR were sent and negative.  Admit inpatient for IV antibiotics and further evaluation.     Past Medical History reviewed:  Past Medical History:   Diagnosis Date     Atrial fibrillation (H)      Dysphagia 2000    Had duodenal strcture dilated at [facility] [place], [place] in 2000 and by Dr. Hernández in 2001     HTN (hypertension)      Migraine, unspecified, without mention of intractable migraine without mention of status migrainosus     beta blocker prophylaxis     Obstructive sleep apnea (adult) (pediatric) 4/9/2015     Osteoporosis, unspecified      Pain in joint, shoulder region      Polymyalgia rheumatica (H)      Renal stones      Past Surgical History reviewed:  Past Surgical History:   Procedure Laterality Date     BACK SURGERY       BRONCHOSCOPY FLEXIBLE N/A 11/21/2014    Procedure: BRONCHOSCOPY FLEXIBLE;  Surgeon: Rhonda Donohue MD;  Location:  GI     C NONSPECIFIC PROCEDURE      s/p dilation at [facility] [place] [place] by Dr. Radha FELIPE NONSPECIFIC PROCEDURE      s/p dilation at [facility] by Dr. Betty FELIPE NONSPECIFIC PROCEDURE      Vag hysterectomy     CHOLECYSTECTOMY N/A 11/15/2014    Procedure: CHOLECYSTECTOMY;  Surgeon: Yomi Brennan,  MD;  Location: [facility] OR     COLONOSCOPY N/A 9/3/2019    Procedure: Colonoscopy  with biopsies using cold forceps with no sedation;  Surgeon: Anastasia Berg MD;  Location:  GI     CYSTOSCOPY, LITHOLAPAXY, COMBINED N/A 4/8/2015    Procedure: COMBINED CYSTOSCOPY, LITHOLAPAXY;  Surgeon: Randy Reno MD;  Location:  OR     ENT SURGERY       ESOPHAGOSCOPY, GASTROSCOPY, DUODENOSCOPY (EGD), COMBINED  11/21/2012    Procedure: COMBINED ESOPHAGOSCOPY, GASTROSCOPY, DUODENOSCOPY (EGD), BIOPSY SINGLE OR MULTIPLE;   ESOPHAGOSCOPY, GASTROSCOPY, DUODENOSCOPY (EGD)   with cold biopsy and dilalation with #15 savary;  Surgeon: Guillermo Arcos MD;  Location:  GI     ESOPHAGOSCOPY, GASTROSCOPY, DUODENOSCOPY (EGD), COMBINED  2/22/2013    Procedure: COMBINED ESOPHAGOSCOPY, GASTROSCOPY, DUODENOSCOPY (EGD);  ESOPHAGOSCOPY, GASTROSCOPY, DUODENOSCOPY (EGD) ;  Surgeon: Lissett Posada MD;  Location:  GI     ESOPHAGOSCOPY, GASTROSCOPY, DUODENOSCOPY (EGD), COMBINED N/A 10/29/2014    Procedure: COMBINED ESOPHAGOSCOPY, GASTROSCOPY, DUODENOSCOPY (EGD);  Surgeon: Dany Ambrocio MD;  Location:  GI     ESOPHAGOSCOPY, GASTROSCOPY, DUODENOSCOPY (EGD), COMBINED N/A 1/13/2015    Procedure: COMBINED ESOPHAGOSCOPY, GASTROSCOPY, DUODENOSCOPY (EGD), BIOPSY SINGLE OR MULTIPLE;  Surgeon: Dany Ambrocio MD;  Location:  GI     ESOPHAGOSCOPY, GASTROSCOPY, DUODENOSCOPY (EGD), COMBINED N/A 3/5/2015    Procedure: COMBINED ESOPHAGOSCOPY, GASTROSCOPY, DUODENOSCOPY (EGD);  Surgeon: Dany Ambrocio MD;  Location:  GI     ESOPHAGOSCOPY, GASTROSCOPY, DUODENOSCOPY (EGD), COMBINED N/A 11/10/2015    Procedure: COMBINED ESOPHAGOSCOPY, GASTROSCOPY, DUODENOSCOPY (EGD);  Surgeon: Dany Ambrocio MD;  Location:  GI     EYE SURGERY       GYN SURGERY       LAPAROTOMY EXPLORATORY N/A 11/15/2014    Procedure: LAPAROTOMY EXPLORATORY;  Surgeon: Yomi Brennan MD;  Location: [facility] OR     LASER HOLMIUM LITHOTRIPSY BLADDER N/A  4/8/2015    Procedure: LASER HOLMIUM LITHOTRIPSY BLADDER;  Surgeon: Randy Reno MD;  Location: [facility] OR     ORTHOPEDIC SURGERY      hip fx surgery x 2     Social History reviewed:  Social History     Tobacco Use     Smoking status: Never Smoker     Smokeless tobacco: Never Used   Substance Use Topics     Alcohol use: No     Alcohol/week: 0.0 standard drinks     Social History     Patient does not qualify to have social determinant information on file (likely too young).   Social History Narrative     Not on file     Family History reviewed:  Family History   Problem Relation Age of Onset     Diabetes Mother      Diabetes Sister      Heart Disease Son      Colon Cancer No family hx of      Allergies:  Allergies   Allergen Reactions     No Known Drug Allergies      Medications:  Prior to Admission medications    Medication Sig Last Dose Taking? Auth Provider   acetaminophen (TYLENOL) 325 MG tablet Take 2 tablets (650 mg) by mouth every 6 hours as needed for mild pain   Dane Christina MD   alendronate (FOSAMAX) 70 MG tablet TAKE 1 TABLET (70MG) BY MOUTH EVERY 7 DAYS. TAKE 60 MINUTES BEFORE MORNING MEAL WITH 8 OUNCES OF WATER. REMAIN UPRIGHT FOR 30 MINUTES.   Juan A Solo PA-C   ferrous gluconate (FERGON) 324 (38 Fe) MG tablet TAKE ONE TABLET BY MOUTH EVERY DAY   Juan A Solo PA-C   multivitamin, therapeutic with minerals (MULTI-VITAMIN) TABS Take 1 tablet by mouth daily.   Houston Zhang MD   Nutritional Supplements (ENSURE) LIQD Take 0.5 Cans by mouth daily (strawberry)   Juan A Solo PA-C   pantoprazole (PROTONIX) 40 MG EC tablet Take 1 tablet (40 mg) by mouth every morning (before breakfast) Take 30-60 minutes before a meal.   Juan A Solo PA-C     Review of Systems:  A Comprehensive greater than 10 system review of systems was carried out.  Pertinent positives and negatives are noted above.  Otherwise negative.     Physical Exam:  Blood pressure (!) 149/79,  pulse 104, temperature 97.8  F (36.6  C), temperature source Oral, resp. rate 20, SpO2 95 %, not currently breastfeeding.  Wt Readings from Last 1 Encounters:   09/11/19 (!) 29.9 kg (66 lb)     Exam:  Constitutional: Awake, NAD   Eyes: sclera white   HEENT: atraumatic, MMM  Respiratory: Bibasilar crackles without wheeze  Cardiovascular: Tachycardia without murmur  GI: non-tender to palpation in all quadrants, not distended, bowel sounds present  Skin: no rash or lesions, acyanotic  Musculoskeletal/extremities: Severe scoliosis, no edema  Neurologic: A&O, speech clear, moves extremities equally  Psychiatric: calm, cooperative, normal affect    Lab and imaging data personally reviewed:  Labs:  Recent Labs   Lab 10/06/19  1235 10/06/19  1224   PHV 7.36 7.37   PO2V 28 30   PCO2V 49 51*   HCO3V 28 29*     Recent Labs   Lab 10/06/19  1224   WBC 6.1   HGB 10.1*   HCT 33.0*   MCV 95   *     Recent Labs   Lab 10/06/19  1224      POTASSIUM 4.0   CHLORIDE 103   CO2 29   ANIONGAP 7   *   BUN 18   CR 0.70   GFRESTIMATED 78   GFRESTBLACK >90   GABINO 9.1   PROTTOTAL 7.8   ALBUMIN 3.2*   BILITOTAL 0.3   ALKPHOS 167*   AST 17   ALT 18     Recent Labs   Lab 10/06/19  1235   LACT 1.2     Recent Labs   Lab 10/06/19  1224   LIPASE 198     Recent Labs   Lab 10/06/19  1224   TROPI <0.015     Recent Labs   Lab 10/06/19  1433   COLOR Yellow   APPEARANCE Cloudy   URINEGLC Negative   URINEBILI Negative   URINEKETONE Trace*   SG 1.025   UBLD Small*   URINEPH 5.5   PROTEIN 50*   NITRITE Negative   LEUKEST Large*   RBCU 100*   WBCU >182*       EKG:  Sinus tachycardia, LVH    Imaging:  Recent Results (from the past 24 hour(s))   XR Chest 2 Views    Narrative    CHEST TWO VIEWS 10/6/2019 1:13 PM     HISTORY: Shortness of breath.    COMPARISON: 1/4/2018.      Impression    IMPRESSION: Scoliosis again evident. Question of a small left base  infiltrate. Remaining lungs clear. The cardiac silhouette is not  enlarged. Pulmonary  vasculature is unremarkable.    CHUY BURCIAGA MD   Abdomen XR, 2 vw, flat and upright    Narrative    ABDOMEN TWO OR THREE VIEWS  10/6/2019 1:13 PM     HISTORY: Abdominal pain.    COMPARISON: 11/8/2014.    FINDINGS: Small amount of stool. No free air. There are no air filled  distended loops of small bowel. The colon is not distended. The lung  bases are unremarkable.      Impression    IMPRESSION: Nonobstructed bowel gas pattern.    CHUY BURCIAGA MD   CT Chest (PE) Abdomen Pelvis w Contrast    Narrative    CT CHEST PE ABDOMEN AND PELVIS WITH CONTRAST  10/6/2019 2:51 PM    HISTORY:  Abdominal pain, possible pneumonia, cough, shortness of  breath.    TECHNIQUE: Scans obtained of the chest, abdomen, and pelvis with IV  contrast. 47 mL Isovue-370 injected. Timing for the chest was for  pulmonary embolism evaluation and timing for the abdomen and pelvis  was done in portal venous phase.  Radiation dose for this scan was reduced using automated exposure  control, adjustment of the mA and/or kV according to patient size, or  iterative reconstruction technique.    COMPARISON: CT abdomen and pelvis dated 9/11/2019. Chest x-rays dated  10/6/2019.    FINDINGS:   Chest: Severe rotatory scoliosis is noted. No obvious fracture is  seen. Severe degenerative changes of the bilateral shoulders are  noted. No aggressive osseous lesions are seen. There is diffuse  osteopenia. Bilateral hip fixation pins are noted. Patchy infiltrates  are noted in the bilateral bases and could represent infectious or  inflammatory etiology. Neoplastic etiologies considered less likely.  No other significant nodularity is seen in the lungs.    The heart is normal in size. There are mitral valve annular  calcifications. Coronary artery calcifications are also noted.  Thoracic aorta is grossly of normal caliber and demonstrates no  evidence for dissection. There is nonaneurysmal atherosclerosis.  Thoracic aorta is tortuous. No mediastinal, hilar, or  axillary  lymphadenopathy is seen. Visualized portions of the thyroid are  unremarkable.    There are no filling defects in the central to third order pulmonary  arteries to suggest pulmonary artery embolism. The pulmonary arteries  are prominent.    Abdomen and pelvis:  Gallbladder is not seen and could be surgically  absent. Recommend clinical correlation. There is mild prominence of  the intra and extrahepatic biliary ducts which could be compensatory  in etiology. The liver, pancreas, spleen, bilateral adrenal glands and  right kidney otherwise enhance normally. The left kidney is atrophic  and demonstrates irregularity likely secondary to scarring. Cortical  calcification in the left kidney (image 19 series 8) measures up to  almost 0.3 cm in diameter. Left kidney otherwise enhances normally. No  hydronephrosis, hydroureter, ureteral calculus, or right  nephrolithiasis. There is mild questionable thickening of the  posterior left urinary bladder. There is a density in this region  (image 58 series 8) which could represent a distal left  ureterovesicular junction calculus measuring up to 0.3 x 0.4 cm. I  could not definitely finding this calcification on the prior CT from  9/11/2019. Streak artifact does limit evaluation of pelvis.    No adenopathy is identified. No free air is seen. There may be trace  free fluid in pelvis.    Uterus is not seen and is likely surgically absent. Ovaries are not  seen and could be surgically absent. No adnexal masses are identified.    The colon is somewhat difficult to follow given the lack of  intraperitoneal adipose tissue and lack of oral contrast material. No  obvious abnormality of the colon is seen. The appendix is not well  seen. Small bowel appears grossly of normal size. Stomach contains a  small amount of fluid and air but is otherwise unremarkable. There is  nonaneurysmal atherosclerosis.      Impression    IMPRESSION:  1. No evidence for pulmonary artery embolism is  identified.  2. Coronary artery and mitral valve annular calcifications. There is  also nonaneurysmal atherosclerosis of the major arteries of the  abdomen and pelvis.  3. Partial left renal atrophy and scarring.  4. Questionable left ureterovesicular junction calculus in an area of  possible thickening of the posterior left urinary bladder wall. This  calculus measures up to 0.4 x 0.3 cm and could be causing symptoms.  Recommend clinical correlation. No evidence for left hydronephrosis to  suggest an obstructive process.  5. Bilateral posterior lower lung infiltrates are concerning for  pneumonia, atelectasis and/or scarring.  6. Evaluation of the abdomen and pelvis is limited due to relative  lack of intraperitoneal adipose tissue.  7. Severe S-shaped scoliosis of the thoracolumbar spine with  associated degenerative changes.  8. Gallbladder, appendix, uterus and ovaries are not identified. These  may be surgically absent. That history was not provided.    MD Hood SILVA MD  Hospitalist  [facility]

## 2019-10-06 NOTE — ED NOTES
[facility]  ED Nurse Handoff Report    HPI   Nedra Carr is a 86 year old female who presents to the ED via granddaughter w/ a cc of shortness of breath and cough onset 2 weeks ago and gradually worsening with no specific alleviating or worsening factors.  Patient also reports nonradiating epigastric abdominal discomfort worse with coughing.  She denies chest pain, nausea, vomiting, fevers, chills, changes bowel movements, sick contacts or recent travel.  She reports minimal orthopnea as well as mild dysuria.  She denies a history of tobacco use, alcohol use or illegal drug use.  ED Chief complaint: Shortness of Breath  . ED Diagnosis:   Final diagnoses:   Urinary tract infection with hematuria, site unspecified   Community acquired pneumonia, unspecified laterality     Allergies:   Allergies   Allergen Reactions     No Known Drug Allergies        Code Status: Full Code  Activity level - Baseline/Home:  Independent. Activity Level - Current:   Stand by Assist. Lift room needed: No. Bariatric: No   Needed: No   Isolation: No. Infection: Not Applicable.     Vital Signs:   Vitals:    10/06/19 1400 10/06/19 1415 10/06/19 1500 10/06/19 1515   BP:    (!) 165/86   Pulse:  100  104   Resp:       Temp:       TempSrc:       SpO2: 93% 92% 96%        Cardiac Rhythm:  ,      Pain level: 0-10 Pain Scale: 7  Patient confused: No. Patient Falls Risk: Yes.   Elimination Status: Has voided has been up to the commode with assist of RN  Patient Report - Initial Complaint: shortness of breath. Focused Assessment:   16:20 Respiratory Respiratory - Respiratory WDL: cough (pt c/o SOB and cough x2 weeks. Patient states that nothing has made her cough/SOB better. ) Cough Frequency: frequent  Cough Type: loose; congested        Tests Performed: CT, blood work. Abnormal Results:   Labs Ordered and Resulted from Time of ED Arrival Up to the Time of Departure from the ED   CBC WITH PLATELETS DIFFERENTIAL -  Abnormal; Notable for the following components:       Result Value    RBC Count 3.49 (*)     Hemoglobin 10.1 (*)     Hematocrit 33.0 (*)     MCHC 30.6 (*)     Platelet Count 723 (*)     All other components within normal limits   COMPREHENSIVE METABOLIC PANEL - Abnormal; Notable for the following components:    Glucose 126 (*)     Albumin 3.2 (*)     Alkaline Phosphatase 167 (*)     All other components within normal limits   NT PROBNP INPATIENT - Abnormal; Notable for the following components:    N-Terminal Pro BNP Inpatient 2,724 (*)     All other components within normal limits   ROUTINE UA WITH MICROSCOPIC - Abnormal; Notable for the following components:    Ketones Urine Trace (*)     Blood Urine Small (*)     Protein Albumin Urine 50 (*)     Leukocyte Esterase Urine Large (*)     WBC Urine >182 (*)     RBC Urine 100 (*)     Yeast Urine Many (*)     Squamous Epithelial /HPF Urine 6 (*)     Mucous Urine Present (*)     All other components within normal limits   BLOOD GAS VENOUS - Abnormal; Notable for the following components:    PCO2 Venous 51 (*)     Bicarbonate Venous 29 (*)     All other components within normal limits   LIPASE   TROPONIN I   PROCALCITONIN   ISTAT CG4 GASES LACTATE BERT NURSING POCT   NURSING DRAW AND HOLD   ISTAT  GASES LACTATE BERT POCT   INFLUENZA A AND B AND RSV PCR   URINE CULTURE AEROBIC BACTERIAL   BLOOD CULTURE   BLOOD CULTURE     CT Chest (PE) Abdomen Pelvis w Contrast   Final Result   IMPRESSION:   1. No evidence for pulmonary artery embolism is identified.   2. Coronary artery and mitral valve annular calcifications. There is   also nonaneurysmal atherosclerosis of the major arteries of the   abdomen and pelvis.   3. Partial left renal atrophy and scarring.   4. Questionable left ureterovesicular junction calculus in an area of   possible thickening of the posterior left urinary bladder wall. This   calculus measures up to 0.4 x 0.3 cm and could be causing symptoms.   Recommend  clinical correlation. No evidence for left hydronephrosis to   suggest an obstructive process.   5. Bilateral posterior lower lung infiltrates are concerning for   pneumonia, atelectasis and/or scarring.   6. Evaluation of the abdomen and pelvis is limited due to relative   lack of intraperitoneal adipose tissue.   7. Severe S-shaped scoliosis of the thoracolumbar spine with   associated degenerative changes.   8. Gallbladder, appendix, uterus and ovaries are not identified. These   may be surgically absent. That history was not provided.      MARIIA QUINN MD      Abdomen XR, 2 vw, flat and upright   Final Result   IMPRESSION: Nonobstructed bowel gas pattern.      CHUY BURCIAGA MD      XR Chest 2 Views   Final Result   IMPRESSION: Scoliosis again evident. Question of a small left base   infiltrate. Remaining lungs clear. The cardiac silhouette is not   enlarged. Pulmonary vasculature is unremarkable.      CHUY BURCIAGA MD        .   Treatments provided: fluids, antibiotics   Family Comments: granddaughter at bedside  OBS brochure/video discussed/provided to patient:  Yes  ED Medications:   Medications   0.9% sodium chloride BOLUS (500 mLs Intravenous New Bag 10/6/19 1523)   doxycycline (VIBRAMYCIN) 100 mg vial to attach to  mL bag (has no administration in time range)   ipratropium - albuterol 0.5 mg/2.5 mg/3 mL (DUONEB) neb solution 3 mL (3 mLs Nebulization Given 10/6/19 1236)   iopamidol (ISOVUE-370) solution 47 mL (47 mLs Intravenous Given 10/6/19 1438)   sodium chloride (PF) 0.9% PF flush 74 mL (74 mLs Intravenous Given 10/6/19 1438)   cefTRIAXone (ROCEPHIN) 1 g vial to attach to  mL bag for ADULTS or NS 50 mL bag for PEDS (1 g Intravenous New Bag 10/6/19 1524)     Drips infusing:  No  For the majority of the shift, the patient's behavior Green. Interventions performed were frequent rounding.     Severe Sepsis OR Septic Shock Diagnosis Present: No      ED Nurse Name/Phone Number: Valentina Beltrán,  RN,   4:16 PM  RECEIVING UNIT ED HANDOFF REVIEW    Above ED Nurse Handoff Report was reviewed: Yes  Reviewed by: Loli Chaney RN on October 6, 2019 at 5:03 PM

## 2019-10-06 NOTE — ED PROVIDER NOTES
History   Chief Complaint:  Shortness of Breath     HPI   Nedra Carr is a 86 year old female who presents to the ED via granddaughter w/ a cc of shortness of breath and cough onset 2 weeks ago and gradually worsening with no specific alleviating or worsening factors.  Patient also reports nonradiating epigastric abdominal discomfort worse with coughing.  She denies chest pain, nausea, vomiting, fevers, chills, changes bowel movements, sick contacts or recent travel.  She reports minimal orthopnea as well as mild dysuria.  She denies a history of tobacco use, alcohol use or illegal drug use.    Allergies:  No Known Drug Allergies      Medications:    acetaminophen (TYLENOL) 325 MG tablet  alendronate (FOSAMAX) 70 MG tablet  ferrous gluconate (FERGON) 324 (38 Fe) MG tablet  multivitamin, therapeutic with minerals (MULTI-VITAMIN) TABS  Nutritional Supplements (ENSURE) LIQD  pantoprazole (PROTONIX) 40 MG EC tablet      Past Medical History:    Past Medical History:   Diagnosis Date     Atrial fibrillation (H)      Dysphagia 2000     HTN (hypertension)      Migraine, unspecified, without mention of intractable migraine without mention of status migrainosus      Obstructive sleep apnea (adult) (pediatric) 4/9/2015     Osteoporosis, unspecified      Pain in joint, shoulder region      Polymyalgia rheumatica (H)      Renal stones        Patient Active Problem List    Diagnosis Date Noted     Other idiopathic scoliosis, unspecified spinal region 11/05/2018     Priority: Medium     Abdominal pain 05/13/2017     Priority: Medium     Mobility impaired 11/25/2015     Priority: Medium     Multiple pelvic fractures 05/18/2015     Priority: Medium     At high risk for falls 05/16/2015     Priority: Medium     Weakness 05/02/2015     Priority: Medium     Hydronephrosis 04/09/2015     Priority: Medium     Acute cholecystitis 04/09/2015     Priority: Medium     Cardiogenic shock (H) 04/09/2015     Priority: Medium     Closed  compression fracture of thoracic vertebra (H) 04/09/2015     Priority: Medium     Family history of other digestive disorders 04/09/2015     Priority: Medium     Allergic rhinitis 04/09/2015     Priority: Medium     Problem list name updated by automated process. Provider to review       Arthritis of hand 04/09/2015     Priority: Medium     Esophageal stricture 04/09/2015     Priority: Medium     Obstructive sleep apnea 04/09/2015     Priority: Medium     Problem list name updated by automated process. Provider to review       S/P cystoscopy 04/08/2015     Priority: Medium     Hypoxia 04/08/2015     Priority: Medium     Urinary bladder stone 04/07/2015     Priority: Medium     Physical deconditioning 02/03/2015     Priority: Medium     Headache 01/27/2015     Priority: Medium     Problem list name updated by automated process. Provider to review       Paroxysmal atrial fibrillation (H) 01/10/2015     Priority: Medium     Abdominal pain, unspecified abdominal location 01/10/2015     Priority: Medium     Problem list name updated by automated process. Provider to review       UTI (urinary tract infection) 01/10/2015     Priority: Medium     Sepsis (H) 01/10/2015     Priority: Medium     Health Care Home 01/07/2015     Priority: Medium       Status: [facility] [phone]  Care Coordinator:  Rosita Gaspar [phone]  See Letters for [facility] Emergency Care Plan  Date:  January 20, 2015               Emphysematous cholecystitis 11/15/2014     Priority: Medium     Perforated gastric ulcer (H) 11/08/2014     Priority: Medium     Anemia 11/02/2014     Priority: Medium     Intractable chronic migraine without aura 07/17/2014     Priority: Medium     Problem list name updated by automated process. Provider to review       Advance Care Planning 05/16/2011     Priority: Medium     Advance Care Planning 7/1/2015: Receipt of ACP document:  Received: Resuscitation Guidelines order which was signed and dated by  provider on 1-23-15.  Document previously scanned on 2-2-15.  Order reviewed and found to be valid.  Code Status needs to be updated to reflect choices- Code status per hospitalization of 5/18. Order on 5/2/15 matched Resuscitation guidelines; unclear from progress notes on change to Full code during 5/18 stay. Need confirmation of current choices and update of code status if Resuscitation Guidelines are in alignment with patient choices.  Confirmed/documented designated decision maker(s).  Added by Za Foster RN, System ACP Coordinator [employer]   Advance Care Planning 7/1/2015: Receipt of ACP document:  Received: Health Care Directive which was witnessed or notarized on 3-26-13.  Document previously scanned on 3-27-13.  Validation form completed and sent to be scanned.  Code Status needs to be updated to reflect choices in most recent ACP document.  Confirmed/documented designated decision maker(s).  Added by Za Foster RN, System ACP Coordinator [employer]   5/19/11 Discussed advance care planning with patient; information given to patient to review. Lynette Nissen, RN  5/16/11 Would like info   Aniya Caal CMA         CARDIOVASCULAR SCREENING; LDL GOAL LESS THAN 130 10/31/2010     Priority: Medium     Cataract 04/26/2009     Priority: Medium     Utility update for deleted IMO code  Imo Update utility       Osteoporosis 09/02/2005     Priority: Medium     Problem list name updated by automated process. Provider to review       HTN (hypertension) 09/02/2005     Priority: Medium     Hallux varus, acquired 05/27/2005     Priority: Medium     Problem list name updated by automated process. Provider to review       Other hammer toe (acquired) 05/27/2005     Priority: Medium        Past Surgical History:    Past Surgical History:   Procedure Laterality Date     BACK SURGERY       BRONCHOSCOPY FLEXIBLE N/A 11/21/2014    Procedure: BRONCHOSCOPY FLEXIBLE;  Surgeon: Rhonda Donohue MD;  Location:   GI     C NONSPECIFIC PROCEDURE      s/p dilation at [facility] by Dr. Radha FELIPE NONSPECIFIC PROCEDURE      s/p dilation at [facility] by Dr. Betty FELIPE NONSPECIFIC PROCEDURE      Vag hysterectomy     CHOLECYSTECTOMY N/A 11/15/2014    Procedure: CHOLECYSTECTOMY;  Surgeon: Yomi Brennan MD;  Location:  OR     COLONOSCOPY N/A 9/3/2019    Procedure: Colonoscopy  with biopsies using cold forceps with no sedation;  Surgeon: Anastasia Berg MD;  Location:  GI     CYSTOSCOPY, LITHOLAPAXY, COMBINED N/A 4/8/2015    Procedure: COMBINED CYSTOSCOPY, LITHOLAPAXY;  Surgeon: Randy Reno MD;  Location:  OR     ENT SURGERY       ESOPHAGOSCOPY, GASTROSCOPY, DUODENOSCOPY (EGD), COMBINED  11/21/2012    Procedure: COMBINED ESOPHAGOSCOPY, GASTROSCOPY, DUODENOSCOPY (EGD), BIOPSY SINGLE OR MULTIPLE;   ESOPHAGOSCOPY, GASTROSCOPY, DUODENOSCOPY (EGD)   with cold biopsy and dilalation with #15 savary;  Surgeon: Guillermo Arcos MD;  Location:  GI     ESOPHAGOSCOPY, GASTROSCOPY, DUODENOSCOPY (EGD), COMBINED  2/22/2013    Procedure: COMBINED ESOPHAGOSCOPY, GASTROSCOPY, DUODENOSCOPY (EGD);  ESOPHAGOSCOPY, GASTROSCOPY, DUODENOSCOPY (EGD) ;  Surgeon: Lissett Posada MD;  Location:  GI     ESOPHAGOSCOPY, GASTROSCOPY, DUODENOSCOPY (EGD), COMBINED N/A 10/29/2014    Procedure: COMBINED ESOPHAGOSCOPY, GASTROSCOPY, DUODENOSCOPY (EGD);  Surgeon: Dany Ambrocio MD;  Location:  GI     ESOPHAGOSCOPY, GASTROSCOPY, DUODENOSCOPY (EGD), COMBINED N/A 1/13/2015    Procedure: COMBINED ESOPHAGOSCOPY, GASTROSCOPY, DUODENOSCOPY (EGD), BIOPSY SINGLE OR MULTIPLE;  Surgeon: Dany Ambrocio MD;  Location:  GI     ESOPHAGOSCOPY, GASTROSCOPY, DUODENOSCOPY (EGD), COMBINED N/A 3/5/2015    Procedure: COMBINED ESOPHAGOSCOPY, GASTROSCOPY, DUODENOSCOPY (EGD);  Surgeon: Dany Ambrocio MD;  Location:  GI     ESOPHAGOSCOPY, GASTROSCOPY, DUODENOSCOPY (EGD), COMBINED N/A 11/10/2015    Procedure: COMBINED ESOPHAGOSCOPY, GASTROSCOPY,  DUODENOSCOPY (EGD);  Surgeon: Dany Ambrocio MD;  Location:  GI     EYE SURGERY       GYN SURGERY       LAPAROTOMY EXPLORATORY N/A 11/15/2014    Procedure: LAPAROTOMY EXPLORATORY;  Surgeon: Yomi Brennan MD;  Location:  OR     LASER HOLMIUM LITHOTRIPSY BLADDER N/A 4/8/2015    Procedure: LASER HOLMIUM LITHOTRIPSY BLADDER;  Surgeon: Randy Reno MD;  Location:  OR     ORTHOPEDIC SURGERY      hip fx surgery x 2        Family History:    Family History   Problem Relation Age of Onset     Diabetes Mother      Diabetes Sister      Heart Disease Son      Colon Cancer No family hx of        Social History:   reports that she has never smoked. She has never used smokeless tobacco. She reports that she does not drink alcohol or use drugs.    PCP: Juan A Solo     Review of Systems   All other systems reviewed and are negative.     Full ROS completed and negative other than pertinent positives and negatives noted in HPI    Physical Exam     Patient Vitals for the past 24 hrs:   BP Temp Temp src Pulse Resp SpO2   10/06/19 1515 (!) 165/86 -- -- 104 -- --   10/06/19 1500 -- -- -- -- -- 96 %   10/06/19 1415 -- -- -- 100 -- 92 %   10/06/19 1400 -- -- -- -- -- 93 %   10/06/19 1345 (!) 145/83 -- -- 100 -- 90 %   10/06/19 1330 -- -- -- -- -- 91 %   10/06/19 1245 (!) 153/83 -- -- 100 -- 97 %   10/06/19 1230 -- -- -- -- -- 93 %   10/06/19 1215 -- -- -- -- -- 95 %   10/06/19 1209 (!) 146/90 97.8  F (36.6  C) Oral 112 20 96 %      Physical Exam  Constitutional: Well developed, somewhat chronic ill appearance, short stature, nontox appearance  Head: Atraumatic.   Mouth/Throat: Oropharynx is clear and dry  Neck:  no stridor, no LAD, full range of motion  Eyes: no scleral icterus  Cardiovascular: Wet cough, regular tachycardia, 2+ bilat radial pulses  Pulmonary/Chest: nml resp effort, Clear BS bilat  Abdominal: ND, +BS, soft, mild epigastric discomfort, no rebound or guarding   Back: severe  kyphosis/scoliosis  : no CVA tenderness bilat  Ext: Warm, well perfused, no edema  Neurological: A&O, symmetric facies, moves ext x4  Skin: Skin is warm and dry.   Psychiatric: Behavior is normal. Thought content normal.   Nursing note and vitals reviewed.    Emergency Department Course   ECG (12:14:46):  Rate 111 bpm.   QT/QTc 340/462.   P-R-T axes 70 35 65.   Sinus tachycardia. Left ventricular hypertrophy with repolarization abnormality. Interpreted at 1215 by Awais Dao MD.     Imaging:  CT Chest (PE) Abdomen Pelvis w Contrast   Final Result   IMPRESSION:   1. No evidence for pulmonary artery embolism is identified.   2. Coronary artery and mitral valve annular calcifications. There is   also nonaneurysmal atherosclerosis of the major arteries of the   abdomen and pelvis.   3. Partial left renal atrophy and scarring.   4. Questionable left ureterovesicular junction calculus in an area of   possible thickening of the posterior left urinary bladder wall. This   calculus measures up to 0.4 x 0.3 cm and could be causing symptoms.   Recommend clinical correlation. No evidence for left hydronephrosis to   suggest an obstructive process.   5. Bilateral posterior lower lung infiltrates are concerning for   pneumonia, atelectasis and/or scarring.   6. Evaluation of the abdomen and pelvis is limited due to relative   lack of intraperitoneal adipose tissue.   7. Severe S-shaped scoliosis of the thoracolumbar spine with   associated degenerative changes.   8. Gallbladder, appendix, uterus and ovaries are not identified. These   may be surgically absent. That history was not provided.      MARIIA QUINN MD      Abdomen XR, 2 vw, flat and upright   Final Result   IMPRESSION: Nonobstructed bowel gas pattern.      CHUY BURCIAGA MD      XR Chest 2 Views   Final Result   IMPRESSION: Scoliosis again evident. Question of a small left base   infiltrate. Remaining lungs clear. The cardiac silhouette is not   enlarged.  Pulmonary vasculature is unremarkable.      CHUY BURCIAGA MD           Laboratory:  Labs Ordered and Resulted from Time of ED Arrival Up to the Time of Departure from the ED   CBC WITH PLATELETS DIFFERENTIAL - Abnormal; Notable for the following components:       Result Value    RBC Count 3.49 (*)     Hemoglobin 10.1 (*)     Hematocrit 33.0 (*)     MCHC 30.6 (*)     Platelet Count 723 (*)     All other components within normal limits   COMPREHENSIVE METABOLIC PANEL - Abnormal; Notable for the following components:    Glucose 126 (*)     Albumin 3.2 (*)     Alkaline Phosphatase 167 (*)     All other components within normal limits   NT PROBNP INPATIENT - Abnormal; Notable for the following components:    N-Terminal Pro BNP Inpatient 2,724 (*)     All other components within normal limits   ROUTINE UA WITH MICROSCOPIC - Abnormal; Notable for the following components:    Ketones Urine Trace (*)     Blood Urine Small (*)     Protein Albumin Urine 50 (*)     Leukocyte Esterase Urine Large (*)     WBC Urine >182 (*)     RBC Urine 100 (*)     Yeast Urine Many (*)     Squamous Epithelial /HPF Urine 6 (*)     Mucous Urine Present (*)     All other components within normal limits   BLOOD GAS VENOUS - Abnormal; Notable for the following components:    PCO2 Venous 51 (*)     Bicarbonate Venous 29 (*)     All other components within normal limits   LIPASE   TROPONIN I   PROCALCITONIN   ISTAT CG4 GASES LACTATE BERT NURSING POCT   NURSING DRAW AND HOLD   ISTAT  GASES LACTATE BERT POCT   INFLUENZA A AND B AND RSV PCR   URINE CULTURE AEROBIC BACTERIAL   BLOOD CULTURE   BLOOD CULTURE        Procedures:  Procedures    Interventions:  Medications   0.9% sodium chloride BOLUS (500 mLs Intravenous New Bag 10/6/19 1523)   doxycycline (VIBRAMYCIN) 100 mg vial to attach to  mL bag (has no administration in time range)   ipratropium - albuterol 0.5 mg/2.5 mg/3 mL (DUONEB) neb solution 3 mL (3 mLs Nebulization Given 10/6/19 1236)    iopamidol (ISOVUE-370) solution 47 mL (47 mLs Intravenous Given 10/6/19 1438)   sodium chloride (PF) 0.9% PF flush 74 mL (74 mLs Intravenous Given 10/6/19 1438)   cefTRIAXone (ROCEPHIN) 1 g vial to attach to  mL bag for ADULTS or NS 50 mL bag for PEDS (1 g Intravenous New Bag 10/6/19 3274)        Emergency Department Course:  Past medical records, nursing notes, and vitals reviewed.  I performed an exam of the patient and obtained history, as documented above.    I rechecked the patient. Findings and plan explained to the Patient    Impression & Plan        Medical Decision Makin year old female presenting w/ shortness of breath, cough    DDx includes bronchitis, pneumonia, COPD exacerbation, viral syndrome NOS, CHF, ACS, pneumothorax, PE although less likely given physical exam and history.  EKG interpretation as noted above.   Labs and imaging ordered as noted above.  Labs significant for UA concerning for infection, nml WBC, nml VBG and lactate.  Imaging findings as noted above concerning for possible ureteral stone and findings of pneumonia vs atelectasis.  Atelectasis seems more like particulary given pt's degree of scoliosis, nml WBC, no fever.  Presentation seems most consistent w/ bronchitis but given imaging findings, pt given abx for UTI and CAP.   Pt subsequently admitted to hospitalist service for further evaluation and mgmt.  Pt counseled on all results, disposition and diagnosis.  They are understanding and agreeable to plan. Patient admitted in stable condition.      Diagnosis:    ICD-10-CM    1. Urinary tract infection with hematuria, site unspecified N39.0 Influenza A and B and RSV PCR    R31.9 Blood culture     Blood culture   2. Community acquired pneumonia, unspecified laterality J18.9         Discharge Medications:  New Prescriptions    No medications on file        10/6/2019   Awais Dao MD Vaughn, Christopher E, MD  10/07/19 8122

## 2019-10-07 NOTE — PLAN OF CARE
Pt is A/O, afebrile, VSS- on 2L O2 via NC. Nonproductive cough. Denied pain. IV patent- SL inbetween Abx. Up with Ax1 walker and gait belt. Tolerating regular diet. Will continue plan of care.

## 2019-10-07 NOTE — PLAN OF CARE
Pt arrived from ER to room 601 at 1700. Pt alert and orientated. Pt educated on call light and new orders. Pt has infrequent cough. LS exp wheezes. Need sputum. Pt voids every hour small amts. Pt profile completed. Pt to be seen by urology tomorrow and is ordered npo after 00.

## 2019-10-07 NOTE — CONSULTS
Consult Date:  10/07/2019      REASON FOR CONSULTATION:  Distal left ureteral stone; urinary tract infection.      HISTORY OF PRESENT ILLNESS:  Nedra Carr is an 86-year-old woman who I have seen before previously for removal of bladder stones.  She reportedly had another appointment with me sometime in the recent past that she missed.  She came into the Emergency Department yesterday complaining of shortness of breath and a cough.  She was admitted with a suspected bronchitis versus pneumonia.  She also had infected-appearing urine on her urinary tract infection.  She did not report any acute flank pain or other urinary symptoms upon admission.  She was admitted on antibiotics.  She has remained afebrile since being admitted.  Her white blood cell count has remained normal.  She also had a CT scan a month ago that showed 1 or 2 stones in the left kidney.  A CT scan performed yesterday showed the stone in the left kidney to be unchanged, but there is possibly a small, 2 mm calculus in the distal left ureter at the ureterovesical junction.  There is no hydronephrosis.  She has been admitted overnight and has done well with no symptoms and no fevers.      PAST MEDICAL HISTORY:  She has had a prior history of kidney stones and bladder stones.  She has a history of hypertension and atrial fibrillation.      PAST SURGICAL HISTORY:  Bladder stone removal.  No prior kidney stone procedures.      HOME MEDICATIONS:  Fosamax and Protonix.      ALLERGIES:  NO KNOWN DRUG ALLERGIES.      SOCIAL HISTORY:  She is a nonsmoker.      FAMILY HISTORY:  No family history of urologic malignancy or stones.      REVIEW OF SYSTEMS:  Currently, she has a cough and shortness of breath but no urinary symptoms.  She endorses no left-sided flank pain.      PHYSICAL EXAMINATION:   VITAL SIGNS:  She is currently afebrile.  Vital signs stable.   GENERAL:  She is alert and oriented, in no acute distress.   HEENT:  Normocephalic and atraumatic.    RESPIRATORY:  Normal nonlabored breathing on her oxygen.   ABDOMEN:  Soft, nontender, nondistended.      IMAGING STUDIES:  I reviewed her CT scan images going back to 2018.  She has had a stone in her left kidney that remains unchanged.  She now has a potential small left ureterovesical junction stone.  There is no hydronephrosis on the left side.      LABORATORY STUDIES:  Urinalysis appears infected.  Urine and blood cultures are pending.  Serum white blood count is 10.  Creatinine 0.55.      IMPRESSION AND PLAN:  This is an  86-year-old woman with a urinary tract infection and a possible distal left ureteral stone.  On her CT scan, it does appear that she likely has a distal left ureteral stone.  However, it is very small and does not appear to be causing any obstruction as there is no hydronephrosis at this time.  The patient reports no flank pain from her stone.  She is certainly not septic-appearing.  Therefore, I recommended observation on the antibiotics.  If she remains asymptomatic, as she is now, I would recommend that we repeat a noncontrast CT of the pelvis just before she plans to discharge from the hospital to make certain that the stone passes spontaneously.  We will continue to follow the patient during the hospitalization.         DANDRE CALDWELL MD             D: 10/07/2019   T: 10/07/2019   MT: ALICJA      Name:     DUC GARCIA   MRN:      [id number]        Account:       [id number]   :      1932           Consult Date:  10/07/2019      Document: [id number]

## 2019-10-07 NOTE — PROGRESS NOTES
SPIRITUAL HEALTH SERVICES Progress Note  [facility]    [facility] made 2 attempts to visit with Nedra this morning.  Initial visit found Nedra in the bathroom. Later in a.m., [facility] greeted Nedra beginning her lunch.     [facility] plan to revisit Nedra later (post meal)  today.    Vinita Nguyen Intern  Phone:  [phone]    Addendum:  [facility] visit with Nedra this afternoon.  [facility] found Nedra sleepy but welcoming a visit from author.  Reflective visit regarding Nedra's need for admission and supportive family (some currently out of town due to a family wedding over the weekend).      Nedra stated that she is feeling much better and is happy that she is now strong enough to get out of bed at times.  Nedra has been residing at a long-term home for the past 20 yrs. stating that they do lots of things together that she enjoys--most notably playing cards and celebrating birthdays.      When author affirmed Nedra's affiliation with the [employer], pt. denied this.   Prayers offered to patient were also declined.      [facility] remains available upon request.

## 2019-10-07 NOTE — PROGRESS NOTES
RT NOTE      Date: 10/06/2019  Admission Dx: CAP  Pulmonary hx: UZMA  Home nebulizer/MDI: NONE  Home oxygen: NONE  Acuity level (RCAT flow sheet): 3  Aerosol therapy initiated: DUONEB QID, ALB Q4 PRN  Pulmonary hygiene initiated: NONE  Volume expansion initiated: IS TID  Current oxygen requirements: RA  Current spo2: 96%  Re-evaluation date: 10/09/2019  Patient education: ENCOURAGE IS USE    Vicky Orourke, RRT

## 2019-10-07 NOTE — PROGRESS NOTES
[facility]  Patient is currently open to home care services with [facility].  The patient is currently receiving RN/HHA services.  [facility]  and team have been notified of patient admission.  [facility] liaison will continue to follow patient during stay.  If appropriate provide orders to resume home care at time of discharge.

## 2019-10-07 NOTE — PROGRESS NOTES
[facility]    Medicine Progress Note - Hospitalist Service       Date of Admission:  10/6/2019  Assessment & Plan        Nedra Carr is a 86 year old female with PMH including paroxysmal A. fib not on anticoagulation, dysphagia w/ multiple dilations, nephrolithiasis, recurrent UTIs, HTN, PMR, severe scoliosis, perforated gastric ulcer, and anemia who presents with cough and shortness of breath.  On presentation patient had CT chest abdomen pelvis which showed some bibasilar infiltrates, started on antibiotics for community-acquired pneumonia. CT also showed small stone at left ureterovesical area without hydronephrosis.  Urologist was consulted.    1. Suspect community-acquired pneumonia versus bronchitis.  2. Left ureterovesical stone with possible UTI  -Continue Rocephin and doxycycline, OK to continue doxycycline 100 mg twice daily  -Urology evaluated the patient, no intervention needed.  -Follow-up cultures.  -No flank pain  -BNP was elevated and echocardiogram was ordered, follow-up result still pending    3. Chronic anemia and thrombocytosis.  -Monitor CBC.  -Baseline platelet is elevated, mostly above 500, and platelet is 608.  Noted it was 156 on September 11.    4. Cachexia/malnutrition.  5.  History of perforated gastric ulcer, dysphagia, history of esophageal strictures.  -Nutritionist consulted.  -Patient is cachectic.  -Encourage oral intake, further recommendation per nutritionist    5. Proximal atrial fibrillation currently sinus.  -Continue to monitor, albumin level is 3.2  -Patient is not on anticoagulation likely due to previous history of peptic ulcer disease with perforation.  -Given her age and condition with severe cachexia, the risk of bleeding outweighs the benefit in this patient.    6. Hypertension  -Continue to monitor, blood pressure is stable      Diet: Regular Diet Adult    DVT Prophylaxis: Pneumatic Compression Devices  Morejon Catheter: not present  Code Status: DNR       Disposition Plan   Expected discharge: 2 more, if she continues to improve and culture results available.  Entered: Ag Kurtz MD 10/07/2019, 2:40 PM     The patient's care was discussed with the.    Ag Kurtz MD  Hospitalist Service  [facility]    ______________________________________________________________________    Interval History   Patient seen and examined, assumed care today, H&P reviewed, admitted with suspected pneumonia versus viral bronchitis, positive UA with urolithiasis.  No fever or chills, has cough, nonproductive sputum, no chest pain.  No urine or bowel habit change noted.    Data reviewed today: I reviewed all medications, new labs and imaging results over the last 24 hours. I personally reviewed .    Physical Exam   Vital Signs: Temp: 96  F (35.6  C) Temp src: Oral BP: 132/63 Pulse: 112   Resp: 18 SpO2: 97 % O2 Device: Nasal cannula Oxygen Delivery: 2 LPM  Weight: 60 lbs 6.4 oz  General Appearance: Cachectic, awake, hard of hearing, not in distress  Respiratory: Good air entry anteriorly, decreased breath sound basal posterior both sides.  Cardiovascular: S1 and S2 well heard, no murmur or gallop.  Regular  GI: Scaphoid, soft, nontender, nondistended, organomegaly.  Positive bowel sounds.  Skin: No rash, petechia or exanthems  Other: Psych: Normal mood and affect, keeps eye contact, responds to question appropriately    Data   Recent Labs   Lab 10/07/19  0640 10/06/19  1224   WBC 10.0 6.1   HGB 8.9* 10.1*   MCV 95 95   * 723*    139   POTASSIUM 3.9 4.0   CHLORIDE 107 103   CO2 27 29   BUN 13 18   CR 0.55 0.70   ANIONGAP 6 7   GABINO 8.7 9.1   GLC 82 126*   ALBUMIN  --  3.2*   PROTTOTAL  --  7.8   BILITOTAL  --  0.3   ALKPHOS  --  167*   ALT  --  18   AST  --  17   LIPASE  --  198   TROPI  --  <0.015     No results found for this or any previous visit (from the past 24 hour(s)).  Medications       cefTRIAXone  2 g Intravenous Q24H      doxycycline (VIBRAMYCIN) IV  100 mg Intravenous Q12H     ferrous gluconate  324 mg Oral Daily     influenza Vac Split High-Dose  0.5 mL Intramuscular Prior to discharge     ipratropium - albuterol 0.5 mg/2.5 mg/3 mL  3 mL Nebulization 4x daily     pantoprazole  40 mg Oral QAM AC     sodium chloride (PF)  3 mL Intracatheter Q8H

## 2019-10-07 NOTE — PLAN OF CARE
A&O. VSS. 2L O2 per nasal cannula, sats 88 - 89% on room air while asleep. Congested cough. Has not been able to bring up sputum. On IV Doxycycline. Denied pain. Assist of 1 with a walker and gait belt. NPO for procedure today.

## 2019-10-08 NOTE — PROGRESS NOTES
[facility] Urology Progress Note          Assessment and Plan:     Assessment:    CAP (community acquired pneumonia), kidney stones, poss distal left UVJ stone, poss UTI, CAP      Plan: Awaiting cultures, continues on abx. If she remains asymptomatic, as she is now, would recommend that we repeat a noncontrast CT of the pelvis just before she plans to discharge from the hospital to make certain that the stone passes spontaneously.       Makenna Arenas PA-C  [facility]  [phone]               Interval History:    Voiding, no flank pain              Review of Systems:   The 5 point Review of Systems is negative other than noted in the HPI             Medications:     Current Facility-Administered Medications Ordered in Epic   Medication Dose Route Frequency Last Rate Last Dose     acetaminophen (TYLENOL) tablet 650 mg  650 mg Oral Q4H PRN   650 mg at 10/07/19 1611     albuterol (PROVENTIL) neb solution 2.5 mg  3 mL Nebulization Q4H PRN         cefTRIAXone (ROCEPHIN) 2 g vial to attach to  ml bag for ADULTS or NS 50 ml bag for PEDS  2 g Intravenous Q24H   2 g at 10/07/19 1611     doxycycline (VIBRAMYCIN) 50 mg in sodium chloride 0.9 % 100 mL intermittent infusion  50 mg Intravenous Q12H   50 mg at 10/08/19 0603     ferrous gluconate (FERGON) tablet 324 mg  324 mg Oral Daily   324 mg at 10/07/19 0850     guaiFENesin (ROBITUSSIN) 20 mg/mL solution 10 mL  10 mL Oral Q4H PRN         influenza Vac Split High-Dose (FLUZONE) injection 0.5 mL  0.5 mL Intramuscular Prior to discharge         ipratropium - albuterol 0.5 mg/2.5 mg/3 mL (DUONEB) neb solution 3 mL  3 mL Nebulization 4x daily   3 mL at 10/08/19 0706     lidocaine (LMX4) cream   Topical Q1H PRN         lidocaine (XYLOCAINE) 2 % 15 mL, alum & mag hydroxide-simethicone (MYLANTA ES/MAALOX  ES) 15 mL GI Cocktail  30 mL Oral TID PRN         lidocaine 1 % 0.1-1 mL  0.1-1 mL Other Q1H PRN         melatonin tablet 1 mg  1 mg Oral At Bedtime  PRN         naloxone (NARCAN) injection 0.1-0.4 mg  0.1-0.4 mg Intravenous Q2 Min PRN         ondansetron (ZOFRAN-ODT) ODT tab 4 mg  4 mg Oral Q6H PRN        Or     ondansetron (ZOFRAN) injection 4 mg  4 mg Intravenous Q6H PRN         pantoprazole (PROTONIX) EC tablet 40 mg  40 mg Oral QAM AC         polyethylene glycol (MIRALAX/GLYCOLAX) Packet 17 g  17 g Oral Daily PRN         senna-docusate (SENOKOT-S/PERICOLACE) 8.6-50 MG per tablet 1 tablet  1 tablet Oral BID PRN        Or     senna-docusate (SENOKOT-S/PERICOLACE) 8.6-50 MG per tablet 2 tablet  2 tablet Oral BID PRN         sodium chloride (PF) 0.9% PF flush 3 mL  3 mL Intracatheter q1 min prn         sodium chloride (PF) 0.9% PF flush 3 mL  3 mL Intracatheter Q8H   3 mL at 10/08/19 0437     No current [facility]-ordered outpatient medications on file.                  Physical Exam:   Vitals were reviewed  Patient Vitals for the past 8 hrs:   BP Temp Temp src Pulse Resp SpO2 Weight   10/08/19 0706 -- -- -- -- 22 94 % --   10/08/19 0651 -- -- -- -- -- -- (!) 27.5 kg (60 lb 9.6 oz)   10/08/19 0013 132/72 95.1  F (35.1  C) Oral 94 20 97 % --     GEN: NAD, lying in bed  EYES: EOMI  MOUTH: MMM  NECK: Supple  RESP: Unlabored breathing  CARDIAC: No LE edema  SKIN: Warm  ABD: soft  NEURO: AAO           Data:     Lab Results   Component Value Date    NTBNPI 2,724 (H) 10/06/2019    NTBNPI 14,150 (H) 11/21/2014    NTBNPI 10,766 (H) 11/20/2014     Lab Results   Component Value Date    WBC 13.1 (H) 10/08/2019    WBC 10.0 10/07/2019    WBC 6.1 10/06/2019    HGB 9.1 (L) 10/08/2019    HGB 8.9 (L) 10/07/2019    HGB 10.1 (L) 10/06/2019    HCT 29.3 (L) 10/08/2019    HCT 29.4 (L) 10/07/2019    HCT 33.0 (L) 10/06/2019    MCV 95 10/08/2019    MCV 95 10/07/2019    MCV 95 10/06/2019     (H) 10/08/2019     (H) 10/07/2019     (H) 10/06/2019     Lab Results   Component Value Date    INR 1.07 01/10/2015    INR 1.07 11/20/2014    INR 1.62 (H) 11/17/2014

## 2019-10-08 NOTE — CONSULTS
CLINICAL NUTRITION SERVICES  -  ASSESSMENT NOTE      Recommendations Ordered by Registered Dietitian (RD):     Boost at 10 AM   Magic cups (vanilla) BID with lunch and dinner     MVI/M   Malnutrition:   % Weight Loss:  > 10% in 6 months (severe malnutrition)  % Intake:  </= 50% for >/= 5 days (severe malnutrition)  Subcutaneous Fat Loss:  Orbital region depletion, Upper arm region depletion, Thoracic region depletion and Lumbar region depletion: severe: suspect some fat loss with aging   Muscle Loss:  Temporal region depletion, Clavicle bone region depletion, Acromion bone region depletion, Scapular bone region depletion, Dorsal hand region depletion, Patellar region depletion, Anterior thigh region depletion and Posterior calf region depletion: severe --> suspect some muscle loss with aging   Fluid Retention:  None noted    Malnutrition Diagnosis: Severe malnutrition  In Context of:  Chronic illness or disease     REASON FOR ASSESSMENT  Nedra Carr is a 86 year old female seen by Registered Dietitian for Admission Nutrition Risk Screen for unintentional loss of 10# or more in the past two months and reduced oral intake over the last month and RN Consult - lost more than 10 pounds in last month. pt weighs 64 # and states she drinks boost at  home.      NUTRITION HISTORY  - Information obtained from the pt, EMR.  - Presents with CAP (community acquired pneumonia), kidney stones, poss distal left UVJ stone, poss UTI, CAP  - PMH: Dysphagia, HTN, Osteoporosis, renal stones  - Patient is on a regular diet at home with no restrictions.   - She lives by herself and makes breakfast and lunches for herself, she receives dinners from [employer].   - Typically prepares something simple for breakfast and lunch - cereals, soups, frozen entrees  - Supplements: takes half a Vanilla Ensure per day   - She reported not having an appetite for the past two weeks, couldn't state why. She did not endorse any  "nausea/vomiting/diarrhea.  - Reported eating more yesterday than she had in a week.    CURRENT NUTRITION ORDERS  Diet Order:     Regular     Current Intake/Tolerance:    Pt was eating breakfast during visit - she said her appetite is slowly improving but still below her baseline    BM x 2 yesterday     NUTRITION FOCUSED PHYSICAL ASSESSMENT FOR DIAGNOSING MALNUTRITION)  Completed:  Yes Full assessment         Observed:    Muscle wasting (refer to documentation in Malnutrition section) and Subcutaneous fat loss (refer to documentation in Malnutrition section)    ANTHROPOMETRICS  Height: 4' 5\"  Weight: 27.5 kg   Body mass index is 15.17 kg/m .  Weight Status:  Underweight BMI <18.5  IBW: 37.5 kg +/-10%  % IBW: 73%   Weight History: Pt's weight is trending down. 12% weight loss in the past 6 months.   Wt Readings from Last 10 Encounters:   10/08/19 (!) 27.5 kg (60 lb 9.6 oz)   09/11/19 (!) 29.9 kg (66 lb)   09/03/19 (!) 29.9 kg (66 lb)   08/05/19 (!) 29.9 kg (66 lb)   06/17/19 30.8 kg (68 lb)   04/02/19 31.8 kg (70 lb)   12/12/18 31.8 kg (70 lb)   11/13/18 31.8 kg (70 lb)   11/05/18 31.8 kg (70 lb)   08/15/18 33.1 kg (72 lb 14.4 oz)     LABS  Labs reviewed    MEDICATIONS  Medications reviewed    ASSESSED NUTRITION NEEDS PER APPROVED PRACTICE GUIDELINES:    Dosing Weight 27.5 kg (current weight)  Estimated Energy Needs: 3471-8948+ kcals (35-40+ Kcal/Kg)  Justification: repletion and underweight  Estimated Protein Needs: 41-55+ grams protein (1.5-2+ g pro/Kg)  Justification: Repletion and hypercatabolism with acute illness  Estimated Fluid Needs: per provider pending fluid status    MALNUTRITION:  % Weight Loss:  > 10% in 6 months (severe malnutrition)  % Intake:  </= 50% for >/= 5 days (severe malnutrition)  Subcutaneous Fat Loss:  Orbital region depletion, Upper arm region depletion, Thoracic region depletion and Lumbar region depletion: severe: suspect some fat loss with aging   Muscle Loss:  Temporal region depletion, " Clavicle bone region depletion, Acromion bone region depletion, Scapular bone region depletion, Dorsal hand region depletion, Patellar region depletion, Anterior thigh region depletion and Posterior calf region depletion: severe --> suspect some muscle loss with aging   Fluid Retention:  None noted    Malnutrition Diagnosis: Severe malnutrition  In Context of:  Chronic illness or disease    NUTRITION DIAGNOSIS:  Inadequate oral intake related to poor appetite as evidenced by weight loss of >10% in the past 6 months, reduced intake <50%, muscle wasting and at loss meeting criteria for malnutrition.     NUTRITION INTERVENTIONS  Recommendations / Nutrition Prescription  Boost at 10 AM   Magic cups (vanilla) BID with lunch and dinner   MVI/M    Implementation  Nutrition education: Provided education on nutritional supplements, high protein/high calorie food choices, maintaining weight status/LBM  Medical Food Supplement  Multivitamin/Mineral    Nutrition Goals  Pt will consume >50% of meals TID and 1-2 high protein supplements per day.     MONITORING AND EVALUATION:  Progress towards goals will be monitored and evaluated per protocol and Practice Guidelines    Joel Mercado RDN   Clinical Dietitian  [facility]

## 2019-10-08 NOTE — PLAN OF CARE
Pt weaned off O2. Ambulated halls SBA with walker, no SOB, walked about 230 feet total. Spo2 was 95% when back in room on RA. Pt denied weakness. Denies pain. Family was concerned about pt discharging tomorrow because she will be going back to her apt where she lives alone with out assistance, as family members will be working. After pt's dtr saw the pt ambulate she seemed more at ease with pt discharging. Family would like to talk to MD before discharge.

## 2019-10-08 NOTE — PLAN OF CARE
A&OX3, forgetful.VSS: on 2L oxygen NC sats 90's.  Up with [id number] and walker. Non productive cough, IS encouraged. Denies pain. Abx doxycycline.Voiding good amounts. Nursing continue to monitor.

## 2019-10-08 NOTE — PLAN OF CARE
Pt up A1 with walker and gait belt. Voiding small amts, frequently. Had medium BM. Denies pain. Weaning off O2. Productive cough. Encourage fluids and food. Encourage IS use. Plan to discharge home tomorrow with family.

## 2019-10-08 NOTE — PROGRESS NOTES
[facility]    Medicine Progress Note - Hospitalist Service       Date of Admission:  10/6/2019  Assessment & Plan        Nedra Carr is a 86 year old female with PMH including paroxysmal A. fib not on anticoagulation, dysphagia w/ multiple dilations, nephrolithiasis, recurrent UTIs, HTN, PMR, severe scoliosis, perforated gastric ulcer, and anemia who presents with cough and shortness of breath.  On presentation patient had CT chest abdomen pelvis which showed some bibasilar infiltrates, started on antibiotics for community-acquired pneumonia. CT also showed small stone at left ureterovesical area without hydronephrosis.  Urologist was consulted.    1. Suspect community-acquired pneumonia versus bronchitis.  2. Left ureterovesical stone with possible UTI.  -Continue Rocephin and doxycycline, OK to continue doxycycline 100 mg twice daily  -Urology evaluated the patient, no intervention needed.  -Follow-up cultures.  -No flank pain.  -BNP was elevated and echocardiogram was ordered, result is still pending.    3. Chronic anemia and thrombocytosis.  -Monitor CBC.  -Baseline platelet is elevated, mostly above 500, and platelet is 608.  Noted it was 156 on September 11.    4. Cachexia/severe malnutrition in setting of chronic illness.     5.  History of perforated gastric ulcer, dysphagia, history of esophageal strictures.  -Nutritionist consulted.  -Patient is cachectic skin and bone, with severe malnutrition, follow-up nutritionist recommendation.  -Encourage oral intake  -Albumin level was 3.2    5. Proximal atrial fibrillation currently sinus.  -Patient is not on anticoagulation likely due to previous history of peptic ulcer disease with perforation.  -Given her age and condition with severe cachexia, the risk of bleeding outweighs the benefit in this patient.    6. Hypertension.  -Continue to monitor, blood pressure is stable      Diet: Regular Diet Adult    DVT Prophylaxis: Pneumatic Compression  Devices.  [place] Catheter: not present  Code Status: DNR      Disposition Plan   Expected discharge: 2 more, if she continues to improve and culture results available.  Entered: Ag Kurtz MD 10/08/2019, 1:36 PM     The patient's care was discussed with the.    Ag Kurtz MD  Hospitalist Service  [facility]    ______________________________________________________________________    Interval History   Patient seen and examined, assumed care today, H&P reviewed, admitted with suspected pneumonia versus viral bronchitis, positive UA with urolithiasis.  No fever or chills, has cough, nonproductive sputum, no chest pain.  No urine or bowel habit change noted.    Data reviewed today: I reviewed all medications, new labs and imaging results over the last 24 hours. I personally reviewed .    Physical Exam   Vital Signs: Temp: 96.2  F (35.7  C) Temp src: Oral BP: 133/67 Pulse: 95   Resp: 22 SpO2: 99 % O2 Device: Nasal cannula Oxygen Delivery: 1 LPM  Weight: 60 lbs 9.6 oz  GENERAL: Cachectic, awake, hard of hearing, not in distress  CHEST: Good air entry anteriorly, decreased breath sound basal posterior both sides.  CVS: S1 and S2 well heard, no murmur or gallop.  Regular  GI: Scaphoid, soft, nontender, nondistended, organomegaly.  Positive bowel sounds.  Skin: No rash, petechia or exanthems  Other: Psych: Normal mood and affect, keeps eye contact, responds to question appropriately    Data   Recent Labs   Lab 10/08/19  0705 10/07/19  0640 10/06/19  1224   WBC 13.1* 10.0 6.1   HGB 9.1* 8.9* 10.1*   MCV 95 95 95   * 608* 723*    140 139   POTASSIUM 4.1 3.9 4.0   CHLORIDE 107 107 103   CO2 28 27 29   BUN 14 13 18   CR 0.50* 0.55 0.70   ANIONGAP 8 6 7   GABINO 9.0 8.7 9.1   * 82 126*   ALBUMIN  --   --  3.2*   PROTTOTAL  --   --  7.8   BILITOTAL  --   --  0.3   ALKPHOS  --   --  167*   ALT  --   --  18   AST  --   --  17   LIPASE  --   --  198   TROPI  --   --  <0.015      No results found for this or any previous visit (from the past 24 hour(s)).  Medications       cefTRIAXone  2 g Intravenous Q24H     doxycycline (VIBRAMYCIN) IV  50 mg Intravenous Q12H     ferrous gluconate  324 mg Oral Daily     influenza Vac Split High-Dose  0.5 mL Intramuscular Prior to discharge     ipratropium - albuterol 0.5 mg/2.5 mg/3 mL  3 mL Nebulization 4x daily     pantoprazole  40 mg Oral QAM AC     sodium chloride (PF)  3 mL Intracatheter Q8H

## 2019-10-08 NOTE — PROGRESS NOTES
Pharmacy called:  Pharmacist is going to page hospitalist to address dosing of doxycycline due to patients weight. 1800 dose not given yet.

## 2019-10-08 NOTE — PLAN OF CARE
Pt A&Ox4 but forgetful. Afebrile, 2L NC. LS diminished with nonproductive cough. Denied any pain. SL. IV abx. Rocephin and Doxycycline. Pharmacy paged hospitalist to have dose of Doxycycline reduced due to patients weight. New dose to start tomorrow. Up with assist of 1, gait belt and walker. Cms intact. Tolerated regular diet. Tylenol given for Headache. Headache improved. Will continue to monitor. Unsure of discharge plans possible home with home care or TCU.

## 2019-10-09 NOTE — PROGRESS NOTES
Reviewed discharge instructions and medications with patient and granddaughter. Questions answered. Patient discharged to home with granddaughter as transport, discharge instructions, medications (Robitussin and Doxycycline), and belongings at this time. Sent home with urine strainer and instructed to bring to [facility] lab if able to collect stone per urology request.

## 2019-10-09 NOTE — PROGRESS NOTES
UROLOGY    Stone now in the bladder. Send home with strainer. If able to capture stone, can be brought to any [place] lab for analysis.     Makenna Arenas PA-C  [facility]  [phone]

## 2019-10-09 NOTE — PROVIDER NOTIFICATION
Paged Dr Graham: Lactic back on pt at 2.5.  Pt asymptomatic and resting comfortably in bed. VSS. No MIV running. Please advise.  [id number]

## 2019-10-09 NOTE — PROGRESS NOTES
Lactic for sepsis 2.7. MD notified. No new orders. Pt had just had nebulizer when sepsis was triggered. Pt not in acute distress. Pt currently on IV Doxycycline and Rocephin. Will continue to monitor

## 2019-10-09 NOTE — PROGRESS NOTES
[facility]  Met with pt to discuss plans for HC.  Pt to be discharged home today and has agreed to have [facility] follow with services of SN, PT and HHA.  Patient care support center processing referral.  Pt verbalized understanding that initial visit is scheduled for 10/10 or 10/11/19.   Pt has 24 hour phone number for [facility] for any questions or concerns.

## 2019-10-09 NOTE — PLAN OF CARE
UTI and PNA  Vitals:   Temp: 96.6  F (35.9  C) Temp src: Oral BP: 127/68 Pulse: 98   Resp: 20 SpO2: 94 % O2 Device: None (Room air)   Neuro: A&Ox4, [place]  Lungs: fine crackles in bases, diminished throughout. Infrequent non productive cough. Denies SOB.  Cardiac: Tachy at times  GI: WDL- denies constipation  : WDL, voids 50-100mL at a times, denies dysuria  Skin: scattered bruising, fragile skin, blanchable redness to spine mepilex applied  Labs: WBC: 13.1 See echo results Lactic of 2.7  Diet: Regular  Pain: Denies   Activity: SBA with walker, pt denies dizziness when up.  Plan: IV abx. Encourage ambulation. Pt to return home ind. Possibly home health when going home.

## 2019-10-09 NOTE — PLAN OF CARE
Day RN  VS monitored, DNR, denies pain, up w/SBA and ww, blanchable redness to back: C/D/I mepilex in place, voiding, hypo BS, last bm 10/8, luis reg diet, LS dim, infrequent congested cough, no sputum produced, repeat CT performed, awaiting results to discharge home with home care, home on PO Doxycycline, will cont to monitor.

## 2019-10-10 NOTE — TELEPHONE ENCOUNTER
"Hospital/TCU/ED for chronic condition Discharge Protocol    \"Hi, my name is Jeanne Judd RN, a registered nurse, and I am calling from [facility].  I am calling to follow up and see how things are going for you after your recent emergency visit/hospital/TCU stay.\"    Tell me how you are doing now that you are home?\" breathing fine, no pain with urination   Continue taking abx, appetite improving      Discharge Instructions    \"Let's review your discharge instructions.  What is/are the follow-up recommendations?  Pt. Response: see PCP, see urology    \"Has an appointment with your primary care provider been scheduled?\"   No (schedule appointment) warm transfer to scheduling line    \"When you see the provider, I would recommend that you bring your medications with you.\"    Medications    \"Tell me what changed about your medicines when you discharged?\"    Changes to chronic meds?    0-1    \"What questions do you have about your medications?\"    None     New diagnoses of heart failure, COPD, diabetes, or MI?    No              Post Discharge Medication Reconciliation Status: discharge medications reconciled, continue medications without change.    Was MTM referral placed (*Make sure to put transitions as reason for referral)?   No    Call Summary    \"What questions or concerns do you have about your recent visit and your follow-up care?\"     none    \"If you have questions or things don't continue to improve, we encourage you contact us through the main clinic number (give number).  Even if the clinic is not open, triage nurses are available 24/7 to help you.     We would like you to know that our clinic has extended hours (provide information).  We also have urgent care (provide details on closest location and hours/contact info)\"      \"Thank you for your time and take care!\"       Jeanne Judd RN, BS  Clinical Nurse Triage.        "

## 2019-10-10 NOTE — TELEPHONE ENCOUNTER
Please call patient for Inpatient Discharge f/u 10/09/19. Urinary Tract Infection With Hematuria, Pneumonia Due To Infectious Organism. Ruth Behrens

## 2019-10-10 NOTE — TELEPHONE ENCOUNTER
Pt now home from previous hospitalization due to pneumonia and kidney stone.  Requesting resumption of care/recertification orders (late one day due to pt being in the hospital on last day of cert)  for the following  RN 2w2, 1w2, 3prn to start 10/14/19  HHA 1w1, 2w6    Please note, medication reconciliation is noting interaction with pts ferrous sulfate and bactrim-FYI      GALE/RCT SUMMARY 10/10/19  86 yr old female with long standing admission with [facility] for twice weekly home health aide assistnace for safety and personal cares.  Pt with recent hosptialization at [facility] 10/6/19 to 10/9/10 due to progressive cough and shortness of breath.  Pt found to have community acquired pneumonia and left uretervesicular stone with no hydronephrosis at this time.  Pt with previous hx kidney stones requring lithotripsy in the past, pt follows with Dr. Reno with urology due to hx UTI and kidney stones.  Pt was started on IV ceftrixaone.  Pt discharged back to [facility] apartment 10/9/19 with assistance from her [facility].  Pt was transitioned home on oral doxycycline x5 additional days.  Pt also sent home with urine strainer in attempts to catch kidney stone if it passes so it can be sent to pathology.  Pt ambulating with rolling walker, gait is steady, denies recent falls. Pt reports weight down to 60 pounds, she met with dietiton in the hospital. Pt does have MOW but does not always like them, she also reports she only drinking half of an ensure daily, encouraged her to drink a full one but she didn't think she would. Pts bowels are WNL, she continues on abx thru 10/14 and is tolorating well so far.  Pt has no open wounds or sores. Lungs are diminshed on the right, but clear, pt does have congested cough but reports not coughing much up if anything. She does have robitussin.  Pt is incuraged to increase clear fluids to help loosen secretions and help with passing kidney stone, pt denies pain in  flanks at visit today.  Pt requires ongoing skilled nursing at increased frequency due to recent pneumonia and current kidney stone. Pt requires medication education, GI/, nutrition/hdyration education, medication management and respriatory assessment.  Pt is refusing PT/OT/SW at [facility].  Pt will continue to require twice weekly HHA for personal cares and bathroom/shower safety and hygeine.  Thank you for this onoging referral.  Trinidad Willett RN, BSN  [email or web address]  [phone]

## 2019-10-11 NOTE — PROGRESS NOTES
Pre-Visit Planning     Future Appointments   Date Time Provider Department Center   10/14/2019  8:30 AM Juan A Solo PA-C [facility] CR   11/26/2019 10:00 AM Rachel Ha MD [facility]     Appointment Notes for this encounter:   [facility] f/u, ok mello Cisse    Questionnaires Reviewed/Assigned  Additional questionnaires assigned    Patient preferred phone number: [phone]    Patient contact not needed.   Blayne Lopez, RN

## 2019-10-12 NOTE — DISCHARGE SUMMARY
[facility]  Hospitalist Discharge Summary       Date of Admission:  10/6/2019  Date of Discharge:  10/9/2019  4:30 PM  Discharging Provider: Ag Kurtz MD      Discharge Diagnoses   Pneumonia  Left ureterovesical stone.  Chronic anemia and thrombocytosis  Sever malnutrition  Paroxysmal Afib  HTN  Chronic anemia and thrombocytopenia.    Follow-ups Needed After Discharge   Follow-up Appointments     Follow-up and recommended labs and tests       Follow up with primary care provider, Juan A Solo, within 7 days   for hospital follow- up.           Unresulted Labs Ordered in the Past 30 Days of this Admission     Date and Time Order Name Status Description    10/6/2019 1552 Blood culture Preliminary     10/6/2019 1548 Blood culture Preliminary         Discharge Disposition   Discharged to home  Condition at discharge: Stable    Hospital Course   Nedra Carr is a 86 year old female with PMH including paroxysmal A. fib not on anticoagulation, dysphagia w/ multiple dilations, nephrolithiasis, recurrent UTIs, HTN, PMR, severe scoliosis, perforated gastric ulcer, and anemia who presents with cough and shortness of breath.  On presentation patient had CT chest abdomen pelvis which showed some bibasilar infiltrates, started on antibiotics for community-acquired pneumonia. CT also showed small stone at left ureterovesical area without hydronephrosis.  Urologist was consulted.Repeated Ct on the day of discharge showed the stone in bladder.   1. Suspect community-acquired pneumonia versus bronchitis.  2. Left ureterovesical stone with possible UTI.  She was treated with Rocephin and doxycycline her and continued. Uology evaluated the patient, no intervention needed. Cultures remained negative. BNP was elevated and echocardiogram ordered showed ejection fraction is estimated at 60-65%.  3. Chronic anemia and thrombocytosis. Baseline platelet is elevated, mostly above 500, and platelet on  discharge was 549. It was 156 on September 11.  4. Cachexia/severe malnutrition in setting of chronic illness.   5.  History of perforated gastric ulcer, dysphagia, history of esophageal strictures.  -Nutritionist consulted. Patient is cachectic skin and bone, with severe malnutrition, follow-up nutritionist recommendation.  5. Proximal atrial fibrillation currently sinus. Patient is not on anticoagulation likely due to previous history of peptic ulcer disease with perforation. Given her age and condition with severe cachexia, the risk of bleeding outweighs the benefit in this patient.     Consultations This Hospital Stay   UROLOGY IP CONSULT  NUTRITION SERVICES ADULT IP CONSULT    Code Status   DNR    Time Spent on this Encounter   I, Ag Kurtz MD, personally saw the patient today and spent greater than 30 minutes discharging this patient.       Ag Kurtz MD  [facility]  ______________________________________________________________________    Physical Exam   Vital Signs:                    Weight: 62 lbs 12.8 oz  GENERAL:      Cachectic, awake, hard of hearing, not in distress  CHEST: Good air entry anteriorly, decreased breath sound basal posterior both sides.  CVS: S1 and S2 well heard, no murmur or gallop.  Regular  GI: Scaphoid, soft, nontender, nondistended, organomegaly.  Positive bowel sounds.  Skin: No rash, petechia or exanthems  Other:  Psych: Normal mood and affect, keeps eye contact, responds to question appropriately          Primary Care Physician   Juan A Solo    Discharge Orders      Home care nursing referral      Home Care PT Referral for Hospital Discharge      Reason for your hospital stay    Pneumonia     Follow-up and recommended labs and tests     Follow up with primary care provider, Juan A Solo, within 7 days for hospital follow- up.     Activity    Your activity upon discharge: activity as tolerated     MD face to face encounter     Documentation of Face to Face and Certification for Home Health Services    I certify that patient: Nedra Carr is under my care and that I, or a nurse practitioner or physician's assistant working with me, had a face-to-face encounter that meets the physician face-to-face encounter requirements with this patient on: 10/6/2019.    This encounter with the patient was in whole, or in part, for the following medical condition, which is the primary reason for home health care: patient with sever malnutrition and cachexia  .    I certify that, based on my findings, the following services are medically necessary home health services: Nursing.    My clinical findings support the need for the above services because: Nurse is needed: patient with sever malnutrition and scoliosis, hifh risk of fall and bone fracture now with pneumonia.    Further, I certify that my clinical findings support that this patient is homebound (i.e. absences from home require considerable and taxing effort and are for medical reasons or Holiness services or infrequently or of short duration when for other reasons) because: Requires assistance of another person or specialized equipment to access medical services because patient: Is unable to operate assistive equipment on their own...    Based on the above findings. I certify that this patient is confined to the home and needs intermittent skilled nursing care, physical therapy and/or speech therapy.  The patient is under my care, and I have initiated the establishment of the plan of care.  This patient will be followed by a physician who will periodically review the plan of care.  Physician/Provider to provide follow up care: Juan A Solo    Attending hospital physician (the Medicare certified [facility] provider): Ag Kurtz MD  Physician Signature: See electronic signature associated with these discharge orders.  Date: 10/9/2019     DNR (Do Not Resuscitate)     Diet     Follow this diet upon discharge: Orders Placed This Encounter      Snacks/Supplements Adult: Magic Cup; With Meals      Snacks/Supplements Adult: Boost Plus; Between Meals      Regular Diet Adult       Significant Results and Procedures   Most Recent 3 CBC's:  Recent Labs   Lab Test 10/09/19  0912 10/08/19  0705 10/07/19  0640   WBC 8.3 13.1* 10.0   HGB 8.4* 9.1* 8.9*   MCV 98 95 95   * 614* 608*     Most Recent 3 BMP's:  Recent Labs   Lab Test 10/08/19  0705 10/07/19  0640 10/06/19  1224    140 139   POTASSIUM 4.1 3.9 4.0   CHLORIDE 107 107 103   CO2 28 27 29   BUN 14 13 18   CR 0.50* 0.55 0.70   ANIONGAP 8 6 7   GABINO 9.0 8.7 9.1   * 82 126*     Most Recent 2 LFT's:  Recent Labs   Lab Test 10/06/19  1224 09/11/19  1445   AST 17 16   ALT 18 15   ALKPHOS 167* 57   BILITOTAL 0.3 0.1*       Discharge Medications   Discharge Medication List as of 10/9/2019  3:56 PM      START taking these medications    Details   doxycycline hyclate (VIBRA-TABS) 100 MG tablet Take 1 tablet (100 mg) by mouth 2 times daily for 5 days, Disp-10 tablet, R-0, E-Prescribe      guaiFENesin (ROBITUSSIN) 20 mg/mL SOLN solution Take 10 mLs by mouth every 6 hours as needed for other (secretion expectorant), Disp-236 mL, R-0, E-Prescribe         CONTINUE these medications which have NOT CHANGED    Details   acetaminophen (TYLENOL) 325 MG tablet Take 2 tablets (650 mg) by mouth every 6 hours as needed for mild pain, Disp-60 tablet, R-0, E-Prescribe      alendronate (FOSAMAX) 70 MG tablet TAKE 1 TABLET (70MG) BY MOUTH EVERY 7 DAYS. TAKE 60 MINUTES BEFORE MORNING MEAL WITH 8 OUNCES OF WATER. REMAIN UPRIGHT FOR 30 MINUTES., Disp-12 tablet, R-2, E-Prescribe      ferrous gluconate (FERGON) 324 (38 Fe) MG tablet Take 324 mg by mouth daily (with breakfast), Historical      multivitamin  with lutein (OCUVITE WITH LTEIN) CAPS per capsule Take 1 capsule by mouth daily, Historical      multivitamin, therapeutic with minerals (MULTI-VITAMIN)  TABS Take 1 tablet by mouth daily., Disp-100 tablet, R-3, Historical      Nutritional Supplements (ENSURE) LIQD Take 0.5 Cans by mouth daily (strawberry), Disp-15 each, R-11, Local PrintSupplement type/formula/brand per patient's preference and health care plan.      pantoprazole (PROTONIX) 40 MG EC tablet Take 1 tablet (40 mg) by mouth every morning (before breakfast) Take 30-60 minutes before a meal., Disp-90 tablet, R-1, E-Prescribe           Allergies   Allergies   Allergen Reactions     No Known Drug Allergies

## 2019-10-14 NOTE — PROGRESS NOTES
Subjective     Nedra Carr is a 86 year old female who presents to clinic today for the following health issues:    [facility]       Hospital Follow-up Visit:    Hospital/Nursing Home/IP Rehab Facility: [facility]  Date of Admission: 10/6/19  Date of Discharge: 10/9/19  Reason(s) for Admission: CAP, UTI, nephrolithiasis,             Problems taking medications regularly:  None       Medication changes since discharge: None       Problems adhering to non-medication therapy:  None    Summary of hospitalization:  [facility] discharge summary reviewed  Diagnostic Tests/Treatments reviewed.  Follow up needed: none  Other Healthcare Providers Involved in Patient s Care:         Homecare and Specialist appointment - urology in ~1 month  Update since discharge: worsened. Patient states appetite is non-existent since leaving hospital and having upper stomach pains. No vomiting, diarrhea, or constipation. No blood in stool noted. Denies food making pain worse. No chest pains or palpitations. Does admit to shortness of breath with activity. Walked 1 block today and resulted in significant fatigue and shortness of breath. States still having urinary frequency and burning. Urine culture at hospital yielded candida and this has been recurrent for months. Was suspected as bacterial in hospital and rocephin was given and doxy was sent home with due to suspected CAP. Patient finished this abx as prescribed.     Of note, per EMR review, hgb was 10.1 on 10/6 and dropped to 8.4 upon discharge. Colonoscopy was obtained on 9/3 due to positive FIT. Internal hemorrhoids were noted, but no other obvious causes of blood loss. Small are of colitis was noted in cecum. No evidence of dysplasia or malignancy noted.     Post Discharge Medication Reconciliation: discharge medications reconciled and changed, per note/orders (see AVS).  Plan of care communicated with patient     Coding guidelines for this visit:  Type of Medical    Decision Making Face-to-Face Visit       within 7 Days of discharge Face-to-Face Visit        within 14 days of discharge   Moderate Complexity [id number]   High Complexity [id number]            Patient Active Problem List   Diagnosis     Hallux varus, acquired     Other hammer toe (acquired)     Osteoporosis     HTN (hypertension)     Cataract     CARDIOVASCULAR SCREENING; LDL GOAL LESS THAN 130     Advance Care Planning     Intractable chronic migraine without aura     Anemia     Perforated gastric ulcer (H)     Emphysematous cholecystitis     Health Care Home     Paroxysmal atrial fibrillation (H)     Abdominal pain, unspecified abdominal location     UTI (urinary tract infection)     Sepsis (H)     Headache     Physical deconditioning     Urinary bladder stone     S/P cystoscopy     Hypoxia     Hydronephrosis     Acute cholecystitis     Cardiogenic shock (H)     Closed compression fracture of thoracic vertebra (H)     Family history of other digestive disorders     Allergic rhinitis     Arthritis of hand     Esophageal stricture     Obstructive sleep apnea     Weakness     At high risk for falls     Multiple pelvic fractures     Mobility impaired     Abdominal pain     Other idiopathic scoliosis, unspecified spinal region     CAP (community acquired pneumonia)     Past Surgical History:   Procedure Laterality Date     BACK SURGERY       BRONCHOSCOPY FLEXIBLE N/A 11/21/2014    Procedure: BRONCHOSCOPY FLEXIBLE;  Surgeon: Rhonda Donohue MD;  Location:  GI     C NONSPECIFIC PROCEDURE      s/p dilation at [facility] by Dr. Radha FELIPE NONSPECIFIC PROCEDURE      s/p dilation at [facility] by Dr. Betty FELIPE NONSPECIFIC PROCEDURE      Vag hysterectomy     CHOLECYSTECTOMY N/A 11/15/2014    Procedure: CHOLECYSTECTOMY;  Surgeon: Yomi Brennan MD;  Location:  OR     COLONOSCOPY N/A 9/3/2019    Procedure: Colonoscopy  with biopsies using cold forceps with no sedation;  Surgeon: Anastasia Berg MD;  Location:   GI     CYSTOSCOPY, LITHOLAPAXY, COMBINED N/A 4/8/2015    Procedure: COMBINED CYSTOSCOPY, LITHOLAPAXY;  Surgeon: Randy Reno MD;  Location:  OR     ENT SURGERY       ESOPHAGOSCOPY, GASTROSCOPY, DUODENOSCOPY (EGD), COMBINED  11/21/2012    Procedure: COMBINED ESOPHAGOSCOPY, GASTROSCOPY, DUODENOSCOPY (EGD), BIOPSY SINGLE OR MULTIPLE;   ESOPHAGOSCOPY, GASTROSCOPY, DUODENOSCOPY (EGD)   with cold biopsy and dilalation with #15 savary;  Surgeon: Guillermo Arcos MD;  Location:  GI     ESOPHAGOSCOPY, GASTROSCOPY, DUODENOSCOPY (EGD), COMBINED  2/22/2013    Procedure: COMBINED ESOPHAGOSCOPY, GASTROSCOPY, DUODENOSCOPY (EGD);  ESOPHAGOSCOPY, GASTROSCOPY, DUODENOSCOPY (EGD) ;  Surgeon: Lissett Posada MD;  Location:  GI     ESOPHAGOSCOPY, GASTROSCOPY, DUODENOSCOPY (EGD), COMBINED N/A 10/29/2014    Procedure: COMBINED ESOPHAGOSCOPY, GASTROSCOPY, DUODENOSCOPY (EGD);  Surgeon: Dany Ambrocio MD;  Location:  GI     ESOPHAGOSCOPY, GASTROSCOPY, DUODENOSCOPY (EGD), COMBINED N/A 1/13/2015    Procedure: COMBINED ESOPHAGOSCOPY, GASTROSCOPY, DUODENOSCOPY (EGD), BIOPSY SINGLE OR MULTIPLE;  Surgeon: Dany Ambrocio MD;  Location:  GI     ESOPHAGOSCOPY, GASTROSCOPY, DUODENOSCOPY (EGD), COMBINED N/A 3/5/2015    Procedure: COMBINED ESOPHAGOSCOPY, GASTROSCOPY, DUODENOSCOPY (EGD);  Surgeon: Dany Ambrocio MD;  Location:  GI     ESOPHAGOSCOPY, GASTROSCOPY, DUODENOSCOPY (EGD), COMBINED N/A 11/10/2015    Procedure: COMBINED ESOPHAGOSCOPY, GASTROSCOPY, DUODENOSCOPY (EGD);  Surgeon: Dany Ambrocio MD;  Location:  GI     EYE SURGERY       GYN SURGERY       LAPAROTOMY EXPLORATORY N/A 11/15/2014    Procedure: LAPAROTOMY EXPLORATORY;  Surgeon: Yomi Brennan MD;  Location:  OR     LASER HOLMIUM LITHOTRIPSY BLADDER N/A 4/8/2015    Procedure: LASER HOLMIUM LITHOTRIPSY BLADDER;  Surgeon: Randy Reno MD;  Location: [facility] OR     ORTHOPEDIC SURGERY      hip fx surgery x 2       Social History      Tobacco Use     Smoking status: Never Smoker     Smokeless tobacco: Never Used   Substance Use Topics     Alcohol use: No     Alcohol/week: 0.0 standard drinks     Family History   Problem Relation Age of Onset     Diabetes Mother      Diabetes Sister      Heart Disease Son      Colon Cancer No family hx of          Current Outpatient Medications   Medication Sig Dispense Refill     pantoprazole (PROTONIX) 40 MG EC tablet Take 1 tablet (40 mg) by mouth every morning (before breakfast) Take 30-60 minutes before a meal. 90 tablet 1     sucralfate (CARAFATE) 1 GM tablet Take 1 tablet (1 g) by mouth 4 times daily 120 tablet 0     acetaminophen (TYLENOL) 325 MG tablet Take 2 tablets (650 mg) by mouth every 6 hours as needed for mild pain 60 tablet 0     alendronate (FOSAMAX) 70 MG tablet TAKE 1 TABLET (70MG) BY MOUTH EVERY 7 DAYS. TAKE 60 MINUTES BEFORE MORNING MEAL WITH 8 OUNCES OF WATER. REMAIN UPRIGHT FOR 30 MINUTES. 12 tablet 2     ferrous gluconate (FERGON) 324 (38 Fe) MG tablet Take 324 mg by mouth daily (with breakfast)       guaiFENesin (ROBITUSSIN) 20 mg/mL SOLN solution Take 10 mLs by mouth every 6 hours as needed for other (secretion expectorant) 236 mL 0     multivitamin  with lutein (OCUVITE WITH LTEIN) CAPS per capsule Take 1 capsule by mouth daily       multivitamin, therapeutic with minerals (MULTI-VITAMIN) TABS Take 1 tablet by mouth daily. 100 tablet 3     Nutritional Supplements (ENSURE) LIQD Take 0.5 Cans by mouth daily (strawberry) 15 each 11     Allergies   Allergen Reactions     No Known Drug Allergies      BP Readings from Last 3 Encounters:   10/14/19 100/62   10/09/19 130/56   09/11/19 92/58    Wt Readings from Last 3 Encounters:   10/14/19 (!) 27.2 kg (60 lb)   10/09/19 (!) 28.5 kg (62 lb 12.8 oz)   09/11/19 (!) 29.9 kg (66 lb)                    Reviewed and updated as needed this visit by Provider         Review of Systems   ROS COMP: Constitutional, HEENT, cardiovascular, pulmonary, GI,  , musculoskeletal, neuro, skin, endocrine and psych systems are negative, except as otherwise noted.      Objective    /62 (BP Location: Right arm, Patient Position: Chair, Cuff Size: Adult Small)   Pulse 124   Temp 94.4  F (34.7  C) (Oral)   Wt (!) 27.2 kg (60 lb)   SpO2 94%   BMI 15.02 kg/m    Body mass index is 15.02 kg/m .  Physical Exam   GENERAL: alert, mild distress, frail, elderly and fatigued  RESP: lungs clear to auscultation - no rales, rhonchi or wheezes  CV: tachycardia with normal rhythm.   ABDOMEN: tenderness epigastric, no organomegaly or masses, liver span normal to percussion and bowel sounds normal  PSYCH: mentation appears normal, affect normal/bright    Diagnostic Test Results:  Results for orders placed or performed in visit on 10/14/19 (from the past 24 hour(s))   CBC with platelets and differential   Result Value Ref [facility]    WBC 10.9 4.0 - 11.0 10e9/L    RBC Count 3.49 (L) 3.8 - 5.2 10e12/L    Hemoglobin 10.0 (L) 11.7 - 15.7 g/dL    Hematocrit 33.3 (L) 35.0 - 47.0 %    MCV 95 78 - 100 fl    MCH 28.7 26.5 - 33.0 pg    MCHC 30.0 (L) 31.5 - 36.5 g/dL    RDW 14.9 10.0 - 15.0 %    Platelet Count 482 (H) 150 - 450 10e9/L    % Neutrophils 88.4 %    % Lymphocytes 5.5 %    % Monocytes 5.6 %    % Eosinophils 0.3 %    % Basophils 0.2 %    Absolute Neutrophil 9.7 (H) 1.6 - 8.3 10e9/L    Absolute Lymphocytes 0.6 (L) 0.8 - 5.3 10e9/L    Absolute Monocytes 0.6 0.0 - 1.3 10e9/L    Absolute Eosinophils 0.0 0.0 - 0.7 10e9/L    Absolute Basophils 0.0 0.0 - 0.2 10e9/L    Diff Method Automated Method    *UA reflex to Microscopic and Culture ([place] and [facility] (except [place] and [place])   Result Value Ref [facility]    Color Urine Yellow     Appearance Urine Slightly Cloudy     Glucose Urine Negative NEG^Negative mg/dL    Bilirubin Urine Negative NEG^Negative    Ketones Urine Negative NEG^Negative mg/dL    Specific Gravity Urine 1.015 1.003 - 1.035    Blood Urine Small (A) NEG^Negative     pH Urine 5.5 5.0 - 7.0 pH    Protein Albumin Urine 100 (A) NEG^Negative mg/dL    Urobilinogen Urine 0.2 0.2 - 1.0 EU/dL    Nitrite Urine Negative NEG^Negative    Leukocyte Esterase Urine Large (A) NEG^Negative    Source Midstream Urine    Urine Microscopic   Result Value Ref [facility]    WBC Urine  (A) OTO5^0 - 5 /HPF    RBC Urine O - 2 OTO2^O - 2 /HPF    Hyaline Casts 2-5 (A) OTO2^O - 2 /LPF    Squamous Epithelial /LPF Urine Few FEW^Few /LPF    Renal Tub Epi Few (A) NEG^Negative /HPF    Bacteria Urine Few (A) NEG^Negative /HPF    Mucous Urine Present (A) NEG^Negative /LPF   Troponin I   Result Value Ref [facility]    Troponin I ES <0.015 0.000 - 0.045 ug/L   Basic metabolic panel   Result Value Ref [facility]    Sodium 135 133 - 144 mmol/L    Potassium 4.4 3.4 - 5.3 mmol/L    Chloride 102 94 - 109 mmol/L    Carbon Dioxide 25 20 - 32 mmol/L    Anion Gap 8 3 - 14 mmol/L    Glucose 136 (H) 70 - 99 mg/dL    Urea Nitrogen 29 7 - 30 mg/dL    Creatinine 0.66 0.52 - 1.04 mg/dL    GFR Estimate 80 >60 mL/min/[1.73_m2]    GFR Estimate If Black >90 >60 mL/min/[1.73_m2]    Calcium 9.2 8.5 - 10.1 mg/dL     CXR - Stable left base atelectasis or scarring. Some patchy  opacities at the right lower lung also appears stable that are  indeterminant and may relate to airspace disease or atelectasis. No  new airspace opacity. Stable scoliotic curvature and degenerative  change of the spine. Stable cardiac silhouette.  EKG - Sinus tachycardia. LVH noted. No acute ST or T wave changes. Other than sinus tachycardia unchanged from hospitalization.         Assessment & Plan     (R06.09) MEHTA (dyspnea on exertion)  (primary encounter diagnosis)  Comment: unclear of exact cause. Patient was recently in hospital for CAP. CXR shows stability from previous. Given age, would not expect significant improvement changes in CAP at this state. She is afebrile and completed doxycyline. Troponin is negative and EKG shows no evidence of ischemia. BMP shows normal  electrolytes and renal function as well. Patient is tachycardic, but remaining vitals normal. hgb is improved as well from discharge. Possible due to residual weakness from pneumonia and/or poor nutrition due to ongoing epigastric pain and early satiety. Will obtain EGD for further evaluation of possible PUD due to previous history of this and perforation. Of note, echo in hospital does show significant mitral regurgitation and moderate aortic regurgitation. If symptoms fail to improve over this week, will have patient follow up with cardiology  Plan: EKG 12-lead complete w/read - Clinics, CBC with        platelets and differential, JUST IN CASE, Urine        Microscopic, Urine Culture Aerobic Bacterial,         Troponin I, Basic metabolic panel            (B37.49) Candida UTI  Comment: history of this in hospital and recurrent in history. Has been referred to urology for rthis, but I do not believe she has seen them yet. davonte appt with them in ~6 weeks. UA shows signs suggesting of possible cystitis today. Culture pending, but though likely candida. Will consult with urology hopefully today over the phone to consult on recommendation for treatment. Has been treated with diflucan multiple times in the recent past.   Plan: CBC with platelets and differential, *UA reflex        to Microscopic and Culture ([place] and [place]         Clinics (except [place] and [place])            (J18.1) Community acquired pneumonia of left lower lobe of lung (H)  Comment: see MEHTA as above. Other than tachycardia, appears stable. Will likely follow up in office in 1 week for recheck. If shortness of breath or chest pain develop or if fever patient to go to the ED.   Plan: XR Chest 2 Views, EKG 12-lead complete w/read -        Clinics, CBC with platelets and differential,         JUST IN CASE            (R10.13) Epigastric pain  Comment: unclear cause, but concerns for possible PUD and possible bleed. Hwoever, reassured hgb has  improved based on discharge hgb. No evidence on exam of need for hospitalization at this time. Still taking PO. Will obtain EGD urgently and start carafate with her protonix. If cannot take PO, would consider consulting with hospitalist for possible direct admit.   Plan: XR Chest 2 Views, EKG 12-lead complete w/read -        Clinics, CBC with platelets and differential,         Troponin I, Basic metabolic panel,         GASTROENTEROLOGY ADULT REF PROCEDURE ONLY,         sucralfate (CARAFATE) 1 GM tablet            (R82.90) Nonspecific finding on examination of urine  Comment:   Plan: Urine Culture Aerobic Bacterial            (K29.00) Other acute gastritis without hemorrhage  Comment:   Plan: pantoprazole (PROTONIX) 40 MG EC tablet                 Follow up: pending consult with urology. Likely 1 week.     Return in about 1 week (around 10/21/2019) for acute recheck.    Juan A Solo PA-C  [facility]

## 2019-10-15 NOTE — OR NURSING
Patient came in to do an EGD procedure. Patient didn't get any sedation medications due to unable to place an iv . When writer going through discharge instructions with patients daughter ,patient daughter expressed the concern of patient unable to take care of herself and not eating anything, patient daughter need help to take care of her mother. Patient daughter took patient to ER. Volunteer helped patient  in a wheel chair.

## 2019-10-15 NOTE — PLAN OF CARE
ROOM # 228    Living Situation (if not independent, order SW consult): Apartment Building, Independent   : Daughter Mark [phone]    Activity level at baseline:Independent with walker   Activity level on admit: SBA with walker       Patient registered to observation; given Patient Bill of Rights; given the opportunity to ask questions about observation status and their plan of care.  Patient has been oriented to the observation room, bathroom and call light is in place.    Discussed discharge goals and expectations with patient/family.

## 2019-10-15 NOTE — PLAN OF CARE
PRIMARY DIAGNOSIS: ACUTE abdominal PAIN/weakness  OUTPATIENT/OBSERVATION GOALS TO BE MET BEFORE DISCHARGE:  1. Pain Status: Improved with use of  carafate     2. Return to near baseline physical activity: No     3. Cleared for discharge by consultants (if involved): No     Discharge Planner Nurse   Safe discharge environment identified: Lives alone, if needs assistance would need TCU placement   Barriers to discharge: Yes       Entered by: Rene Leon 10/15/2019 2:43 PM.           Pt AxO x4. SBA with walker. Pt is independent at baseline. Pt has epigastric pain, Pt states the carafate she had earlier was helpful. Will give another dose shortly. Abd US shows nothing acute. Speech consult due to swallowing concerns, per day RN report, pt struggled a little with medication. Pt has no IV access, Tylenol given with applesauce.  Plan is to encourage PO intake, speech consult, pain management

## 2019-10-15 NOTE — LETTER
October 14, 2019      Nedra Carr  [id number]   [place] [id number]        Dear Nedra,     Thank you for choosing [facility]. You are scheduled for the following service(s).   Please be aware that coverage of these services is subject to the terms and limitations of your health insurance plan.  Call member services at your health plan with any benefit or coverage questions.    Date:   10/15/19      Procedure: UPPER ENDOSCOPY-EGD  Doctor: DR Zaheer RIOS           Arrival Time:  8 30 AM     *check in at Emergency/Endoscopy desk*  Procedure Time: 9 AM       Location:   [facility]        Endoscopy Department, First Floor (Enter through ER Doors) *         [address] [place], [place] [address] [phone] or [phone] () to reschedule          PRE-PROCEDURE CHECKLIST    If you have diabetes, ask your regular doctor for diet and medication restrictions.  If you take any antiplatelet or anticoagulant medications (such as Coumadin, Lovenox, Plavix, etc.) and have not already discussed this, please call your primary physician for advice on holding this medication.  If you take Aspirin, you may continue to do so.  If you are or may be pregnant, please discuss the risks and benefits of this procedure with your doctor.  You must arrange for a ride for the day of your exam. If you fail to arrange transportation with a responsible adult, your procedure will need to be cancelled and rescheduled. Taxi, bus and medical transport are not acceptable unless you have a responsible adult that you know & trust with you. Please arrange for this  to be able to pick you up in our department, approximately one hour after your scheduled procedure, if they are not able to stay with you.      Canceling or rescheduling   If you must cancel or reschedule your appointment, please call [phone] as soon as possible.      Upper Endoscopy or  Esophagogastroduodenoscopy (EGD) is a test performed to evaluate symptoms of persistent abdominal pain, nausea, vomiting and difficulty swallowing. It may also be used to treat various conditions of the upper gastrointestinal tract, such as bleeding, narrowing or abnormal growths.     What happens during an upper endoscopy?  On the day of your procedure, plan to spend up to one and a half hour after your arrival at the endoscopy center. The exam itself takes about 5 to 10 minutes.    Before the exam:  - You will change into a gown.   - Your medical history and medication list will be reviewed with you, unless it has already been done over the phone.   - A nurse will insert an intravenous (IV) line into your hand or arm.  - The doctor will talk to you and give you a consent form to sign.    During the exam:  - Medicine will be given through the IV line to help you relax and feel comfortable.   - Your heart rate and oxygen levels will be monitored. If your blood pressure is low, you may be given fluids through the IV line.   - The doctor will insert a flexible, hollow tube, called an endoscope, into your mouth and will advance it slowly through the esophagus, stomach and duodenum (the first part of your small intestine).   - You may have a feeling of pressure or fullness.   - If you have difficulty swallowing, and the doctor finds a narrowing in your esophagus, it may be possible for the area to be expanded-dilated during the exam.   - If abnormal tissue is found, the doctor may remove it through the endoscope (biopsy it) for closer examination. The tissue removal is painless.    After the exam:  - Any tissue samples removed during the exam will be sent to a lab for evaluation. It may take 5 to 7 working days for you to be notified of the results  - The doctor will prepare a full report for the physician who referred you for the upper endoscopy.   - The doctor will talk with you about the initial results of your exam.    - You may feel bloated after the procedure. That is normal and should not last long.   - Your throat may feel sore for a short time.   - Following the exam, you may resume your normal diet. Avoid alcohol until the next day.   - You may resume your regular activities the day after the procedure.   - Medication given during the exam will prohibit you from driving for the rest of the day.  - A nurse will provide you with complete discharge instructions before you leave the endoscopy center. Be sure to ask the nurse for specific instructions if you take blood thinners such as Aspirin , Coumadin , Lovenox , Plavix , etc.       PREPARATION    To ensure a successful exam, please follow all instructions carefully.      The night before your exam:    STOP eating solid foods at 11:45 pm.     Clear liquids are okay to drink (examples: Gatorade , apple juice, clear broth,coffee or tea without milk or cream, etc.).     DO NOT drink red liquids or alcoholic beverages.    The day of your exam:    STOP drinking clear liquids 4 hours before your exam.     You may take your usual medications with 4 oz. of water, but it needs to be at least 4 hours prior to your procedure.    When you leave for the procedure:    Bring a list of all of your current medications, including any allergy or over-the-counter medications, unless you have already reviewed that with an Endoscopy RN over the phone.     Bring a photo ID as well as up-to-date insurance information, such as your insurance card and any referral forms that might be required by your payer.         DIRECTIONS TO THE ENDOSCOPY DEPARTMENT    From the north ([facility])  Take [address] south, exit on [address] . Get into the left hand eri, turn left (east), go one-half mile to [address] and turn left. Go north to the first stoplight, take a right on [address] and follow it to the Emergency entrance.    From the south ([facility])  Take 35N  to the 35E split and exit on [address] . On [address] , turn left (west) to [address]. Turn right (north) on [address]. Go north to the first stoplight, take a right on [address] and follow it to the Emergency entrance.    From the east via 35E ([facility])  Take [address]E south to [place] [address] exit. Turn right on [address] . Go west to [address]. Turn right (north) on [address]. Go to the first stoplight, take a right and follow on [address] to the Emergency entrance.    From the east via [address] ([facility])  Take [address] to [address]. Turn left (south) on [address] to [address]. Turn left (east) on [address] and follow it to the Emergency entrance.    From the west via [address] ([place])  Take [address] east to [address]. Turn right (south) on [address] to [address]. Turn left (east) on [address] and follow it to the Emergency entrance.

## 2019-10-15 NOTE — H&P
[facility] / Observation [facility]  History and Physical Exam     Nedra Carr MRN# [id number]   YOB: 1932 Age: 86 year old      Date of Admission:  10/15/2019    Primary care provider: Juan A Solo          Assessment:   Nedra Carr is a 86 year old female with a PMH significant for a fib, HTN, UZMA, PMR and was recently hospitalized for pneumonia and incidentally noted to have a kidney stone that dropped into the bladder, who is directly admitted from endoscopy with generalized weakness and abdominal pain after EGD done earlier today.   Work up in the ED reveals: VSS. BMP and CBC from yesterday were unremarkable, notably normal Cr, electrolytes and WBC. Troponin was undetectable. UA showed large LE and  WBCs, urine culture is now growing <10,000 colonies of mixed urogenital barrera. CXR from yesterday was stable, no new or worsening infiltrates.   Patient will be registered to Observation for further evaluation and symptom management.     1. RUQ abdominal pain - ongoing since shortly after discharge from hospital on 10/9. Denies nausea, vomiting, diarrhea or worsening pain with eating. She denies dysphagia but nursing reports issues with swallowing at bedside. Endorsed poor appetite, notes regular BM (although CT scan from hospitalization showed colonic constipation), also showed incidental kidney stone but nothing else acute. Daughter reports hx of duodenal ulcers, hx of cholecystectomy. Denies fevers. UA from yesterday looked concerning for infection but urine culture is growing mixed urogenital barrera, will repeat UA and get a RUQ US. No ulcers noted on EGD today, no dilation done of esophagus, biopsies taken. Encourage PO intake. If not improved, consider repeat labs tomorrow and consider further imaging. Recently started on Protonix and carafate, continue, will switch carafate to liquid.   2. Generalized weakness - multifactorial. Recent hospitalization for pneumonia, pt  and daughter state she has gotten progressively weaker since discharge. Poor appetite since discharge, likely deconditioning as well. Pt denies worsening SOB, denies diarrhea, N/V. Labs and CXR from yesterday were fairly unremarkable. Equivocal UA with culture now growing mixed urogenital barrera.   3. Hypoxia - pt noted to be mildly hypoxic on room air upon arrival to the floor, no hx of chronic respiratory failure. Recently hospitalized with pneumonia, treated with abx, has completed course. CXR yesterday looks unchanged/stable. Wean O2 as able. If still requiring O2 tomorrow, consider repeat imaging.            Plan:     1. [place] to Observation  2. RUQ US  3. Repeat UA  4. Supportive care with anti-emetics, pain meds PRN  5. Regular diet as tolerated, encourage PO hydration  6. Follow labs  7. PT/speech consult  8. DVT prophylaxis: pt at low risk, encourage ambulation.    9. SW consult                Chief Complaint:   Generalized weakness         History of Present Illness:   Nedra Carr is a 86 year old female who presents with generalized weakness.               Past Medical History:     Past Medical History:   Diagnosis Date     Atrial fibrillation (H)      Dysphagia 2000    Had duodenal strcture dilated at [facility] in 2000 and by Dr. Hernández in 2001     HTN (hypertension)      Migraine, unspecified, without mention of intractable migraine without mention of status migrainosus     beta blocker prophylaxis     Obstructive sleep apnea (adult) (pediatric) 4/9/2015     Osteoporosis, unspecified      Pain in joint, shoulder region      Polymyalgia rheumatica (H)      Renal stones                Past Surgical History:     Past Surgical History:   Procedure Laterality Date     BACK SURGERY       BRONCHOSCOPY FLEXIBLE N/A 11/21/2014    Procedure: BRONCHOSCOPY FLEXIBLE;  Surgeon: Rhonda Donohue MD;  Location:  GI     C NONSPECIFIC PROCEDURE      s/p dilation at [facility] by Dr. Radha FELIPE NONSPECIFIC PROCEDURE       s/p dilation at [facility] by Dr. Betty FELIPE NONSPECIFIC PROCEDURE      Vag hysterectomy     CHOLECYSTECTOMY N/A 11/15/2014    Procedure: CHOLECYSTECTOMY;  Surgeon: Yomi Brennan MD;  Location:  OR     COLONOSCOPY N/A 9/3/2019    Procedure: Colonoscopy  with biopsies using cold forceps with no sedation;  Surgeon: Anastasia Berg MD;  Location:  GI     CYSTOSCOPY, LITHOLAPAXY, COMBINED N/A 4/8/2015    Procedure: COMBINED CYSTOSCOPY, LITHOLAPAXY;  Surgeon: Randy Reno MD;  Location:  OR     ENT SURGERY       ESOPHAGOSCOPY, GASTROSCOPY, DUODENOSCOPY (EGD), COMBINED  11/21/2012    Procedure: COMBINED ESOPHAGOSCOPY, GASTROSCOPY, DUODENOSCOPY (EGD), BIOPSY SINGLE OR MULTIPLE;   ESOPHAGOSCOPY, GASTROSCOPY, DUODENOSCOPY (EGD)   with cold biopsy and dilalation with #15 savary;  Surgeon: Guillermo Arcos MD;  Location:  GI     ESOPHAGOSCOPY, GASTROSCOPY, DUODENOSCOPY (EGD), COMBINED  2/22/2013    Procedure: COMBINED ESOPHAGOSCOPY, GASTROSCOPY, DUODENOSCOPY (EGD);  ESOPHAGOSCOPY, GASTROSCOPY, DUODENOSCOPY (EGD) ;  Surgeon: Lissett Posada MD;  Location:  GI     ESOPHAGOSCOPY, GASTROSCOPY, DUODENOSCOPY (EGD), COMBINED N/A 10/29/2014    Procedure: COMBINED ESOPHAGOSCOPY, GASTROSCOPY, DUODENOSCOPY (EGD);  Surgeon: Dany Ambrocio MD;  Location:  GI     ESOPHAGOSCOPY, GASTROSCOPY, DUODENOSCOPY (EGD), COMBINED N/A 1/13/2015    Procedure: COMBINED ESOPHAGOSCOPY, GASTROSCOPY, DUODENOSCOPY (EGD), BIOPSY SINGLE OR MULTIPLE;  Surgeon: Dany Ambrocio MD;  Location:  GI     ESOPHAGOSCOPY, GASTROSCOPY, DUODENOSCOPY (EGD), COMBINED N/A 3/5/2015    Procedure: COMBINED ESOPHAGOSCOPY, GASTROSCOPY, DUODENOSCOPY (EGD);  Surgeon: Dany Ambrocio MD;  Location:  GI     ESOPHAGOSCOPY, GASTROSCOPY, DUODENOSCOPY (EGD), COMBINED N/A 11/10/2015    Procedure: COMBINED ESOPHAGOSCOPY, GASTROSCOPY, DUODENOSCOPY (EGD);  Surgeon: Dany Ambrocio MD;  Location:  GI     ESOPHAGOSCOPY, GASTROSCOPY,  DUODENOSCOPY (EGD), COMBINED N/A 10/15/2019    Procedure: ESOPHAGOGASTRODUODENOSCOPY (EGD) (FV) random biopsies by cold forceps with no sedation;  Surgeon: Dany Ambrocio MD;  Location:  GI     EYE SURGERY       GYN SURGERY       LAPAROTOMY EXPLORATORY N/A 11/15/2014    Procedure: LAPAROTOMY EXPLORATORY;  Surgeon: Yomi Brennan MD;  Location:  OR     LASER HOLMIUM LITHOTRIPSY BLADDER N/A 4/8/2015    Procedure: LASER HOLMIUM LITHOTRIPSY BLADDER;  Surgeon: Randy Reno MD;  Location:  OR     ORTHOPEDIC SURGERY      hip fx surgery x 2               Social History:     Social History     Socioeconomic History     Marital status:      Spouse name: Jefferson     Number of children: 3     Years of education: Not on file     Highest education level: Not on file   Occupational History     Employer: RETIRED   Social Needs     Financial resource strain: Not on file     Food insecurity:     Worry: Not on file     Inability: Not on file     Transportation needs:     Medical: Not on file     Non-medical: Not on file   Tobacco Use     Smoking status: Never Smoker     Smokeless tobacco: Never Used   Substance and Sexual Activity     Alcohol use: No     Alcohol/week: 0.0 standard drinks     Drug use: No     Sexual activity: Not Currently     Partners: Male   Lifestyle     Physical activity:     Days per week: Not on file     Minutes per session: Not on file     Stress: Not on file   Relationships     Social connections:     Talks on phone: Not on file     Gets together: Not on file     Attends Religion service: Not on file     Active member of club or organization: Not on file     Attends meetings of clubs or organizations: Not on file     Relationship status: Not on file     Intimate partner violence:     Fear of current or ex partner: Not on file     Emotionally abused: Not on file     Physically abused: Not on file     Forced sexual activity: Not on file   Other Topics Concern     Parent/sibling w/  CABG, MI or angioplasty before 65F 55M? Not Asked      Service Not Asked     Blood Transfusions Not Asked     Caffeine Concern No     Comment: 1 cup weekly     Occupational Exposure Not Asked     Hobby Hazards Not Asked     Sleep Concern Not Asked     Stress Concern Not Asked     Weight Concern Not Asked     Special Diet No     Back Care Not Asked     Exercise Yes     Comment: band exercises     Bike Helmet Not Asked     Seat Belt Not Asked     Self-Exams Not Asked   Social History Narrative     Not on file               Family History:     Family History   Problem Relation Age of Onset     Diabetes Mother      Diabetes Sister      Heart Disease Son      Colon Cancer No family hx of               Allergies:      Allergies   Allergen Reactions     No Known Drug Allergies                Medications:     Prior to Admission medications    Medication Sig Last Dose Taking? Auth Provider   acetaminophen (TYLENOL) 325 MG tablet Take 2 tablets (650 mg) by mouth every 6 hours as needed for mild pain Past Week at Unknown time Yes Dane Christina MD   alendronate (FOSAMAX) 70 MG tablet TAKE 1 TABLET (70MG) BY MOUTH EVERY 7 DAYS. TAKE 60 MINUTES BEFORE MORNING MEAL WITH 8 OUNCES OF WATER. REMAIN UPRIGHT FOR 30 MINUTES. Past Week at Unknown time Yes Juan A Solo PA-C   ferrous gluconate (FERGON) 324 (38 Fe) MG tablet Take 324 mg by mouth daily (with breakfast) 10/14/2019 at Unknown time Yes Reported, Patient   guaiFENesin (ROBITUSSIN) 20 mg/mL SOLN solution Take 10 mLs by mouth every 6 hours as needed for other (secretion expectorant) Past Week at Unknown time Yes Ag Kurtz MD   multivitamin  with lutein (OCUVITE WITH LTEIN) CAPS per capsule Take 1 capsule by mouth daily Past Week at Unknown time Yes Reported, Patient   multivitamin, therapeutic with minerals (MULTI-VITAMIN) TABS Take 1 tablet by mouth daily. Past Week at Unknown time Yes Houston Zhang MD   Nutritional Supplements (ENSURE)  "LIQD Take 0.5 Cans by mouth daily (strawberry) 10/14/2019 at Unknown time Yes Juan A Solo PA-C   pantoprazole (PROTONIX) 40 MG EC tablet Take 1 tablet (40 mg) by mouth every morning (before breakfast) Take 30-60 minutes before a meal. 10/14/2019 at Unknown time Yes Juan A Solo PA-C   sucralfate (CARAFATE) 1 GM tablet Take 1 tablet (1 g) by mouth 4 times daily 10/14/2019 at Unknown time Yes Juan A Solo PA-C   fluconazole (DIFLUCAN) 200 MG tablet Take 1 tablet (200 mg) by mouth daily   Juan A Solo PA-C              Review of Systems:   A Comprehensive greater than 10 system review of systems was carried out.  Pertinent positives and negatives are noted above.  Otherwise negative for contributory information.     Constitutional, neuro, ENT, endocrine, pulmonary, cardiac, gastrointestinal, genitourinary, musculoskeletal, integument and psychiatric systems are negative, except as otherwise noted.         Physical Exam:   Blood pressure (!) 148/85, pulse 108, resp. rate 18, height 1.346 m (4' 5\"), weight (!) 27.2 kg (60 lb), SpO2 96 %, not currently breastfeeding.    GENERAL:  Comfortable, cachetic, frail looking.   PSYCH: pleasant, oriented, No acute distress.  HEENT:  Atraumatic, normocephalic. Normal conjunctiva, normal hearing, and oropharynx is normal.  NECK:  Supple, no neck vein distention  HEART:  Irregular rhythm. Normal S1, S2 with no murmur, no pericardial rub, gallops or S3 or S4.  LUNGS:  Clear to auscultation, normal Respiratory effort. No wheezing, rales or ronchi.  GI:  Soft, normal bowel sounds. RUQ tenderness, non distended.   EXTREMITIES:  No pedal edema, +2 pulses bilateral and equal.  SKIN:  Dry to touch, No rash, wound or ulcerations.  NEUROLOGIC:  CN 2-12 intact, BL 5/5 symmetric upper and lower extremity strength, sensation is intact with no focal deficits.            Data:     Recent Labs   Lab 10/14/19  0859 10/09/19  0912   WBC 10.9 8.3   HGB 10.0* " 8.4*   HCT 33.3* 28.8*   MCV 95 98   * 549*     Recent Labs   Lab 10/14/19  0859      POTASSIUM 4.4   CHLORIDE 102   CO2 25   ANIONGAP 8   *   BUN 29   CR 0.66   GFRESTIMATED 80   GFRESTBLACK >90   GABINO 9.2       Recent Labs   Lab 10/14/19  0859   TROPI <0.015     Recent Labs   Lab 10/14/19  0859   COLOR Yellow   APPEARANCE Slightly Cloudy   URINEGLC Negative   URINEBILI Negative   URINEKETONE Negative   SG 1.015   UBLD Small*   URINEPH 5.5   PROTEIN 100*   UROBILINOGEN 0.2   NITRITE Negative   LEUKEST Large*   RBCU O - 2   WBCU *         Recent Results (from the past 48 hour(s))   XR Chest 2 Views    Narrative    CHEST TWO VIEWS   10/14/2019 9:22 AM     HISTORY: Community-acquired pneumonia of left lower lobe of lung (H).  Epigastric pain.    COMPARISON: 10/6/2019.      Impression    IMPRESSION: Stable left base atelectasis or scarring. Some patchy  opacities at the right lower lung also appears stable that are  indeterminant and may relate to airspace disease or atelectasis. No  new airspace opacity. Stable scoliotic curvature and degenerative  change of the spine. Stable cardiac silhouette.    MD Brook NICOLAS PA-C PA-C

## 2019-10-15 NOTE — PLAN OF CARE
PRIMARY DIAGNOSIS: ACUTE PAIN  OUTPATIENT/OBSERVATION GOALS TO BE MET BEFORE DISCHARGE:  1. Pain Status: Improved with use of alternative comfort measures carafate    2. Return to near baseline physical activity: No, poor pain control     3. Cleared for discharge by consultants (if involved): No    Discharge Planner Nurse   Safe discharge environment identified: Lives alone, if needs assistance would need TCU placement   Barriers to discharge: Yes       Entered by: Rene Leon 10/15/2019 2:43 PM.        Pt AxO x4. SBA with walker. Pt is independent at baseline. Pt has epigastric pain, 6/10, Carafate given. Went down for Abd US. Speech consult due to swallowing concerns.     Plan: Pain management, encourage PO intake, speech consult, possible TCU placement depending upon goals   Please review provider order for any additional goals.   Nurse to notify provider when observation goals have been met and patient is ready for discharge.

## 2019-10-15 NOTE — PROGRESS NOTES
"Date of RN Care Coordinator Intervention: 10/15/19  Last Emergency Department Visit:  10/6 (admitted; discharged on 10/9)  Collaborated with:  Patient/daughter; [facility]; Admission coordinator from the [facility] ([facility]); nursing supervisor    Data:  Patient arrived in ED lobby following outpt GI appointment today.  Daughter accompanies her and is expressing concern about pt not being able to manage a home. Pt appears uncomfortable; complaining of abd pain, weakness and vomiting.   Daughter shares that pt \"hasn't been able to keep anything down.\"    She lives alone and receives the following services:    [facility] (RN 1x/month, HHA 2x/wk).   Also has MOW's, life alert, and housekeeping.   Uses a walker to ambulate.   RNCC received request to assist with placement directly to a SNF/TCU.          Intervention:  Met with patient and her daughter in the ED waiting area.   Both are concerned about her current condition, which is not her baseline.   They request placement at [facility] as she has been there in the past.   Gave this writer permission to send referrals out to surrounding facilities in the [facility] as well.     Referrals sent to    [facility] ([facility]) 10/15/2019 Fax   [facility] ([facility])  Bed Avail on 10/17 10/15/2019 Fax   [facility]ART[facility]NE[facility] ([facility])Bed available today 10/15/2019 Fax   [facility] 10/15/2019 Fax             Assessment:  Pt with recent 3 day hospitalization needing increased level of care.  Requesting SNF/TCU.    Ultimately determined to need inpatient hospitalization based on medical needs.      Plan:    Anticipated Disposition: SNF/TCU--facility choice pending.     Barriers to d/c plan:  Medical readiness; facility bed availability    Follow Up:  Inpt Care Coordination staff to follow for continued disposition planning.    Zhane Reyes RN, LICSW, [facility]  RN Care Coordinator  [facility], " Emergency Department  Phone  [phone]

## 2019-10-15 NOTE — H&P
Pre-Endoscopy History and Physical     Nedra Carr MRN# [id number]   YOB: 1932 Age: 86 year old     Date of Procedure: 10/15/2019  Primary care provider: Juan A Solo  Type of Endoscopy: Gastroscopy with possible biopsy, possible dilation  Reason for Procedure: dysphagia  Type of Anesthesia Anticipated: Conscious Sedation    HPI:    Nedra is a 86 year old female who will be undergoing the above procedure.      A history and physical has been performed. The patient's medications and allergies have been reviewed. The risks and benefits of the procedure and the sedation options and risks were discussed with the patient.  All questions were answered and informed consent was obtained.      She denies a personal or family history of anesthesia complications or bleeding disorders.     Patient Active Problem List   Diagnosis     Hallux varus, acquired     Other hammer toe (acquired)     Osteoporosis     HTN (hypertension)     Cataract     CARDIOVASCULAR SCREENING; LDL GOAL LESS THAN 130     Advance Care Planning     Intractable chronic migraine without aura     Anemia     Perforated gastric ulcer (H)     Emphysematous cholecystitis     Health Care Home     Paroxysmal atrial fibrillation (H)     Abdominal pain, unspecified abdominal location     UTI (urinary tract infection)     Sepsis (H)     Headache     Physical deconditioning     Urinary bladder stone     S/P cystoscopy     Hypoxia     Hydronephrosis     Acute cholecystitis     Cardiogenic shock (H)     Closed compression fracture of thoracic vertebra (H)     Family history of other digestive disorders     Allergic rhinitis     Arthritis of hand     Esophageal stricture     Obstructive sleep apnea     Weakness     At high risk for falls     Multiple pelvic fractures     Mobility impaired     Abdominal pain     Other idiopathic scoliosis, unspecified spinal region     CAP (community acquired pneumonia)        Past Medical History:    Diagnosis Date     Atrial fibrillation (H)      Dysphagia 2000    Had duodenal strcture dilated at [facility] [place] and by Dr. Hernández in 2001     HTN (hypertension)      Migraine, unspecified, without mention of intractable migraine without mention of status migrainosus     beta blocker prophylaxis     Obstructive sleep apnea (adult) (pediatric) 4/9/2015     Osteoporosis, unspecified      Pain in joint, shoulder region      Polymyalgia rheumatica (H)      Renal stones         Past Surgical History:   Procedure Laterality Date     BACK SURGERY       BRONCHOSCOPY FLEXIBLE N/A 11/21/2014    Procedure: BRONCHOSCOPY FLEXIBLE;  Surgeon: Rhonda Donohue MD;  Location:  GI     C NONSPECIFIC PROCEDURE      s/p dilation at [facility] by Dr. Radha FELIPE NONSPECIFIC PROCEDURE      s/p dilation at [facility] by Dr. Betty FELIPE NONSPECIFIC PROCEDURE      Vag hysterectomy     CHOLECYSTECTOMY N/A 11/15/2014    Procedure: CHOLECYSTECTOMY;  Surgeon: Yomi Brennan MD;  Location:  OR     COLONOSCOPY N/A 9/3/2019    Procedure: Colonoscopy  with biopsies using cold forceps with no sedation;  Surgeon: Anastasia Berg MD;  Location:  GI     CYSTOSCOPY, LITHOLAPAXY, COMBINED N/A 4/8/2015    Procedure: COMBINED CYSTOSCOPY, LITHOLAPAXY;  Surgeon: Randy Reno MD;  Location:  OR     ENT SURGERY       ESOPHAGOSCOPY, GASTROSCOPY, DUODENOSCOPY (EGD), COMBINED  11/21/2012    Procedure: COMBINED ESOPHAGOSCOPY, GASTROSCOPY, DUODENOSCOPY (EGD), BIOPSY SINGLE OR MULTIPLE;   ESOPHAGOSCOPY, GASTROSCOPY, DUODENOSCOPY (EGD)   with cold biopsy and dilalation with #15 savary;  Surgeon: Guillermo Arcos MD;  Location:  GI     ESOPHAGOSCOPY, GASTROSCOPY, DUODENOSCOPY (EGD), COMBINED  2/22/2013    Procedure: COMBINED ESOPHAGOSCOPY, GASTROSCOPY, DUODENOSCOPY (EGD);  ESOPHAGOSCOPY, GASTROSCOPY, DUODENOSCOPY (EGD) ;  Surgeon: Lissett Posada MD;  Location:  GI     ESOPHAGOSCOPY, GASTROSCOPY, DUODENOSCOPY (EGD), COMBINED N/A  10/29/2014    Procedure: COMBINED ESOPHAGOSCOPY, GASTROSCOPY, DUODENOSCOPY (EGD);  Surgeon: Dany Ambrocio MD;  Location:  GI     ESOPHAGOSCOPY, GASTROSCOPY, DUODENOSCOPY (EGD), COMBINED N/A 1/13/2015    Procedure: COMBINED ESOPHAGOSCOPY, GASTROSCOPY, DUODENOSCOPY (EGD), BIOPSY SINGLE OR MULTIPLE;  Surgeon: Dany Ambrocio MD;  Location:  GI     ESOPHAGOSCOPY, GASTROSCOPY, DUODENOSCOPY (EGD), COMBINED N/A 3/5/2015    Procedure: COMBINED ESOPHAGOSCOPY, GASTROSCOPY, DUODENOSCOPY (EGD);  Surgeon: Dany Ambrocio MD;  Location:  GI     ESOPHAGOSCOPY, GASTROSCOPY, DUODENOSCOPY (EGD), COMBINED N/A 11/10/2015    Procedure: COMBINED ESOPHAGOSCOPY, GASTROSCOPY, DUODENOSCOPY (EGD);  Surgeon: Dany Ambrocio MD;  Location:  GI     EYE SURGERY       GYN SURGERY       LAPAROTOMY EXPLORATORY N/A 11/15/2014    Procedure: LAPAROTOMY EXPLORATORY;  Surgeon: Yomi Brennan MD;  Location:  OR     LASER HOLMIUM LITHOTRIPSY BLADDER N/A 4/8/2015    Procedure: LASER HOLMIUM LITHOTRIPSY BLADDER;  Surgeon: Randy Reno MD;  Location:  OR     ORTHOPEDIC SURGERY      hip fx surgery x 2       Social History     Tobacco Use     Smoking status: Never Smoker     Smokeless tobacco: Never Used   Substance Use Topics     Alcohol use: No     Alcohol/week: 0.0 standard drinks       Family History   Problem Relation Age of Onset     Diabetes Mother      Diabetes Sister      Heart Disease Son      Colon Cancer No family hx of        Prior to Admission medications    Medication Sig Start Date End Date Taking? Authorizing Provider   acetaminophen (TYLENOL) 325 MG tablet Take 2 tablets (650 mg) by mouth every 6 hours as needed for mild pain 1/16/15  Yes Dane Christina MD   alendronate (FOSAMAX) 70 MG tablet TAKE 1 TABLET (70MG) BY MOUTH EVERY 7 DAYS. TAKE 60 MINUTES BEFORE MORNING MEAL WITH 8 OUNCES OF WATER. REMAIN UPRIGHT FOR 30 MINUTES. 1/30/19  Yes Juan A Solo PA-C   ferrous gluconate (FERGON)  "324 (38 Fe) MG tablet Take 324 mg by mouth daily (with breakfast)   Yes Reported, Patient   guaiFENesin (ROBITUSSIN) 20 mg/mL SOLN solution Take 10 mLs by mouth every 6 hours as needed for other (secretion expectorant) 10/9/19  Yes Ag Kurtz MD   multivitamin  with lutein (OCUVITE WITH LTEIN) CAPS per capsule Take 1 capsule by mouth daily   Yes Reported, Patient   multivitamin, therapeutic with minerals (MULTI-VITAMIN) TABS Take 1 tablet by mouth daily. 5/8/13  Yes Houston Zhang MD   Nutritional Supplements (ENSURE) LIQD Take 0.5 Cans by mouth daily (strawberry) 10/14/15  Yes Juan A Solo PA-C   pantoprazole (PROTONIX) 40 MG EC tablet Take 1 tablet (40 mg) by mouth every morning (before breakfast) Take 30-60 minutes before a meal. 10/14/19  Yes Juan A Solo PA-C   sucralfate (CARAFATE) 1 GM tablet Take 1 tablet (1 g) by mouth 4 times daily 10/14/19  Yes Juan A Solo PA-C       Allergies   Allergen Reactions     No Known Drug Allergies         REVIEW OF SYSTEMS:   5 point ROS negative except as noted above in HPI, including Gen., Resp., CV, GI &  system review.    PHYSICAL EXAM:   Ht 1.346 m (4' 5\")   Wt (!) 27.2 kg (60 lb)   BMI 15.02 kg/m   Estimated body mass index is 15.02 kg/m  as calculated from the following:    Height as of this encounter: 1.346 m (4' 5\").    Weight as of this encounter: 27.2 kg (60 lb).   GENERAL APPEARANCE: alert, and oriented  MENTAL STATUS: alert  AIRWAY EXAM: Mallampatti Class I (visualization of the soft palate, fauces, uvula, anterior and posterior pillars)  RESP: lungs clear to auscultation - no rales, rhonchi or wheezes  CV: regular rates and rhythm  DIAGNOSTICS:    Not indicated    IMPRESSION   ASA Class 2 - Mild systemic disease    PLAN:   Plan for Gastroscopy with possible biopsy, possible dilation. We discussed the risks, benefits and alternatives and the patient wished to proceed.    The above has been forwarded to the consulting " provider.      Signed Electronically by: Dany Ambrocio MD  October 15, 2019

## 2019-10-16 NOTE — PLAN OF CARE
PRIMARY DIAGNOSIS: ACUTE PAIN, weakness  OUTPATIENT/OBSERVATION GOALS TO BE MET BEFORE DISCHARGE:  1. Pain Status: Improved-controlled with oral protonix and carafate    2. Return to near baseline physical activity: Yes    3. Cleared for discharge by consultants (if involved): N/A    Discharge Planner Nurse   Safe discharge environment identified: Yes  Barriers to discharge: No       Entered by: Betzy Rao 10/16/2019 2:20 PM     Please review provider order for any additional goals.   Nurse to notify provider when observation goals have been met and patient is ready for discharge.    Patient alert and oriented  [place]  Mepilexs to back x2  Up assist x1  VSS, on room air  Tolerating diet, no nausea this shift  Intermittent abdominal pain, scheduled medications help  No IV access  Lungs crackle, congested cough  Speech following, diet per their recommendation  BM today, positive BS  No issues with voiding  Urine cultures pending  Continue with plan of care

## 2019-10-16 NOTE — PROGRESS NOTES
"Clinical Swallow Evaluation:      10/16/19 1111   General Information   Onset Date 10/15/19   Start of Care Date 10/16/19   Referring Physician Brook Sim PA-C   Patient Profile Review/OT: Additional Occupational Profile Info See Profile for full history and prior level of function   Patient/Family Goals Statement to swallow better, to feel better   Swallowing Evaluation Bedside swallow evaluation   Behaviorial Observations WFL (within functional limits)   Mode of current nutrition Oral diet   Type of oral diet Regular;Thin liquid   Respiratory Status O2 Supply   Type of O2 supply Nasal cannula  (2L)   Comments Pt admitted to [facility] observation unit with generalized weakness and abdominal pain after EGD done earlier today, also with mild hypoxia. SLP ordered d/t difficulty swallowing. She was seen x1 in past in 2014 where a DD1/nectar thick liquid diet was recommended. She reports a soft solid diet at home, thin liquids. she does endorse coughing with liquids, globus sensation with solids which she reports is \"new\" for the last couple of weeks.    Clinical Swallow Evaluation   Oral Musculature generally intact   Structural Abnormalities none present   Dentition upper dentures;lower dentures   Mucosal Quality good   Mandibular Strength and Mobility intact   Oral Labial Strength and Mobility WFL   Lingual Strength and Mobility WFL   Velar Elevation intact   Buccal Strength and Mobility intact   Laryngeal Function Cough;Swallow;Voicing initiated;Dry swallow palpated   Oral Musculature Comments weak congestive cough prior to any PO   Additional Documentation Yes   Clinical Swallow Eval: Thin Liquid Texture Trial   Mode of Presentation, Thin Liquids cup;straw;self-fed   Volume of Liquid or Food Presented sips   Oral Phase of Swallow Premature pharyngeal entry   Pharyngeal Phase of Swallow impaired;coughing/choking;reduction in laryngeal movement   Diagnostic Statement overt coughing on 3/3 trials   Clinical Swallow " Eval: Nectar Thick Liquid Texture Trial   Mode of Presentation, Nectar cup;straw;self-fed   Volume of Nectar Presented 3 oz   Oral Phase, Nectar Poor AP movement;Premature pharyngeal entry  (improved control)   Pharyngeal Phase, Nectar impaired;reduction in laryngeal movement   Diagnostic Statement x1 delayed cough, did not appear directly related to swallow but will monitor   Clinical Swallow Eval: Puree Solid Texture Trial   Mode of Presentation, Puree spoon;self-fed   Volume of Puree Presented 3 oz   Oral Phase, Puree Premature pharyngeal entry;Poor AP movement   Pharyngeal Phase, Puree impaired;feeling of something stuck in throat;regurgitation of food/liquids;repeated swallows   Diagnostic Statement x1 delayed cough, did not appear directly related to swallow but will monitor   Clinical Swallow Eval: Semisolid Texture Trial   Mode of Presentation, Semisolid self-fed   Volume of Semisolid Food Presented x1 bite of banana - unable to chew therefore expectorated by pt   Esophageal Phase of Swallow   Esophageal comments recent EGD on 10/15 normal esophagus   Swallow Eval: Clinical Impressions   Skilled Criteria for Therapy Intervention Skilled criteria met.  Treatment indicated.   Functional Assessment Scale (FAS) 3   Treatment Diagnosis oropharyngeal dysphagia   Diet texture recommendations Dysphagia diet level 1;Nectar thick liquids   Recommended Feeding/Eating Techniques alternate between small bites and sips of food/liquid;hard swallow w/ each bite or sip;maintain upright posture during/after eating for 30 mins;small sips/bites   Demonstrates Need for Referral to Another Service dietitian   Therapy Frequency 5x/week   Predicted Duration of Therapy Intervention (days/wks) 1 week   Anticipated Discharge Disposition   (TCU)   Risks and Benefits of Treatment have been explained. Yes   Patient, family and/or staff in agreement with Plan of Care Yes   Clinical Impression Comments Clinical swallow evaluation completed  as pt with reported difficulty swallowing - she does endorse coughing with liquids, globus sensation with solids when asked. Oromotor movement WNL but significant weakness noted. Pt assessed with thin liquids, nectar thick liquids, DD1 solids, DD2 solids and currently moderate oropharyngeal dysphagia. Oral phase notable for slow anterior to posterior transit, suspect premature spillage (appears > with thin liquids compared to nectar) and incomplete mastication with DD2 solids requiring expectoration of bolus. Hyolaryngeal ROM is sluggish, reduced to palpation. Intermittent globus sensation with solids, double swallows and liquid wash effective. Overt immediate coughing with thin liquids. X2 delayed congestive coughs with puree/nectar (? Related to current respiratory status/cough vs swallow).    Total Evaluation Time   Total Evaluation Time (Minutes) 31

## 2019-10-16 NOTE — PLAN OF CARE
PRIMARY DIAGNOSIS: ACUTE PAIN, weakness  OUTPATIENT/OBSERVATION GOALS TO BE MET BEFORE DISCHARGE:  1. Pain Status: Pain free.    2. Return to near baseline physical activity: No    3. Cleared for discharge by consultants (if involved): No    Discharge Planner Nurse   Safe discharge environment identified: No  Barriers to discharge: Yes       Entered by: Ludmila Velazquez 10/16/2019 4:53 AM    VSS, pt is A&Ox4, up with assist x1, denies pain at this time, no IV access, regular diet, PT/SW/speech consults in, will continue to monitor        Please review provider order for any additional goals.   Nurse to notify provider when observation goals have been met and patient is ready for discharge.

## 2019-10-16 NOTE — PROGRESS NOTES
PRIMARY DIAGNOSIS: ACUTE PAIN, Weakness  OUTPATIENT/OBSERVATION GOALS TO BE MET BEFORE DISCHARGE:  1. Pain Status: Improved-controlled with oral pain medications.    2. Return to near baseline physical activity: No    3. Cleared for discharge by consultants (if involved): No    Discharge Planner Nurse   Safe discharge environment identified: No  Barriers to discharge: Yes       Entered by: Ludmila Velazquez 10/16/2019 2:03 AM     VSS, pt is A&Ox4, up with assist x1, SL, urine cultures pending, PT/SW consults in, will continue to monitor       Please review provider order for any additional goals.   Nurse to notify provider when observation goals have been met and patient is ready for discharge.

## 2019-10-16 NOTE — PROGRESS NOTES
[facility]  Hospitalist Progress Note  Brook Sim PA-C 10/16/2019    Reason for Stay (Diagnosis): generalized weakness, abd pain         Assessment and Plan:      Summary of Stay: Nedra Carr is a 86 year old female with a PMH significant for a fib, HTN, UZMA, PMR and was recently hospitalized for pneumonia and incidentally noted to have a kidney stone that dropped into the bladder, who was directly admitted from endoscopy on 10/15/2019 with generalized weakness and abdominal pain.       Problem List:   1. RUQ abdominal pain - ongoing since shortly after discharge from hospital on 10/9. Denies nausea, vomiting, diarrhea or worsening pain with eating. She denies dysphagia but nursing reports issues with swallowing at bedside. Speech consulted, notes moderate oropharyngeal dysphagia, diet adjusted. Endorses poor appetite, notes regular BM (although CT scan from hospitalization showed colonic constipation), also showed incidental kidney stone but nothing else acute. Daughter reports hx of duodenal ulcers, hx of cholecystectomy. Denies fevers. UA from yesterday looked concerning for infection but urine culture is growing mixed urogenital barrera, UA repeated. RUQ US obtained and was unremarkable. No ulcers noted on EGD today, no dilation done of esophagus, biopsies taken. Recently started on Protonix and carafate, continue, will switch carafate to liquid. PO intake improved today, notes intermittent abd pain today but improved, not as tender.  2. Generalized weakness - multifactorial. Recent hospitalization for pneumonia, pt and daughter state she has gotten progressively weaker since discharge. Poor appetite since discharge, likely deconditioning as well. Pt denies worsening SOB, denies diarrhea, N/V. Labs and CXR from yesterday were fairly unremarkable. Equivocal UA with culture now growing mixed urogenital barrera. SW consulted for TCU placement  3. Hypoxia - pt noted to be mildly hypoxic on  "room air upon arrival to the floor, no hx of chronic respiratory failure. Recently hospitalized with pneumonia, treated with abx, has completed course. CXR yesterday looks unchanged/stable. Wean O2 as able. Denies SOB or cough.      DVT Prophylaxis: Ambulate every shift  Code Status: DNR  Discharge Dispo: TCU  Estimated Disch Date / # of Days until Disch: bed available tomorrow        Interval History (Subjective):      Pt notes feeling better today, appetite is mildly improved. Notes abd pain is improved. Had to BMs today, denies dysuria. Denies SOB or cough                  Physical Exam:      Last Vital Signs:  /70 (BP Location: Left arm)   Pulse 104   Temp 97.6  F (36.4  C) (Oral)   Resp 16   Ht 1.346 m (4' 5\")   Wt (!) 27.2 kg (60 lb)   SpO2 93%   BMI 15.02 kg/m      No intake or output data in the 24 hours ending 10/16/19 1710    Constitutional: Awake, alert, cooperative, no apparent distress   Respiratory: Clear to auscultation bilaterally, no crackles or wheezing   Cardiovascular: Regular rate and rhythm, normal S1 and S2, and no murmur noted   Abdomen: Normal bowel sounds, soft, non-distended, non-tender   Skin: No rashes, no cyanosis, dry to touch   Neuro: Alert and oriented x3, no numbness, memory loss   Extremities: No edema, normal range of motion   Other(s):        All other systems: Negative          Medications:      All current medications were reviewed with changes reflected in problem list.         Data:      All new lab and imaging data was reviewed.   Labs:  Recent Labs   Lab 10/14/19  0859   WBC 10.9   HGB 10.0*   HCT 33.3*   MCV 95   *     Recent Labs   Lab 10/14/19  0859      POTASSIUM 4.4   CHLORIDE 102   CO2 25   ANIONGAP 8   *   BUN 29   CR 0.66   GFRESTIMATED 80   GFRESTBLACK >90   GABINO 9.2     Recent Labs   Lab 10/15/19  2154 10/14/19  0859   COLOR Yellow Yellow   APPEARANCE Cloudy Slightly Cloudy   URINEGLC Negative Negative   URINEBILI Negative Negative "   URINEKETONE 10* Negative   SG 1.020 1.015   UBLD Trace* Small*   URINEPH 5.0 5.5   PROTEIN 30* 100*   UROBILINOGEN  --  0.2   NITRITE Negative Negative   LEUKEST Large* Large*   RBCU 9* O - 2   WBCU >182* *      Imaging:   Recent Results (from the past 48 hour(s))   US Abdomen Limited    Narrative    US ABDOMEN LIMITED   10/15/2019 3:17 PM     HISTORY: Right upper quadrant abdominal pain, status post  cholecystectomy, history of duodenal ulcer.    COMPARISON: CT abdomen and pelvis 10/9/2019.    FINDINGS:    Assessment limited by bowel gas obscuring structures.  Gallbladder: Absent.    Bile ducts: CHD is normal diameter.  No intrahepatic biliary  dilatation.    Liver: Normal.     Pancreas: Partially obscured by overlying bowel gas,  but grossly  unremarkable.     Right kidney: Normal.     Aorta and IVC: Not specifically assessed.       Impression    IMPRESSION: Cholecystectomy. No specific acute abnormality can be  seen.    MD Brook NICOLAS PA-C

## 2019-10-16 NOTE — PROGRESS NOTES
X-COVER    UA back (patient did not void until this evening so unable to obtain a sample).  UA shows negative nitrite, trace blood, large LE, > 182 WBC, few bacteria.  UCx pending.    Looks like patient had a UA yesterday at an outside clinic that looked similar but UCx so far has only grown mixed urogenital barrera.      Will hold off on Abx for now pending UCx results.     [facility]

## 2019-10-16 NOTE — PLAN OF CARE
Discharge Planner PT   Patient plan for discharge: TCU per chart/SW/Obs rounds  Current status: PT: Order received; Patient under Observation cares; per discussion with SW at new PT evaluation is not needed at this time as patient has a prior qualifying stay. Will discontinue order and defer PT evaluation and treatment to next level of care  Barriers to return to prior living situation: lives alone and is unable to manage at home in current condition per chart  Recommendations for discharge: TCU  Rationale for recommendations: TCU is being pursued by medical team as patient is below baseline and unable to manage independently at home. Has a recent qualifying stay per SW; No new PT evaluation needing during this stay.       Entered by: Brinda Diaz 10/16/2019 10:56 AM

## 2019-10-16 NOTE — PROGRESS NOTES
Discharge Planner   Discharge Plans in progress: Yes, TCU recommended and patient agreeable. [facility] in [place] has accepted patient into a private room, no additional cost as all rooms are private. Patient agreeable. Left VM for daughter Mark to update on discharge plan. Patient anticipates daughter to transport.  Barriers to discharge plan: [facility] checking insurance coverage and request discharge after 1 PM on Thursday 10/17/19.   Follow up plan: CM will continue to follow for discharge planning to TCU and will send orders to TCU once complete on day of discharge.        Entered by: Bridgett Dee 10/16/2019 2:15 PM       [facility]  [facility]  [phone]

## 2019-10-16 NOTE — PROGRESS NOTES
Discharge Planner   Discharge Plans in progress: Yes. TCU referrals resent this morning.   Barriers to discharge plan: TCU acceptance and bed availability. Transportation TBD. Medical readiness and completion of consults.   Follow up plan: CM will continue to follow for discharge planning.        Entered by: Bridgett Dee 10/16/2019 10:39 AM       [facility]  Care [facility] Services  [phone]

## 2019-10-16 NOTE — PHARMACY-ADMISSION MEDICATION HISTORY
Admission medication history interview status for this patient is complete. See [facility] admission navigator for allergy information, prior to admission medications and immunization status.     Medication history interview source(s):Patient  Medication history resources (including written lists, pill bottles, clinic record):None  Primary pharmacy:[facility]    Changes made to PTA medication list:  Added: -  Deleted: -  Changed: -    Actions taken by pharmacist (provider contacted, etc):None     Additional medication history information:None    Medication reconciliation/reorder completed by provider prior to medication history?  Yes     Do you take OTC medications (eg tylenol, ibuprofen, fish oil, eye/ear drops, etc)? Yes     For patients on insulin therapy: No  Lantus/levemir/NPH/toujeo/tresiba/Mix 70/30 dose:  No  Sliding scale Novolog: No  If Yes, do you have a baseline novolog pre-meal dose:  -  units with meals  Patients eat three meals a day: NA  How many episodes of hypoglycemia do you have per week: -  How many missed doses do you have per week: -  How many times do you check your blood glucose per day:  -  Do you have a Continuous glucose monitor (CGM) : No (remind pt that not approved for hospital use)  Any Barriers to therapy - Be specific :  No  (cost of medications, comfortable with giving injections (if applicable), comfortable and confident with current diabetes regimen)      Prior to Admission medications    Medication Sig Last Dose Taking? Auth Provider   acetaminophen (TYLENOL) 325 MG tablet Take 2 tablets (650 mg) by mouth every 6 hours as needed for mild pain Past Week at Unknown time Yes Dane Christina MD   alendronate (FOSAMAX) 70 MG tablet TAKE 1 TABLET (70MG) BY MOUTH EVERY 7 DAYS. TAKE 60 MINUTES BEFORE MORNING MEAL WITH 8 OUNCES OF WATER. REMAIN UPRIGHT FOR 30 MINUTES. 10/13/2019 at Unknown time Yes Juan A Solo PA-C   ferrous gluconate (FERGON) 324 (38 Fe) MG tablet Take  324 mg by mouth daily (with breakfast) 10/14/2019 at Unknown time Yes Reported, Patient   guaiFENesin (ROBITUSSIN) 20 mg/mL SOLN solution Take 10 mLs by mouth every 6 hours as needed for other (secretion expectorant) Past Week at Unknown time Yes Ag Kurtz MD   multivitamin  with lutein (OCUVITE WITH LTEIN) CAPS per capsule Take 1 capsule by mouth daily Past Week at Unknown time Yes Reported, Patient   multivitamin, therapeutic with minerals (MULTI-VITAMIN) TABS Take 1 tablet by mouth daily. Past Week at Unknown time Yes Houston Zhang MD   Nutritional Supplements (ENSURE) LIQD Take 0.5 Cans by mouth daily (strawberry) 10/14/2019 at Unknown time Yes Juan A Solo PA-C   pantoprazole (PROTONIX) 40 MG EC tablet Take 1 tablet (40 mg) by mouth every morning (before breakfast) Take 30-60 minutes before a meal. 10/14/2019 at Unknown time Yes Juan A Solo PA-C   sucralfate (CARAFATE) 1 GM tablet Take 1 tablet (1 g) by mouth 4 times daily 10/14/2019 at Unknown time Yes Juan A Solo PA-C   fluconazole (DIFLUCAN) 200 MG tablet Take 1 tablet (200 mg) by mouth daily not started yet  Juan A Solo PA-C

## 2019-10-16 NOTE — PROGRESS NOTES
Your information has been submitted on October 16th, 2019 at 02:39:38 PM CDT. The confirmation number is [id number]      Linh Whitney  Care Management Coordinator  [facility]  [phone]

## 2019-10-16 NOTE — PLAN OF CARE
PRIMARY DIAGNOSIS: ACUTE PAIN, weakness  OUTPATIENT/OBSERVATION GOALS TO BE MET BEFORE DISCHARGE:  1. Pain Status: Intermittent, oral protonix and carafate    2. Return to near baseline physical activity: Yes    3. Cleared for discharge by consultants (if involved): No  Discharge Planner Nurse   Safe discharge environment identified: Yes  Barriers to discharge: No       Entered by: Betzy Rao 10/16/2019 10:55 AM     Please review provider order for any additional goals.   Nurse to notify provider when observation goals have been met and patient is ready for discharge.

## 2019-10-16 NOTE — PLAN OF CARE
Discharge Planner SLP   Patient plan for discharge: did not state  Current status: Clinical swallow evaluation completed as pt with reported difficulty swallowing - she does endorse coughing with liquids, globus sensation with solids when asked. Oromotor movement WNL but significant weakness noted. Pt assessed with thin liquids, nectar thick liquids, DD1 solids, DD2 solids and currently moderate oropharyngeal dysphagia. Oral phase notable for slow anterior to posterior transit, suspect premature spillage (appears > with thin liquids compared to nectar) and incomplete mastication with DD2 solids requiring expectoration of bolus. Hyolaryngeal ROM is sluggish, reduced to palpation. Intermittent globus sensation with solids, double swallows and liquid wash effective. Overt immediate coughing with thin liquids. X2 delayed congestive coughs with puree/nectar (? Related to current respiratory status/cough vs swallow).     Recommendations:   1) downgrade to DD1 (puree) solids, nectar thick liquids (straws OK)  2) fully upright for all meals, slow pace, small bites/sips, double swallow solids as needed, take a sip of liquid after every 1-2 bites  3) close supervision/assist with tray set up as needed    Barriers to return to prior living situation: weakness, level of assist, dysphagia  Recommendations for discharge: TCU  Rationale for recommendations: ongoing SLP for dysphagia management 5x/week       Entered by: Makenna Coppola 10/16/2019 11:05 AM

## 2019-10-16 NOTE — PROGRESS NOTES
[facility]  Patient is currently open to home care services with [facility].  The patient is currently receiving RN/HHA services.  [facility]  and team have been notified of patient admission.  [facility] liaison will continue to follow patient during stay.

## 2019-10-17 NOTE — PROGRESS NOTES
PRIMARY DIAGNOSIS: Abd pain, weakness  OUTPATIENT/OBSERVATION GOALS TO BE MET BEFORE DISCHARGE:  1. ADLs back to baseline: No    2. Activity and level of assistance: Assist of 1 with gait belt.    3. Pain status: Pain free.    4. Return to near baseline physical activity: Yes    Pt is disoriented to time. VSS. Pt denies pain. Pt ambulates with ax1. Carafate and Protonix. Plan for TCU placement to [facility]. Dysphagia diet 1 nectar thick. Will continue to monitor.      Discharge Planner Nurse   Safe discharge environment identified: Yes  Barriers to discharge: Yes       Entered by: Yolanda Mathew 10/17/2019 3:36 AM      Please review provider order for any additional goals.   Nurse to notify provider when observation goals have been met and patient is ready for discharge.

## 2019-10-17 NOTE — PROGRESS NOTES
Discharge Planner   Discharge Plans in progress: [facility] TCU today.  Barriers to discharge plan: Awaiting insurance authorization.  Follow up plan: [facility] TCU. Requested that pt admit at 1700 due to a staffing meeting this afternoon. Anticipate that [employer] auth will be approved by then. PAS completed. Orders faxed. Updated dtr who will transport at 1630 today.        Entered by: Sammie Gerardo 10/17/2019 1:27 PM       1600: Authorization obtained for insurance coverage for SNF

## 2019-10-17 NOTE — PLAN OF CARE
PRIMARY DIAGNOSIS: ACUTE PAIN, weakness  OUTPATIENT/OBSERVATION GOALS TO BE MET BEFORE DISCHARGE:  1. Pain Status: Improved-controlled with oral pain medications.    2. Return to near baseline physical activity: Yes    3. Cleared for discharge by consultants (if involved): Yes    Discharge Planner Nurse   Safe discharge environment identified: Yes  Barriers to discharge: No       Entered by: Betzy Rao 10/17/2019 2:57 PM     Please review provider order for any additional goals.   Nurse to notify provider when observation goals have been met and patient is ready for discharge.    Patient alert and oriented, [place], hearing aids at bedside  No IV access  VSS, on room air  Lungs coarse, IS given to patient  Tolerating diet, no nausea- no reports of food/drink going down wrong  Positive BS, loose BM yesterday  No issue with urinating  Mepilexes to lower and mid back, off loaded with pillows, redness/blanchable to coccyx- intact  Sensation intact  Discharging at 1700, daughter to transport

## 2019-10-17 NOTE — PROGRESS NOTES
PRIMARY DIAGNOSIS: Abd pain, weakness  OUTPATIENT/OBSERVATION GOALS TO BE MET BEFORE DISCHARGE:  1. ADLs back to baseline: No    2. Activity and level of assistance: Assist of 1 with gait belt.    3. Pain status: Pain free.    4. Return to near baseline physical activity: Yes    Pt is disoriented to time. VSS. Pt denies pain. Pt ambulates with ax1. Carafate and Protonix. Plan for TCU placement to [facility]. Dysphagia diet 1 nectar thick. Will continue to monitor.      Discharge Planner Nurse   Safe discharge environment identified: Yes  Barriers to discharge: Yes       Entered by: Yolanda Mathew 10/17/2019 2:01 AM      Please review provider order for any additional goals.   Nurse to notify provider when observation goals have been met and patient is ready for discharge.

## 2019-10-17 NOTE — PROGRESS NOTES
PRIMARY DIAGNOSIS: Abd pain, weakness  OUTPATIENT/OBSERVATION GOALS TO BE MET BEFORE DISCHARGE:  1. ADLs back to baseline: No    2. Activity and level of assistance: Assist of 1 with gait belt.    3. Pain status: Pain free.    4. Return to near baseline physical activity: Yes     Discharge Planner Nurse   Safe discharge environment identified: Yes  Barriers to discharge: Yes       Entered by: Marquis Cortes 10/16/2019 7:51 PM   VSS. Pt alert and oriented x 4. Up with assist of 1 with gait belt. Voiding adequately. Tolerating diet. Carafate for abd pain. Discharge plan pending TCU transfer.  Please review provider order for any additional goals.   Nurse to notify provider when observation goals have been met and patient is ready for discharge.

## 2019-10-17 NOTE — PLAN OF CARE
Packet printed and given to daughter  Discharged with daughter.    OBSERVATION patient END time: 2945

## 2019-10-17 NOTE — PLAN OF CARE
Speech Language Therapy Discharge Summary    Reason for therapy discharge:    Discharged to transitional care facility.    Progress towards therapy goal(s). See goals on Care Plan in [facility] electronic health record for goal details.  Goals not met.  Barriers to achieving goals:   discharge from facility.    Therapy recommendation(s):    Continued therapy is recommended.  Rationale/Recommendations:  ongoing SLP for dysphagia management, pt below baseline.    Pt D/C'd with the following recommendations:   1) continue DD1 (puree) solids, nectar thick liquids (straws OK)  2) fully upright for all meals, slow pace, small bites/sips, double swallow solids as needed, take a sip of liquid after every 1-2 bites  3) close supervision/assist with tray set up as needed

## 2019-10-17 NOTE — PLAN OF CARE
Discharge Planner SLP   Patient plan for discharge: did not discuss  Current status: Swallow tx completed. Pt only agreeable to liquids, limited session as she requests to rest/reports headache pain 5/6 out of 10 - RN notified. Pt assessed with nectar thick liquids and thin liquids. No overt s/s with nectar via cup or straw. Overt coughing with thin liquids, SP02 dropped from 91-92% to 86-87%. Continue current recommendations.     Recommendations:   1) continue DD1 (puree) solids, nectar thick liquids (straws OK)  2) fully upright for all meals, slow pace, small bites/sips, double swallow solids as needed, take a sip of liquid after every 1-2 bites  3) close supervision/assist with tray set up as needed     Barriers to return to prior living situation: weakness, level of assist, dysphagia  Recommendations for discharge: TCU  Rationale for recommendations: ongoing SLP for dysphagia management 5x/week       Entered by: Makenna Coppola 10/17/2019 11:11 AM

## 2019-10-17 NOTE — PLAN OF CARE
PRIMARY DIAGNOSIS: ACUTE PAIN, weakness  OUTPATIENT/OBSERVATION GOALS TO BE MET BEFORE DISCHARGE:  1. Pain Status: Improved-controlled with oral pain medications.    2. Return to near baseline physical activity: Yes    3. Cleared for discharge by consultants (if involved): N/A    Discharge Planner Nurse   Safe discharge environment identified: Yes  Barriers to discharge: No       Entered by: Betzy Rao 10/17/2019 11:55 AM     Please review provider order for any additional goals.   Nurse to notify provider when observation goals have been met and patient is ready for discharge.

## 2019-10-17 NOTE — DISCHARGE SUMMARY
[facility] Outpatient / Observation Unit  Discharge Summary        Nedra Carr MRN# [id number]   YOB: 1932 Age: 86 year old     Date of Admission:  10/15/2019  Date of Discharge:  10/17/2019  Admitting Physician:  Adriane Orellana MD  Discharge Physician: Brook Sim PA-C  Discharging Service: Hospitalist      Primary Provider: Juan A Solo  Primary Care Physician Phone Number: [phone]         Primary Discharge Diagnoses:    Summary of Stay: Nedra Carr is a 86 year old female with a PMH significant for a fib, HTN, UZMA, PMR and was recently hospitalized for pneumonia and incidentally noted to have a kidney stone that dropped into the bladder, who was directly admitted from endoscopy on 10/15/2019 with generalized weakness and abdominal pain.        Problem List:   1. RUQ abdominal pain - ongoing since shortly after discharge from hospital on 10/9. Denies nausea, vomiting, diarrhea or worsening pain with eating. She denies dysphagia but nursing reports issues with swallowing at bedside. Speech consulted, notes moderate oropharyngeal dysphagia, diet adjusted. Endorses poor appetite, notes regular BM (although CT scan from hospitalization showed colonic constipation), also showed incidental kidney stone but nothing else acute. Daughter reports hx of duodenal ulcers, hx of cholecystectomy. Denies fevers. UA from 10/14 looked concerning for infection but urine culture is growing mixed urogenital barrera, UA repeated on 10/15, UA again looks concerning for infection but given previous culture results, abx held and urine culture followed, no growth thus far. RUQ US obtained and was unremarkable. No ulcers noted on EGD today, no dilation done of esophagus, biopsies taken. Recently started on Protonix and carafate, continue, switched carafate to liquid here. PO intake improved, tolerating oral intake. Denies abd pain today, notes it can be intermittent  2. Generalized weakness -  multifactorial. Recent hospitalization for pneumonia, pt and daughter state she has gotten progressively weaker since discharge. Poor appetite since discharge, likely deconditioning as well. Pt denies worsening SOB, denies diarrhea, N/V. Labs and CXR from yesterday were fairly unremarkable. Equivocal UA with culture now growing mixed urogenital barrera. UA and culture repeated, not growing bacteria at this time, still pending. SW consulted for TCU placement, pt will discharge to [facility].   3. Hypoxia - pt noted to be mildly hypoxic on room air upon arrival to the floor, no hx of chronic respiratory failure. Recently hospitalized with pneumonia, treated with abx, has completed course. CXR yesterday looks unchanged/stable. O2 was weaned, pt did develop mild hypoxia and increased SOB with ambulation, O2 sats improved quickly with rest and deep breaths. Continue to monitor at TCU, continue IS. Pt states SOB she experiences with ambulation is improved from previous.             Secondary Discharge Diagnoses:     Past Medical History:   Diagnosis Date     Atrial fibrillation (H)      Dysphagia 2000    Had duodenal strcture dilated [place] [facility] [place] and by Dr. Hernández in 2001     HTN (hypertension)      Migraine, unspecified, without mention of intractable migraine without mention of status migrainosus     beta blocker prophylaxis     Obstructive sleep apnea (adult) (pediatric) 4/9/2015     Osteoporosis, unspecified      Pain in joint, shoulder region      Polymyalgia rheumatica (H)      Renal stones                 Code Status:      DNR        Brief Hospital Summary:        Reason for your hospital stay      Generalized weakness and abdominal pain.             Please refer to initial admission history and physical for further details.   Briefly, Nedra Carr was admitted on 10/15/2019 for concerns of generalized weakness and abdominal pain.  Pt was admitted directly to the OBS unit after EGD earlier in the day due  to abd pain. Pt was weak and no source of abdominal pain was found on EGD.. UA was abnormal prior to admission, this was repeated, urine culture sent. RUQ US was obtained and negative for acute process.    Pt was provided supportive cares. Labs were reviewed and significant results addressed. Speech therapy was consulted and noted pt to have moderate dysphagia and diet adjusted. The patient's abdominal pain improved, she tolerated a oral intake. She was initially placed on oxygen due to mild hypoxia. She was weaned off O2 at rest and maintained normal SpO2. She was ambulated in the hallway and SpO2 did drop into the 80s with exertion. Her O2 sats would quickly recover with rest and deep breaths. She did note SOB with exertion but states this is improved from previous.   On the day of discharge, pt's strength had improved and a safe discharge plan was arranged. Medications were reviewed and adjustments made as necessary. Pt is instructed to follow up as below.           Significant Lab During Hospitalization:      Recent Labs   Lab 10/14/19  0859   WBC 10.9   HGB 10.0*   HCT 33.3*   MCV 95   *     Recent Labs   Lab 10/14/19  0859      POTASSIUM 4.4   CHLORIDE 102   CO2 25   ANIONGAP 8   *   BUN 29   CR 0.66   GFRESTIMATED 80   GFRESTBLACK >90   GABINO 9.2     Recent Labs   Lab 10/15/19  2154 10/14/19  0859   COLOR Yellow Yellow   APPEARANCE Cloudy Slightly Cloudy   URINEGLC Negative Negative   URINEBILI Negative Negative   URINEKETONE 10* Negative   SG 1.020 1.015   UBLD Trace* Small*   URINEPH 5.0 5.5   PROTEIN 30* 100*   UROBILINOGEN  --  0.2   NITRITE Negative Negative   LEUKEST Large* Large*   RBCU 9* O - 2   WBCU >182* *                Significant Imaging During Hospitalization:      Recent Results (from the past 48 hour(s))   US Abdomen Limited    Narrative    US ABDOMEN LIMITED   10/15/2019 3:17 PM     HISTORY: Right upper quadrant abdominal pain, status post  cholecystectomy, history of  duodenal ulcer.    COMPARISON: CT abdomen and pelvis 10/9/2019.    FINDINGS:    Assessment limited by bowel gas obscuring structures.  Gallbladder: Absent.    Bile ducts: CHD is normal diameter.  No intrahepatic biliary  dilatation.    Liver: Normal.     Pancreas: Partially obscured by overlying bowel gas,  but grossly  unremarkable.     Right kidney: Normal.     Aorta and IVC: Not specifically assessed.       Impression    IMPRESSION: Cholecystectomy. No specific acute abnormality can be  seen.    HOOD TOMAS MD              Pending Results:        Unresulted Labs Ordered in the Past 30 Days of this Admission     Date and Time Order Name Status Description    10/15/2019 2155 Urine Culture Aerobic Bacterial Preliminary               Consultations This Hospital Stay:      No consultations were requested during this admission         Discharge Instructions and Follow-Up:      Follow-up Appointments     Follow Up and recommended labs and tests      Follow up with Nursing home physician or PCP in 1 week.  No follow up   labs or test are needed.           Pt instructed to follow up with PCP in 7 days.   Follow-up Labs None        Discharge Disposition:      Discharged to home         Discharge Medications:        Current Discharge Medication List      CONTINUE these medications which have NOT CHANGED    Details   acetaminophen (TYLENOL) 325 MG tablet Take 2 tablets (650 mg) by mouth every 6 hours as needed for mild pain  Qty: 60 tablet, Refills: 0    Associated Diagnoses: Abdominal pain, other specified site      alendronate (FOSAMAX) 70 MG tablet TAKE 1 TABLET (70MG) BY MOUTH EVERY 7 DAYS. TAKE 60 MINUTES BEFORE MORNING MEAL WITH 8 OUNCES OF WATER. REMAIN UPRIGHT FOR 30 MINUTES.  Qty: 12 tablet, Refills: 2    Associated Diagnoses: Age-related osteoporosis without current pathological fracture      ferrous gluconate (FERGON) 324 (38 Fe) MG tablet Take 324 mg by mouth daily (with breakfast)      guaiFENesin (ROBITUSSIN)  "20 mg/mL SOLN solution Take 10 mLs by mouth every 6 hours as needed for other (secretion expectorant)  Qty: 236 mL, Refills: 0    Associated Diagnoses: Pneumonia due to infectious organism, unspecified laterality, unspecified part of lung      multivitamin  with lutein (OCUVITE WITH LTEIN) CAPS per capsule Take 1 capsule by mouth daily      multivitamin, therapeutic with minerals (MULTI-VITAMIN) TABS Take 1 tablet by mouth daily.  Qty: 100 tablet, Refills: 3      Nutritional Supplements (ENSURE) LIQD Take 0.5 Cans by mouth daily (strawberry)  Qty: 15 each, Refills: 11    Comments: Supplement type/formula/brand per patient's preference and health care plan.  Associated Diagnoses: Perforated gastric ulcer, chronic or unspecified; Physical deconditioning; Weakness      pantoprazole (PROTONIX) 40 MG EC tablet Take 1 tablet (40 mg) by mouth every morning (before breakfast) Take 30-60 minutes before a meal.  Qty: 90 tablet, Refills: 1    Associated Diagnoses: Other acute gastritis without hemorrhage      sucralfate (CARAFATE) 1 GM tablet Take 1 tablet (1 g) by mouth 4 times daily  Qty: 120 tablet, Refills: 0    Associated Diagnoses: Epigastric pain      fluconazole (DIFLUCAN) 200 MG tablet Take 1 tablet (200 mg) by mouth daily  Qty: 14 tablet, Refills: 0    Associated Diagnoses: Candida UTI                 Allergies:         Allergies   Allergen Reactions     No Known Drug Allergies            Condition and Physical on Discharge:      Discharge condition: Stable   Vitals: Blood pressure 107/69, pulse 104, temperature 97.5  F (36.4  C), temperature source Oral, resp. rate 20, height 1.346 m (4' 5\"), weight (!) 27.2 kg (60 lb), SpO2 91 %, not currently breastfeeding.  60 lbs 0 oz      GENERAL:  Comfortable.  PSYCH: pleasant, oriented, No acute distress.  HEART:  Normal S1, S2 with no murmur, no pericardial rub, gallops or S3 or S4.  LUNGS:  Clear to auscultation, normal Respiratory effort. No wheezing, rales or " ori.  EXTREMITIES:  Able to ambulate with walker.   SKIN:  Dry to touch, No rash, wound or ulcerations.  NEUROLOGIC:  Grossly intact    Brook Sim PA-C PA-C

## 2019-10-17 NOTE — PROGRESS NOTES
PRIMARY DIAGNOSIS: Abd pain, weakness  OUTPATIENT/OBSERVATION GOALS TO BE MET BEFORE DISCHARGE:  1. ADLs back to baseline: No    2. Activity and level of assistance: Assist of 1 with gait belt.    3. Pain status: Pain free.    4. Return to near baseline physical activity: Yes    Pt is disoriented to time. VSS. Pt denies pain. Pt ambulates with ax1. Carafate and Protonix. Plan [facility]CU [facility]. Dysphagia diet 1 nectar thick. Will continue to monitor.      Discharge Planner Nurse   Safe discharge environment identified: Yes  Barriers to discharge: Yes       Entered by: Yolanda Mathew 10/17/2019 5:30 AM      Please review provider order for any additional goals.   Nurse to notify provider when observation goals have been met and patient is ready for discharge.

## 2019-11-05 NOTE — TELEPHONE ENCOUNTER
Call out to TCU -  [facility] [phone]   Confirmed patient is current resident. There is no projected discharge date.  We requested notification of discharge.   Blayne Lopez RN PAL - Care Team of Bert Solo PA-C

## 2019-11-19 NOTE — PROGRESS NOTES
Clinic Care Coordination Contact    Situation: Patient chart reviewed by care coordinator.    Background: Received notification from home care of patient's discharge. Patient has been residing at TCU.     Assessment: RNCC followed up with TCU and was informed that patient was transitioned over to long term care side. Will not be discharging.     Plan/Recommendations: No further outreaches scheduled. Communicated this to care team SATHYA SCOTT.     Jyoti Ricketts RN Care Coordinator  [facility]  Email: [email or web address]  Phone: [phone]

## 2020-01-01 ENCOUNTER — HOSPITAL ENCOUNTER (INPATIENT)
Dept: INTENSIVE CARE | Facility: HOSPITAL | Age: 85
End: 2020-02-10
Attending: INTERNAL MEDICINE | Admitting: INTERNAL MEDICINE
Payer: COMMERCIAL

## 2020-01-01 ENCOUNTER — OFFICE VISIT - HEALTHEAST (OUTPATIENT)
Dept: GERIATRICS | Facility: CLINIC | Age: 85
End: 2020-01-01

## 2020-01-01 ENCOUNTER — RECORDS - HEALTHEAST (OUTPATIENT)
Dept: LAB | Facility: CLINIC | Age: 85
End: 2020-01-01

## 2020-01-01 DIAGNOSIS — I48.91 ATRIAL FIBRILLATION, UNSPECIFIED TYPE (H): ICD-10-CM

## 2020-01-01 DIAGNOSIS — R05.9 COUGH: ICD-10-CM

## 2020-01-01 DIAGNOSIS — J18.9 PNEUMONIA OF LEFT LOWER LOBE DUE TO INFECTIOUS ORGANISM: ICD-10-CM

## 2020-01-01 DIAGNOSIS — R50.9 FEVER, UNSPECIFIED FEVER CAUSE: ICD-10-CM

## 2020-01-01 DIAGNOSIS — J96.01 ACUTE RESPIRATORY FAILURE WITH HYPOXIA (H): ICD-10-CM

## 2020-01-01 LAB
ABO/RH(D): NORMAL
ALBUMIN UR-MCNC: ABNORMAL MG/DL
ANION GAP SERPL CALCULATED.3IONS-SCNC: 10 MMOL/L (ref 5–18)
ANION GAP SERPL CALCULATED.3IONS-SCNC: 11 MMOL/L (ref 5–18)
ANION GAP SERPL CALCULATED.3IONS-SCNC: 12 MMOL/L (ref 5–18)
ANION GAP SERPL CALCULATED.3IONS-SCNC: 6 MMOL/L (ref 5–18)
ANION GAP SERPL CALCULATED.3IONS-SCNC: 8 MMOL/L (ref 5–18)
ANION GAP SERPL CALCULATED.3IONS-SCNC: 9 MMOL/L (ref 5–18)
ANTIBODY SCREEN: NEGATIVE
AORTIC ROOT: 3.2 CM
AORTIC VALVE MEAN VELOCITY: 113 CM/S
APPEARANCE UR: ABNORMAL
AR DECEL SLOPE: 4240 MM/S2
AR PEAK VELOCITY: 445 CM/S
ASCENDING AORTA: 4 CM
ATRIAL RATE - MUSE: 122 BPM
ATRIAL RATE - MUSE: 174 BPM
AV DIMENSIONLESS INDEX VTI: 0.7
AV MEAN GRADIENT: 6 MMHG
AV PEAK GRADIENT: 13.8 MMHG
AV REGURGITANT PEAK GRADIENT: 79.2 MMHG
AV REGURGITATION PRESSURE HALF TIME: 308 MS
AV VALVE AREA: 1.9 CM2
AV VELOCITY RATIO: 0.7
BACTERIA #/AREA URNS HPF: ABNORMAL HPF
BASE EXCESS BLDA CALC-SCNC: 5.1 MMOL/L
BASOPHILS # BLD AUTO: 0 THOU/UL (ref 0–0.2)
BASOPHILS # BLD AUTO: 0 THOU/UL (ref 0–0.2)
BASOPHILS NFR BLD AUTO: 0 % (ref 0–2)
BASOPHILS NFR BLD AUTO: 0 % (ref 0–2)
BILIRUB UR QL STRIP: NEGATIVE
BLD PROD TYP BPU: NORMAL
BLOOD EXPIRATION DATE: NORMAL
BLOOD TYPE: 6200
BNP SERPL-MCNC: 375 PG/ML (ref 0–167)
BNP SERPL-MCNC: 719 PG/ML (ref 0–167)
BSA FOR ECHO PROCEDURE: 1.07 M2
BUN SERPL-MCNC: 22 MG/DL (ref 8–28)
BUN SERPL-MCNC: 23 MG/DL (ref 8–28)
BUN SERPL-MCNC: 27 MG/DL (ref 8–28)
BUN SERPL-MCNC: 32 MG/DL (ref 8–28)
BUN SERPL-MCNC: 35 MG/DL (ref 8–28)
BUN SERPL-MCNC: 41 MG/DL (ref 8–28)
CALCIUM SERPL-MCNC: 6 MG/DL (ref 8.5–10.5)
CALCIUM SERPL-MCNC: 8.9 MG/DL (ref 8.5–10.5)
CALCIUM SERPL-MCNC: 9.2 MG/DL (ref 8.5–10.5)
CALCIUM SERPL-MCNC: 9.4 MG/DL (ref 8.5–10.5)
CALCIUM SERPL-MCNC: 9.7 MG/DL (ref 8.5–10.5)
CALCIUM SERPL-MCNC: 9.8 MG/DL (ref 8.5–10.5)
CALCIUM, IONIZED MEASURED: 1.3 MMOL/L (ref 1.11–1.3)
CHLORIDE BLD-SCNC: 102 MMOL/L (ref 98–107)
CHLORIDE BLD-SCNC: 104 MMOL/L (ref 98–107)
CHLORIDE BLD-SCNC: 104 MMOL/L (ref 98–107)
CHLORIDE BLD-SCNC: 105 MMOL/L (ref 98–107)
CHLORIDE BLD-SCNC: 107 MMOL/L (ref 98–107)
CHLORIDE BLD-SCNC: 116 MMOL/L (ref 98–107)
CO2 SERPL-SCNC: 24 MMOL/L (ref 22–31)
CO2 SERPL-SCNC: 27 MMOL/L (ref 22–31)
CO2 SERPL-SCNC: 28 MMOL/L (ref 22–31)
CO2 SERPL-SCNC: 28 MMOL/L (ref 22–31)
CO2 SERPL-SCNC: 30 MMOL/L (ref 22–31)
CO2 SERPL-SCNC: 32 MMOL/L (ref 22–31)
CODING SYSTEM: NORMAL
COHGB MFR BLD: 99.6 % (ref 95–96)
COLOR UR AUTO: YELLOW
COMPONENT (HISTORICAL CONVERSION): NORMAL
CREAT SERPL-MCNC: 0.64 MG/DL (ref 0.6–1.1)
CREAT SERPL-MCNC: 0.77 MG/DL (ref 0.6–1.1)
CREAT SERPL-MCNC: 0.83 MG/DL (ref 0.6–1.1)
CREAT SERPL-MCNC: 1.07 MG/DL (ref 0.6–1.1)
CREAT SERPL-MCNC: 1.15 MG/DL (ref 0.6–1.1)
CREAT SERPL-MCNC: 1.15 MG/DL (ref 0.6–1.1)
CROSSMATCH: NORMAL
CV BLOOD PRESSURE: ABNORMAL MMHG
CV ECHO HEIGHT: 51 IN
CV ECHO WEIGHT: 70 LBS
DIASTOLIC BLOOD PRESSURE - MUSE: NORMAL
DIASTOLIC BLOOD PRESSURE - MUSE: NORMAL
DOP CALC AO PEAK VEL: 186 CM/S
DOP CALC AO VTI: 33.2 CM
DOP CALC LVOT AREA: 2.54 CM2
DOP CALC LVOT DIAMETER: 1.8 CM
DOP CALC LVOT PEAK VEL: 125 CM/S
DOP CALC LVOT STROKE VOLUME: 61.8 CM3
DOP CALCLVOT PEAK VEL VTI: 24.3 CM
EJECTION FRACTION: 45 % (ref 55–75)
EOSINOPHIL # BLD AUTO: 0 THOU/UL (ref 0–0.4)
EOSINOPHIL # BLD AUTO: 0 THOU/UL (ref 0–0.4)
EOSINOPHIL NFR BLD AUTO: 0 % (ref 0–6)
EOSINOPHIL NFR BLD AUTO: 0 % (ref 0–6)
ERYTHROCYTE [DISTWIDTH] IN BLOOD BY AUTOMATED COUNT: 15.3 % (ref 11–14.5)
ERYTHROCYTE [DISTWIDTH] IN BLOOD BY AUTOMATED COUNT: 15.3 % (ref 11–14.5)
ERYTHROCYTE [DISTWIDTH] IN BLOOD BY AUTOMATED COUNT: 15.9 % (ref 11–14.5)
FLOW: 4 LPM
FLUAV AG SPEC QL IA: NORMAL
FLUBV AG SPEC QL IA: NORMAL
FRACTIONAL SHORTENING: 24.5 % (ref 28–44)
GFR SERPL CREATININE-BSD FRML MDRD: 45 ML/MIN/1.73M2
GFR SERPL CREATININE-BSD FRML MDRD: 45 ML/MIN/1.73M2
GFR SERPL CREATININE-BSD FRML MDRD: 49 ML/MIN/1.73M2
GFR SERPL CREATININE-BSD FRML MDRD: >60 ML/MIN/1.73M2
GLUCOSE BLD-MCNC: 104 MG/DL (ref 70–125)
GLUCOSE BLD-MCNC: 120 MG/DL (ref 70–125)
GLUCOSE BLD-MCNC: 132 MG/DL (ref 70–125)
GLUCOSE BLD-MCNC: 133 MG/DL (ref 70–125)
GLUCOSE BLD-MCNC: 215 MG/DL (ref 70–125)
GLUCOSE BLD-MCNC: 290 MG/DL (ref 70–125)
GLUCOSE BLDC GLUCOMTR-MCNC: 106 MG/DL (ref 70–139)
GLUCOSE BLDC GLUCOMTR-MCNC: 138 MG/DL (ref 70–139)
GLUCOSE BLDC GLUCOMTR-MCNC: 141 MG/DL (ref 70–139)
GLUCOSE BLDC GLUCOMTR-MCNC: 142 MG/DL (ref 70–139)
GLUCOSE BLDC GLUCOMTR-MCNC: 146 MG/DL (ref 70–139)
GLUCOSE BLDC GLUCOMTR-MCNC: 175 MG/DL (ref 70–139)
GLUCOSE BLDC GLUCOMTR-MCNC: 205 MG/DL (ref 70–139)
GLUCOSE BLDC GLUCOMTR-MCNC: 218 MG/DL (ref 70–139)
GLUCOSE BLDC GLUCOMTR-MCNC: 226 MG/DL (ref 70–139)
GLUCOSE BLDC GLUCOMTR-MCNC: 51 MG/DL (ref 70–139)
GLUCOSE BLDC GLUCOMTR-MCNC: 93 MG/DL (ref 70–139)
GLUCOSE UR STRIP-MCNC: NEGATIVE MG/DL
HCO3, ARTERIAL CALC - HISTORICAL: 28.9 MMOL/L (ref 23–29)
HCT VFR BLD AUTO: 21.5 % (ref 35–47)
HCT VFR BLD AUTO: 30 % (ref 35–47)
HCT VFR BLD AUTO: 35.6 % (ref 35–47)
HGB BLD-MCNC: 10.3 G/DL (ref 12–16)
HGB BLD-MCNC: 10.8 G/DL (ref 12–16)
HGB BLD-MCNC: 11.6 G/DL (ref 12–16)
HGB BLD-MCNC: 11.8 G/DL (ref 12–16)
HGB BLD-MCNC: 6.3 G/DL (ref 12–16)
HGB BLD-MCNC: 8.8 G/DL (ref 12–16)
HGB UR QL STRIP: NEGATIVE
INTERPRETATION ECG - MUSE: NORMAL
INTERPRETATION ECG - MUSE: NORMAL
INTERVENTRICULAR SEPTUM IN END DIASTOLE: 1.2 CM (ref 0.6–0.9)
ION CA PH 7.4: 1.22 MMOL/L (ref 1.11–1.3)
ISSUE DATE AND TIME: NORMAL
IVS/PW RATIO: 1
KETONES UR STRIP-MCNC: NEGATIVE MG/DL
LA AREA 1: 22.6 CM2
LA AREA 2: 23.8 CM2
LACTATE SERPL-SCNC: 0.8 MMOL/L (ref 0.5–2.2)
LACTATE SERPL-SCNC: 0.9 MMOL/L (ref 0.5–2.2)
LACTATE SERPL-SCNC: 1.1 MMOL/L (ref 0.5–2.2)
LACTATE SERPL-SCNC: 1.1 MMOL/L (ref 0.5–2.2)
LACTATE SERPL-SCNC: 1.2 MMOL/L (ref 0.5–2.2)
LEFT ATRIUM LENGTH: 5.7 CM
LEFT ATRIUM SIZE: 4.4 CM
LEFT ATRIUM TO AORTIC ROOT RATIO: 1.38 NO UNITS
LEFT ATRIUM VOLUME INDEX: 75 ML/M2
LEFT ATRIUM VOLUME: 80.2 ML
LEFT VENTRICLE CARDIAC INDEX: 5.1 L/MIN/M2
LEFT VENTRICLE CARDIAC OUTPUT: 5.4 L/MIN
LEFT VENTRICLE DIASTOLIC VOLUME INDEX: 83.6 CM3/M2 (ref 29–61)
LEFT VENTRICLE DIASTOLIC VOLUME: 89.5 CM3 (ref 46–106)
LEFT VENTRICLE HEART RATE: 88 BPM
LEFT VENTRICLE MASS INDEX: 211.6 G/M2
LEFT VENTRICLE SYSTOLIC VOLUME INDEX: 46 CM3/M2 (ref 8–24)
LEFT VENTRICLE SYSTOLIC VOLUME: 49.2 CM3 (ref 14–42)
LEFT VENTRICULAR INTERNAL DIMENSION IN DIASTOLE: 4.9 CM (ref 3.8–5.2)
LEFT VENTRICULAR INTERNAL DIMENSION IN SYSTOLE: 3.7 CM (ref 2.2–3.5)
LEFT VENTRICULAR MASS: 226.4 G
LEFT VENTRICULAR OUTFLOW TRACT MEAN GRADIENT: 3 MMHG
LEFT VENTRICULAR OUTFLOW TRACT MEAN VELOCITY: 75.3 CM/S
LEFT VENTRICULAR OUTFLOW TRACT PEAK GRADIENT: 6 MMHG
LEFT VENTRICULAR POSTERIOR WALL IN END DIASTOLE: 1.2 CM (ref 0.6–0.9)
LEUKOCYTE ESTERASE UR QL STRIP: ABNORMAL
LV STROKE VOLUME INDEX: 57.8 ML/M2
LYMPHOCYTES # BLD AUTO: 0.2 THOU/UL (ref 0.8–4.4)
LYMPHOCYTES # BLD AUTO: 0.9 THOU/UL (ref 0.8–4.4)
LYMPHOCYTES NFR BLD AUTO: 11 % (ref 20–40)
LYMPHOCYTES NFR BLD AUTO: 4 % (ref 20–40)
MAGNESIUM SERPL-MCNC: 1.6 MG/DL (ref 1.8–2.6)
MAGNESIUM SERPL-MCNC: 1.6 MG/DL (ref 1.8–2.6)
MAGNESIUM SERPL-MCNC: 1.8 MG/DL (ref 1.8–2.6)
MAGNESIUM SERPL-MCNC: 3.2 MG/DL (ref 1.8–2.6)
MCH RBC QN AUTO: 25.7 PG (ref 27–34)
MCH RBC QN AUTO: 26.3 PG (ref 27–34)
MCH RBC QN AUTO: 26.3 PG (ref 27–34)
MCHC RBC AUTO-ENTMCNC: 28.9 G/DL (ref 32–36)
MCHC RBC AUTO-ENTMCNC: 29.3 G/DL (ref 32–36)
MCHC RBC AUTO-ENTMCNC: 29.3 G/DL (ref 32–36)
MCV RBC AUTO: 88 FL (ref 80–100)
MCV RBC AUTO: 90 FL (ref 80–100)
MCV RBC AUTO: 91 FL (ref 80–100)
MITRAL REGURGITANT VELOCITY TIME INTEGRAL: 182 CM
MITRAL VALVE DECELERATION SLOPE: 5830 MM/S2
MITRAL VALVE E/A RATIO: 1.2
MITRAL VALVE PRESSURE HALF-TIME: 48 MS
MONOCYTES # BLD AUTO: 0.2 THOU/UL (ref 0–0.9)
MONOCYTES # BLD AUTO: 0.5 THOU/UL (ref 0–0.9)
MONOCYTES NFR BLD AUTO: 5 % (ref 2–10)
MONOCYTES NFR BLD AUTO: 6 % (ref 2–10)
MR FLOW: 38 CM3
MR MEAN GRADIENT: 84 MMHG
MR MEAN VELOCITY: 432 CM/S
MR PEAK GRADIENT: 123.7 MMHG
MR PISA EROA: 0.2 CM2
MR PISA RADIUS: 0.7 CM
MR PISA VN NYQUIST: 38.5 CM/S
MV AVERAGE E/E' RATIO: 12.4 CM/S
MV DECELERATION TIME: 165 MS
MV E'TISSUE VEL-LAT: 10.7 CM/S
MV E'TISSUE VEL-MED: 4.9 CM/S
MV LATERAL E/E' RATIO: 9
MV MEDIAL E/E' RATIO: 19.7
MV PEAK A VELOCITY: 77.6 CM/S
MV PEAK E VELOCITY: 96.4 CM/S
MV REGURGITANT VOLUME: 38.8 CC
MV VALVE AREA PRESSURE 1/2 METHOD: 4.6 CM2
NEUTROPHILS # BLD AUTO: 3.9 THOU/UL (ref 2–7.7)
NEUTROPHILS # BLD AUTO: 6.6 THOU/UL (ref 2–7.7)
NEUTROPHILS NFR BLD AUTO: 82 % (ref 50–70)
NEUTROPHILS NFR BLD AUTO: 90 % (ref 50–70)
NITRATE UR QL: NEGATIVE
NUC REST DIASTOLIC VOLUME INDEX: 1120 LBS
NUC REST SYSTOLIC VOLUME INDEX: 51 IN
OXYHEMOGLOBIN - HISTORICAL: 96.5 % (ref 95–96)
P AXIS - MUSE: 56 DEGREES
P AXIS - MUSE: NORMAL
PCO2 BLD: 56 MM HG (ref 35–45)
PH BLD: 7.36 [PH] (ref 7.37–7.44)
PH UR STRIP: 5 [PH] (ref 4.5–8)
PH: 7.24 (ref 7.35–7.45)
PHOSPHATE SERPL-MCNC: 2.5 MG/DL (ref 2.5–4.5)
PHOSPHATE SERPL-MCNC: 4.7 MG/DL (ref 2.5–4.5)
PHOSPHATE SERPL-MCNC: 4.7 MG/DL (ref 2.5–4.5)
PISA MR PEAK VEL: 556 CM/S
PLATELET # BLD AUTO: 169 THOU/UL (ref 140–440)
PLATELET # BLD AUTO: 240 THOU/UL (ref 140–440)
PLATELET # BLD AUTO: 300 THOU/UL (ref 140–440)
PMV BLD AUTO: 10.1 FL (ref 8.5–12.5)
PMV BLD AUTO: 10.7 FL (ref 8.5–12.5)
PMV BLD AUTO: 11.2 FL (ref 8.5–12.5)
PO2 BLD: 121 MM HG (ref 75–85)
POTASSIUM BLD-SCNC: 2.4 MMOL/L (ref 3.5–5)
POTASSIUM BLD-SCNC: 4.1 MMOL/L (ref 3.5–5)
POTASSIUM BLD-SCNC: 4.2 MMOL/L (ref 3.5–5)
POTASSIUM BLD-SCNC: 4.2 MMOL/L (ref 3.5–5)
POTASSIUM BLD-SCNC: 4.6 MMOL/L (ref 3.5–5)
POTASSIUM BLD-SCNC: 5.3 MMOL/L (ref 3.5–5)
PR INTERVAL - MUSE: 124 MS
PR INTERVAL - MUSE: NORMAL
QRS DURATION - MUSE: 76 MS
QRS DURATION - MUSE: 86 MS
QT - MUSE: 288 MS
QT - MUSE: 314 MS
QTC - MUSE: 447 MS
QTC - MUSE: 471 MS
R AXIS - MUSE: 58 DEGREES
R AXIS - MUSE: 61 DEGREES
RBC # BLD AUTO: 2.4 MILL/UL (ref 3.8–5.4)
RBC # BLD AUTO: 3.43 MILL/UL (ref 3.8–5.4)
RBC # BLD AUTO: 3.92 MILL/UL (ref 3.8–5.4)
RBC #/AREA URNS AUTO: ABNORMAL HPF
SODIUM SERPL-SCNC: 141 MMOL/L (ref 136–145)
SODIUM SERPL-SCNC: 142 MMOL/L (ref 136–145)
SODIUM SERPL-SCNC: 143 MMOL/L (ref 136–145)
SODIUM SERPL-SCNC: 144 MMOL/L (ref 136–145)
SODIUM SERPL-SCNC: 146 MMOL/L (ref 136–145)
SODIUM SERPL-SCNC: 147 MMOL/L (ref 136–145)
SP GR UR STRIP: 1.01 (ref 1–1.03)
SQUAMOUS #/AREA URNS AUTO: ABNORMAL LPF
STATUS (HISTORICAL CONVERSION): NORMAL
SYSTOLIC BLOOD PRESSURE - MUSE: NORMAL
SYSTOLIC BLOOD PRESSURE - MUSE: NORMAL
T AXIS - MUSE: -41 DEGREES
T AXIS - MUSE: -66 DEGREES
TEMPERATURE: 37 DEGREES C
TRANS CELLS #/AREA URNS HPF: ABNORMAL LPF
TRICUSPID VALVE ANULAR PLANE SYSTOLIC EXCURSION: 1.5 CM
TROPONIN I SERPL-MCNC: 0.1 NG/ML (ref 0–0.29)
TROPONIN I SERPL-MCNC: 0.11 NG/ML (ref 0–0.29)
TROPONIN I SERPL-MCNC: 0.12 NG/ML (ref 0–0.29)
TROPONIN I SERPL-MCNC: 0.13 NG/ML (ref 0–0.29)
TROPONIN I SERPL-MCNC: 0.16 NG/ML (ref 0–0.29)
TROPONIN I SERPL-MCNC: 0.17 NG/ML (ref 0–0.29)
TROPONIN I SERPL-MCNC: 0.18 NG/ML (ref 0–0.29)
UNIT ABO/RH (HISTORICAL CONVERSION): NORMAL
UNIT NUMBER: NORMAL
UROBILINOGEN UR STRIP-ACNC: ABNORMAL
VENTILATION MODE: ABNORMAL
VENTRICULAR RATE- MUSE: 122 BPM
VENTRICULAR RATE- MUSE: 161 BPM
WBC #/AREA URNS AUTO: ABNORMAL HPF
WBC CLUMPS #/AREA URNS HPF: PRESENT /[HPF]
WBC: 4.3 THOU/UL (ref 4–11)
WBC: 4.5 THOU/UL (ref 4–11)
WBC: 6.2 THOU/UL (ref 4–11)
WBC: 8.1 THOU/UL (ref 4–11)
YEAST #/AREA URNS HPF: ABNORMAL HPF

## 2020-01-01 ASSESSMENT — MIFFLIN-ST. JEOR
SCORE: 536.13
SCORE: 526.15

## 2020-02-11 LAB
ATRIAL RATE - MUSE: NORMAL
BACTERIA SPEC CULT: NORMAL
DIASTOLIC BLOOD PRESSURE - MUSE: NORMAL
INTERPRETATION ECG - MUSE: NORMAL
P AXIS - MUSE: NORMAL
PR INTERVAL - MUSE: NORMAL
QRS DURATION - MUSE: 76 MS
QRS DURATION - MUSE: 78 MS
QRS DURATION - MUSE: 78 MS
QRS DURATION - MUSE: 82 MS
QT - MUSE: 216 MS
QT - MUSE: 226 MS
QT - MUSE: 316 MS
QT - MUSE: 608 MS
QTC - MUSE: 390 MS
QTC - MUSE: 405 MS
QTC - MUSE: 413 MS
QTC - MUSE: 414 MS
R AXIS - MUSE: 64 DEGREES
R AXIS - MUSE: 68 DEGREES
R AXIS - MUSE: 71 DEGREES
R AXIS - MUSE: 72 DEGREES
SYSTOLIC BLOOD PRESSURE - MUSE: NORMAL
T AXIS - MUSE: -11 DEGREES
T AXIS - MUSE: -85 DEGREES
T AXIS - MUSE: 270 DEGREES
T AXIS - MUSE: 51 DEGREES
VENTRICULAR RATE- MUSE: 103 BPM
VENTRICULAR RATE- MUSE: 193 BPM
VENTRICULAR RATE- MUSE: 196 BPM
VENTRICULAR RATE- MUSE: 28 BPM

## 2020-02-12 ENCOUNTER — AMBULATORY - HEALTHEAST (OUTPATIENT)
Dept: GERIATRICS | Facility: CLINIC | Age: 85
End: 2020-02-12

## 2020-02-13 LAB
AEROBIC BLOOD CULTURE BOTTLE: NO GROWTH
ANAEROBIC BLOOD CULTURE BOTTLE: NO GROWTH

## 2020-05-06 ENCOUNTER — PATIENT OUTREACH (OUTPATIENT)
Dept: FAMILY MEDICINE | Facility: CLINIC | Age: 85
End: 2020-05-06

## 2020-05-06 NOTE — TELEPHONE ENCOUNTER
Chart reviewed. Per [facility] admission note:  Nedra Carr ()  - 87 y.o. Female; born [birth year],  Feb. 10, 2020February 10, 2020 Encounter Summary, generated on Feb. 10, 2020February 10, 2020  Notification of a  Patient form emailed to appropriate department.   Blayne Lopez RN - Patient Advocate and Liaison (PAL) [phone] - Care Team of Bert Solo PA-C

## 2020-05-14 ENCOUNTER — NURSE TRIAGE (OUTPATIENT)
Dept: FAMILY MEDICINE | Facility: CLINIC | Age: 85
End: 2020-05-14

## 2021-06-02 NOTE — PROGRESS NOTES
[facility]    Facility:   [facility] [[id number]]   Code Status: DNR      CHIEF COMPLAINT/REASON FOR VISIT:  Chief Complaint   Patient presents with     Problem Visit     F/U swallowing        HISTORY:      HPI: Nedra is a 86 y.o. female currently undergoing physical occupational therapy at [facility] TCU. She is with a past medical history for Afib, HTN UZMA, osteoporosis, polymyalgia rheumatica and perforated gastric ulcer.  She was recently at an elective EGD for reflux in which she was directly admitted to the hospital for weakness and malnutrition.  She had a previous hospitalization for pna and on this hospitalization she was found to be hypoxic and worsening pneumonia for which she was treated with antibiotics.  She also has left ureterovesical stone with UTI.  CT was negative for hydronephrosis.  She was found to have anemia and thrombocytosis.  She was started on carafate and pepcid for GERD and she completed the course of antibiotics for UTI and PNA and discharged to TCU.     Today she is seen for f/U on coughing with food.  Today she tells me she has had no further episodes of coughing with food since the last episode but she does have a cough at night.  Her recent chest X-ray was unchaged from the one prior.  She deneid having any CP but is short of breath with activity currently she is satting at 91% on 2 L nasal cannula.  Last hemoglobin was 8.1 on 10/28 which is slightly down from previous one at 8.3 and her platelets are slightly lower at 511 from 515 .SHe she is currently on a puréed diet with nectar thickened liquids. Her weight is down 1 pound and dietician is aware.   She is not on anticoagulation due to PUD.     Past Medical History:   Diagnosis Date     A-fib (H)      Dysphagia 2000    [place] [facility] [place] Dr. Hernández in 2001     HTN (hypertension)      Migraine     beta blocker prophylaxis     OA  (osteoarthritis)      UZMA (obstructive sleep apnea) 04/09/2015     Pain in joint, shoulder region      Polymyalgia rheumatica (H)      Renal stone              Family History   Problem Relation Age of Onset     Diabetes Mother      Diabetes Sister      Heart disease Son      Social History     Socioeconomic History     Marital status:      Spouse name: Not on file     Number of children: Not on file     Years of education: Not on file     Highest education level: Not on file   Occupational History     Not on file   Social Needs     Financial resource strain: Not on file     Food insecurity:     Worry: Not on file     Inability: Not on file     Transportation needs:     Medical: Not on file     Non-medical: Not on file   Tobacco Use     Smoking status: Never Smoker     Smokeless tobacco: Never Used   Substance and Sexual Activity     Alcohol use: Not Currently     Drug use: Not on file     Sexual activity: Not on file   Lifestyle     Physical activity:     Days per week: Not on file     Minutes per session: Not on file     Stress: Not on file   Relationships     Social connections:     Talks on phone: Not on file     Gets together: Not on file     Attends [employer] service: Not on file     Active member of club or organization: Not on file     Attends meetings of clubs or organizations: Not on file     Relationship status: Not on file     Intimate partner violence:     Fear of current or ex partner: Not on file     Emotionally abused: Not on file     Physically abused: Not on file     Forced sexual activity: Not on file   Other Topics Concern     Not on file   Social History Narrative     Not on file         Review of Systems   Constitutional: Positive for activity change and fatigue. Negative for appetite change and fever.   HENT: Negative for congestion.    Respiratory: Positive for shortness of breath. Negative for cough and wheezing.    Cardiovascular: Negative for chest pain and leg swelling.    Gastrointestinal: Negative for abdominal distention, abdominal pain, constipation, diarrhea and nausea.   Genitourinary: Negative for dysuria.   Musculoskeletal: Negative for arthralgias and back pain.   Skin: Negative for color change and wound.   Neurological: Negative for dizziness.   Psychiatric/Behavioral: Positive for sleep disturbance. Negative for agitation, behavioral problems and confusion.       Vitals:    10/31/19 0839   BP: 112/72   Pulse: 100   Resp: 20   Temp: 98.7  F (37.1  C)   SpO2: 91%   Weight: (!) 64 lb 6.4 oz (29.2 kg)       Physical Exam  Constitutional:       Appearance: She is well-developed.      Comments: Pleasant frail woman in no acute distress    HENT:      Head: Normocephalic.   Eyes:      Conjunctiva/sclera: Conjunctivae normal.   Neck:      Musculoskeletal: Normal range of motion.   Cardiovascular:      Rate and Rhythm: Regular rhythm.      Heart sounds: Normal heart sounds. No murmur.      Comments: Pulse 80, Rhythm regular.   Pulmonary:      Effort: No respiratory distress.      Breath sounds: Rales present. No wheezing.      Comments: Right lower lobe.   Abdominal:      General: Bowel sounds are normal. There is no distension.      Palpations: Abdomen is soft.      Tenderness: There is no tenderness.   Musculoskeletal: Normal range of motion.   Skin:     General: Skin is warm.   Neurological:      Mental Status: She is alert and oriented to person, place, and time.   Psychiatric:         Behavior: Behavior normal.           LABS:   Recent Results (from the past 240 hour(s))   HM2(CBC w/o Differential)   Result Value Ref Range    WBC 8.5 4.0 - 11.0 thou/uL    RBC 2.70 (L) 3.80 - 5.40 mill/uL    Hemoglobin 7.6 (L) 12.0 - 16.0 g/dL    Hematocrit 25.8 (L) 35.0 - 47.0 %    MCV 96 80 - 100 fL    MCH 28.1 27.0 - 34.0 pg    MCHC 29.5 (L) 32.0 - 36.0 g/dL    RDW 14.0 11.0 - 14.5 %    Platelets 469 (H) 140 - 440 thou/uL    MPV 10.3 8.5 - 12.5 fL   HM2(CBC w/o Differential)   Result Value  Ref Range    WBC 6.3 4.0 - 11.0 thou/uL    RBC 2.91 (L) 3.80 - 5.40 mill/uL    Hemoglobin 8.1 (L) 12.0 - 16.0 g/dL    Hematocrit 28.0 (L) 35.0 - 47.0 %    MCV 96 80 - 100 fL    MCH 27.8 27.0 - 34.0 pg    MCHC 28.9 (L) 32.0 - 36.0 g/dL    RDW 13.8 11.0 - 14.5 %    Platelets 511 (H) 140 - 440 thou/uL    MPV 10.3 8.5 - 12.5 fL       ASSESSMENT:      ICD-10-CM    1. Coughing R05        PLAN:      Cough  Speech  hfollowing . Chest x-ray unchanged from prior one. Add Tessalon 100 mg three times a day PRN     Insomnia - melatonin 1 mg p.o. at bedtime    Kidney stone- follow up with Urology on 11/26/19    Dysphagia and followed by speech therapy Currently on a puréed diet with nectar thickened liquids    Severe malnutrition followed by dietary and placed on nutritional supplements twice daily    Anemia currently on iron supplements last hemoglobin 8.1 labs to be monitored    Thrombocytosis last platelet count 511 labs being monitored    Atrial fibrillation currently not on anticoagulation due to PUD, patient recently  started on metoprolol tartrate 12.5 mg two times a day.     Electronically signed by: June Marshall CNP

## 2021-06-02 NOTE — PROGRESS NOTES
[facility]    Facility:   [facility] [[id number]]   Code Status: DNR      CHIEF COMPLAINT/REASON FOR VISIT:  Chief Complaint   Patient presents with     Problem Visit       HISTORY:      HPI: Nedra is a 86 y.o. female currently undergoing physical occupational therapy at [facility]. She is with a past medical history for Afib, HTN UZMA, osteoporosis, polymyalgia rheumatica and perforated gastric ulcer.  She was recently at an elective EGD for reflux in which she was directly admitted to the hospital for weakness and malnutrition.  She had a previous hospitalization for pna and on this hospitalization she was found to be hypoxic and worsening pneumonia for which she was treated with antibiotics.  She also has left ureterovesical stone with UTI.  CT was negative for hydronephrosis.  She was found to have anemia and thrombocytosis.  She was started on carafate and pepcid for GERD and she completed the course of antibiotics for UTI and PNA and discharged to TCU.     Today she is seen in her room as a first routine visit to review multiple medical issues.  Her main concern today is she has not been able to sleep and reports insomnia.  She was started on 1 mg melatonin at bedtime.  She denies chest pain or shortness of breath.  She was found to be very frail 65 pound woman.  She did deny chest pain or increased shortness of breath from baseline.  Last hemoglobin was 8.3 and her platelets were elevated at 515.  Labs pending for recheck.    Past Medical History:   Diagnosis Date     A-fib (H)      Dysphagia 2000    Had duodenal strcture dilated at [facility] [place] in 2000 and by Dr. Hernández in 2001     HTN (hypertension)      Migraine     beta blocker prophylaxis     OA (osteoarthritis)      UZMA (obstructive sleep apnea) 04/09/2015     Pain in joint, shoulder region      Polymyalgia rheumatica (H)      Renal stone              Family History   Problem Relation Age of  Onset     Diabetes Mother      Diabetes Sister      Heart disease Son      Social History     Socioeconomic History     Marital status:      Spouse name: Not on file     Number of children: Not on file     Years of education: Not on file     Highest education level: Not on file   Occupational History     Not on file   Social Needs     Financial resource strain: Not on file     Food insecurity:     Worry: Not on file     Inability: Not on file     Transportation needs:     Medical: Not on file     Non-medical: Not on file   Tobacco Use     Smoking status: Never Smoker     Smokeless tobacco: Never Used   Substance and Sexual Activity     Alcohol use: Not Currently     Drug use: Not on file     Sexual activity: Not on file   Lifestyle     Physical activity:     Days per week: Not on file     Minutes per session: Not on file     Stress: Not on file   Relationships     Social connections:     Talks on phone: Not on file     Gets together: Not on file     Attends Jainism service: Not on file     Active member of club or organization: Not on file     Attends meetings of clubs or organizations: Not on file     Relationship status: Not on file     Intimate partner violence:     Fear of current or ex partner: Not on file     Emotionally abused: Not on file     Physically abused: Not on file     Forced sexual activity: Not on file   Other Topics Concern     Not on file   Social History Narrative     Not on file         Review of Systems   Constitutional: Positive for activity change and fatigue. Negative for appetite change and fever.   HENT: Negative for congestion.    Respiratory: Positive for shortness of breath. Negative for cough and wheezing.    Cardiovascular: Negative for chest pain and leg swelling.   Gastrointestinal: Negative for abdominal distention, abdominal pain, constipation, diarrhea and nausea.   Genitourinary: Negative for dysuria.   Musculoskeletal: Negative for arthralgias and back pain.   Skin:  Negative for color change and wound.   Neurological: Negative for dizziness.   Psychiatric/Behavioral: Positive for sleep disturbance. Negative for agitation, behavioral problems and confusion.       Vitals:    10/21/19 1218   BP: 121/74   Pulse: 98   Resp: 18   Temp: 97.2  F (36.2  C)   SpO2: 95%   Weight: (!) 65 lb 6.4 oz (29.7 kg)       Physical Exam  Constitutional:       Appearance: She is well-developed.      Comments: Pleasant frail woman in no acute distress seen today sitting up in a recliner.   HENT:      Head: Normocephalic.   Eyes:      Conjunctiva/sclera: Conjunctivae normal.   Neck:      Musculoskeletal: Normal range of motion.   Cardiovascular:      Rate and Rhythm: Normal rate and regular rhythm.      Heart sounds: Normal heart sounds. No murmur.   Pulmonary:      Effort: No respiratory distress.      Breath sounds: Rales present. No wheezing.      Comments: Right lower lobe.   Abdominal:      General: Bowel sounds are normal. There is no distension.      Palpations: Abdomen is soft.      Tenderness: There is no tenderness.   Musculoskeletal: Normal range of motion.   Skin:     General: Skin is warm.   Neurological:      Mental Status: She is alert and oriented to person, place, and time.   Psychiatric:         Behavior: Behavior normal.           LABS:   Recent Results (from the past 240 hour(s))   HM2(CBC w/o Differential)   Result Value Ref Range    WBC 8.0 4.0 - 11.0 thou/uL    RBC 2.95 (L) 3.80 - 5.40 mill/uL    Hemoglobin 8.3 (L) 12.0 - 16.0 g/dL    Hematocrit 28.4 (L) 35.0 - 47.0 %    MCV 96 80 - 100 fL    MCH 28.1 27.0 - 34.0 pg    MCHC 29.2 (L) 32.0 - 36.0 g/dL    RDW 13.9 11.0 - 14.5 %    Platelets 515 (H) 140 - 440 thou/uL    MPV 10.2 8.5 - 12.5 fL   Basic Metabolic Panel   Result Value Ref Range    Sodium 137 136 - 145 mmol/L    Potassium 4.6 3.5 - 5.0 mmol/L    Chloride 98 98 - 107 mmol/L    CO2 31 22 - 31 mmol/L    Anion Gap, Calculation 8 5 - 18 mmol/L    Glucose 99 70 - 125 mg/dL     Calcium 9.9 8.5 - 10.5 mg/dL    BUN 12 8 - 28 mg/dL    Creatinine 0.59 (L) 0.60 - 1.10 mg/dL    GFR MDRD Af Amer >60 >60 mL/min/1.73m2    GFR MDRD Non Af Amer >60 >60 mL/min/1.73m2       ASSESSMENT:      ICD-10-CM    1. Insomnia, unspecified type G47.00    2. Dysphagia, unspecified type R13.10    3. Atrial fibrillation, unspecified type (H) I48.91    4. Kidney stone N20.0        PLAN:    Insomnia -start melatonin 1 mg p.o. at bedtime    Kidney stone- follow up with Urology on 11/26/19    Dysphagia and followed by speech therapy    Severe malnutrition followed by dietary and placed on nutritional supplements twice daily    Anemia currently on iron supplements last hemoglobin 8.3 labs to be monitored    Thrombocytosis last platelet count 515 labs being monitored    Atrial fibrillation currently not on anticoagulation due to PUD, patient also not on a beta-blocker heart rate is controlled pulse 98, monitor closely    Electronically signed by: June Marshall CNP

## 2021-06-02 NOTE — PROGRESS NOTES
[facility]    Facility:   [facility] [[id number]]   Code Status: DNR      CHIEF COMPLAINT/REASON FOR VISIT:  Chief Complaint   Patient presents with     Problem Visit     possible aspiration        HISTORY:      HPI: Nedra is a 86 y.o. female currently undergoing physical occupational therapy at [facility]. She is with a past medical history for Afib, HTN UZMA, osteoporosis, polymyalgia rheumatica and perforated gastric ulcer.  She was recently at an elective EGD for reflux in which she was directly admitted to the hospital for weakness and malnutrition.  She had a previous hospitalization for pna and on this hospitalization she was found to be hypoxic and worsening pneumonia for which she was treated with antibiotics.  She also has left ureterovesical stone with UTI.  CT was negative for hydronephrosis.  She was found to have anemia and thrombocytosis.  She was started on carafate and pepcid for GERD and she completed the course of antibiotics for UTI and PNA and discharged to TCU.     Today she is seen for reports of coughing with dinner last night.  Per the patient she developed coughing with her dinner last night.  Per the staff it took her approximately 40 minutes to clear.  I did have her evaluated by speech this morning who thinks she may have aspirated.  Today she tells me she is feeling much better she was noted to have Rales right lower extremities.  However she is currently in a transitional care unit with recent pneumonia.  She deneid having any CP but is short of breath with activity currently she is satting at 91% on 2 L nasal cannula.  Last hemoglobin was 8.1 on 10/28 which is slightly down from previous one at 8.3 and her platelets are slightly lower at 511 from 515 .SHe she is currently on a puréed diet with nectar thickened liquids and she tells me she thinks that it might be related to her casserole being a little thicker than  normal.  She is not on anticoagulation due to PUD.  Metoprolol was recently started and  her pulse was  80    Past Medical History:   Diagnosis Date     A-fib (H)      Dysphagia 2000    Had duodenal strcture dilated at [facility] [place] in [place] and by Dr. Hernández in 2001     HTN (hypertension)      Migraine     beta blocker prophylaxis     OA (osteoarthritis)      UZMA (obstructive sleep apnea) 04/09/2015     Pain in joint, shoulder region      Polymyalgia rheumatica (H)      Renal stone              Family History   Problem Relation Age of Onset     Diabetes Mother      Diabetes Sister      Heart disease Son      Social History     Socioeconomic History     Marital status:      Spouse name: Not on file     Number of children: Not on file     Years of education: Not on file     Highest education level: Not on file   Occupational History     Not on file   Social Needs     Financial resource strain: Not on file     Food insecurity:     Worry: Not on file     Inability: Not on file     Transportation needs:     Medical: Not on file     Non-medical: Not on file   Tobacco Use     Smoking status: Never Smoker     Smokeless tobacco: Never Used   Substance and Sexual Activity     Alcohol use: Not Currently     Drug use: Not on file     Sexual activity: Not on file   Lifestyle     Physical activity:     Days per week: Not on file     Minutes per session: Not on file     Stress: Not on file   Relationships     Social connections:     Talks on phone: Not on file     Gets together: Not on file     Attends Adventist service: Not on file     Active member of club or organization: Not on file     Attends meetings of clubs or organizations: Not on file     Relationship status: Not on file     Intimate partner violence:     Fear of current or ex partner: Not on file     Emotionally abused: Not on file     Physically abused: Not on file     Forced sexual activity: Not on file   Other Topics Concern     Not on file   Social History Narrative      Not on file         Review of Systems   Constitutional: Positive for activity change and fatigue. Negative for appetite change and fever.   HENT: Negative for congestion.    Respiratory: Positive for shortness of breath. Negative for cough and wheezing.    Cardiovascular: Negative for chest pain and leg swelling.   Gastrointestinal: Negative for abdominal distention, abdominal pain, constipation, diarrhea and nausea.   Genitourinary: Negative for dysuria.   Musculoskeletal: Negative for arthralgias and back pain.   Skin: Negative for color change and wound.   Neurological: Negative for dizziness.   Psychiatric/Behavioral: Positive for sleep disturbance. Negative for agitation, behavioral problems and confusion.       Vitals:    10/28/19 1220   BP: 108/64   Pulse: 80   Resp: 18   Temp: 97.9  F (36.6  C)   SpO2: 91%   Weight: (!) 65 lb 6.4 oz (29.7 kg)       Physical Exam  Constitutional:       Appearance: She is well-developed.      Comments: Pleasant frail woman in no acute distress    HENT:      Head: Normocephalic.   Eyes:      Conjunctiva/sclera: Conjunctivae normal.   Neck:      Musculoskeletal: Normal range of motion.   Cardiovascular:      Rate and Rhythm: Regular rhythm.      Heart sounds: Normal heart sounds. No murmur.      Comments: Pulse 80, Rhythm regular.   Pulmonary:      Effort: No respiratory distress.      Breath sounds: Rales present. No wheezing.      Comments: Right lower lobe.   Abdominal:      General: Bowel sounds are normal. There is no distension.      Palpations: Abdomen is soft.      Tenderness: There is no tenderness.   Musculoskeletal: Normal range of motion.   Skin:     General: Skin is warm.   Neurological:      Mental Status: She is alert and oriented to person, place, and time.   Psychiatric:         Behavior: Behavior normal.           LABS:   Recent Results (from the past 240 hour(s))   HM2(CBC w/o Differential)   Result Value Ref Range    WBC 8.0 4.0 - 11.0 thou/uL    RBC 2.95  (L) 3.80 - 5.40 mill/uL    Hemoglobin 8.3 (L) 12.0 - 16.0 g/dL    Hematocrit 28.4 (L) 35.0 - 47.0 %    MCV 96 80 - 100 fL    MCH 28.1 27.0 - 34.0 pg    MCHC 29.2 (L) 32.0 - 36.0 g/dL    RDW 13.9 11.0 - 14.5 %    Platelets 515 (H) 140 - 440 thou/uL    MPV 10.2 8.5 - 12.5 fL   Basic Metabolic Panel   Result Value Ref Range    Sodium 137 136 - 145 mmol/L    Potassium 4.6 3.5 - 5.0 mmol/L    Chloride 98 98 - 107 mmol/L    CO2 31 22 - 31 mmol/L    Anion Gap, Calculation 8 5 - 18 mmol/L    Glucose 99 70 - 125 mg/dL    Calcium 9.9 8.5 - 10.5 mg/dL    BUN 12 8 - 28 mg/dL    Creatinine 0.59 (L) 0.60 - 1.10 mg/dL    GFR MDRD Af Amer >60 >60 mL/min/1.73m2    GFR MDRD Non Af Amer >60 >60 mL/min/1.73m2   HM2(CBC w/o Differential)   Result Value Ref Range    WBC 8.5 4.0 - 11.0 thou/uL    RBC 2.70 (L) 3.80 - 5.40 mill/uL    Hemoglobin 7.6 (L) 12.0 - 16.0 g/dL    Hematocrit 25.8 (L) 35.0 - 47.0 %    MCV 96 80 - 100 fL    MCH 28.1 27.0 - 34.0 pg    MCHC 29.5 (L) 32.0 - 36.0 g/dL    RDW 14.0 11.0 - 14.5 %    Platelets 469 (H) 140 - 440 thou/uL    MPV 10.3 8.5 - 12.5 fL       ASSESSMENT:      ICD-10-CM    1. Pneumonia due to infectious organism, unspecified laterality, unspecified part of lung J18.9    2. Coughing R05        PLAN:      choking episode Speech consult. Chest x-ray Ap/Lateral and check CBC.     Insomnia - melatonin 1 mg p.o. at bedtime    Kidney stone- follow up with Urology on 11/26/19    Dysphagia and followed by speech therapy Currently on a puréed diet with nectar thickened liquids    Severe malnutrition followed by dietary and placed on nutritional supplements twice daily    Anemia currently on iron supplements last hemoglobin 8.1 labs to be monitored    Thrombocytosis last platelet count 511 labs being monitored    Atrial fibrillation currently not on anticoagulation due to PUD, patient recently  started on metoprolol tartrate 12.5 mg two times a day for pulse reg rate and rhythm     Electronically signed by: June  Rolando, CNP

## 2021-06-02 NOTE — PROGRESS NOTES
[facility]    Facility:   [facility] [[id number]]   Code Status: DNR      CHIEF COMPLAINT/REASON FOR VISIT:  Chief Complaint   Patient presents with     Problem Visit     tachycardia        HISTORY:      HPI: Nedra is a 86 y.o. female currently undergoing physical occupational therapy at [facility]. She is with a past medical history for Afib, HTN UZMA, osteoporosis, polymyalgia rheumatica and perforated gastric ulcer.  She was recently at an elective EGD for reflux in which she was directly admitted to the hospital for weakness and malnutrition.  She had a previous hospitalization for pna and on this hospitalization she was found to be hypoxic and worsening pneumonia for which she was treated with antibiotics.  She also has left ureterovesical stone with UTI.  CT was negative for hydronephrosis.  She was found to have anemia and thrombocytosis.  She was started on carafate and pepcid for GERD and she completed the course of antibiotics for UTI and PNA and discharged to TCU.     Today she is seen or tachycardia . Her heart rate was between 116-120 apical. Her Rhythm was regular. She did a few other isolated episodes of tachycardia during her stay.  She deneid having any CP but did feel shortness of breath. Sats 88% on RA and she was placed on oxygen to keep sats > 90%  Last hemoglobin was 8.3 and her platelets were elevated at 515.  Labs pending for recheck.SHe appeared comfortable in the chair and was eating her lunch. She is not on anticoagulation due to PUD.     Past Medical History:   Diagnosis Date     A-fib (H)      Dysphagia 2000    [place] [facility] [place] Dr. Hernández in 2001     HTN (hypertension)      Migraine     beta blocker prophylaxis     OA (osteoarthritis)      UZMA (obstructive sleep apnea) 04/09/2015     Pain in joint, shoulder region      Polymyalgia rheumatica (H)      Renal stone              Family  History   Problem Relation Age of Onset     Diabetes Mother      Diabetes Sister      Heart disease Son      Social History     Socioeconomic History     Marital status:      Spouse name: Not on file     Number of children: Not on file     Years of education: Not on file     Highest education level: Not on file   Occupational History     Not on file   Social Needs     Financial resource strain: Not on file     Food insecurity:     Worry: Not on file     Inability: Not on file     Transportation needs:     Medical: Not on file     Non-medical: Not on file   Tobacco Use     Smoking status: Never Smoker     Smokeless tobacco: Never Used   Substance and Sexual Activity     Alcohol use: Not Currently     Drug use: Not on file     Sexual activity: Not on file   Lifestyle     Physical activity:     Days per week: Not on file     Minutes per session: Not on file     Stress: Not on file   Relationships     Social connections:     Talks on phone: Not on file     Gets together: Not on file     Attends Religion service: Not on file     Active member of club or organization: Not on file     Attends meetings of clubs or organizations: Not on file     Relationship status: Not on file     Intimate partner violence:     Fear of current or ex partner: Not on file     Emotionally abused: Not on file     Physically abused: Not on file     Forced sexual activity: Not on file   Other Topics Concern     Not on file   Social History Narrative     Not on file         Review of Systems   Constitutional: Positive for activity change and fatigue. Negative for appetite change and fever.   HENT: Negative for congestion.    Respiratory: Positive for shortness of breath. Negative for cough and wheezing.    Cardiovascular: Negative for chest pain and leg swelling.   Gastrointestinal: Negative for abdominal distention, abdominal pain, constipation, diarrhea and nausea.   Genitourinary: Negative for dysuria.   Musculoskeletal: Negative for  arthralgias and back pain.   Skin: Negative for color change and wound.   Neurological: Negative for dizziness.   Psychiatric/Behavioral: Positive for sleep disturbance. Negative for agitation, behavioral problems and confusion.       Vitals:    10/22/19 1202   BP: 124/70   Pulse: 98   Resp: 20   Temp: 99.3  F (37.4  C)   SpO2: 90%   Weight: (!) 65 lb 6.4 oz (29.7 kg)       Physical Exam  Constitutional:       Appearance: She is well-developed.      Comments: Pleasant frail woman in no acute distress seen today sitting up in a recliner.   HENT:      Head: Normocephalic.   Eyes:      Conjunctiva/sclera: Conjunctivae normal.   Neck:      Musculoskeletal: Normal range of motion.   Cardiovascular:      Rate and Rhythm: Regular rhythm.      Heart sounds: Normal heart sounds. No murmur.      Comments: Tachycardia 116-120 apical. Rhythm regular.   Pulmonary:      Effort: No respiratory distress.      Breath sounds: Rales present. No wheezing.      Comments: Right lower lobe.   Abdominal:      General: Bowel sounds are normal. There is no distension.      Palpations: Abdomen is soft.      Tenderness: There is no tenderness.   Musculoskeletal: Normal range of motion.   Skin:     General: Skin is warm.   Neurological:      Mental Status: She is alert and oriented to person, place, and time.   Psychiatric:         Behavior: Behavior normal.           LABS:   Recent Results (from the past 240 hour(s))   HM2(CBC w/o Differential)   Result Value Ref Range    WBC 8.0 4.0 - 11.0 thou/uL    RBC 2.95 (L) 3.80 - 5.40 mill/uL    Hemoglobin 8.3 (L) 12.0 - 16.0 g/dL    Hematocrit 28.4 (L) 35.0 - 47.0 %    MCV 96 80 - 100 fL    MCH 28.1 27.0 - 34.0 pg    MCHC 29.2 (L) 32.0 - 36.0 g/dL    RDW 13.9 11.0 - 14.5 %    Platelets 515 (H) 140 - 440 thou/uL    MPV 10.2 8.5 - 12.5 fL   Basic Metabolic Panel   Result Value Ref Range    Sodium 137 136 - 145 mmol/L    Potassium 4.6 3.5 - 5.0 mmol/L    Chloride 98 98 - 107 mmol/L    CO2 31 22 - 31  mmol/L    Anion Gap, Calculation 8 5 - 18 mmol/L    Glucose 99 70 - 125 mg/dL    Calcium 9.9 8.5 - 10.5 mg/dL    BUN 12 8 - 28 mg/dL    Creatinine 0.59 (L) 0.60 - 1.10 mg/dL    GFR MDRD Af Amer >60 >60 mL/min/1.73m2    GFR MDRD Non Af Amer >60 >60 mL/min/1.73m2       ASSESSMENT:      ICD-10-CM    1. Tachycardia R00.0        PLAN:    Insomnia - melatonin 1 mg p.o. at bedtime    Kidney stone- follow up with Urology on 11/26/19    Dysphagia and followed by speech therapy    Severe malnutrition followed by dietary and placed on nutritional supplements twice daily    Anemia currently on iron supplements last hemoglobin 8.3 labs to be monitored    Thrombocytosis last platelet count 515 labs being monitored    Atrial fibrillation currently not on anticoagulation due to PUD, patient also not on a beta-blocker heart rate is controlled pulse 98, monitor closely Pt started on metoprolol tartrate 12.5 mg two times a day for pulse 116-120 and a few other isolated  readings.     Electronically signed by: June Marshall CNP

## 2021-06-03 VITALS
SYSTOLIC BLOOD PRESSURE: 157 MMHG | BODY MASS INDEX: 16.97 KG/M2 | RESPIRATION RATE: 20 BRPM | TEMPERATURE: 97.9 F | WEIGHT: 67.8 LBS | DIASTOLIC BLOOD PRESSURE: 64 MMHG | HEART RATE: 79 BPM

## 2021-06-03 VITALS
TEMPERATURE: 97.9 F | OXYGEN SATURATION: 91 % | RESPIRATION RATE: 18 BRPM | DIASTOLIC BLOOD PRESSURE: 64 MMHG | WEIGHT: 65.4 LBS | SYSTOLIC BLOOD PRESSURE: 108 MMHG | BODY MASS INDEX: 16.37 KG/M2 | HEART RATE: 80 BPM

## 2021-06-03 VITALS
SYSTOLIC BLOOD PRESSURE: 121 MMHG | RESPIRATION RATE: 18 BRPM | WEIGHT: 65.4 LBS | OXYGEN SATURATION: 95 % | HEART RATE: 98 BPM | BODY MASS INDEX: 16.37 KG/M2 | TEMPERATURE: 97.2 F | DIASTOLIC BLOOD PRESSURE: 74 MMHG

## 2021-06-03 VITALS
TEMPERATURE: 97.1 F | SYSTOLIC BLOOD PRESSURE: 156 MMHG | WEIGHT: 67.8 LBS | RESPIRATION RATE: 18 BRPM | OXYGEN SATURATION: 96 % | DIASTOLIC BLOOD PRESSURE: 73 MMHG | BODY MASS INDEX: 16.97 KG/M2 | HEART RATE: 88 BPM

## 2021-06-03 VITALS
WEIGHT: 67.6 LBS | RESPIRATION RATE: 18 BRPM | TEMPERATURE: 98.3 F | SYSTOLIC BLOOD PRESSURE: 122 MMHG | BODY MASS INDEX: 16.92 KG/M2 | DIASTOLIC BLOOD PRESSURE: 63 MMHG | OXYGEN SATURATION: 90 % | HEART RATE: 78 BPM

## 2021-06-03 VITALS
WEIGHT: 64.4 LBS | SYSTOLIC BLOOD PRESSURE: 112 MMHG | OXYGEN SATURATION: 91 % | HEART RATE: 100 BPM | RESPIRATION RATE: 20 BRPM | TEMPERATURE: 98.7 F | BODY MASS INDEX: 16.12 KG/M2 | DIASTOLIC BLOOD PRESSURE: 72 MMHG

## 2021-06-03 VITALS
HEART RATE: 98 BPM | OXYGEN SATURATION: 90 % | RESPIRATION RATE: 20 BRPM | BODY MASS INDEX: 16.37 KG/M2 | DIASTOLIC BLOOD PRESSURE: 70 MMHG | WEIGHT: 65.4 LBS | SYSTOLIC BLOOD PRESSURE: 124 MMHG | TEMPERATURE: 99.3 F

## 2021-06-03 NOTE — PROGRESS NOTES
[facility]      Facility:    [facility] [[id number]]  Code Status: DNR       Chief Complaint/Reason for Visit:  Chief Complaint   Patient presents with     H & P     Admit note to LTC, transfer from TCU.       HPI:   Nedra is a 86 y.o. female with hx of afib not on AC, PMR, UZMA, seen for admission to LTC at [facility], She was previously In the TCU after hospitalization 10/15/19-10/17/19 as noted below.     TCU HPI:   Nedra is a 86 y.o. female with hx of afib not on AC, HTN, PMR, UZMA, admitted to the hospital on 10/15/19 due to generalized weakness and abdominal pain as noted below.     Primary Discharge Diagnoses:   Summary of Stay: Nedra Carr is a 86 year old female with a PMH significant for a fib, HTN, UZMA, PMR and was recently hospitalized for pneumonia and incidentally noted to have a kidney stone that dropped into the bladder, who was directly admitted from endoscopy on 10/15/2019 with generalized weakness and abdominal pain.      Problem List:   1. RUQ abdominal pain - ongoing since shortly after discharge from hospital on 10/9. Denies nausea, vomiting, diarrhea or worsening pain with eating. She denies dysphagia but nursing reports issues with swallowing at bedside. Speech consulted, notes moderate oropharyngeal dysphagia, diet adjusted. Endorses poor appetite, notes regular BM (although CT scan from hospitalization showed colonic constipation), also showed incidental kidney stone but nothing else acute. Daughter reports hx of duodenal ulcers, hx of cholecystectomy. Denies fevers. UA from 10/14 looked concerning for infection but urine culture is growing mixed urogenital barrera, UA repeated on 10/15, UA again looks concerning for infection but given previous culture results, abx held and urine culture followed, no growth thus far. RUQ US obtained and was unremarkable. No ulcers noted on EGD today, no dilation done of esophagus, biopsies  taken. Recently started on Protonix and carafate, continue, switched carafate to liquid here. PO intake improved, tolerating oral intake. Denies abd pain today, notes it can be intermittent  2. Generalized weakness - multifactorial. Recent hospitalization for pneumonia, pt and daughter state she has gotten progressively weaker since discharge. Poor appetite since discharge, likely deconditioning as well. Pt denies worsening SOB, denies diarrhea, N/V. Labs and CXR from yesterday were fairly unremarkable. Equivocal UA with culture now growing mixed urogenital barrera. UA and culture repeated, not growing bacteria at this time, still pending. SW consulted for TCU placement, pt will discharge to [facility].   3. Hypoxia - pt noted to be mildly hypoxic on room air upon arrival to the floor, no hx of chronic respiratory failure. Recently hospitalized with pneumonia, treated with abx, has completed course. CXR yesterday looks unchanged/stable. O2 was weaned, pt did develop mild hypoxia and increased SOB with ambulation, O2 sats improved quickly with rest and deep breaths. Continue to monitor at TCU, continue IS. Pt states SOB she experiences with ambulation is improved from previous.      Brief Hospital Summary:   Reason for your hospital stay   Generalized weakness and abdominal pain.     Briefly, Nedra Carr was admitted on 10/15/2019 for concerns of generalized weakness and abdominal pain.  Pt was admitted directly to the OBS unit after EGD earlier in the day due to abd pain. Pt was weak and no source of abdominal pain was found on EGD.. UA was abnormal prior to admission, this was repeated, urine culture sent. RUQ US was obtained and negative for acute process. Pt was provided supportive cares. Labs were reviewed and significant results addressed. Speech therapy was consulted and noted pt to have moderate dysphagia and diet adjusted. The patient's abdominal pain improved, she tolerated a oral intake. She was  initially placed on oxygen due to mild hypoxia. She was weaned off O2 at rest and maintained normal SpO2. She was ambulated in the hallway and SpO2 did drop into the 80s with exertion. Her O2 sats would quickly recover with rest and deep breaths. She did note SOB with exertion but states this is improved from previous. On the day of discharge, pt's strength had improved and a safe discharge plan was arranged. Medications were reviewed and adjustments made as necessary.    Overall stabilized and discharged to TCU on 10/17/19 for PT, OT, nursing cares, medical management and monitoring.     Today:  She has been able to wean off oxygen. Denies cough. No fever. Feels stronger. She had swallow study yesterday and continues on nectar thick liquids, working with speech therapy in the facility. No chest pain. No complaints of abdominal pain today, she is on Protonix and carafate. Has anemia, on iron. Last hgb in the hospital on 10/14/19 was 10.0, then on 10/21/19 was 8.3 and 7.6 in the TCU, most recently 7.8 on 11/11/19. No signs of bleeding. Her appetite is fair. Dietician following closely. She denies diarrhea or constipation. No new vision or hearing problems.       Past Medical History:  Past Medical History:   Diagnosis Date     A-fib (H)      Dysphagia 2000    Had duodenal strcture dilated at [facility] in 2000 and by Dr. Hernández in 2001     HTN (hypertension)      Migraine     beta blocker prophylaxis     OA (osteoarthritis)      UZMA (obstructive sleep apnea) 04/09/2015     Pain in joint, shoulder region      Polymyalgia rheumatica (H)      Renal stone            Surgical History:  Past Surgical History:   Procedure Laterality Date     BACK SURGERY       CHOLECYSTECTOMY  11/15/2014     COLONOSCOPY  09/03/2019     CYSTOSCOPY W/ LITHOLAPAXY / EHL  04/08/2015    Procedure: COMBINED CYSTOSCOPY, LITHOLAPAXY; Surgeon: Randy Reno MD; Location: [facility] OR       ESOPHAGOGASTRODUODENOSCOPY  11/21/12, 2/22/13, 1/13/15, 3/5/15,  11/10/15, 10/15/19     EXPLORATORY LAPAROTOMY  11/15/2014     FLEXIBLE BRONCHOSCOPY  11/21/2014     HIP FRACTURE SURGERY      x2     HYSTERECTOMY       LASER HOLMIUM LITHOTRIPSY BLADDER  04/08/2015       Family History:   Family History   Problem Relation Age of Onset     Diabetes Mother      Diabetes Sister      Heart disease Son        Social History:    Social History     Socioeconomic History     Marital status:      Spouse name: Not on file     Number of children: Not on file     Years of education: Not on file     Highest education level: Not on file   Occupational History     Not on file   Social Needs     Financial resource strain: Not on file     Food insecurity:     Worry: Not on file     Inability: Not on file     Transportation needs:     Medical: Not on file     Non-medical: Not on file   Tobacco Use     Smoking status: Never Smoker     Smokeless tobacco: Never Used   Substance and Sexual Activity     Alcohol use: Not Currently     Drug use: Not on file     Sexual activity: Not on file   Lifestyle     Physical activity:     Days per week: Not on file     Minutes per session: Not on file     Stress: Not on file   Relationships     Social connections:     Talks on phone: Not on file     Gets together: Not on file     Attends Buddhism service: Not on file     Active member of club or organization: Not on file     Attends meetings of clubs or organizations: Not on file     Relationship status: Not on file     Intimate partner violence:     Fear of current or ex partner: Not on file     Emotionally abused: Not on file     Physically abused: Not on file     Forced sexual activity: Not on file   Other Topics Concern     Not on file   Social History Narrative     Not on file        Medications:  Current Outpatient Medications   Medication Sig     acetaminophen (TYLENOL) 325 MG tablet Take 650 mg by mouth every 6 (six) hours as needed for pain.     albuterol (ACCUNEB) 1.25 mg/3 mL nebulizer solution Take  1 ampule by nebulization every 4 (four) hours as needed for wheezing.            albuterol (ACCUNEB) 1.25 mg/3 mL nebulizer solution Take 1 ampule by nebulization 4 (four) times a day.     alendronate (FOSAMAX) 70 MG tablet Take 70 mg by mouth every 7 days. Take in the morning on an empty stomach with a full glass of water 30 minutes before food (thur)     benzonatate (TESSALON) 100 MG capsule Take 100 mg by mouth 3 (three) times a day as needed for cough.     ferrous gluconate (FERGON) 324 MG tablet Take 324 mg by mouth daily with breakfast.     guaiFENesin (ROBITUSSIN) 100 mg/5 mL syrup Take 100 mg by mouth every 4 (four) hours as needed for cough. And 200mg q6h prn           melatonin 1 mg Tab tablet Take 1 mg by mouth at bedtime.     metoprolol tartrate (LOPRESSOR) 25 MG tablet Take 12.5 mg by mouth 2 (two) times a day.     multivitamin therapeutic tablet Take 1 tablet by mouth daily.     pantoprazole (PROTONIX) 40 MG tablet Take 40 mg by mouth daily.     sucralfate (CARAFATE) 100 mg/mL suspension Take 1 g by mouth 4 (four) times a day.     vitamin A-vitamin C-vit E-min (OCUVITE) Tab tablet Take 1 tablet by mouth daily.       Allergies:  Lab Results   Component Value Date    WBC 6.2 11/11/2019    HGB 7.8 (L) 11/11/2019    HCT 27.1 (L) 11/11/2019    MCV 96 11/11/2019     (H) 11/11/2019         Review of Systems:  Pertinent items are noted in HPI.      Physical Exam:   General: Patient is alert, thin, frail, pale elderly female, no distress.  Vitals: /67, Temp 98.7, Pulse 93, RR 20, O2 sat 91% RA.  HEENT: Head is NCAT. Eyes show no injection or icterus. Nares negative. Oropharynx well hydrated.  Neck: Supple. No tenderness or adenopathy. No JVD.  Lungs: Diminished at bases otherwise clear. No wheezes.  Cardiovascular: Regular rate and rhythm, normal S1, S2.  Back: No spinal or CVA tenderness.  Abdomen: Soft, no tenderness on exam. Bowel sounds present. No guarding rebound or rigidity.  :  Deferred.  Extremities: No edema is noted.  Musculoskeletal: Degen changes.   Skin: Warm and dry.  Psych: Mood appears pretty good.      Labs:  Lab Results   Component Value Date    WBC 6.2 11/11/2019    HGB 7.8 (L) 11/11/2019    HCT 27.1 (L) 11/11/2019    MCV 96 11/11/2019     (H) 11/11/2019     Results for orders placed or performed in visit on 10/21/19   Basic Metabolic Panel   Result Value Ref Range    Sodium 137 136 - 145 mmol/L    Potassium 4.6 3.5 - 5.0 mmol/L    Chloride 98 98 - 107 mmol/L    CO2 31 22 - 31 mmol/L    Anion Gap, Calculation 8 5 - 18 mmol/L    Glucose 99 70 - 125 mg/dL    Calcium 9.9 8.5 - 10.5 mg/dL    BUN 12 8 - 28 mg/dL    Creatinine 0.59 (L) 0.60 - 1.10 mg/dL    GFR MDRD Af Amer >60 >60 mL/min/1.73m2    GFR MDRD Non Af Amer >60 >60 mL/min/1.73m2       Assessment/Plan:  1. General debilitation. Advanced age, frailty, medical conditions. Appropriate for LTC.  2. HTN. Continue on metoprolol, helps with rate control for afib. Monitor BPs.   3. Hx Afib. No anticoagulation due to GI issues. Rate control with beta blocker.  4. Anemia. She is on iron. No obvious bleeding, follow closely. Had EGD with no ulcers. Follow up with GI as needed. Continue PPI and Carafate.  5. Recent pneumonia. Resolved.   6. Dysphagia. On nectar thick liquids, working with speech therapy, had swallow study yesterday.  7. Code status is DNR.          Electronically signed by: Clarita Deal MD

## 2021-06-03 NOTE — PROGRESS NOTES
[facility]    Facility:   [facility] [[id number]]   Code Status: DNR  PCP: Provider, No Primary Care   Phone: None   Fax: None      CHIEF COMPLAINT/REASON FOR VISIT:  Chief Complaint   Patient presents with     Review Of Multiple Medical Conditions       HISTORY COURSE:  Nedra is a 86 y.o. female currently undergoing physical occupational [facility]. She is with a past medical history for Afib, HTN UZMA, osteoporosis, polymyalgia rheumatica and perforated gastric ulcer.  She was recently at an elective EGD for reflux in which she was directly admitted to the hospital for weakness and malnutrition.  She had a previous hospitalization for pna and on this hospitalization she was found to be hypoxic and worsening pneumonia for which she was treated with antibiotics.  She also has left ureterovesical stone with UTI.  CT was negative for hydronephrosis.  She was found to have anemia and thrombocytosis.  She was started on carafate and pepcid for GERD and she completed the course of antibiotics for UTI and PNA and discharged to TCU.      Today she is seen for  routine visit to review multiple medical issues . She continues to have a cough at night. Per therapy she did not cough during the 40 minute session. Speech therapy is unable to determine if her coughing during meals is related to her pneumonia or foods and recommended a swallow study. Today she move to a LTC bed but will remain on skilled nursing.  She denied any increased shortness of breath.  . She was extended on an antibiotic which ended 11/9/19.    She deneid having any CP.    Last hemoglobin was 8.1 on 10/28 which is slightly down from previous one at 8.3 and her platelets are slightly lower at 511 from 515 Next lab 11/11/19.  .SHe she is currently on a puréed diet with nectar thickened liquids.   She is not on anticoagulation due to PUD. She is ambulating well with a walker and  SBA.       Review of Systems  Constitutional: Positive for activity change and fatigue. Negative for appetite change and fever.   HENT: Negative for congestion.    Respiratory: Positive for cough and shortness of breath. Negative for wheezing.    Cardiovascular: Negative for chest pain and leg swelling.   Gastrointestinal: Negative for abdominal distention, abdominal pain, constipation, diarrhea and nausea.   Genitourinary: Negative for dysuria.   Musculoskeletal: Negative for arthralgias and back pain.   Skin: Negative for color change and wound.   Neurological: Negative for dizziness.   Psychiatric/Behavioral: Positive for sleep disturbance. Negative for agitation, behavioral problems and confusion.         Vitals:    11/12/19 1439   BP: 156/73   Pulse: 88   Resp: 18   Temp: 97.1  F (36.2  C)   SpO2: 96%   Weight: (!) 67 lb 12.8 oz (30.8 kg)       Physical Exam  Constitutional:       Appearance: She is well-developed.      Comments: Pleasant frail woman in no acute distress    HENT:      Head: Normocephalic.   Eyes:      Conjunctiva/sclera: Conjunctivae normal.   Neck:      Musculoskeletal: Normal range of motion.   Cardiovascular:      Rate and Rhythm: Regular rhythm.      Heart sounds: Normal heart sounds. No murmur.      Comments: Pulse 88, Rhythm regular.   Pulmonary:      Effort: No respiratory distress.      Breath sounds: Rales present. No wheezing.      Comments: Right lower lobe.   Abdominal:      General: Bowel sounds are normal. There is no distension.      Palpations: Abdomen is soft.      Tenderness: There is no tenderness.   Musculoskeletal: Normal range of motion.   Skin:     General: Skin is warm.   Neurological:      Mental Status: She is alert and oriented to person, place, and time.   Psychiatric:         Behavior: Behavior normal.   MEDICATION LIST:  Current Outpatient Medications   Medication Sig     acetaminophen (TYLENOL) 325 MG tablet Take 650 mg by mouth every 6 (six) hours as needed for  pain.     albuterol (ACCUNEB) 1.25 mg/3 mL nebulizer solution Take 1 ampule by nebulization every 4 (four) hours as needed for wheezing.            albuterol (ACCUNEB) 1.25 mg/3 mL nebulizer solution Take 1 ampule by nebulization 4 (four) times a day.     alendronate (FOSAMAX) 70 MG tablet Take 70 mg by mouth every 7 days. Take in the morning on an empty stomach with a full glass of water 30 minutes before food (thur)     benzonatate (TESSALON) 100 MG capsule Take 100 mg by mouth 3 (three) times a day as needed for cough.     ferrous gluconate (FERGON) 324 MG tablet Take 324 mg by mouth daily with breakfast.     guaiFENesin (ROBITUSSIN) 100 mg/5 mL syrup Take 100 mg by mouth every 4 (four) hours as needed for cough. And 200mg q6h prn           melatonin 1 mg Tab tablet Take 1 mg by mouth at bedtime.     metoprolol tartrate (LOPRESSOR) 25 MG tablet Take 12.5 mg by mouth 2 (two) times a day.     multivitamin therapeutic tablet Take 1 tablet by mouth daily.     pantoprazole (PROTONIX) 40 MG tablet Take 40 mg by mouth daily.     sucralfate (CARAFATE) 100 mg/mL suspension Take 1 g by mouth 4 (four) times a day.     vitamin A-vitamin C-vit E-min (OCUVITE) Tab tablet Take 1 tablet by mouth daily.       DISCHARGE DIAGNOSIS:    ICD-10-CM    1. Coughing R05    2. Atrial fibrillation, unspecified type (H) I48.91    3. Weakness R53.1    4. Severe malnutrition (H) E43      PLAN:       Cough  Speech  hfollowing . Chest x-ray unchanged from prior one. Continue  Tessalon 100 mg three times a day PRN swallow study ordered per speech recommendations.      Insomnia - melatonin 1 mg p.o. at bedtime     Kidney stone- follow up with Urology on 11/26/19     Dysphagia and followed by speech therapy Currently on a puréed diet with nectar thickened liquids     Severe malnutrition followed by dietary and placed on nutritional supplements twice daily     Anemia currently on iron supplements last hemoglobin 7.8 down from  8.1 labs to be  monitored     Thrombocytosis last platelet count 482 down from  511 labs being monitored     Atrial fibrillation currently not on anticoagulation due to PUD, patient recently  started on metoprolol tartrate 12.5 mg two times a day.       Electronically signed by: June Marshall CNP

## 2021-06-03 NOTE — PROGRESS NOTES
Speech Language/Pathology  Videofluoroscopic Swallow Study     Problem:  There is no problem list on file for this patient.      Onset Date: 11/19/2019 (order date)  Reason for Evaluation: Assess swallow function and determine safest, yet least restrictive diet  Pertinent History: GERD; recent pneumonia; cough; shortness of breath; severe malnutrition  Current Diet: NDD1 textures and nectar-thick liquids  Baseline Diet: Regular textures and thin liquids    Patient is an 86-year-old female referred due to concerns of dysphagia with possible aspiration.  Patient is currently residing in the TCU at [facility].  She is currently on a diet of NDD1 (pureed) textures and nectar-thick liquids and is working with Speech Therapy on her swallow.  The purpose of this assessment is to evaluate the physiology and function of patient's oropharyngeal swallow, determine her aspiration risk, and establish her safest, yet least restrictive diet at present.    Patient presents as pleasant and cooperative during this evaluation.  Her daughter, Mark, observed study and was present afterwards to receive results and recommendations.  An  was not applicable    Patient was given honey-thick, pureed, nectar-thick, and thin consistencies of barium    Oral Phase:    Dentition/Oral hygiene: Patient wears upper and lower dentures.  Oral hygiene was good.    Oral motor function was not impaired.     Bolus prep and oral control were not impaired.     Anterior-Posterior transit was not impaired.    Premature spill beyond the base of the tongue into the valleculae did not occur with any consistency.    Tongue base retraction was not impaired.    Oral stasis did not occur with any consistency.    Pharyngeal Phase:    Aspiration did not occur with any consistency.     Laryngeal penetration occurred fairly consistently with thin liquid.  On one occasion, penetration was deep (i.e., to the level of the vocal folds) but cleared  spontaneously.  Patient had no cough or throat clear response at the vocal folds.    Swallow response was delayed with thin and nectar-thick liquid.  This resulted in pourover to the pyriform sinuses.    Epiglottic movement was complete consistently across texture trials, though delayed with boluses of thin liquid.  This appeared to contribute to laryngeal penetration.    Mild, diffuse pharyngeal stasis occurred with all consistencies.  The majority of this cleared with spontaneous dry swallows.    Pharyngeal constriction was mildly impaired.    Hyolaryngeal elevation was reduced. Hyolaryngeal excursion was reduced.    Cricopharyngeal function was not impaired. Cervical esophageal function was not impaired.    Assessment:    Patient demonstrated no oral dysphagia and mild pharyngeal dysphagia.    Patient is at moderate to high aspiration risk with thin liquids due to delayed timing of the swallow response and delayed epiglottic inversion.    Rehab potential is fair based on prior level of function and evaluation results.    Recommendations:    Plan: Continue current diet of NDD1 textures and nectar-thick liquids at this time.  Recommend trials of advanced diet textures with Speech Therapy only.  Patient may be a good candidate for the free water protocol.    Strategies: Patient to follow standard safe swallowing precautions, including sitting fully upright for all intake, eating slowly, and taking small bites and sips. No straws with liquids due to increased aspiration risk.    Continue Speech Therapy at TCU.  Frequency and duration to be determined by patient's primary SLP.    Referrals: N/A     Reviewed history of swallow problem with patient and daughter and verbally explained roles of SLP and radiologist.  Verbally explained process of VFSS prior to administration of barium.  Verbally explained results and recommendations to patient and daughter.  SLP answered questions and stated that a copy of this report will  be faxed to patient's referring provider, June Marshall CNP.  Patient and daughter verbalized understanding.    30 dysphagia minutes    Jessi Merino MA, CCC-SLP

## 2021-06-03 NOTE — PROGRESS NOTES
[facility]    Facility:   [facility] [[id number]]   Code Status: DNR  PCP: Provider, No Primary Care   Phone: None   Fax: None      CHIEF COMPLAINT/REASON FOR VISIT:  Chief Complaint   Patient presents with     Discharge Summary       HISTORY COURSE:  Nedra is a 86 y.o. female currently undergoing physical occupational therapy at [facility]. She is with a past medical history for Afib, HTN UZMA, osteoporosis, polymyalgia rheumatica and perforated gastric ulcer.  She was recently at an elective EGD for reflux in which she was directly admitted to the hospital for weakness and malnutrition.  She had a previous hospitalization for pna and on this hospitalization she was found to be hypoxic and worsening pneumonia for which she was treated with antibiotics.  She also has left ureterovesical stone with UTI.  CT was negative for hydronephrosis.  She was found to have anemia and thrombocytosis.  She was started on carafate and pepcid for GERD and she completed the course of antibiotics for UTI and PNA and discharged to TCU.      Today she is seen for  a face-to-face for discharge.  Patient will currently stay in her TCU room 213 with current medications and treatments until a long-term bed becomes available.  She will switch from TCU to long-term care status as of 11/8/2019.  She was weaned off her oxygen this morning and she was 91% on room air and her other finger was checked and she was 85% on room air.   she denied any increased shortness of breath.  she did work with therapy  on room air and she maintained her sats at 96%.  So I will order PRN oxygen to keep sats greater than 90%. She was extended on an antibiotic which will end 11/9/19 and reports feeling much better.     She deneid having any CP.    Last hemoglobin was 8.1 on 10/28 which is slightly down from previous one at 8.3 and her platelets are slightly lower at 511 from 515 Next lab 11/11/19.   .SHe she is currently on a puréed diet with nectar thickened liquids.   She is not on anticoagulation due to PUD.     UPDATE: Pt has won her appeal and will remain on TCU with therapies at this time.     Review of Systems  Constitutional: Positive for activity change and fatigue. Negative for appetite change and fever.   HENT: Negative for congestion.    Respiratory: Positive for cough and shortness of breath. Negative for wheezing.    Cardiovascular: Negative for chest pain and leg swelling.   Gastrointestinal: Negative for abdominal distention, abdominal pain, constipation, diarrhea and nausea.   Genitourinary: Negative for dysuria.   Musculoskeletal: Negative for arthralgias and back pain.   Skin: Negative for color change and wound.   Neurological: Negative for dizziness.   Psychiatric/Behavioral: Positive for sleep disturbance. Negative for agitation, behavioral problems and confusion.         Vitals:    11/07/19 1147   BP: 157/64   Pulse: 79   Resp: 20   Temp: 97.9  F (36.6  C)   Weight: (!) 67 lb 12.8 oz (30.8 kg)       Physical Exam  Constitutional:       Appearance: She is well-developed.      Comments: Pleasant frail woman in no acute distress    HENT:      Head: Normocephalic.   Eyes:      Conjunctiva/sclera: Conjunctivae normal.   Neck:      Musculoskeletal: Normal range of motion.   Cardiovascular:      Rate and Rhythm: Regular rhythm.      Heart sounds: Normal heart sounds. No murmur.      Comments: Pulse 79, Rhythm regular.   Pulmonary:      Effort: No respiratory distress.      Breath sounds: Rales present. No wheezing.      Comments: Right lower lobe.   Abdominal:      General: Bowel sounds are normal. There is no distension.      Palpations: Abdomen is soft.      Tenderness: There is no tenderness.   Musculoskeletal: Normal range of motion.   Skin:     General: Skin is warm.   Neurological:      Mental Status: She is alert and oriented to person, place, and time.   Psychiatric:         Behavior:  Behavior normal.   MEDICATION LIST:  Current Outpatient Medications   Medication Sig     acetaminophen (TYLENOL) 325 MG tablet Take 650 mg by mouth every 6 (six) hours as needed for pain.     albuterol (ACCUNEB) 1.25 mg/3 mL nebulizer solution Take 1 ampule by nebulization every 4 (four) hours as needed for wheezing.            alendronate (FOSAMAX) 70 MG tablet Take 70 mg by mouth every 7 days. Take in the morning on an empty stomach with a full glass of water 30 minutes before food (thur)     benzonatate (TESSALON) 100 MG capsule Take 100 mg by mouth 3 (three) times a day as needed for cough.     ferrous gluconate (FERGON) 324 MG tablet Take 324 mg by mouth daily with breakfast.     guaiFENesin (ROBITUSSIN) 100 mg/5 mL syrup Take 100 mg by mouth every 4 (four) hours as needed for cough. And 200mg q6h prn           melatonin 1 mg Tab tablet Take 1 mg by mouth at bedtime.     metoprolol tartrate (LOPRESSOR) 25 MG tablet Take 12.5 mg by mouth 2 (two) times a day.     multivitamin therapeutic tablet Take 1 tablet by mouth daily.     pantoprazole (PROTONIX) 40 MG tablet Take 40 mg by mouth daily.     sucralfate (CARAFATE) 100 mg/mL suspension Take 1 g by mouth 4 (four) times a day.     vitamin A-vitamin C-vit E-min (OCUVITE) Tab tablet Take 1 tablet by mouth daily.     albuterol (ACCUNEB) 1.25 mg/3 mL nebulizer solution Take 1 ampule by nebulization 4 (four) times a day.       DISCHARGE DIAGNOSIS:    ICD-10-CM    1. Coughing R05    2. Pneumonia due to infectious organism, unspecified laterality, unspecified part of lung J18.9    3. Atrial fibrillation, unspecified type (H) I48.91    4. Weakness R53.1      PLAN:       Cough  Speech  hfollowing . Chest x-ray unchanged from prior one. Add Tessalon 100 mg three times a day PRN and antibiotic was extended.       Insomnia - melatonin 1 mg p.o. at bedtime     Kidney stone- follow up with Urology on 11/26/19     Dysphagia and followed by speech therapy Currently on a puréed  diet with nectar thickened liquids     Severe malnutrition followed by dietary and placed on nutritional supplements twice daily     Anemia currently on iron supplements last hemoglobin 8.1 labs to be monitored     Thrombocytosis last platelet count 511 labs being monitored     Atrial fibrillation currently not on anticoagulation due to PUD, patient recently  started on metoprolol tartrate 12.5 mg two times a day.       Electronically signed by: June Marshall CNP

## 2021-06-03 NOTE — PROGRESS NOTES
[facility]    Facility:   [facility] [[id number]]   Code Status: DNR      CHIEF COMPLAINT/REASON FOR VISIT:  Chief Complaint   Patient presents with     Problem Visit     continued cough and SOB        HISTORY:      HPI: Nedra is a 86 y.o. female currently undergoing physical occupational therapy at [facility]. She is with a past medical history for Afib, HTN UZMA, osteoporosis, polymyalgia rheumatica and perforated gastric ulcer.  She was recently at an elective EGD for reflux in which she was directly admitted to the hospital for weakness and malnutrition.  She had a previous hospitalization for pna and on this hospitalization she was found to be hypoxic and worsening pneumonia for which she was treated with antibiotics.  She also has left ureterovesical stone with UTI.  CT was negative for hydronephrosis.  She was found to have anemia and thrombocytosis.  She was started on carafate and pepcid for GERD and she completed the course of antibiotics for UTI and PNA and discharged to TCU.     Today she is seen for continued coughing. She reports minimal relief with the cough medicine and tessalon.   Her recent chest X-ray was unchanged from the one prior and she is having increased shortness of breath requiring oxygen. Antibiotics were continued for 7 days.   She deneid having any CP but is short of breath with activity currently she is satting at 93% on 2 L nasal cannula.  Last hemoglobin was 8.1 on 10/28 which is slightly down from previous one at 8.3 and her platelets are slightly lower at 511 from 515 .SHe she is currently on a puréed diet with nectar thickened liquids.   She is not on anticoagulation due to PUD.     Past Medical History:   Diagnosis Date     A-fib (H)      Dysphagia 2000    Had duodenal strcture dilated at [facility] in 2000 and by Dr. Hernández in 2001     HTN (hypertension)      Migraine     beta blocker prophylaxis     OA  (osteoarthritis)      UZMA (obstructive sleep apnea) 04/09/2015     Pain in joint, shoulder region      Polymyalgia rheumatica (H)      Renal stone              Family History   Problem Relation Age of Onset     Diabetes Mother      Diabetes Sister      Heart disease Son      Social History     Socioeconomic History     Marital status:      Spouse name: Not on file     Number of children: Not on file     Years of education: Not on file     Highest education level: Not on file   Occupational History     Not on file   Social Needs     Financial resource strain: Not on file     Food insecurity:     Worry: Not on file     Inability: Not on file     Transportation needs:     Medical: Not on file     Non-medical: Not on file   Tobacco Use     Smoking status: Never Smoker     Smokeless tobacco: Never Used   Substance and Sexual Activity     Alcohol use: Not Currently     Drug use: Not on file     Sexual activity: Not on file   Lifestyle     Physical activity:     Days per week: Not on file     Minutes per session: Not on file     Stress: Not on file   Relationships     Social connections:     Talks on phone: Not on file     Gets together: Not on file     Attends Muslim service: Not on file     Active member of club or organization: Not on file     Attends meetings of clubs or organizations: Not on file     Relationship status: Not on file     Intimate partner violence:     Fear of current or ex partner: Not on file     Emotionally abused: Not on file     Physically abused: Not on file     Forced sexual activity: Not on file   Other Topics Concern     Not on file   Social History Narrative     Not on file         Review of Systems   Constitutional: Positive for activity change and fatigue. Negative for appetite change and fever.   HENT: Negative for congestion.    Respiratory: Positive for cough and shortness of breath. Negative for wheezing.    Cardiovascular: Negative for chest pain and leg swelling.    Gastrointestinal: Negative for abdominal distention, abdominal pain, constipation, diarrhea and nausea.   Genitourinary: Negative for dysuria.   Musculoskeletal: Negative for arthralgias and back pain.   Skin: Negative for color change and wound.   Neurological: Negative for dizziness.   Psychiatric/Behavioral: Positive for sleep disturbance. Negative for agitation, behavioral problems and confusion.       Vitals:    11/04/19 1318   BP: 122/63   Pulse: 78   Resp: 18   Temp: 98.3  F (36.8  C)   SpO2: 90%   Weight: (!) 67 lb 9.6 oz (30.7 kg)       Physical Exam  Constitutional:       Appearance: She is well-developed.      Comments: Pleasant frail woman in no acute distress    HENT:      Head: Normocephalic.   Eyes:      Conjunctiva/sclera: Conjunctivae normal.   Neck:      Musculoskeletal: Normal range of motion.   Cardiovascular:      Rate and Rhythm: Regular rhythm.      Heart sounds: Normal heart sounds. No murmur.      Comments: Pulse 78, Rhythm regular.   Pulmonary:      Effort: No respiratory distress.      Breath sounds: Rales present. No wheezing.      Comments: Right lower lobe.   Abdominal:      General: Bowel sounds are normal. There is no distension.      Palpations: Abdomen is soft.      Tenderness: There is no tenderness.   Musculoskeletal: Normal range of motion.   Skin:     General: Skin is warm.   Neurological:      Mental Status: She is alert and oriented to person, place, and time.   Psychiatric:         Behavior: Behavior normal.           LABS:   Recent Results (from the past 240 hour(s))   HM2(CBC w/o Differential)   Result Value Ref Range    WBC 6.3 4.0 - 11.0 thou/uL    RBC 2.91 (L) 3.80 - 5.40 mill/uL    Hemoglobin 8.1 (L) 12.0 - 16.0 g/dL    Hematocrit 28.0 (L) 35.0 - 47.0 %    MCV 96 80 - 100 fL    MCH 27.8 27.0 - 34.0 pg    MCHC 28.9 (L) 32.0 - 36.0 g/dL    RDW 13.8 11.0 - 14.5 %    Platelets 511 (H) 140 - 440 thou/uL    MPV 10.3 8.5 - 12.5 fL       ASSESSMENT:      ICD-10-CM    1.  Coughing R05    2. Pneumonia due to infectious organism, unspecified laterality, unspecified part of lung J18.9    3. Essential hypertension I10    4. Weakness R53.1        PLAN:      Cough  Speech  Following  . Chest x-ray unchanged from prior one. Which still shoes pneumonia- placed on doxycycline.  Continue  Tessalon 100 mg three times a day PRN     Insomnia - melatonin 1 mg p.o. at bedtime    Kidney stone- follow up with Urology on 11/26/19    Dysphagia and followed by speech therapy Currently on a puréed diet with nectar thickened liquids    Severe malnutrition followed by dietary and placed on nutritional supplements twice daily    Anemia currently on iron supplements last hemoglobin 8.1 labs to be monitored    Thrombocytosis last platelet count 511 labs being monitored    Atrial fibrillation currently not on anticoagulation due to PUD, patient recently   on metoprolol tartrate 12.5 mg two times a day.     Electronically signed by: June Marshall CNP

## 2021-06-04 VITALS
SYSTOLIC BLOOD PRESSURE: 140 MMHG | BODY MASS INDEX: 17.82 KG/M2 | RESPIRATION RATE: 20 BRPM | WEIGHT: 71.2 LBS | DIASTOLIC BLOOD PRESSURE: 82 MMHG | OXYGEN SATURATION: 92 % | HEART RATE: 88 BPM | TEMPERATURE: 98.5 F

## 2021-06-04 VITALS — BODY MASS INDEX: 16.71 KG/M2 | HEIGHT: 55 IN | WEIGHT: 72.2 LBS

## 2021-06-05 NOTE — ED PROVIDER NOTES
eMERGENCY dEPARTMENT PROGRESS NOTE        ED COURSE AND MEDICAL DECISION MAKING  Patient was signed out to me by Dr. Foy at change of shift (3:00 PM) pending follow up labs and ICU admission.    Nedra Carr is a 87 y.o. female with history of atrial fibrillation and hypertension who presents via EMS for evaluation of shortness of breath.    Per EMS, today the patient became increasingly short of breath. She had 80% stats on EMS arrival to her nursing home. She improved to 98% after 1 duoneb but then decreased back to 80%. She is DNR.    Per chart review, on 02/02/2020 (6 days ago) the patient was diagnosed with pneumonia and discharged on Augmentin and Doxycycline.  MDM: Patient remains alert, she is able to communicate with the BiPAP mask on and reports feeling improved.  Oxygen saturations 35% FiO2 or 100%.  Labs are reviewed her creatinine is normal.  She was given Lasix 20 mg IV in addition to previous antibiotics for possible component of congestive heart failure.  Will proceed with admission.  LAB  Pertinent labs results reviewed   Results for orders placed or performed during the hospital encounter of 02/08/20   Influenza A/B Rapid Test   Result Value Ref Range    Influenza  A, Rapid Antigen No Influenza A antigen detected No Influenza A antigen detected    Influenza B, Rapid Antigen No Influenza B antigen detected No Influenza B antigen detected   Lactic Acid   Result Value Ref Range    Lactic Acid 1.2 0.5 - 2.2 mmol/L   Basic Metabolic Panel   Result Value Ref Range    Sodium 141 136 - 145 mmol/L    Potassium 4.2 3.5 - 5.0 mmol/L    Chloride 104 98 - 107 mmol/L    CO2 27 22 - 31 mmol/L    Anion Gap, Calculation 10 5 - 18 mmol/L    Glucose 290 (H) 70 - 125 mg/dL    Calcium 9.2 8.5 - 10.5 mg/dL    BUN 23 8 - 28 mg/dL    Creatinine 0.77 0.60 - 1.10 mg/dL    GFR MDRD Af Amer >60 >60 mL/min/1.73m2    GFR MDRD Non Af Amer >60 >60 mL/min/1.73m2   Troponin I   Result Value Ref Range    Troponin I 0.17  0.00 - 0.29 ng/mL   BNP(B-type Natriuretic Peptide)   Result Value Ref Range     (H) 0 - 167 pg/mL   HM1 (CBC with Diff)   Result Value Ref Range    WBC 8.1 4.0 - 11.0 thou/uL    RBC 3.92 3.80 - 5.40 mill/uL    Hemoglobin 10.3 (L) 12.0 - 16.0 g/dL    Hematocrit 35.6 35.0 - 47.0 %    MCV 91 80 - 100 fL    MCH 26.3 (L) 27.0 - 34.0 pg    MCHC 28.9 (L) 32.0 - 36.0 g/dL    RDW 15.9 (H) 11.0 - 14.5 %    Platelets 300 140 - 440 thou/uL    MPV 11.2 8.5 - 12.5 fL    Neutrophils % 82 (H) 50 - 70 %    Lymphocytes % 11 (L) 20 - 40 %    Monocytes % 6 2 - 10 %    Eosinophils % 0 0 - 6 %    Basophils % 0 0 - 2 %    Neutrophils Absolute 6.6 2.0 - 7.7 thou/uL    Lymphocytes Absolute 0.9 0.8 - 4.4 thou/uL    Monocytes Absolute 0.5 0.0 - 0.9 thou/uL    Eosinophils Absolute 0.0 0.0 - 0.4 thou/uL    Basophils Absolute 0.0 0.0 - 0.2 thou/uL         RADIOLOGY    Pertinent imaging reviewed   Please see official radiology report.  XR Chest 1 View Portable   Final Result   Radiographic evaluation of the mid and lower lungs limited by the shallow inspiration and severe scoliosis. Increased opacity throughout the right lower hemithorax could represent airspace consolidation in the lung and/or artifact related to    overlap of normal structures. Fine linear opacities in the periphery of the visualized lungs could represent minimal edema. Mild cardiac enlargement.             FINAL IMPRESSION    1.Acute Respiratory Failure  2.Pneumonia          Tex Page, DO  02/08/20 2730

## 2021-06-05 NOTE — ED NOTES
Report rec'd. Care assumed. Plan of care: Pt's creatinine result is back. To give furomside and then pt to floor.

## 2021-06-05 NOTE — PLAN OF CARE
Problem: Discharge Barriers  Goal: Patient's discharge needs are met  Outcome: Progressing   Goal: Patient s discharge needs are met.  Outcome: Care Progression reviewed with Hospitalist, Care Manager.  Discharge Disposition: Discussed and plan to discharge to: return to [facility]  Planned Discharge Date: 3-5 days   Problem: Barriers to discharge include: bipap, IV lasix, BC pending, pall consult   Transportation needs/Ride Time: TBD

## 2021-06-05 NOTE — PROGRESS NOTES
Progress Note    Assessment/Plan  Nedra Carr is a 87 y.o. female admitted 2/8 via EMS from [facility] with increased shortness of breath and hypoxia. Admitted with aspiration pneumonia and started on IV Zosyn and BiPAP. BNP was elevated and diuresed with lasix. Also IV solumedrol. However respiratory status progressively worsened and Pulmonary was consulted. Has known h/o atrial fib and went into RVR rhythm. Received Adenosine on 2/9 followed by diltiazem. On 2/9 she had an episode where she was unresponsive associated with the treatment of the afib with RVR requiring bagging for about 5-10 minutes. No shocks ever administered. She was started on amiodarone drip following this with improvement in her heart rate. She was also placed on phenylepherine drip. Patient had recieved IV Morphine and at some point became unresponsive requiring bagging as mentioned above and received Flumazenil.      [facility] has discussed goals of care with patient and her daughter at bedside and daughter and patient have decided to proceed with comfort cares starting later today. Daughter states that they are waiting for patient's daughter in law and grand kids to come in. She will then inform the nurses to contact us once they are ready for comfort cares. Confirms DNR and DNI for now.    1. Acute hypoxic respiratory failure: secondary to bilateral aspiration pneumonia and fluid overload. Continue IV Vanc and Zosyn, IV solumedrol, Oxygen and BiPAP as needed, diurese as needed.     2. S/p RVR, known h/o chr a fib: currently sinus. Continue amiodarone drip. BP dropped following morphine and treatment of SVT requiring pressors which have now been discontinued. S/p 1 unit PRBC transfusion for hgb 6.7. Preserved EF on ECHO done previously.     3. Essential HTN: Continue metoprolol and lasix.    4. Acute blood loss anemia: s/p 1 unit PRBC transfusion. Goal hgb>7.5    5. Hypokalemia: resolved    6. DNR/DNI. Patient and family leaning  towards comfort cares. Waiting for family to come in today before actual decision for comfort care put in place.         Subjective  Patient is complaining of pain in RUQ abdomen. ROS limited as patient has Oxymask on and has very poor verbal output.     Objective    Vital signs in last 24 hours  Temp:  [96  F (35.6  C)-98.1  F (36.7  C)] 96  F (35.6  C)  Heart Rate:  [] 100  Resp:  [16-39] 24  BP: ()/(46-98) 153/74  FiO2 (%):  [35 %] 35 %  Weight:   (!) 72 lb 3.2 oz (32.7 kg)    Intake/Output last 3 shifts  I/O last 3 completed shifts:  In: 1018.2 [P.O.:120; I.V.:323.2; Blood:325; IV Piggyback:250]  Out: 1075 [Urine:1075]  Intake/Output this shift:  I/O this shift:  In: -   Out: 100 [Urine:100]    Physical Exam  General appearance: awake and cooperative, clearly exhausted  Lungs: clear to auscultation bilaterally  Heart: regular rate and rhythm, S1, S2 normal  Abdomen: soft, non-tender;  Extremities: atraumatic, no cyanosis or edema  Neurologic: patient is replying appropriately and seems to be comprehending conversation. Very poor verbal output. No obvious focal deficit. Generalized weakness        Meds    amiodarone 900 mg/500 ml standard 24 hour infusion 0.5 mg/min (02/10/20 0400)     norepinephrine Stopped (02/09/20 2007)     phenylephrine Stopped (02/09/20 2300)       aspirin  81 mg Oral DAILY     bacitracin  1 packet Topical Once     [Held by provider] furosemide  20 mg Intravenous BID - diuretic     insulin aspart (NovoLOG) injection   Subcutaneous Q4H FIXED TIMES     levalbuterol  1.25 mg Nebulization QID - RT     methylPREDNISolone sodium succinate  40 mg Intravenous Q12H     metoprolol tartrate  12.5 mg Oral BID     pantoprazole  40 mg Intravenous Q24H     piperacillin-tazobactam  3.375 g Intravenous Q8H     senna-docusate  1 tablet Oral BID    Or     senna (SENOKOT) syrup  8.8 mg Enteral Tube BID     sodium chloride  10 mL Intravenous Q8H FIXED TIMES     sodium chloride  10-30 mL Intravenous  Q8H FIXED TIMES     sodium chloride  3 mL Intravenous Line Care       Pertinent Labs   Lab Results: personally reviewed.   not applicable    Results from last 7 days   Lab Units 02/10/20  0606 02/10/20  0028 02/09/20  1732   LN-SODIUM mmol/L 144 142 146*   LN-POTASSIUM mmol/L 4.6 5.3* 2.4*   LN-CHLORIDE mmol/L 105 104 116*   LN-CO2 mmol/L 28 30 24   LN-BLOOD UREA NITROGEN mg/dL 35* 32* 22   LN-CREATININE mg/dL 1.15* 1.07 0.64   LN-CALCIUM mg/dL 9.8 9.7 6.0*         Results from last 7 days   Lab Units 02/10/20  0606 02/10/20  0028 02/09/20  1732 02/09/20  0623 02/08/20  1400   LN-WHITE BLOOD CELL COUNT thou/uL  --   --  4.3 4.5 8.1   LN-HEMOGLOBIN g/dL 11.8* 10.8* 6.3* 8.8* 10.3*   LN-HEMATOCRIT %  --   --  21.5* 30.0* 35.6   LN-PLATELET COUNT thou/uL  --   --  169 240 300

## 2021-06-05 NOTE — PLAN OF CARE
Problem: Safety  Goal: Patient will be injury free during hospitalization  Outcome: Progressing     Problem: Potential for Compromised Skin Integrity  Goal: Nutritional status is improving  Outcome: Not Progressing     Patient alert and oriented. Afebrile, VSS. Patient off bipap at 1640, now on NC 2L. Patient NPO. Urine Output was 850cc this shift.  Continue to monitor.

## 2021-06-05 NOTE — TREATMENT PLAN
02/09/20 1051   Reason for Assessment (RCAT)   RCAT Assesment Initial   Assessment Reason  Other (comment)  (Apiration Pneumonia)   Vitals (RCAT)   Heart Rate 91   Resp (!) 39   SpO2 100 %  (2L Oxymask)   Chart Assessment (RCAT)   Pulmonary Status  0   Surgical Status  0   Chest Xray  2   Patient Assessment (RCAT)    Respiratory Pattern  4   Mental Status  0   Breath Sounds  3   Cough Effectiveness  0   Level of Activity  1   O2 Required SpO2 >= 92%  1   Chart + Pt. Assessment Total Points  11   RCAT Acuity Score 11 (Acuity 3)   Clinical Indications (RCAT)   Aerosol Hygiene RCAT protocol   RCAT Order Placed  Yes   RCAT Assessment done, Score 11, Acuity, due to increase RR and crackles, will give patient Albuterol neb four times a day.  RCAT in 48 hours and PRN

## 2021-06-05 NOTE — PROGRESS NOTES
ICU note addendum    Rapid response team was called as patient had her heart rate jumped into the 140-200s.  I arrived immediately at the scene.  We tried given adenosine 6 mg x 1, followed by adenosine 12 mg x 1 with no improvement.  EKG was done and then showed A. fib with RVR.  Patient was then given several pushes of diltiazem (5 mg , 10 mg x 1, 15 mg x 1) followed by diltiazem drip however there was no improvement in her heart rate.  She then started becoming more hypotensive.  At one point during, she was administered 10 mg of diazepam, instead of 10 mg of diltiazem.  She also had morphine ordered for shortness of breath. I ended up ordering flumazenill and narcan given that she was administered one and had the other ordered. She was bagged when she became unresponsive. She was bagged for about 5-10 minutes.  She woke up and was following commands following narcan and flumazenil.   Pacer pads were placed.  She was never shocked. Patient was then started on amiodarone with improvement in her heart rate.  Fortunately, we were able to avoid intubation.    Assessment and plan:    Cardiovascular  A. fib with RVR -stat labs were sent and were noted for hypokalemia with a potassium of 2.4 and hemoglobin of 6.7.  -She is currently amiodarone drip.  She received amiodarone bolus.   -Started phenylephrine drip and are weaning off Levophed drip  -Holding diltiazem drip.  -Echo done earlier shows preserved ejection fraction.  -Replace electrolytes  -Follow-up lites and lactic acid  -Transfuse 1 unit packed red blood cells given hemoglobin less than 7.    Neuro:  Now alert   Signed out to overnight NP that should pt become lethargic, then to initially try flumazenil    Pulmonary  -Acute respiratory failure requiring BiPAP initially however currently she is on facemask  -Will check chest x-ray after PICC line placement  -Family is okay with intubation however CPR    Heme:  Acute blood loss anemia  Etiology  unclear  Transfuse 1 unit of prbc  Serial Hgb and lactates    Total time spent more than 90 minutes critical care.  Metastatic was changed to critical care.    Kael De Leon MD

## 2021-06-05 NOTE — PROGRESS NOTES
"   02/09/20 1542 02/09/20 1553   Respiratory Assessment   Assessment Type Pre-treatment Post-treatment   Respiratory Pattern Tachypneic Tachypneic   Chest Assessment Chest expansion symmetrical Chest expansion symmetrical   Bilateral Breath Sounds Coarse;Expiratory wheezes Coarse;Expiratory wheezes   Respiratory Treatments    Medications Albuterol  --    $ Aerosol Subsequent Tx  HHN  --    Pre-Treatment Pulse 95  --    Pre-Treatment Respirations 32  --    Pre-Treatment Sp02 100  (4L NC)  --    Post-Treatment Pulse  --  94   Post-Treatment Respirations  --  32   Post-Treatment Sp02  --  100  (Post neb)   Position High José's  --    Treatment Tolerance  --  Tolerated well   Cough Description / Suction    Cough Type  --  Non-productive   87yr pneumonia admitted on 2/8/20 for pneumonia.  Ob BiPAP today for increased RR.  Patient on for one hour, became nauseated, and wretch ing, removed from BiPAP.    Setting  10/5 Rate of 16 FiO2 35%, liquicel in place small mask.  Face to face with RN about skin assessment under the mask.    BP (!) 88/49   Pulse (!) 119   Temp 97.9  F (36.6  C) (Oral)   Resp 20   Ht 4' 3\" (1.295 m)   Wt (!) 70 lb (31.8 kg)   SpO2 98%   BMI 18.92 kg/m      "

## 2021-06-05 NOTE — PLAN OF CARE
Problem: Potential for Compromised Skin Integrity  Goal: Skin integrity is maintained or improved  Outcome: Progressing     Problem: Glucose Imbalance  Goal: Achieve optimal glucose control  Outcome: Progressing     Problem: Breathing  Goal: Patient will maintain patent airway  Outcome: Progressing     Pt resting comfortably on 2 L oxymask.  RR 18.  Blood sugar 51 at midnight.  Recheck 146 after d50 house officer notified.  Vitals stable.  Will continue to monitor

## 2021-06-05 NOTE — ED NOTES
Small amount of brown stool in depends, periarea cleaned and copeland placed. Cloudy yellow urine collected and sent to lab. Patient tolerated well.

## 2021-06-05 NOTE — PROCEDURES
"PICC Line Insertion Procedure Note  Pt. Name: Nedra Carr  MRN:        [id number]    Procedure: Insertion of a  triple Lumen  5 fr  Bard SOLO (valved) Power PICC, Lot number [id number]    Indications: Vasopressor    Contraindications : none    Procedure Details   Patient identified with 2 identifiers and \"Time Out\" conducted.  .     Central line insertion bundle followed: hand hygeine performed prior to procedure, site cleansed with cholraprep, hat, mask, sterile gloves,sterile gown worn, patient draped with maximum barrier head to toe drape, sterile field maintained.    The vein was assessed and found to be compressible and of adequate size. 1 ml 1% Lidocaine administered sq to the insertion site. A 5 Fr PICC was inserted into the basilic vein of the right arm with ultrasound guidance. 1 attempt(s) required to access vein.   Catheter threaded without difficulty. Good blood return noted.    Modified Seldinger Technique used for insertion.    The 8 sharps that are included in the PICC insertion kit were accounted for and disposed of in the sharps container prior to breakdown of the sterile field.    Catheter secured with Statlock, biopatch and Tegaderm dressing applied.    Findings:  Total catheter length  38 cm, with 4 cm exposed. Mid upper arm circumference is 19 cm. Catheter was flushed with 30 cc NS. Patient  tolerated procedure well.    Tip placement verified by xray. Xray read by Dr. Gudino . Tip placement in the SVC junction.    CLABSI prevention brochure left at bedside.    Patient's primary RN notified PICC is ready for use.    Comments:          Kacy Bryan, RN,BSN,[facility]        "

## 2021-06-05 NOTE — SIGNIFICANT EVENT
87yr F, admitted for pneumonia on 2/8/20 patient is using BiPAP PRN and Albuterol neb four times a day.  At 1640 patient developed a rapid , Adensine given, patient unresponsive and patient bagged with ambu bag and 100% O2.  Narcan given and patient started breathing on her own.  Placed on 3L Oxymask, SaO2 97%.  Monitor patient for rapid HR, respiratory distress and hypoxemia.

## 2021-06-05 NOTE — PROGRESS NOTES
Midline Insertion Procedure Note  Pt. Name: Nedra Carr  MRN:        [id number]      Procedure: Insertion of 5fr Dual Lumen BioFlo Midline Catheter, Lot number [id number]    Indications: Vacular access    Procedure Details   Patient identified with 2 identifiers.  Contraindications : none.     Maximum Barrier line insertion bundle followed: hand hygeine performed prior to procedure, site cleansed with cholraprep, hat, mask, sterile gloves,sterile gown worn, patient draped with maximum barrier head to toe drape, sterile field maintained.    The vein was assessed and found to be compressible and of adequate size. 1 ml 1% Lidocaine administered sq to the insertion site. 5fr midline catheter inserted into the brachial vein of the left arm with ultrasound guidance.1attempts required to access vein.   Catheter threaded without difficulty. Good blood return noted.    Modified Seldinger Technique used for insertion.    Catheter secured with Statlock, biopatch and Tegaderm dressing applied.    Findings:  Total catheter length  15 cm, with 0 cm exposed. Mid upper arm circumference is 16 cm. Catheter was flushed with 20 cc NS. Patient  tolerated procedure well.      Patient's primary RN notified midline is ready for use.    Comments:      A midline catheter is a form of peripheral venous access. Not recommended for the infusion of vesicants (Vancomycin, Vasopressors, TPN, etc.)    Kacy Bryan RN,BSN,[facility]

## 2021-06-05 NOTE — CONSULTS
Pulmonary Clinic Consult Note  Date of Service: 2/9/2020    Reason for Consultation: COPD requiring BiPAP    History:     HPI: Patient is a pleasant 87-year-old female, nursing home resident, with history of osteoporosis and kyphoscoliosis, protein calorie malnutrition, atrial fibrillation, hypertension, UZMA, history of aspiration pneumonia was admitted with shortness of breath and oxygen desaturation.  Per records, patient was apparently satting around 44% upon arrival to the ED.  She had been recently treated at the nursing home facility with Augmentin and doxycycline for presumed pneumonia.  Here, patient was placed on BiPAP and admitted to the ICU.  She rapidly improved after BiPAP however this morning patient required BiPAP and pulmonary critical care was consulted for further recommendation.  In terms of her treatments, patient had received IV Lasix, IV Zosyn, and nebs.  She did receive Solu-Medrol in the ED.    Patient is currently off BiPAP and has been placed on oxygen at 4 L/min via Oxymizer.  Is that she is feeling better.      PMHx/PSHx:    Past Medical History:   Diagnosis Date     A-fib (H)      Dysphagia 2000    Had duodenal strcture dilated [place] [facility] in [place] and by Dr. Hernández in 2001     HTN (hypertension)      Migraine     beta blocker prophylaxis     OA (osteoarthritis)      UZMA (obstructive sleep apnea) 04/09/2015     Pain in joint, shoulder region      Polymyalgia rheumatica (H)      Renal stone      Past Surgical History:   Procedure Laterality Date     BACK SURGERY       CHOLECYSTECTOMY  11/15/2014     COLONOSCOPY  09/03/2019     CYSTOSCOPY W/ LITHOLAPAXY / EHL  04/08/2015    Procedure: COMBINED CYSTOSCOPY, LITHOLAPAXY; Surgeon: Randy Reno MD; Location: [facility] OR       ESOPHAGOGASTRODUODENOSCOPY  11/21/12, 2/22/13, 1/13/15, 3/5/15, 11/10/15, 10/15/19     EXPLORATORY LAPAROTOMY  11/15/2014     FLEXIBLE BRONCHOSCOPY  11/21/2014     HIP FRACTURE SURGERY      x2     HYSTERECTOMY       LASER  "HOLMIUM LITHOTRIPSY BLADDER  04/08/2015       Social History     Socioeconomic History     Marital status:      Spouse name: Not on file     Number of children: Not on file     Years of education: Not on file     Highest education level: Not on file   Occupational History     Not on file   Social Needs     Financial resource strain: Not on file     Food insecurity:     Worry: Not on file     Inability: Not on file     Transportation needs:     Medical: Not on file     Non-medical: Not on file   Tobacco Use     Smoking status: Never Smoker     Smokeless tobacco: Never Used   Substance and Sexual Activity     Alcohol use: Not Currently     Drug use: Never     Sexual activity: Not on file   Lifestyle     Physical activity:     Days per week: Not on file     Minutes per session: Not on file     Stress: Not on file   Relationships     Social connections:     Talks on phone: Not on file     Gets together: Not on file     Attends [employer] service: Not on file     Active member of club or organization: Not on file     Attends meetings of clubs or organizations: Not on file     Relationship status: Not on file     Intimate partner violence:     Fear of current or ex partner: Not on file     Emotionally abused: Not on file     Physically abused: Not on file     Forced sexual activity: Not on file   Other Topics Concern     Not on file   Social History Narrative     Not on file       Review of Systems - 10 point review of system negative except for what is mentioned in the HPI.    Meds and Allergies:  See EHR for the updated medication list and Allergies. These were reviewed.       Family History   Problem Relation Age of Onset     Diabetes Mother      Diabetes Sister      Heart disease Son          Exam/Data:   /60 (Patient Position: Semi-matamoros)   Pulse 86   Temp 97.6  F (36.4  C) (Oral)   Resp (!) 30   Ht 4' 3\" (1.295 m)   Wt (!) 70 lb (31.8 kg)   SpO2 94%   BMI 18.92 kg/m      EXAM:  GEN: comfortable, " NAD, cachective  HEENT: NCAT, EMOI, mmm  CVS: S1S2, RRR  Lung: scattered carckles  Abd: soft, nt, + BS. No masses  Ext: no c/c/e  Vasc: intact radial pulses bilaterally  Neuro: nonfocal  Skin: no visible rash  Musculoskeletal: not assessed  Psych: normal affect    DATA    IMAGING: personally reviewed images. Formal radiology interpretation noted below.  Xr Chest 1 View Portable    Result Date: 2/9/2020  EXAM: XR CHEST 1 VIEW PORTABLE LOCATION: [facility] DATE/TIME: 2/9/2020 9:34 AM INDICATION: Increased work of breathing, SOB COMPARISON: 02/08/2020     Severe thoracolumbar scoliosis. Enlarged cardiac silhouette. Slightly improved nonspecific right basilar opacity. This has a masslike quality, therefore recommend continued follow-up to resolution.    Xr Chest 1 View Portable    Result Date: 2/8/2020  EXAM: XR CHEST 1 VIEW PORTABLE LOCATION: [facility] DATE/TIME: 02/08/2020, 1:42 PM INDICATION: Shortness of breath. COMPARISON: CT abdomen 09/11/2019 and thoracic spine radiograph 01/04/2018.     Radiographic evaluation of the mid and lower lungs limited by the shallow inspiration and severe scoliosis. Increased opacity throughout the right lower hemithorax could represent airspace consolidation in the lung and/or artifact related to overlap of normal structures. Fine linear opacities in the periphery of the visualized lungs could represent minimal edema. Mild cardiac enlargement.     Assessment/Plan:   Nedra Carr is a 87 y.o. female , nursing home resident, with history of osteoporosis and kyphoscoliosis, protein calorie malnutrition, atrial fibrillation, hypertension, UZMA, history of aspiration pneumonia was admitted with shortness of breath and oxygen desaturation.  Per records, patient was apparently satting around 44% upon arrival to the ED.  She had been recently treated at the nursing home facility with Augmentin and doxycycline for presumed pneumonia.    Currently, patient is off bipap  although she was placed on it very briefly this morning.     Recommendations:  Agree with Abx per primary team  Agree with diuresis per primary team  F/u sputum cultures  Tailor abx per cultures  Swallow evaluation  BiPAP if needed  Titrate FiO2  Nebs   Fluttdorothy vallve  I Will start steroids    Would strongly recommend palliative care.   Discussed with primary team.    Kael De Leon MD  Pulmonary and Critical Care Medicine  2/9/2020        No Known Allergies    Medications:     Current Facility-Administered Medications   Medication Dose Route Frequency Provider Last Rate Last Dose     acetaminophen tablet 650 mg (TYLENOL)  650 mg Oral Q4H PRN Alicia Salomon MD   650 mg at 02/09/20 0106     albuterol nebulizer solution 2.5 mg (PROVENTIL)  2.5 mg Nebulization Q4H PRN Alicia Salomon MD   2.5 mg at 02/09/20 0833     albuterol nebulizer solution 2.5 mg (PROVENTIL)  2.5 mg Nebulization QID - RT Alicia Salomon MD   2.5 mg at 02/09/20 1211     aspirin EC tablet 81 mg  81 mg Oral DAILY Alicia Salomon MD         bacitracin ointment packet 1 packet  1 packet Topical Once Christopher Malin MD         bisacodyL suppository 10 mg (DULCOLAX)  10 mg Rectal Daily PRN Alicia Salomon MD         furosemide injection 20 mg (LASIX)  20 mg Intravenous BID - diuretic Alicia Salomon MD   20 mg at 02/09/20 0918     glucagon (human recombinant) injection 1 mg  1 mg Subcutaneous Q15 Min PRN Christopher Malin MD         guaiFENesin 100 mg/5 mL syrup 200 mg (ROBITUSSIN)  200 mg Oral Q4H PRN Alicia Salomon MD         ipratropium-albuterol 0.5-2.5 mg/3 mL nebulizer solution 3 mL (DUO-NEB)  3 mL Nebulization Q4H PRN Alicia Salomon MD         metoprolol tartrate tablet 12.5 mg (LOPRESSOR)  12.5 mg Oral BID Alicia Salomon MD   12.5 mg at 02/08/20 2103     ondansetron injection 4 mg (ZOFRAN)  4 mg Intravenous Q4H PRN Alicia Salomon MD   4 mg at 02/09/20 1013    Or     ondansetron  tablet 8 mg (ZOFRAN)  8 mg Oral Q8H PRN Alicia Salomon MD         pantoprazole 40 mg injection  40 mg Intravenous Q24H Alicia Salomon MD   40 mg at 02/08/20 1843     piperacillin-tazobactam 3.375 g in NaCl 0.9 % 50 mL (MINI-BAG Plus) (ZOSYN)  3.375 g Intravenous Q8H Alicia Salomon MD 12.5 mL/hr at 02/09/20 0634 3.375 g at 02/09/20 0634     polyvinyl alcohol 1.4 % ophthalmic solution 1-2 drop (LIQUIFILM TEARS)  1-2 drop Both Eyes Q1H PRN Alicia Salomon MD         senna-docusate 8.6-50 mg tablet 1 tablet (PERICOLACE)  1 tablet Oral BID Alicia Salomon MD        Or     sennosides syrup 8.8 mg (for SENOKOT)  8.8 mg Enteral Tube BID Alicia Salomon MD         sodium chloride flush 10 mL (NS)  10 mL Intravenous Q8H FIXED TIMES Alicia Salomon MD   10 mL at 02/09/20 0900     sodium chloride flush 10-20 mL (NS)  10-20 mL Intravenous PRN Alicia Salomon MD         sodium chloride flush 10-30 mL (NS)  10-30 mL Intravenous PRN Alicia Salomon MD         sodium chloride flush 20 mL (NS)  20 mL Intravenous PRN Alicia Salomon MD         sodium chloride flush 3 mL (NS)  3 mL Intravenous Line Care Alicia Salomon MD   3 mL at 02/09/20 0100       Much or all of the text in this note was generated through the use of the Dragon Dictate voice-to-text software. Errors in spelling or words which seem out of context are unintentional. Sound alike errors, in particular, may have escaped editing.

## 2021-06-05 NOTE — PROGRESS NOTES
Pharmacy Consult: Vancomycin Dosing in the Emergency Department    Pharmacist consulted by Alyssa Cavazos MD to dose vancomycin for Nedra Carr, a 87 y.o. female.    Indication for vancomycin therapy: Hospital Acquired Pneumonia    Other current antimicrobials              vancomycin 680 mg in sodium chloride 0.9% 250 mL (VANCOCIN)  Once          piperacillin-tazobactam 3.375 g in NaCl 0.9 % 50 mL (MINI-BAG Plus) (ZOSYN)  Once          azithromycin 500 mg in dextrose 5% 250 mL (ZITHROMAX)  Once                   Assessment/Plan    1. Vancomycin 680 mg IV once in the ED (20 mg/kg actual body weight).  2. If the patient is admitted to the hospital and vancomycin therapy should continue, please re-consult pharmacy.    Subjective/Objective    Nedra Carr presented to the ED on 2/8/2020 for Shortness of Breath    Height:    Actual Body Weight (ABW): (!) 34 kg (75 lb)    Patient height not recorded    BMI: There is no height or weight on file to calculate BMI.    No Known Allergies    Patient Active Problem List   Diagnosis     A-fib (H)    Past Medical History:   Diagnosis Date     A-fib (H)      Dysphagia 2000    Had duodenal strcture dilated at [facility] in 2000 and by Dr. Hernández in 2001     HTN (hypertension)      Migraine     beta blocker prophylaxis     OA (osteoarthritis)      UZMA (obstructive sleep apnea) 04/09/2015     Pain in joint, shoulder region      Polymyalgia rheumatica (H)      Renal stone         There were no vitals filed for this visit.    Recent Labs     02/06/20  0520 02/08/20  1400   WBC 6.2 8.1   NEUTROABS  --  6.6     No results for input(s): BUN, CREATININE in the last 72 hours.    CrCl cannot be calculated (Patient's most recent lab result is older than the maximum 5 days allowed.).    Thank you for the consult,  Farzana Grier, PharmD  2/8/2020  2:43 PM

## 2021-06-05 NOTE — PROGRESS NOTES
Speech Language/Pathology  Bedside Swallow Evaluation    Problem:  Patient Active Problem List   Diagnosis     A-fib (H)     Acute respiratory failure (H)     Aspiration pneumonitis (H)     Essential hypertension, benign     Elevated troponin     Severe protein-calorie malnutrition (Martinez: less than 60% of standard weight) (H)       Onset date: 2/8/20  Reason for evaluation: determine aspiration risk, patient will known dysphagia, want dietary recommendations  Pertinent History: Pt is 87 year old with recurrent pneumonia, concerns of aspiration pneumonia, acute respiratory failure with hypoxia. Patient had last videofluoroscopy swallow study 12/2/19 which displayed no aspiration, but x1 deep penetration of thin liquids to the level of the vocal folds without cough response, and consistent penetration with thin liquids. At that time NDD1/Nectar thick liquids were recommended with recommendation for SLP to work on advancement of dietary textures and consideration of water protocol.  Current Diet: NPO waiting for swallow eval  Baseline Diet: Reg/Thin. Sounds like prior to this hospital admit, patient has been advanced to regular textures/thin liquids - patient reported she wasn't drinking with straws.    Assessment:    Patient presents with mild signs and symptoms of aspiration with thin liquids (1x delayed cough).    Patient demonstrated mild oral dysphagia.    Suspect mild pharyngeal dysphagia due to presence of clinical signs and symptoms of aspiration and/or prior history of dysphagia.    A Video Swallow Study is recommended at this time due to previously not displaying cough with deep penetration to the level of the vocal folds on previous VFSS and current respiratory and dysphagia concerns to make recommendations on appropriate liquid texture.    Rehab potential is fair based on prior level of function, evaluation results, motivation and cooperation and medical status.    Recommendations/Plan:    Recommended diet  of honey thick liquids and mechanical soft due to respiratory status and past dysphagia with thin liquids. Will make further recommendations on safest least restrictive diet to consider after VFSS results.    Recommend patient to take oral medications via applesauce or thickened liquids    Strategies: Sitting upright for all intake, slow pace of eating/drinking    Recommend oral motor exercises? no    Speech therapy 6x/week times per week    Referrals: video swallow study - text paged and spoke with MD about recommendations    Subjective:    Patient presents as alert and cooperative during this session. Sleeping upon arrival and easily aroused.  An  was not applicable    Patient was given puree, honey, thin and regular solid. Patient is able to self feed/drink with assist due to oxymask.    Objective:    Dentition/Oral hygiene: Upper and lower dentures were placed prior to BSS. Patient put dental adhesive on bottom dentures.    Oral motor function was not impaired.    Bolus prep and oral control was mildly impaired. Mastication was slow but functional and the patient used anterior chewing.    Anterior-Posterior transit was not impaired.    Mild anterior oral stasis occurred with solid, patient was aware and cleared with drinking honey thick liquids.    Puree: Patient trialed 3 bites and presented with no s/s of aspiration. Initiation of swallow appeared timely.    Honey: Patient trialed 2 ounces by cup and presented with no s/s of aspiration. Initiation of swallow appeared timely.      Thin:Patient trialed 1 ounce by cup and presented with delayed cough x1. Initiation of swallow appeared mildly delayed.     Regular Solid Patient trialed 1 ounce and presented with no s/s of aspiration.  Initiation of swallow appeared timely.    Hyolaryngeal movement appeared intact .    Addendum: Pt was on oxymask during BSS with O2 at 4L since she had just been asleep. O2 sats were mainly at  throughout BSS. x2  after sips of water O2 was at 96-97. x1 after multiple bites of cracker, O2 dropped down to 88-92 when monitoring her briefly without O2 mask - O2 SATS quickly went up to  within 1/2 minute when O2 mask replaced. RN reported that pt had been tolerating oxy mask at 2L when not asleep and she can get work of breathing. Earlier in AM she had been on BiPap some.    20 dysphagia minutes     Farheen Singleton

## 2021-06-05 NOTE — ED TRIAGE NOTES
Pt arrives via EMS from [facility]. Recently diagnosed with pneumonia. Increased shortness of breath today. O2 sats in 80s upon EMS arrival. Crackles at bilateral bases for EMS. Given duo-neb by EMS - O2 came up to 98%. After 1st neb completed, O2 sats decreased again to 80s RA. 2nd duo-neb given by EMS.     Pt appears to be working hard to breathe, using accessory muscles. Facility reported to EMS that patient is normally talkative. Pt responds to voice when her name is called, but not talking currently. Pt arrived with O2 on RA at 44%.     Provider called immediately to the room. Oxymask placed on patient immediately at 6L. O2 came up to 99%.

## 2021-06-05 NOTE — PLAN OF CARE
Problem: Pain  Goal: Patient's pain/discomfort is manageable  2/10/2020 0429 by Lola Salomon RN  Outcome: Progressing  2/10/2020 0426 by Lola Salomon RN  Outcome: Progressing     Problem: Safety  Goal: Patient will be injury free during hospitalization  2/10/2020 0429 by Lola Salomon RN  Outcome: Progressing  2/10/2020 0426 by Lola Salomon RN  Outcome: Progressing     Problem: Breathing  Goal: Patient will maintain patent airway  2/10/2020 0429 by Lola Salomon RN  Outcome: Progressing  2/10/2020 0426 by Lola Salomon RN  Outcome: Progressing    Pt continues to have increased work of breathing.  Bipap most of night.  1 episode of nausea zofran given.  Vss.  Will continue to monitor

## 2021-06-05 NOTE — ED NOTES
Patient arrived from nursing facility and placed on bipap for WOB and hypoxia.  Ipap 10, Epap 5 75%  Breath sounds with crackles, sats 100%.  Albuterol neb given

## 2021-06-05 NOTE — PROGRESS NOTES
Care Management [facility] Assessment Note:    ED assessed. Patient is a resident at [facility]. At baseline, she ambulates with a walker and is alert and oriented.       Call placed to nursing home to inform of admission and patient status.       Daughter is main contact for care planning.     Kaila Arroyo RN  Care Manager [facility]ARENA/ED  [facility].

## 2021-06-05 NOTE — H&P
Admission History and Physical   Nedra Carr,  1932, MRN [id number]    [facility]  Acute respiratory failure with hypoxia (H) [J96.01]  Pneumonia of left lower lobe due to infectious organism (H) [J18.1]    PCP: June Marshall CNP, [unfilled], [phone]   Code status:  DNR       Extended Emergency Contact Information  Primary Emergency Contact: Mark Estrella   [facility]  Home Phone: [phone]  Relation: Child  Secondary Emergency Contact: Derrick Estrella   [facility]  Home Phone: [phone]  Relation: Son-In-Law       Assessment and Plan     Assessment:  Acute hypoxic respiratory failure likely secondary due to congestive heart failure and aspiration pneumonia given predominance of findings on the right hemithorax  --BNP is elevated  Troponin is marginally elevated and therefore will get serial troponins--  --order ABG  --Ordered R CAT for BiPAP  --Keep the patient overnight in the ICU given anticipated tenuous clinical course  --Family updated that patient is critically ill  --Order IV Lasix with holding parameters and IV Zosyn  --Ordered speech eval. keep the patient n.p.o.  --We will order nebs as needed since I did not appreciate wheezing  --Ordered echocardiogram for next a.m.  --We will hold off on the steroids given absence of wheezing.  Patient did receive 1 dose of IV Solu-Medrol in the ER.  --Discussed with Dr. De Leon (intensivist)    Hypertension suboptimal control on admission   --but improved with IV Lasix  --Ordered IV Lasix with holding parameter as given above  --continue home dose of metoprolol with holding parameter    Elevated troponin likely due to type II non-STEMI due to demand ischemia  --Patient denies any chest pain  --Ordered serial troponins and echocardiogram  --We will hold off on IV heparin  --Ordered 1 dose of baby aspirin 81 mg p.o.  --continue home dose of metoprolol with holding parameter    Severe protein calorie  malnutrition  --Patient weighs 70 pounds.  Very poor reserve.  --Portends guarded prognosis  --Ordered magnesium and phosphorus protocol      Hyperglycemia likely reactive  --Ordered sliding scale insulin as per ICU protocol  --      Chronic anemia  --Defer work-up as outpatient    Severe kyphoscoliosis secondary to osteoporosis  --Making it to interpret chest imaging            Plan:    Pt would be admitted as inpatient and will likely stay for greater than 2 midnights.    Precautions: Fall and aspiration    Nutrition: N.p.o. until swallow study is done    Activity: Bedrest    IV fluids: None    Antibiotics: Zosyn      GI prophylaxis: PPI    Consult: Intensivist    Monitor: Hypoxia    Labs: Serial troponins    Imaging: Echocardiogram      Total time taken 70 minutes.     Chief Complaint:  Shortness of breath and hypoxia at the nursing home worsening for the past 1 week     HPI:    Informant is patient's daughter  Nedra Carr is a 87 y.o. old female with history of recurrent pneumonia, osteoporosis with severe kyphoscoliosis severe protein calorie malnutrition with a weight of 70 pounds, paroxysmal A. fib, dysphagia, hypertension, obstructive sleep apnea and nursing home resident who has been feeling worsening shortness of breath for the past 1 week.  She denies any upper respiratory tract infection.  According to the daughter there is a graft of cold air coming in the dining room and that could have caused her pneumonia.  Patient has had a history of aspiration but she was slowly upgraded to regular diet.  Patient was treated for pneumonia with Augmentin and doxycycline at the nursing home but with no improvement.  Patient arrived in the ER with oxygen saturation of 44% on room air.  She was put on BiPAP and duo nebs were given.  Subsequently her oxygen level came to 98%.  She was also given antibiotics and IV Lasix.  Her BNP was elevated.  First set of troponin was 0.17. patient denies any chest pain.  She  is normally not on oxygen.  Her CODE STATUS is DNR but it is okay to intubate.  Complete review of systems cannot be obtained since patient is hearing impaired and confused  She notes loss of appetite                   Medical History      Past Medical History:   Diagnosis Date     A-fib (H)      Dysphagia 2000     HTN (hypertension)      Migraine      OA (osteoarthritis)      UZMA (obstructive sleep apnea) 04/09/2015     Pain in joint, shoulder region      Polymyalgia rheumatica (H)      Renal stone       Family History  Reviewed, and family history includes Diabetes in her mother and sister; Heart disease in her son.          Allergies  No Known Allergies Social History  Reviewed, and she  reports that she has never smoked. She has never used smokeless tobacco. She reports previous alcohol use.  DNR but okay to intubate  Review of Systems:  10-point ROS negative, except as noted in HPI            Prior to Admission Medications   Medications Prior to Admission   Medication Sig Dispense Refill Last Dose     acetaminophen (TYLENOL) 325 MG tablet Take 650 mg by mouth every 6 (six) hours as needed for pain.   PRN     albuterol (ACCUNEB) 1.25 mg/3 mL nebulizer solution Take 1 ampule by nebulization every 4 (four) hours as needed for wheezing.          2/8/2020 at AM     alendronate (FOSAMAX) 70 MG tablet Take 70 mg by mouth every 7 days. Take in the morning on an empty stomach with a full glass of water 30 minutes before food (thur)   2/6/2020     amoxicillin-clavulanate (AUGMENTIN) 500-125 mg per tablet Take 1 tablet by mouth every 8 (eight) hours. Take for 5 days.    2/8/2020 at AM     benzonatate (TESSALON) 100 MG capsule Take 100 mg by mouth daily as needed for cough.    PRN     doxycycline (ADOXA) 75 MG tablet Take 75 mg by mouth 2 (two) times a day. Take for 7 days   2/8/2020 at AM     ferrous gluconate (FERGON) 324 MG tablet Take 324 mg by mouth daily with breakfast.   2/8/2020 at AM     guaiFENesin (ROBITUSSIN)  100 mg/5 mL syrup Take 200 mg by mouth every 4 (four) hours as needed for cough.    PRN     melatonin 1 mg Tab tablet Take 1 mg by mouth at bedtime.   2/7/2020 at HS     metoprolol tartrate (LOPRESSOR) 25 MG tablet Take 12.5 mg by mouth 2 (two) times a day.   2/8/2020 at AM     pantoprazole (PROTONIX) 40 MG tablet Take 40 mg by mouth daily.   2/8/2020 at AM     sucralfate (CARAFATE) 100 mg/mL suspension Take 1 g by mouth 4 (four) times a day.   2/8/2020 at AM     trolamine salicylate (ASPERCREME) 10 % cream Apply 1 application topically daily.   2/8/2020 at AM     vitamin A-vitamin C-vit E-min (OCUVITE) Tab tablet Take 1 tablet by mouth daily.   2/8/2020 at AM          Physical Exam:  Temp:  [97.6  F (36.4  C)] 97.6  F (36.4  C)  Heart Rate:  [100-124] 101  Resp:  [26-40] 40  BP: (122-177)/(58-85) 122/58  FiO2 (%):  [35 %] 35 %  (!) 70 lb (31.8 kg)  Body mass index is 18.92 kg/m .  Hearing impaired and confused  Using accessory muscles of respiration to breathe  Looks chronically ill and cachectic  Severe kyphoscoliosis  No sinus tenderness  Moist  mucus membranes  Neck supple, but thin  CVS: Hyperdynamic circulation with tachycardia  Resp: Severely depressed breath sounds on the right hemithorax  Inspiratory crackles all over the lung fields on the left hemithorax  I did not hear any normal breath sounds.  Did discuss this with intensivist Abd: soft, No t/g/r  Morejon's catheter in place  Neuro: no involuntary movements such as tremors  Vasc: no leg edema  No clubbing  Skin--no generalized skin rash     Pertinent Labs  Lab Results:  Recent Results (from the past 24 hour(s))   Troponin I    Collection Time: 02/08/20  2:00 PM   Result Value Ref Range    Troponin I 0.17 0.00 - 0.29 ng/mL   BNP(B-type Natriuretic Peptide)    Collection Time: 02/08/20  2:00 PM   Result Value Ref Range     (H) 0 - 167 pg/mL   HM1 (CBC with Diff)    Collection Time: 02/08/20  2:00 PM   Result Value Ref Range    WBC 8.1 4.0 - 11.0  thou/uL    RBC 3.92 3.80 - 5.40 mill/uL    Hemoglobin 10.3 (L) 12.0 - 16.0 g/dL    Hematocrit 35.6 35.0 - 47.0 %    MCV 91 80 - 100 fL    MCH 26.3 (L) 27.0 - 34.0 pg    MCHC 28.9 (L) 32.0 - 36.0 g/dL    RDW 15.9 (H) 11.0 - 14.5 %    Platelets 300 140 - 440 thou/uL    MPV 11.2 8.5 - 12.5 fL    Neutrophils % 82 (H) 50 - 70 %    Lymphocytes % 11 (L) 20 - 40 %    Monocytes % 6 2 - 10 %    Eosinophils % 0 0 - 6 %    Basophils % 0 0 - 2 %    Neutrophils Absolute 6.6 2.0 - 7.7 thou/uL    Lymphocytes Absolute 0.9 0.8 - 4.4 thou/uL    Monocytes Absolute 0.5 0.0 - 0.9 thou/uL    Eosinophils Absolute 0.0 0.0 - 0.4 thou/uL    Basophils Absolute 0.0 0.0 - 0.2 thou/uL   Lactic Acid    Collection Time: 02/08/20  2:42 PM   Result Value Ref Range    Lactic Acid 1.2 0.5 - 2.2 mmol/L   Basic Metabolic Panel    Collection Time: 02/08/20  2:50 PM   Result Value Ref Range    Sodium 141 136 - 145 mmol/L    Potassium 4.2 3.5 - 5.0 mmol/L    Chloride 104 98 - 107 mmol/L    CO2 27 22 - 31 mmol/L    Anion Gap, Calculation 10 5 - 18 mmol/L    Glucose 290 (H) 70 - 125 mg/dL    Calcium 9.2 8.5 - 10.5 mg/dL    BUN 23 8 - 28 mg/dL    Creatinine 0.77 0.60 - 1.10 mg/dL    GFR MDRD Af Amer >60 >60 mL/min/1.73m2    GFR MDRD Non Af Amer >60 >60 mL/min/1.73m2   Influenza A/B Rapid Test    Collection Time: 02/08/20  3:05 PM   Result Value Ref Range    Influenza  A, Rapid Antigen No Influenza A antigen detected No Influenza A antigen detected    Influenza B, Rapid Antigen No Influenza B antigen detected No Influenza B antigen detected   Urinalysis    Collection Time: 02/08/20  3:30 PM   Result Value Ref Range    Color, UA Yellow Colorless, Yellow, Straw, Light Yellow    Clarity, UA Cloudy (!) Clear    Glucose, UA Negative Negative    Bilirubin, UA Negative Negative    Ketones, UA Negative Negative, 60 mg/dL    Specific Gravity, UA 1.015 1.001 - 1.030    Blood, UA Negative Negative    pH, UA 5.0 4.5 - 8.0    Protein,  mg/dL (!) Negative mg/dL     Urobilinogen, UA <2.0 E.U./dL <2.0 E.U./dL, 2.0 E.U./dL    Nitrite, UA Negative Negative    Leukocytes, UA Large (!) Negative    Bacteria, UA Few (!) None Seen hpf    RBC, UA 3-5 (!) None Seen, 0-2 hpf    WBC, UA 10-25 (!) None Seen, 0-5 hpf    Squam Epithel, UA 5-10 (!) None Seen, 0-5 lpf    WBC Clumps Present (!) None Seen    Trans Epithel, UA 0-5 (!) None Seen lpf    Yeast, UA Moderate (!) None Seen hpf     No results found for: HGBA1C  No results found for: IRON, TIBC, FERRITIN  Lab Results   Component Value Date    TROPONINI 0.17 02/08/2020     No results found for: TSH, [id number], [id number], THYROIDAB    Pertinent Radiology  Radiology Results:Xr Chest 1 View Portable    Result Date: 2/8/2020  EXAM: XR CHEST 1 VIEW PORTABLE LOCATION: [facility] DATE/TIME: 02/08/2020, 1:42 PM INDICATION: Shortness of breath. COMPARISON: CT abdomen 09/11/2019 and thoracic spine radiograph 01/04/2018.     Radiographic evaluation of the mid and lower lungs limited by the shallow inspiration and severe scoliosis. Increased opacity throughout the right lower hemithorax could represent airspace consolidation in the lung and/or artifact related to overlap of normal structures. Fine linear opacities in the periphery of the visualized lungs could represent minimal edema. Mild cardiac enlargement.         EKG Results: personally reviewed.    ST depression in lateral leads  Echo:

## 2021-06-05 NOTE — ED PROVIDER NOTES
EMERGENCY DEPARTMENT ENCOUNTER     NAME: Nedra Carr   AGE: 87 y.o. female   YOB: 1932   MRN: [id number]   EVALUATION DATE & TIME: 2/8/2020  1:27 PM   PCP: June Marshall CNP     Chief Complaint   Patient presents with     Shortness of Breath   :    FINAL IMPRESSION       1. Acute respiratory failure with hypoxia (H)    2. Pneumonia of left lower lobe due to infectious organism (H)           ED COURSE & MEDICAL DECISION MAKING    1:30 PM I met with the patient to introduce myself, gather additional history, perform my initial exam, and discuss the plan.   1:35 PM Ordered BiPap. Called for RT, portable CXR.   1:36 PM I spoke to patient's family member over the phone regarding code status.  1:40 PM I reviewed the portable CXR.  1:45 PM I spoke to patient's daughter at bedside.  2:44 PM I spoke to hospitalist Dr. Malin.  2:49 PM I spoke to intensivist Dr. De Leon.  Pertinent Labs & Imaging studies reviewed. (See chart for details)   87 y.o. female  presents to the Emergency Department for evaluation of hypoxia and difficulty breathing. Initial Vitals Reviewed. Initial exam notable for extremely ill-appearing cachectic elderly patient with kyphosis and scoliosis clinically, increased work of breathing with diffuse inspiratory and expiratory wheezes and crackles, hypoxia with room air sats in the 40s.  She was quite pale and dusky appearing on arrival and was minimally responsive.  Almost immediately on my evaluation, we started some additional nebulizer therapy and started BiPAP.  I was then able to look through her chart and I can see that a couple of days ago she had a chest x-ray that showed a lower lobe pneumonia and was placed on doxycycline and Augmentin.  I was able to speak at length with Mark her daughter, who is her power of .  She did state that her mother is DNR, but even after discussion about risks, she stated that she thinks she would still want intubation if it came to that.   Fortunately here in the ED her respiratory status is improving significantly.  She is now more awake, nodding to questioning, sats are 100 on 100% FiO2 on BiPAP.  Her chest x-ray is incredibly difficult to interpret but does appear to show worsening lower lobe pneumonia, also with some edema and significant scoliosis and kyphosis that make it difficult to interpret.  In terms of treatment, due to the wheezing I did start nebulizer therapy and some IV steroids.  I am also covering with broader spectrum antibiotics with vancomycin, Zosyn, and azithromycin for hospital-acquired pneumonia and her facility that is failed outpatient management.  In addition, there does show edema and I suspect that she is going to require Lasix as well.  Unfortunately we have had some difficulty obtaining lab studies and at time of the end of my shift the basic metabolic panel and lactate are still pending.  My plan is to give a dose of Lasix if the basic metabolic panel allows with a reasonable creatinine.  I think either way she needs to be placed in the ICU given her tenuous respiratory status.  Her family understands that there would likely be difficulty getting her off of ventilator if we did intubate, but they do feel that she would be amenable to admission if it becomes necessary.  I have discussed her case with hospitalist and intensivist and there are plans for ICU admission.  I will asked that she stay in the emergency department until we have the basic metabolic panel and can make a determination about lasix.  Of note, I did consider possible pulmonary embolus given the profound hypoxia, but I think the patient's respiratory status is too tenuous to proceed with CT PE scan at this time.           At the conclusion of the encounter I discussed the results of all of the tests and the disposition. The questions were answered. The patient or family acknowledged understanding and was agreeable with the care plan.     60 minutes  critical care time, see procedure note below for details if relevant    MEDICATIONS GIVEN IN THE EMERGENCY:   Medications   piperacillin-tazobactam 3.375 g in NaCl 0.9 % 50 mL (MINI-BAG Plus) (ZOSYN) (has no administration in time range)   azithromycin 250 mg in dextrose 5% 250 mL (ZITHROMAX) (has no administration in time range)   vancomycin 680 mg in sodium chloride 0.9% 250 mL (VANCOCIN) (has no administration in time range)   albuterol nebulizer solution 2.5 mg (PROVENTIL) (2.5 mg Nebulization Given 2/8/20 1401)   methylPREDNISolone sod suc(PF) injection 125 mg (Solu-MEDROL) (125 mg Intravenous Given 2/8/20 1354)   ipratropium-albuterol 0.5-2.5 mg/3 mL nebulizer solution 3 mL (DUO-NEB) (3 mL Nebulization Given 2/8/20 1340)      NEW PRESCRIPTIONS STARTED AT TODAY'S ER VISIT   Current Discharge Medication List      CONTINUE these medications which have NOT CHANGED    Details   acetaminophen (TYLENOL) 325 MG tablet Take 650 mg by mouth every 6 (six) hours as needed for pain.      albuterol (ACCUNEB) 1.25 mg/3 mL nebulizer solution Take 1 ampule by nebulization every 4 (four) hours as needed for wheezing.             alendronate (FOSAMAX) 70 MG tablet Take 70 mg by mouth every 7 days. Take in the morning on an empty stomach with a full glass of water 30 minutes before food (thur)      amoxicillin-clavulanate (AUGMENTIN) 500-125 mg per tablet Take 1 tablet by mouth every 8 (eight) hours. Take for 5 days.       benzonatate (TESSALON) 100 MG capsule Take 100 mg by mouth daily as needed for cough.       doxycycline (ADOXA) 75 MG tablet Take 75 mg by mouth 2 (two) times a day. Take for 7 days      ferrous gluconate (FERGON) 324 MG tablet Take 324 mg by mouth daily with breakfast.      guaiFENesin (ROBITUSSIN) 100 mg/5 mL syrup Take 200 mg by mouth every 4 (four) hours as needed for cough.       melatonin 1 mg Tab tablet Take 1 mg by mouth at bedtime.      metoprolol tartrate (LOPRESSOR) 25 MG tablet Take 12.5 mg by mouth  2 (two) times a day.      pantoprazole (PROTONIX) 40 MG tablet Take 40 mg by mouth daily.      sucralfate (CARAFATE) 100 mg/mL suspension Take 1 g by mouth 4 (four) times a day.      trolamine salicylate (ASPERCREME) 10 % cream Apply 1 application topically daily.      vitamin A-vitamin C-vit E-min (OCUVITE) Tab tablet Take 1 tablet by mouth daily.            ================================================================   HISTORY OF PRESENT ILLNESS       Patient information was obtained from: EMS  Use of Intrepreter: N/A  Nedra Carr is a 87 y.o. female with history of atrial fibrillation, HTN who presents to the ED via EMS for evaluation of shortness of breath.   Per EMS, while at her residence [facility] the patient became increasingly short of breath. EMS was called and upon arrival she was 80% on room air, after 1 duoneb she came up to 98% on room air, but after completion she decreased again to 80% on room so she was given another duoneb. On 2/02/2020 the patient was diagnosed with a pneumonia and was discharged on Augmentin and Doxycycline. Patient is DNR.  ================================================================    REVIEW OF SYSTEMS     Review of Systems   Unable to perform ROS: Acuity of condition   Respiratory: Positive for shortness of breath.          PAST HISTORY     PAST MEDICAL HISTORY:   Past Medical History:   Diagnosis Date     A-fib (H)      Dysphagia 2000    Had duodenal strcture dilated at [facility] in 2000 and by Dr. Hernández in 2001     HTN (hypertension)      Migraine     beta blocker prophylaxis     OA (osteoarthritis)      UZMA (obstructive sleep apnea) 04/09/2015     Pain in joint, shoulder region      Polymyalgia rheumatica (H)      Renal stone       PAST SURGICAL HISTORY:   Past Surgical History:   Procedure Laterality Date     BACK SURGERY       CHOLECYSTECTOMY  11/15/2014     COLONOSCOPY  09/03/2019     CYSTOSCOPY W/ LITHOLAPAXY / EHL  04/08/2015    Procedure:  COMBINED CYSTOSCOPY, LITHOLAPAXY; Surgeon: Randy Reno MD; Location: [facility] OR       ESOPHAGOGASTRODUODENOSCOPY  11/21/12, 2/22/13, 1/13/15, 3/5/15, 11/10/15, 10/15/19     EXPLORATORY LAPAROTOMY  11/15/2014     FLEXIBLE BRONCHOSCOPY  11/21/2014     HIP FRACTURE SURGERY      x2     HYSTERECTOMY       LASER HOLMIUM LITHOTRIPSY BLADDER  04/08/2015      CURRENT MEDICATIONS:   No current facility-administered medications on file prior to encounter.      Current Outpatient Medications on File Prior to Encounter   Medication Sig     acetaminophen (TYLENOL) 325 MG tablet Take 650 mg by mouth every 6 (six) hours as needed for pain.     albuterol (ACCUNEB) 1.25 mg/3 mL nebulizer solution Take 1 ampule by nebulization every 4 (four) hours as needed for wheezing.            alendronate (FOSAMAX) 70 MG tablet Take 70 mg by mouth every 7 days. Take in the morning on an empty stomach with a full glass of water 30 minutes before food (thur)     amoxicillin-clavulanate (AUGMENTIN) 500-125 mg per tablet Take 1 tablet by mouth every 8 (eight) hours. Take for 5 days.      benzonatate (TESSALON) 100 MG capsule Take 100 mg by mouth daily as needed for cough.      doxycycline (ADOXA) 75 MG tablet Take 75 mg by mouth 2 (two) times a day. Take for 7 days     ferrous gluconate (FERGON) 324 MG tablet Take 324 mg by mouth daily with breakfast.     guaiFENesin (ROBITUSSIN) 100 mg/5 mL syrup Take 200 mg by mouth every 4 (four) hours as needed for cough.      melatonin 1 mg Tab tablet Take 1 mg by mouth at bedtime.     metoprolol tartrate (LOPRESSOR) 25 MG tablet Take 12.5 mg by mouth 2 (two) times a day.     pantoprazole (PROTONIX) 40 MG tablet Take 40 mg by mouth daily.     sucralfate (CARAFATE) 100 mg/mL suspension Take 1 g by mouth 4 (four) times a day.     trolamine salicylate (ASPERCREME) 10 % cream Apply 1 application topically daily.     vitamin A-vitamin C-vit E-min (OCUVITE) Tab tablet Take 1 tablet by mouth daily.      [DISCONTINUED] albuterol (ACCUNEB) 1.25 mg/3 mL nebulizer solution Take 1 ampule by nebulization 4 (four) times a day.     [DISCONTINUED] albuterol (ACCUNEB) 1.25 mg/3 mL nebulizer solution Take 1 ampule by nebulization every 4 (four) hours as needed for wheezing.     [DISCONTINUED] multivitamin therapeutic tablet Take 1 tablet by mouth daily.      ALLERGIES:   No Known Allergies   FAMILY HISTORY:   Family History   Problem Relation Age of Onset     Diabetes Mother      Diabetes Sister      Heart disease Son       SOCIAL HISTORY:   Social History     Socioeconomic History     Marital status:      Spouse name: Not on file     Number of children: Not on file     Years of education: Not on file     Highest education level: Not on file   Occupational History     Not on file   Social Needs     Financial resource strain: Not on file     Food insecurity:     Worry: Not on file     Inability: Not on file     Transportation needs:     Medical: Not on file     Non-medical: Not on file   Tobacco Use     Smoking status: Never Smoker     Smokeless tobacco: Never Used   Substance and Sexual Activity     Alcohol use: Not Currently     Drug use: Not on file     Sexual activity: Not on file   Lifestyle     Physical activity:     Days per week: Not on file     Minutes per session: Not on file     Stress: Not on file   Relationships     Social connections:     Talks on phone: Not on file     Gets together: Not on file     Attends Confucianism service: Not on file     Active member of club or organization: Not on file     Attends meetings of clubs or organizations: Not on file     Relationship status: Not on file     Intimate partner violence:     Fear of current or ex partner: Not on file     Emotionally abused: Not on file     Physically abused: Not on file     Forced sexual activity: Not on file   Other Topics Concern     Not on file   Social History Narrative     Not on file        VITALS  Patient Vitals for the past 24 hrs:    BP Pulse Resp SpO2 Weight   02/08/20 1340 177/85 (!) 124 26 100 % (!) 75 lb (34 kg)        ================================================================    PHYSICAL EXAM     VITAL SIGNS: /85 (Patient Position: Semi-matamoros)   Pulse (!) 124   Resp 26   Wt (!) 75 lb (34 kg)   SpO2 100%    Constitutional:  Awake, Acute respiratory distress. Oxygen sats. In the 40's on room air. Extremely cachetic with kyphosis.   HENT:  Atraumatic, oropharynx without exudate or erythema, membranes moist  Lymph:  No adenopathy  Eyes: EOM intact, PERRL, no injection  Neck: Supple  Respiratory:  Diffuse inspiratory and expiratory crackles and wheezing with tachypnea. Increased work of breathing.   Cardiovascular:  Regular rate and rhythm, single S1 and S2   GI:  Soft, nontender, nondistended, no rebound or guarding   Musculoskeletal:  Moves all extremities, no lower extremity edema, no deformities    Skin:  Warm, dry  Neurologic:  Alert, no focal deficits noted   ================================================================  LAB       All pertinent labs reviewed and interpreted.   Results for orders placed or performed during the hospital encounter of 02/08/20   Troponin I   Result Value Ref Range    Troponin I 0.17 0.00 - 0.29 ng/mL   BNP(B-type Natriuretic Peptide)   Result Value Ref Range     (H) 0 - 167 pg/mL   HM1 (CBC with Diff)   Result Value Ref Range    WBC 8.1 4.0 - 11.0 thou/uL    RBC 3.92 3.80 - 5.40 mill/uL    Hemoglobin 10.3 (L) 12.0 - 16.0 g/dL    Hematocrit 35.6 35.0 - 47.0 %    MCV 91 80 - 100 fL    MCH 26.3 (L) 27.0 - 34.0 pg    MCHC 28.9 (L) 32.0 - 36.0 g/dL    RDW 15.9 (H) 11.0 - 14.5 %    Platelets 300 140 - 440 thou/uL    MPV 11.2 8.5 - 12.5 fL    Neutrophils % 82 (H) 50 - 70 %    Lymphocytes % 11 (L) 20 - 40 %    Monocytes % 6 2 - 10 %    Eosinophils % 0 0 - 6 %    Basophils % 0 0 - 2 %    Neutrophils Absolute 6.6 2.0 - 7.7 thou/uL    Lymphocytes Absolute 0.9 0.8 - 4.4 thou/uL    Monocytes  Absolute 0.5 0.0 - 0.9 thou/uL    Eosinophils Absolute 0.0 0.0 - 0.4 thou/uL    Basophils Absolute 0.0 0.0 - 0.2 thou/uL        ===============================================================  RADIOLOGY       Reviewed all pertinent imaging. Please see official radiology report.   Xr Chest 1 View Portable    Result Date: 2/8/2020  EXAM: XR CHEST 1 VIEW PORTABLE LOCATION: [facility] DATE/TIME: 02/08/2020, 1:42 PM INDICATION: Shortness of breath. COMPARISON: CT abdomen 09/11/2019 and thoracic spine radiograph 01/04/2018.     Radiographic evaluation of the mid and lower lungs limited by the shallow inspiration and severe scoliosis. Increased opacity throughout the right lower hemithorax could represent airspace consolidation in the lung and/or artifact related to overlap of normal structures. Fine linear opacities in the periphery of the visualized lungs could represent minimal edema. Mild cardiac enlargement.          ================================================================  EKG       EKG reviewed interpreted by me shows sinus tachycardia with a rate of 122, normal axis and intervals,  I have independently reviewed and interpreted the EKG(s) documented above.     ================================================================  PROCEDURES       Critical Care  Performed by: Alyssa Foy MD  Authorized by: Alyssa Foy MD   Total critical care time: 60 minutes  Critical care time was exclusive of separately billable procedures and treating other patients.  Critical care was necessary to treat or prevent imminent or life-threatening deterioration of the following conditions: respiratory failure.  Critical care was time spent personally by me on the following activities: blood draw for specimens, development of treatment plan with patient or surrogate, discussions with consultants, evaluation of patient's response to treatment, examination of patient, obtaining history from patient or  surrogate, ordering and performing treatments and interventions, ordering and review of laboratory studies, ordering and review of radiographic studies, pulse oximetry, re-evaluation of patient's condition and review of old charts (BiPAP management, family conversations, radiology and laboratory review, discussions with intensivist, hospitalist).          I, Catalino Bautista, am serving as a scribe to document services personally performed by Dr. Foy based on my observation and the provider's statements to me. I, Alyssa Foy MD attest that Catalino Bautista is acting in a scribe capacity, has observed my performance of the services and has documented them in accordance with my direction.   Alyssa Foy M.D.   Emergency Medicine   [facility] EMERGENCY DEPARTMENT  [address] [address]  Dept: [phone]  Loc: [phone]      Alyssa Foy MD  02/08/20 1513       Alyssa Foy MD  02/08/20 1516

## 2021-06-05 NOTE — PROGRESS NOTES
"   02/08/20 1618   Non-Invasive    Pt Owned Device No   Device V 60   Pt. Interface Over the nose   Over the nose Size Small  (Changed to smaller light)   Mode ST   IPAP 10 cmH2O   EPAP 5 cmH2O   Resp Rate (Set) 16   Insp Time (sec) 1 sec   FiO2 (%) 35 %  (FiO2 decreased)   Insp Rise Time (%) 3 %   Monitoring   Resp Rate Observed 31   Tidal (Observed) 207 mL   Minute Ventilation (L/min) 7.6 L/min   PIP Observed (cm H2O) 10 cm H2O   Ti/Ttot 30   Pt Leak 23   Alarms   Insp Pressure High (cm H2O) 15 cm H2O   Insp Pressure Low (cm H2O) 8 cm H2O   Tidal Volume High 1200   Tidal Volume Low 200   Low Presssure Delay 30 sec   MV Low (L/min) 3 L/min   High Resp Rate 50   Low Resp Rate 10   Alarm Functional and On Yes   NPPV Other   SpO2 96 %   Heart Rate (!) 101   Skin Under Mask No breakdown  (Liquicel in place)   Face to face with RN about skin assessment.    /57   Pulse 93   Temp 97.6  F (36.4  C) (Axillary)   Resp (!) 35   Ht 4' 3\" (1.295 m)   Wt (!) 70 lb (31.8 kg)   SpO2 100%   BMI 18.92 kg/m      Component      Latest Ref Rng & Units 2/8/2020   pH, Arterial      7.37 - 7.44 7.36 (L)   pCO2, Arterial      35 - 45 mm Hg 56 (H)   pO2, Arterial      75 - 85 mm Hg 121 (H)   Bicarbonate, Arterial Calc      23.0 - 29.0 mmol/L 28.9   O2 Sat, Arterial      95.0 - 96.0 % 99.6 (H)   Oxyhemoglobin      95.0 - 96.0 % 96.5 (H)   POC Base Excess Calc      mmol/L 5.1   Ventilation Mode       Oxymask   Flow      LPM 4.0   Sample Stabilized Temperature      degrees C 37.0     "

## 2021-06-05 NOTE — PLAN OF CARE
Problem: Pain  Goal: Patient's pain/discomfort is manageable  Outcome: Progressing  Note:   Patient had a headache today, gave 1 dose of tylenol, patient sleeping afterwards.      Problem: Potential for Compromised Skin Integrity  Goal: Nutritional status is improving  Outcome: Progressing  Note:   Patient had SLP evaluation today, Cardiac diet, mechanical soft and honey thick liquids were recommended. Encouraged food intake for lunch.      Problem: Ineffective Airway Clearance  Goal: Maintain airway patency  Outcome: Progressing     Patient afebrile, was on  2-4L NC/Oxymask this shift.  Patient continues to have shortness of breath. Nebs given by RT. Bipap on for short period earlier this morning (see previous note). Patient had an Echo this morning. Patient up to chair today for approx. 2 hours.  See event note.

## 2021-06-05 NOTE — PROGRESS NOTES
"Progress Note    Assessment/Plan  86 yo female with known dysphagia, aspiration pneumonia, PMR, osteoporosis, severe protein calorie malnutrition admitted for shortness of breath and new finding of right sided pneumonia, likely recurrent aspiration.    1. Recurrent aspiration pneumonia  -- Patient \"graduated\" to a regular diet per her daughter a \"few months ago\"  -- Last swallow study I see showed silent aspiration with thin liquids and continued recommendation of NDD1 and nectar thick liquids, but she has not been receiving this per her daughter  -- Continue Zosyn for aspiration pneumonia    2. Acute respiratory failure  -- She required Bipap on admit for coarse breath sounds, improved and was able to be off bipap for the last 12 hours  -- Started Lasix for elevated BNP and possible fluid component on  CXR, -1250 overnight  Update: Increased work of breathing reported around 0915, albuterol neb done without improvement and restarting Bipap, getting STAT CXR. Will also get pulm consult.    3. Cachexia, malnutrition  -- Patient reportedly has a very poor appetite at baseline, likely will have worse tolerance with needed changes in diet  Update Patient very clearly tells me that she does not want to be on a pureed diet again. When I remind her that she will not get better, she shrugs to indicate that she doesn't care.     4. Possible left heart strain with slight increase in troponin  -- Echo pending  -- Troponin slightly increased but not abnormal range    Discussed with patient's daughter this morning, reports \"my mom is going to hate being on a pureed diet again.\" Discussed that perhaps is time to discuss a more palliative course with shift of focus to comfort rather than prolonging life. Daughter reports they have \"never talked about this.\"  Will need to have a care conference with the patient and family as the patient seems to desire continuing to eat for comfort and shift in focus    Code: DNR okay with " intubation      Disposition Plan:Unknown  Barriers to Discharge:Worsening respiratory status    Principal Problem:    Acute respiratory failure (H)  Active Problems:    Aspiration pneumonitis (H)    Essential hypertension, benign    Elevated troponin    Severe protein-calorie malnutrition (Martinez: less than 60% of standard weight) (H)      Subjective  Reports still somewhat short of breath. No complaints of pain. Pretty weak and tired easily.    Objective    Vital signs in last 24 hours  Temp:  [97.6  F (36.4  C)-98.3  F (36.8  C)] 97.8  F (36.6  C)  Heart Rate:  [] 90  Resp:  [20-44] 20  BP: (106-177)/(54-85) 142/66  FiO2 (%):  [35 %] 35 %  Weight:   (!) 70 lb (31.8 kg)    Intake/Output last 3 shifts  I/O last 3 completed shifts:  In: 600 [IV Piggyback:600]  Out: 1850 [Urine:1850]  Intake/Output this shift:  No intake/output data recorded.    Review of Systems   Review of systems not obtained due to inability to communicate with the patient.     Physical Exam  General: Well developed frail elderly female, No apparent distress  Cardiovascular: Regular rate and rhythm, Normal S1, S2  Lungs: Coarse breath sounds bilaterally, R>>L  Abdomen: Soft, Non-tender, not distended, Bowel sounds present  Extremities: No edema, no clubbing  Skin: Warm and well-perfused without lesions.  Neurologic: Alert. Face is symmetric, Moves all extremities equally      Pertinent Labs   Lab Results: personally reviewed.   Recent Results (from the past 24 hour(s))   Troponin I   Result Value Ref Range    Troponin I 0.17 0.00 - 0.29 ng/mL   BNP(B-type Natriuretic Peptide)   Result Value Ref Range     (H) 0 - 167 pg/mL   HM1 (CBC with Diff)   Result Value Ref Range    WBC 8.1 4.0 - 11.0 thou/uL    RBC 3.92 3.80 - 5.40 mill/uL    Hemoglobin 10.3 (L) 12.0 - 16.0 g/dL    Hematocrit 35.6 35.0 - 47.0 %    MCV 91 80 - 100 fL    MCH 26.3 (L) 27.0 - 34.0 pg    MCHC 28.9 (L) 32.0 - 36.0 g/dL    RDW 15.9 (H) 11.0 - 14.5 %    Platelets 300 140  - 440 thou/uL    MPV 11.2 8.5 - 12.5 fL    Neutrophils % 82 (H) 50 - 70 %    Lymphocytes % 11 (L) 20 - 40 %    Monocytes % 6 2 - 10 %    Eosinophils % 0 0 - 6 %    Basophils % 0 0 - 2 %    Neutrophils Absolute 6.6 2.0 - 7.7 thou/uL    Lymphocytes Absolute 0.9 0.8 - 4.4 thou/uL    Monocytes Absolute 0.5 0.0 - 0.9 thou/uL    Eosinophils Absolute 0.0 0.0 - 0.4 thou/uL    Basophils Absolute 0.0 0.0 - 0.2 thou/uL   ECG 12 lead nursing unit performed   Result Value Ref Range    SYSTOLIC BLOOD PRESSURE      DIASTOLIC BLOOD PRESSURE      VENTRICULAR RATE 122 BPM    ATRIAL RATE 122 BPM    P-R INTERVAL 124 ms    QRS DURATION 76 ms    Q-T INTERVAL 314 ms    QTC CALCULATION (BEZET) 447 ms    P Axis 56 degrees    R AXIS 58 degrees    T AXIS -41 degrees    MUSE DIAGNOSIS       ** Poor data quality, interpretation may be adversely affected  Sinus tachycardia  ST & T wave abnormality, consider inferolateral ischemia  Abnormal ECG    Confirmed by SEE ED PROVIDER NOTE FOR, ECG INTERPRETATION ([id number]),  LIANA SIERRA ([id number]) on 2/9/2020 12:06:01 AM     Lactic Acid   Result Value Ref Range    Lactic Acid 1.2 0.5 - 2.2 mmol/L   Basic Metabolic Panel   Result Value Ref Range    Sodium 141 136 - 145 mmol/L    Potassium 4.2 3.5 - 5.0 mmol/L    Chloride 104 98 - 107 mmol/L    CO2 27 22 - 31 mmol/L    Anion Gap, Calculation 10 5 - 18 mmol/L    Glucose 290 (H) 70 - 125 mg/dL    Calcium 9.2 8.5 - 10.5 mg/dL    BUN 23 8 - 28 mg/dL    Creatinine 0.77 0.60 - 1.10 mg/dL    GFR MDRD Af Amer >60 >60 mL/min/1.73m2    GFR MDRD Non Af Amer >60 >60 mL/min/1.73m2   Phosphorus   Result Value Ref Range    Phosphorus 4.7 (H) 2.5 - 4.5 mg/dL   Magnesium   Result Value Ref Range    Magnesium 1.8 1.8 - 2.6 mg/dL   Influenza A/B Rapid Test   Result Value Ref Range    Influenza  A, Rapid Antigen No Influenza A antigen detected No Influenza A antigen detected    Influenza B, Rapid Antigen No Influenza B antigen detected No Influenza B antigen detected    Urinalysis   Result Value Ref Range    Color, UA Yellow Colorless, Yellow, Straw, Light Yellow    Clarity, UA Cloudy (!) Clear    Glucose, UA Negative Negative    Bilirubin, UA Negative Negative    Ketones, UA Negative Negative, 60 mg/dL    Specific Gravity, UA 1.015 1.001 - 1.030    Blood, UA Negative Negative    pH, UA 5.0 4.5 - 8.0    Protein,  mg/dL (!) Negative mg/dL    Urobilinogen, UA <2.0 E.U./dL <2.0 E.U./dL, 2.0 E.U./dL    Nitrite, UA Negative Negative    Leukocytes, UA Large (!) Negative    Bacteria, UA Few (!) None Seen hpf    RBC, UA 3-5 (!) None Seen, 0-2 hpf    WBC, UA 10-25 (!) None Seen, 0-5 hpf    Squam Epithel, UA 5-10 (!) None Seen, 0-5 lpf    WBC Clumps Present (!) None Seen    Trans Epithel, UA 0-5 (!) None Seen lpf    Yeast, UA Moderate (!) None Seen hpf   Blood Gases, Arterial   Result Value Ref Range    pH, Arterial 7.36 (L) 7.37 - 7.44    pCO2, Arterial 56 (H) 35 - 45 mm Hg    pO2, Arterial 121 (H) 75 - 85 mm Hg    Bicarbonate, Arterial Calc 28.9 23.0 - 29.0 mmol/L    O2 Sat, Arterial 99.6 (H) 95.0 - 96.0 %    Oxyhemoglobin 96.5 (H) 95.0 - 96.0 %    Base Excess, Arterial Calc 5.1 mmol/L    Ventilation Mode Oxymask     Flow 4.0 LPM    Sample Stabilized Temperature 37.0 degrees C   POCT Glucose   Result Value Ref Range    Glucose 218 (H) 70 - 139 mg/dL   POCT Glucose   Result Value Ref Range    Glucose 51 (L) 70 - 139 mg/dL   Troponin I   Result Value Ref Range    Troponin I 0.16 0.00 - 0.29 ng/mL   POCT Glucose   Result Value Ref Range    Glucose 146 (H) 70 - 139 mg/dL   POCT Glucose   Result Value Ref Range    Glucose 106 70 - 139 mg/dL   Troponin I   Result Value Ref Range    Troponin I 0.18 0.00 - 0.29 ng/mL   HM2(CBC w/o Differential)   Result Value Ref Range    WBC 4.5 4.0 - 11.0 thou/uL    RBC 3.43 (L) 3.80 - 5.40 mill/uL    Hemoglobin 8.8 (L) 12.0 - 16.0 g/dL    Hematocrit 30.0 (L) 35.0 - 47.0 %    MCV 88 80 - 100 fL    MCH 25.7 (L) 27.0 - 34.0 pg    MCHC 29.3 (L) 32.0 - 36.0  g/dL    RDW 15.3 (H) 11.0 - 14.5 %    Platelets 240 140 - 440 thou/uL    MPV 10.1 8.5 - 12.5 fL   Basic Metabolic Panel   Result Value Ref Range    Sodium 143 136 - 145 mmol/L    Potassium 4.2 3.5 - 5.0 mmol/L    Chloride 102 98 - 107 mmol/L    CO2 32 (H) 22 - 31 mmol/L    Anion Gap, Calculation 9 5 - 18 mmol/L    Glucose 104 70 - 125 mg/dL    Calcium 8.9 8.5 - 10.5 mg/dL    BUN 27 8 - 28 mg/dL    Creatinine 0.83 0.60 - 1.10 mg/dL    GFR MDRD Af Amer >60 >60 mL/min/1.73m2    GFR MDRD Non Af Amer >60 >60 mL/min/1.73m2   Magnesium   Result Value Ref Range    Magnesium 1.6 (L) 1.8 - 2.6 mg/dL   Phosphorus Level > 2.4 no replacement required   Result Value Ref Range    Phosphorus 4.7 (H) 2.5 - 4.5 mg/dL   POCT Glucose   Result Value Ref Range    Glucose 93 70 - 139 mg/dL

## 2021-06-05 NOTE — SIGNIFICANT EVENT
Patient placed back on bipap at 0914 for increased work of breathing, RR40, accessory muscle use and patient states breathing is worse. Crackles heard in right lung, this is new from this mornings assessment.  Paged Dr. Salomon. Dr. Salomon returned call immediately, ordered CXR.     Patient saying she is nauseated, with Dr. Salomon at bedside. Bipap removed. Will give 1x zofran. Patient on oxymask 2L at 1000. Continue to monitor.

## 2021-06-05 NOTE — SIGNIFICANT EVENT
Patients HR & ECG Strip done at 1500 was SR. HR at 1639 was 96, HR at 1640 was 168. Dr. Salomon was at the bedside with writer at the time of this event, Dr. De Leon called to bedside. See MAR for details. See EKG strips for details. See MD note for details. Labs drawn during event. Continue to monitor.

## 2021-06-05 NOTE — PROGRESS NOTES
[facility]    Facility:   [facility] [[id number]]   Code Status: DNR      CHIEF COMPLAINT/REASON FOR VISIT:  Chief Complaint   Patient presents with     FVP Care Coordination - Regulatory       HISTORY:      HPI: Nedra is a 87 y.o. female  residing in the LTC  at [facility]. She is with a past medical history for Afib, HTN UZMA, osteoporosis, polymyalgia rheumatica and perforated gastric ulcer.         Today she is seen for  routine regulatory visit to review multiple medical issues.  However I did receive a call on her last night and she spiked a temperature of 101.9.  She was given an IM dose of Rocephin and a Neb. a stat chest x-ray was done which showed right lower lobe pneumonia.  And she was started on doxycycline and Augmentin.  However her white blood cell was normal at 6.2.  Today she reports feeling much better and she is afebrile at 98.5.  Her lung sounds were coarse in the bases.  Her weights were reviewed and she is up 2 pounds in the last month.  She does have chronic low back pain and takes Tylenol and Aspercreme was added to that regimen.  She deneid having any CP.     Past Medical History:   Diagnosis Date     A-fib (H)      Dysphagia 2000    Had duodenal strcture dilated at [facility] in 2000 and by Dr. Hernández in 2001     HTN (hypertension)      Migraine     beta blocker prophylaxis     OA (osteoarthritis)      UZMA (obstructive sleep apnea) 04/09/2015     Pain in joint, shoulder region      Polymyalgia rheumatica (H)      Renal stone              Family History   Problem Relation Age of Onset     Diabetes Mother      Diabetes Sister      Heart disease Son      Social History     Socioeconomic History     Marital status:      Spouse name: Not on file     Number of children: Not on file     Years of education: Not on file     Highest education level: Not on file   Occupational History     Not on file   Social Needs     Financial  resource strain: Not on file     Food insecurity:     Worry: Not on file     Inability: Not on file     Transportation needs:     Medical: Not on file     Non-medical: Not on file   Tobacco Use     Smoking status: Never Smoker     Smokeless tobacco: Never Used   Substance and Sexual Activity     Alcohol use: Not Currently     Drug use: Not on file     Sexual activity: Not on file   Lifestyle     Physical activity:     Days per week: Not on file     Minutes per session: Not on file     Stress: Not on file   Relationships     Social connections:     Talks on phone: Not on file     Gets together: Not on file     Attends Rastafarian service: Not on file     Active member of club or organization: Not on file     Attends meetings of clubs or organizations: Not on file     Relationship status: Not on file     Intimate partner violence:     Fear of current or ex partner: Not on file     Emotionally abused: Not on file     Physically abused: Not on file     Forced sexual activity: Not on file   Other Topics Concern     Not on file   Social History Narrative     Not on file         Review of Systems   Constitutional: Positive for fever.     Constitutional: Positive for activity change and fatigue. Negative for appetite change and fever.   HENT: Negative for congestion.    Respiratory: Positive for cough and shortness of breath. Negative for wheezing.    Cardiovascular: Negative for chest pain and leg swelling.   Gastrointestinal: Negative for abdominal distention, abdominal pain, constipation, diarrhea and nausea.   Genitourinary: Negative for dysuria.   Musculoskeletal: Negative for arthralgias and back pain.   Skin: Negative for color change and wound.   Neurological: Negative for dizziness.   Psychiatric/Behavioral: Positive for sleep disturbance. Negative for agitation, behavioral problems and confusion.      Vitals:    02/06/20 0909   BP: 140/82   Pulse: 88   Resp: 20   Temp: 98.5  F (36.9  C)   SpO2: 92%   Weight: (!) 71  lb 3.2 oz (32.3 kg)       Physical Exam    Constitutional:       Appearance: She is well-developed.      Comments: Pleasant frail woman in no acute distress    HENT:      Head: Normocephalic.   Eyes:      Conjunctiva/sclera: Conjunctivae normal.   Neck:      Musculoskeletal: Normal range of motion.   Cardiovascular:      Rate and Rhythm: Regular rhythm.      Heart sounds: Normal heart sounds. No murmur.      Comments: Pulse 88, Rhythm regular.   Pulmonary:      Effort: No respiratory distress.      Breath sounds: Rales present. No wheezing.      Comments: bilateral bases R>L   Abdominal:      General: Bowel sounds are normal. There is no distension.      Palpations: Abdomen is soft.      Tenderness: There is no tenderness.   Musculoskeletal: Normal range of motion.   Skin:     General: Skin is warm.   Neurological:      Mental Status: She is alert and oriented to person, place, and time.   Psychiatric:         Behavior: Behavior normal.   LABS:   Recent Results (from the past 240 hour(s))   White Blood Count (WBC)   Result Value Ref Range    WBC 6.2 4.0 - 11.0 thou/uL     Current Outpatient Medications   Medication Sig     acetaminophen (TYLENOL) 325 MG tablet Take 650 mg by mouth every 6 (six) hours as needed for pain.     albuterol (ACCUNEB) 1.25 mg/3 mL nebulizer solution Take 1 ampule by nebulization every 4 (four) hours as needed for wheezing.            albuterol (ACCUNEB) 1.25 mg/3 mL nebulizer solution Take 1 ampule by nebulization every 4 (four) hours as needed for wheezing.     alendronate (FOSAMAX) 70 MG tablet Take 70 mg by mouth every 7 days. Take in the morning on an empty stomach with a full glass of water 30 minutes before food (thur)     benzonatate (TESSALON) 100 MG capsule Take 100 mg by mouth daily as needed for cough.      ferrous gluconate (FERGON) 324 MG tablet Take 324 mg by mouth daily with breakfast.     guaiFENesin (ROBITUSSIN) 100 mg/5 mL syrup Take 100 mg by mouth every 4 (four) hours as  needed for cough. And 200mg q6h prn           melatonin 1 mg Tab tablet Take 1 mg by mouth at bedtime.     metoprolol tartrate (LOPRESSOR) 25 MG tablet Take 12.5 mg by mouth 2 (two) times a day.     multivitamin therapeutic tablet Take 1 tablet by mouth daily.     pantoprazole (PROTONIX) 40 MG tablet Take 40 mg by mouth daily.     sucralfate (CARAFATE) 100 mg/mL suspension Take 1 g by mouth 4 (four) times a day.     trolamine salicylate (ASPERCREME) 10 % cream Apply 1 application topically daily.     vitamin A-vitamin C-vit E-min (OCUVITE) Tab tablet Take 1 tablet by mouth daily.     albuterol (ACCUNEB) 1.25 mg/3 mL nebulizer solution Take 1 ampule by nebulization 4 (four) times a day.     Case Management:  I have reviewed the facility/SNF care plan/MDS which was done 1/21/20, including the falls risk, nutrition and pain screening. I also reviewed the current immunizations, and preventive care.. Future cancer screening is not clinically indicated secondary to age/goals of care.   Patient's desire to return to the community is not assessible due to cognitive impairment.    Information reviewed:  Medications, vital signs, orders, and nursing notes.    ASSESSMENT:      ICD-10-CM    1. Atrial fibrillation, unspecified type (H) I48.91    2. Cough R05    3. Fever, unspecified fever cause R50.9        PLAN:    Pneumonia patient given a one-time dose of Rocephin IM and then started on doxycycline and Augmentin.  Nebulizers as needed Robitussin as needed    Pain management continue Tylenol and Aspercreme    Insomnia - melatonin 1 mg p.o. at bedtime     Dysphagia  Currently on a puréed diet with nectar thickened liquids     Severe malnutrition On  nutritional supplements     Anemia currently on iron supplements last hemoglobin 7.8      Thrombocytosis last platelet count 482 down from  511     Atrial fibrillation currently not on anticoagulation  patient  on metoprolol tartrate 12.5 mg two times a day.     Electronically  signed by: June Marshall, TONYA

## 2021-06-05 NOTE — PROGRESS NOTES
02/09/20 2:22 PM  Call placed to [facility] to confirm pt resides at TCU vs LTC.  Confirmed pt is from LTC and has bed available to return to.

## 2021-06-05 NOTE — PROGRESS NOTES
Pt remains on Bipap this shift due to shortness of breath. Bipap was ST 10/5 16 35%. Pt gets small tidal volumes on this due to increased RR. Pt was placed on AVAPS with a max/min 25/5 in order to achieve a appropriate VT. Pt receive neb at midnight. BS crackles with wheezes. RT will continue to monitor.    Pascale Moreno, LRT

## 2021-06-06 NOTE — PROGRESS NOTES
Brief ICU note    Patient has met with palliative care.  She would like to transition to hospice.  Discussed case with primary team and have signed off.  Please call us if we can be of any further assistance.    Kael De Leon MD  2/10/2020

## 2021-06-06 NOTE — DISCHARGE SUMMARY
[facility] MEDICINE  DISCHARGE SUMMARY     Primary Care Physician: June Marshall CNP  Admission Date: 2/8/2020   Discharge Provider: Debby Madrigal Discharge Date: 2/10/2020   Diet:    Code Status: DNR   Activity:         Condition at Discharge: Patient passed away     REASON FOR PRESENTATION(See Admission Note for Details)   Increased shortness of breath    PRINCIPAL & ACTIVE DISCHARGE DIAGNOSES     Principal Problem:    Acute respiratory failure secondary to bilateral aspiration pneumonia and fluid overload  Active Problems:    Aspiration pneumonitis (H)    Essential hypertension, benign    Elevated troponin    Severe protein-calorie malnutrition (Martinez: less than 60% of standard weight) (H)    Atrial fibrillation with RVR (H)    Acute kidney failure with tubular necrosis (H)    Right sided abdominal pain    Encounter for palliative care    Hypokalemia      SIGNIFICANT FINDINGS (Imaging, labs):   Xr Chest 1 View Portable    Result Date: 2/9/2020  EXAM: XR CHEST 1 VIEW PORTABLE LOCATION: [facility] DATE/TIME: 2/9/2020 9:34 AM INDICATION: Increased work of breathing, SOB COMPARISON: 02/08/2020     Severe thoracolumbar scoliosis. Enlarged cardiac silhouette. Slightly improved nonspecific right basilar opacity. This has a masslike quality, therefore recommend continued follow-up to resolution.    Xr Chest 1 View Portable    Result Date: 2/8/2020  EXAM: XR CHEST 1 VIEW PORTABLE LOCATION: [facility] DATE/TIME: 02/08/2020, 1:42 PM INDICATION: Shortness of breath. COMPARISON: CT abdomen 09/11/2019 and thoracic spine radiograph 01/04/2018.     Radiographic evaluation of the mid and lower lungs limited by the shallow inspiration and severe scoliosis. Increased opacity throughout the right lower hemithorax could represent airspace consolidation in the lung and/or artifact related to overlap of normal structures. Fine linear opacities in the periphery of the visualized lungs could represent minimal  edema. Mild cardiac enlargement.     Ct Chest Without Contrast    Result Date: 2/10/2020  EXAM: CT CHEST WO CONTRAST LOCATION: [facility] DATE/TIME: 2/10/2020 8:20 AM INDICATION: Pneumonia lung mass COMPARISON: Chest x-ray yesterday. TECHNIQUE: CT chest without IV contrast. Multiplanar reformats were obtained. Dose reduction techniques were used. CONTRAST: None. FINDINGS: LUNGS AND PLEURA: Dense consolidation involves both lungs, right greater than left. This is present in both upper and lower lobes. There are air bronchograms, and this most likely represents pneumonia. Trace right pleural effusion. MEDIASTINUM/AXILLAE: The airways appear patent. In the absence of IV contrast, it is difficult to exclude hilar adenopathy. The ascending aorta measures 4 cm, borderline aneurysmal. Coronary atherosclerosis. UPPER ABDOMEN: Aortic atherosclerosis. Left renal atrophy. MUSCULOSKELETAL: There are thoracolumbar kyphoscoliosis.     1.  Bilateral pneumonia, right greater than left. 2.  Trace right pleural effusion.     Xr Chest 1 View For Picc Placement Portable    Result Date: 2/9/2020  EXAM: XR CHEST 1 VIEW FOR PICC LINE PLACEMENT PORTABLE LOCATION: [facility] DATE/TIME: 2/9/2020 6:45 PM INDICATION: verify catheter placement COMPARISON: 02/09/2020 0925 hours     Cardiomegaly. Mild central vascular congestion. Masslike consolidation within the right perihilar and midlung region could represent pneumonia or adenopathy/neoplasm. CT chest exam recommended for further evaluation. Right basilar infiltrate or atelectasis. Right subclavian central venous catheter tip distal SVC near the cavoatrial junction. No pneumothorax. Severe scoliosis.        PENDING LABS      Order Current Status    Blood culture from PERIPHERAL SITE In process    MRSA culture In process          PROCEDURES ( this hospitalization only)      * No surgery found *    RECOMMENDATIONS TO OUTPATIENT PROVIDER FOR F/U VISIT     Patient passed  away    DISPOSITION         SUMMARY OF HOSPITAL COURSE:      Nedra Carr is an 87 y.o. female admitted  via EMS from [facility] with increased shortness of breath and hypoxia. Admitted with aspiration pneumonia and started on IV Zosyn and BiPAP. BNP was elevated and diuresed with lasix. Also started on IV solumedrol. However respiratory status progressively worsened and Pulmonary was consulted. Has known h/o atrial fib and went into RVR rhythm. Received Adenosine on  followed by diltiazem. On  she had an episode where she was unresponsive associated with the treatment of the afib with RVR requiring bagging for about 5-10 minutes. No shocks ever administered. She was started on amiodarone drip following this with improvement in her heart rate. She was also placed on phenylepherine drip. Patient had recieved IV Morphine and at some point following this became unresponsive requiring bagging as mentioned above and received Flumazenil.       [facility] discussed goals of care with patient and her daughter at bedside and daughter and patient decided to proceed with comfort cares. Patient passed away on 2/10 surrounded by multiple loving family members.    Discharge Medications with Med changes:        Medication List      Unreviewed discharge medications    acetaminophen 325 MG tablet  Dose:  650 mg  Commonly known as:  TYLENOL  650 mg, Oral, Every 6 hours PRN     albuterol 1.25 mg/3 mL nebulizer solution  Dose:  1 ampule  Commonly known as:  ACCUNEB  1 ampule, Nebulization, Every 4 hours PRN  Ask about: Which instructions should I use?     alendronate 70 MG tablet  Dose:  70 mg  Commonly known as:  FOSAMAX  70 mg, Oral, Every 7 days, Take in the morning on an empty stomach with a full glass of water 30 minutes before food (thur)      Augmentin 500-125 mg per tablet  Dose:  1 tablet  Generic drug:  amoxicillin-clavulanate  1 tablet, Oral, Every 8 hours, Take for 5 days.     benzonatate 100 MG  capsule  Dose:  100 mg  Commonly known as:  TESSALON  100 mg, Oral, Daily PRN     doxycycline 75 MG tablet  Dose:  75 mg  Commonly known as:  ADOXA  75 mg, Oral, 2 times daily, Take for 7 days      ferrous gluconate 324 MG tablet  Dose:  324 mg  Commonly known as:  FERGON  324 mg, Oral, Daily with breakfast     guaiFENesin 100 mg/5 mL syrup  Dose:  200 mg  Commonly known as:  ROBITUSSIN  200 mg, Oral, Every 4 hours PRN     melatonin 1 mg Tab tablet  Dose:  1 mg  1 mg, Oral, Bedtime     metoprolol tartrate 25 MG tablet  Dose:  12.5 mg  Commonly known as:  LOPRESSOR  12.5 mg, Oral, 2 times daily     pantoprazole 40 MG tablet  Dose:  40 mg  Commonly known as:  PROTONIX  40 mg, Oral, DAILY     sucralfate 100 mg/mL suspension  Dose:  1 g  Commonly known as:  CARAFATE  1 g, Oral, 4 times daily     trolamine salicylate 10 % cream  Dose:  1 application  Commonly known as:  ASPERCREME  1 application, Topical, DAILY     vitamin A-vitamin C-vit E-min Tab tablet  Dose:  1 tablet  Commonly known as:  OCUVITE  1 tablet, Oral, DAILY                Rationale for medication changes:              Consults   pulmonary      Immunizations given this encounter            Anticoagulation Information      Recent INR results:     Warfarin doses (if applicable) or name of other anticoagulant:       Discharge Orders     No discharge procedures on file.  Examination     Vital Signs in last 24 hours:   Temp:  [96  F (35.6  C)-98.1  F (36.7  C)] 98  F (36.7  C)  Heart Rate:  [] 90  Resp:  [16-38] 18  BP: ()/(46-98) 126/68  SpO2:  [89 %-100 %] 97 %  FiO2 (%):  [35 %] 35 %  Patient was pronounced dead at 3:15 PM on 2/10/2020               Please see EMR for more detailed significant labs, imaging, consultant notes etc.  Total time spent on discharge: >35 minutes    Debby Madrigal MD   [facility] Center[facility]: Ph:[phone]    CC:June Marshall, CNP

## 2021-06-06 NOTE — PROGRESS NOTES
"Spiritual Care Note    Spiritual Assessment:      visited patient due to plan of care decision making. Patient's daughter, Mark, present at bedside. She shared about Nedra's medical condition and history. She stated that patient wants to focus on comfort cares and Mark stated \"it's a lot to process\" as this is emotionally distressing for her.     Patient shared about family/life history. Patient's son is , so Mark is only living child, and her grandchildren will be visiting later today.      Patient comes from [employer] background and derives meaning, purpose, and comfort from konrad. Patient appreciative of prayer.     Care Provided:     Introduced self and role of Spiritual Care     Empathic listening and presence     Helped patient in processing of emotions     Offered prayer     Plan of Care: A  will continue to visit as able or per request by patient/family/staff.      Chaplain Romeo Rose MDIV, BCC   "

## 2021-06-06 NOTE — PLAN OF CARE
Problem: Speech Therapy  Goal: SLP Goals  Description  Initial Goals entered on 2/9/2020 by LB Huang and to be met by 2/14/2020  Frequency: 6x/week  Patient Stated Goal(s): wants to eat, not pureed food  1) Patient will tolerate mechanical soft textures with honey thick liquids without reported s/s of aspiration x2-3 meals. -GOAL NOT ADDRESSED  2) Patient will participate in videofluoroscopic swallow study to assess oral pharyngeal swallow function and recommendations for plan of care. -GOAL NOT ADDRESSED      Outcome: Completed     Speech Therapy Discharge Summary    Patient was seen for dysphagia.  The goals established on 2/9/20 could not be addressed due to change in medical status.  Patient is transitioning to comfort cares.  Further Speech Therapy is not anticipated at this time.

## 2021-06-06 NOTE — CONSULTS
[facility] PALLIATIVE CARE CONSULTATION    PATIENT NAME: Nedra Carr  MRN #: [id number]  DATE OF ADMISSION: 2/8/2020   DATE OF ENCOUNTER: 2/10/2020   REQUESTING PHYSICIAN: Dr. De Leon  PRIMARY CARE PROVIDER: June Marshall CNP  CONSULTANT: Lacey Marroquin, VIRA  VISIT #: 1    REASON FOR CONSULTATION: Goals of Care     IMPRESSION/RECOMMENDATIONS:  1. Shortness of breath, secondary to pneumonia, CHF  -Recommend dilaudid 1-2 mg SL every 1 hour PRN for shortness of breath.  Could use IV if severe and not relieved by oral.  Recommend premedicating with 0.4 mg IV dilaudid 15 minutes prior to removal of BiPAP.    -Could also use sublingual ativan 0.5 mg every 4 hours PRN if accompanied by anxiety  -Oxygen for comfort    2. Right side abdominal pain, etiology unclear  Per chart review, patient has had some work up for abdominal pain, including EGD in 10/2019.  -See above recommendations for Dilaudid.    3. Cachexia and malnutrition, severe  Patient weighs 72 lbs.    -Continue to monitor  -Indicates very poor reserve and poor prognosis    4. Palliative Care Encounter:   -See goals of care discussion below.    -Code status DNR.  Patient verbalized her wish for DNI today.    -Per patient request, transition to comfort care.  Daughter is calling family members and will let staff know when she is ready to transition to comfort care and stop BiPAP  -Palliative care will continue to follow to discuss goals and wishes for care and to make recommendations for symptom management.     Addendum 1330: Met again with 2 granddaughters, son in law, daughter in law.  Questions answered about comfort care.  Patient still confirming she would like to transition to comfort care, discontinuing any medications or treatments that are not specifically for comfort.  They have been trying to reach patient's son Jag, unsuccessfully.  Nedra states she will wait 1/2 hour and then wants to proceed with comfort care.  Discussed with Pradip MCDONNELL  and Dr. Madrigal.  Comfort care orders initiated.  Recommend premedicating with 0.4 mg IV dilaudid and 0.5 mg IV lorazepam 15 minutes prior to removal of BiPAP.    Notified by Pradip RN that patient is ready to proceed with comfort care, Discussed with Dr. Madrigal.  Orders discontinued that are not specifically related to comfort.  Discussed with Dr. De Leon, who also visited with family.     These recommendations were discussed with Dr. De Leon, Dr. Madrigal     The patient is not eligible for discharge from a palliative standpoint at this time.    ADMITTING DIAGNOSIS: Acute respiratory failure (H)    CHIEF COMPLAINT: acute respiratory failure    HISTORY OF PRESENT ILLNESS:  Summary: Nedra is a 87 year old female with a past medical history of recurrent pneumonia, osteoporosis with severe kyphoscoliosis, severe protein calorie malnutrition, paroxysmal A. fib, dysphagia, hypertension, UZMA who presented to the ER on 2/8/2020 with shortness of breath.  Admitted to the hospital with acute hypoxic respiratory failure, hypertension, elevated troponin.  Has been requiring BiPAP on and off this admission.   Yesterday evening patient developed rapid heart rate and was found to have a-fib with RVR.  Was started on amiodarone and BiPAP.    Previous hospitalizations: Hospitalized at [facility] in October 2019 for pneumonia, lefft ureterovesical stone with possible UTI  Other specialties following this admission: Pulmonary  Palliative care was consulted to discuss goals and wishes for care and to make recommendations for symptom management.  Per discussion with bedside nurse and Dr. De Leon, patient is DNR but had previously wanted intubation.  Currently requiring BiPAP and likely needing to be intubated if this is her wish.    Current Symptoms: Patient reported moderate right side abdominal pain.  Mild nausea and received zofran, which was helpful.  No vomiting.  Short of breath.  Feels weak.  States she is worried about being in  "pain and suffering.     Functional Status Prior to Admission: Prior to admission patient was living in long term care.   Social:.  Has 3 children, one sone  7 years ago.      Patient s Understanding: Patient able to tell me she is in the hospital \"because I can't breathe.\"  Oriented to person, year, place.    Capacity:     Family/Others Present: daughter Mark    PATIENT GOALS OF CARE: I introduced the role and philosophy of palliative care.  We reviewed current conditions, medications and treatments including respiratory failure, requiring BiPAP, pneumonia-likely aspiration, congestive heart failure.  Reviewed that patient has been requiring BiPAP and now at the point of needing to be intubated, per pulmonologist.  Discussed that patient is very weak, frail, and likely would have difficulty weaning from ventilator if intubated.  Reviewed options in detail, including 1. All aggressive measures including intubation, 2. Continue current cares and treatments with no intubation, 3. Transition to comfort focused care and allow natural dying process with comfort.  Patient shook head no when asked about intubation.  She then gestured the number 3 and when asked to clarify if she wanted comfort care, she shook head yes and said \"comfort.\"  We discussed that this would likely mean end of life and she shook head yes.  She verbalized to writer and daughter that she was worried about pain and \"suffering.\"  We discussed comfort care and medications/interventions that are used to help with comfort at end of life.    Daughter present, very tearful.  States she supports her mother's decision and told her \"I don't want you to suffer.\"  She would like to call patient's son and granddaughters, and then transition to comfort care, remove BiPAP after she has talked with them.  Patient in agreement.  Patient agrees to no further labs, testing, monitoring.   Discussed with Pradip MCDONNELL, Dr. Madrigal, and Dr. De Leon.  Quality of life: " "States she enjoys reading and watching TV  Code status: DNR/DNI  Current cares and treatments: Continue with BiPAP and transition to comfort care later today.  Discharge plan: to be determined    ADVANCED CARE PLANS/HEALTHCARE DIRECTIVES:  No advance directive    SURROGATE DECISION MAKERS IF NO DOCUMENTED/APPOINTED AGENTS:    Patient states she would want her daughter Mark as surrogate decision maker.      HOSPICE ELIGIBILITY: Yes     SPIRITUAL ASSESSMENT:   Are you a spiritual person: \"not really\"  Do you have a Zoroastrianism or community providing spiritual support:No  Would you like to see ? Patient's daughter requested a visit from     PAST MEDICAL HISTORY:   Past Medical History:   Diagnosis Date     A-fib (H)      Dysphagia 2000    [place] [facility] [place] Dr. Hernández in 2001     HTN (hypertension)      Migraine     beta blocker prophylaxis     OA (osteoarthritis)      UZMA (obstructive sleep apnea) 04/09/2015     Pain in joint, shoulder region      Polymyalgia rheumatica (H)      Renal stone        PAST SURGICAL HISTORY:   Past Surgical History:   Procedure Laterality Date     BACK SURGERY       CHOLECYSTECTOMY  11/15/2014     COLONOSCOPY  09/03/2019     CYSTOSCOPY W/ LITHOLAPAXY / EHL  04/08/2015    Procedure: COMBINED CYSTOSCOPY, LITHOLAPAXY; Surgeon: Randy Reno MD; Location: [facility] OR       ESOPHAGOGASTRODUODENOSCOPY  11/21/12, 2/22/13, 1/13/15, 3/5/15, 11/10/15, 10/15/19     EXPLORATORY LAPAROTOMY  11/15/2014     FLEXIBLE BRONCHOSCOPY  11/21/2014     HIP FRACTURE SURGERY      x2     HYSTERECTOMY       LASER HOLMIUM LITHOTRIPSY BLADDER  04/08/2015     PICC AND MIDLINE TEAM LINE INSERTION  2/9/2020            ALLERGIES: No Known Allergies    MEDICATIONS:   Current Facility-Administered Medications   Medication Dose Route Frequency Provider Last Rate Last Dose     acetaminophen tablet 650 mg (TYLENOL)  650 mg Oral Q4H PRN Alicia Salomon MD   650 mg at " 02/09/20 1424     amiodarone 900 mg in NaCl 0.9% (non-PVC) 500 mL (CORDARONE standard 24 hour infusion)  0.5 mg/min Intravenous Continuous Kael De Leon MD 16.7 mL/hr at 02/10/20 0400 0.5 mg/min at 02/10/20 0400     aspirin EC tablet 81 mg  81 mg Oral DAILY Alicia Salomon MD         bacitracin ointment packet 1 packet  1 packet Topical Once Christopher Malin MD         bacitracin ointment packet 1 packet  1 packet Topical Once PRN Kael De Leon MD         bisacodyL suppository 10 mg (DULCOLAX)  10 mg Rectal Daily PRN Alicia Salomon MD         dextrose 50 % (D50W) syringe 20-50 mL  20-50 mL Intravenous Q15 Min PRN Betzy Leon CNP         [Held by provider] furosemide injection 20 mg (LASIX)  20 mg Intravenous BID - diuretic Alicia Salomon MD   Stopped at 02/09/20 1800     glucagon (human recombinant) injection 1 mg  1 mg Subcutaneous Q15 Min PRN Betzy Leon CNP         guaiFENesin 100 mg/5 mL syrup 200 mg (ROBITUSSIN)  200 mg Oral Q4H PRN Alicia Salomon MD         insulin aspart U-100 injection (NovoLOG)   Subcutaneous Q4H FIXED TIMES Betzy Leon CNP   1 Units at 02/10/20 0351     ipratropium-albuterol 0.5-2.5 mg/3 mL nebulizer solution 3 mL (DUO-NEB)  3 mL Nebulization Q4H PRN Alicia Salomon MD         levalbuterol nebulizer solution 1.25 mg (XOPENEX)  1.25 mg Nebulization Q4H PRN Betzy Leon CNP         levalbuterol nebulizer solution 1.25 mg (XOPENEX)  1.25 mg Nebulization QID - RT Betzy Leon CNP   1.25 mg at 02/10/20 0103     methylPREDNISolone sod suc(PF) injection 40 mg (SOLU-MEDROL)  40 mg Intravenous Q12H Kael De Leon MD   40 mg at 02/10/20 0351     metoprolol tartrate tablet 12.5 mg (LOPRESSOR)  12.5 mg Oral BID Alicia Salomon MD   12.5 mg at 02/08/20 2102     morphine injection 1 mg  1 mg Intravenous Q6H PRN Alicia Salomon MD   1 mg at 02/10/20 0649     naloxone injection 0.2-0.4 mg (NARCAN)  0.2-0.4 mg  Intravenous PRN Alicia Salomon MD   0.4 mg at 02/09/20 1702    Or     naloxone injection 0.2-0.4 mg (NARCAN)  0.2-0.4 mg Intramuscular PRN Alicia Salomon MD         norepinephrine 4 mg/250 ml in D5W (0.016 mg/ml)  0.01-0.4 mcg/kg/min Intravenous Continuous Kael De Leon MD   Stopped at 02/09/20 2007     ondansetron injection 4 mg (ZOFRAN)  4 mg Intravenous Q4H PRN Alicia Salomon MD   4 mg at 02/10/20 0917    Or     ondansetron tablet 8 mg (ZOFRAN)  8 mg Oral Q8H PRN Alicia Salomon MD         pantoprazole 40 mg injection  40 mg Intravenous Q24H Alicia Salomon MD   Stopped at 02/09/20 1800     phenylephrine 50 mg/250 ml in NS (0.2 mg/ml)  0.1-6 mcg/kg/min Intravenous Continuous Kael De Leon MD   Stopped at 02/09/20 2300     piperacillin-tazobactam 3.375 g in NaCl 0.9 % 50 mL (MINI-BAG Plus) (ZOSYN)  3.375 g Intravenous Q8H Alicia Salomon MD 12.5 mL/hr at 02/10/20 0608 3.375 g at 02/10/20 0608     polyvinyl alcohol 1.4 % ophthalmic solution 1-2 drop (LIQUIFILM TEARS)  1-2 drop Both Eyes Q1H PRN Alicia Salomon MD         senna-docusate 8.6-50 mg tablet 1 tablet (PERICOLACE)  1 tablet Oral BID Alicia Salomon MD        Or     sennosides syrup 8.8 mg (for SENOKOT)  8.8 mg Enteral Tube BID Alicia Salomon MD         sodium chloride flush 10 mL (NS)  10 mL Intravenous Q8H FIXED TIMES Alicia Salomon MD   10 mL at 02/10/20 0900     sodium chloride flush 10-20 mL (NS)  10-20 mL Intravenous PRN Kael De Leon MD         sodium chloride flush 10-30 mL (NS)  10-30 mL Intravenous PRN Kael De Leon MD         sodium chloride flush 10-30 mL (NS)  10-30 mL Intravenous Q8H FIXED TIMES Kael De Leon MD   10 mL at 02/10/20 0609     sodium chloride flush 20 mL (NS)  20 mL Intravenous PRN Kael De Leon MD         sodium chloride flush 3 mL (NS)  3 mL Intravenous Line Care Alicia Salomon MD   3 mL at 02/10/20 0900       REVIEW OF  "SYMPTOMS:  Pertinent items are noted in HPI.  Otherwise negative    ESAS: Score 0-10 or mild, moderate, severe   Pain: mild  Tiredness: moderate  Drowsiness: mild  Nausea: mild  Lack of Appetite: moderate  Shortness of breath: moderate  Depression: 0  Anxiety: 0    PPS:   20%- 1. Totally bed bound; 2. Unable to do any activity, extensive disease; 3. Total care; 4. Minimal to sips; 5. Full or drowsy +/- confusion     PHYSICAL EXAMINATION:    /85   Pulse 93   Temp 97.1  F (36.2  C) (Axillary)   Resp 20   Ht 4' 3\" (1.295 m)   Wt (!) 72 lb 3.2 oz (32.7 kg)   SpO2 98%   BMI 19.52 kg/m    General appearance: cachectic and moderate distress  Head: Normocephalic, without obvious abnormality, atraumatic  Eyes: negative findings: lids and lashes normal  Nose: no discharge  Throat: BiPAP on, dry mucous membranes  Lungs: rhonchi bilaterally  Abdomen: soft, non-tender; bowel sounds normal; no masses,  no organomegaly  Extremities: extremities normal, atraumatic, no cyanosis or edema  Pulses: 2+ and symmetric  Skin: pale, warm  Neurologic: Grossly normal    LAB:  Results from last 7 days   Lab Units 02/10/20  0606 02/10/20  0028 02/09/20  1732 02/09/20  0623 02/08/20  1400   LN-WHITE BLOOD CELL COUNT thou/uL  --   --  4.3 4.5 8.1   LN-HEMOGLOBIN g/dL 11.8* 10.8* 6.3* 8.8* 10.3*   LN-HEMATOCRIT %  --   --  21.5* 30.0* 35.6   LN-PLATELET COUNT thou/uL  --   --  169 240 300        Results from last 7 days   Lab Units 02/10/20  0606 02/10/20  0028 02/09/20  1732   LN-SODIUM mmol/L 144 142 146*   LN-POTASSIUM mmol/L 4.6 5.3* 2.4*   LN-CHLORIDE mmol/L 105 104 116*   LN-CO2 mmol/L 28 30 24   LN-BLOOD UREA NITROGEN mg/dL 35* 32* 22   LN-CREATININE mg/dL 1.15* 1.07 0.64   LN-CALCIUM mg/dL 9.8 9.7 6.0*               I have personally reviewed all pertinent labs.    RADIOLOGIC FINDINGS:  EXAM: XR CHEST 1 VIEW PORTABLE  LOCATION: [facility]  DATE/TIME: 02/08/2020, 1:42 PM     INDICATION: Shortness of " breath.  COMPARISON: CT abdomen 09/11/2019 and thoracic spine radiograph 01/04/2018.     IMPRESSION:   Radiographic evaluation of the mid and lower lungs limited by the shallow inspiration and severe scoliosis. Increased opacity throughout the right lower hemithorax could represent airspace consolidation in the lung and/or artifact related to   overlap of normal structures. Fine linear opacities in the periphery of the visualized lungs could represent minimal edema. Mild cardiac enlargement.     EXAM: XR CHEST 1 VIEW FOR PICC LINE PLACEMENT PORTABLE  LOCATION: [facility]  DATE/TIME: 2/9/2020 6:45 PM     INDICATION: verify catheter placement  COMPARISON: 02/09/2020 0925 hours     IMPRESSION:   Cardiomegaly. Mild central vascular congestion. Masslike consolidation within the right perihilar and midlung region could represent pneumonia or adenopathy/neoplasm. CT chest exam recommended for further evaluation. Right basilar infiltrate   or atelectasis. Right subclavian central venous catheter tip distal SVC near the cavoatrial junction. No pneumothorax. Severe scoliosis.    CT Chest 2/10/2020  EXAM: CT CHEST WO CONTRAST  LOCATION: [facility]  DATE/TIME: 2/10/2020 8:20 AM     INDICATION: Pneumonia lung mass  COMPARISON: Chest x-ray yesterday.  TECHNIQUE: CT chest without IV contrast. Multiplanar reformats were obtained. Dose reduction techniques were used.  CONTRAST: None.     FINDINGS:   LUNGS AND PLEURA: Dense consolidation involves both lungs, right greater than left. This is present in both upper and lower lobes. There are air bronchograms, and this most likely represents pneumonia. Trace right pleural effusion.     MEDIASTINUM/AXILLAE: The airways appear patent. In the absence of IV contrast, it is difficult to exclude hilar adenopathy. The ascending aorta measures 4 cm, borderline aneurysmal. Coronary atherosclerosis.     UPPER ABDOMEN: Aortic atherosclerosis. Left renal  atrophy.     MUSCULOSKELETAL: There are thoracolumbar kyphoscoliosis.     IMPRESSION:   1.  Bilateral pneumonia, right greater than left.  2.  Trace right pleural effusion.    CARDIOLOGY:  ECG 2/9/20  Atrial fibrillation with rapid ventricular response   Marked ST abnormality, possible inferior subendocardial injury   Marked ST abnormality, possible anterior subendocardial injury   Abnormal ECG   When compared with ECG of 08-FEB-2020 14:04,   Atrial fibrillation has replaced Sinus rhythm   ST more depressed Inferior leads   ST more depressed Anterior leads     ECHO 2/9/2020  1. Normal left ventricular size and systolic performance with a visually estimated ejection fraction of 55%.   2. There is mild concentric increase in left ventricular wall thickness.   3. There is mild to moderate aortic insufficiency.   4. There is moderate mitral insufficiency.   5. Normal right ventricular size and systolic performance.   6. There is moderate left atrial enlargement.   7. There is mild enlargement of the proximal ascending aorta.      Lacey Marroquin, [facility]  Office #: [phone]    Total Time 65 minutes with > 50% of time during encounter was spent with family and patient on counseling, counseling symptom management options-shortness of breath and pain, discussing goals of care and care coordination as documented above.       Note created using dragon voice recognition software.  Errors in spelling or words which seems out of context are unintentional.  Sounds alike errors may have escaped editing.

## 2021-06-06 NOTE — SIGNIFICANT EVENT
Patient passed away with multiple family members at bedside after she was transitioned to comfort cares today. Patient was pronounce dead at 3:15 PM today. No spontaneous circulation or respiration with fixed dilated pupils.

## 2021-06-06 NOTE — PLAN OF CARE
Problem: Pain  Goal: Patient's pain/discomfort is manageable  Outcome: Progressing     Problem: Breathing  Goal: Patient will maintain patent airway  Outcome: Progressing     Tolerating bipap throughout shift, decision with pt and family to intubate or proceed with comfort cares was discussed with family and MD and Palliative care, per pt request comfort care was decision. Waiting for family to get a hold of son, which they were unable, now to start comfort cares at 1400, pre medicated with dilaudid and ativan prior to removal of bipap and monitoring. Family at bedside.

## 2021-06-19 NOTE — LETTER
Letter by Clarita Deal MD at      Author: Clarita Deal MD Service: -- Author Type: --    Filed:  Encounter Date: 12/3/2019 Status: Signed         Patient: Nedra Carr   MR Number: [id number]   YOB: 1932   Date of Visit: 12/3/2019     [facility]      Facility:    [facility] [[id number]]  Code Status: DNR       Chief Complaint/Reason for Visit:  Chief Complaint   Patient presents with   ? H & P     Admit note to LTC, transfer from TCU.       HPI:   Nedra is a 86 y.o. female with hx of afib not on AC, PMR, UZMA, seen for admission to LTC at [facility], She was previously In the TCU after hospitalization 10/15/19-10/17/19 as noted below.     TCU HPI:   Nedra is a 86 y.o. female with hx of afib not on AC, HTN, PMR, UZMA, admitted to the hospital on 10/15/19 due to generalized weakness and abdominal pain as noted below.     Primary Discharge Diagnoses:   Summary of Stay: Nedra Carr is a 86 year old female with a PMH significant for a fib, HTN, UZMA, PMR and was recently hospitalized for pneumonia and incidentally noted to have a kidney stone that dropped into the bladder, who was directly admitted from endoscopy on 10/15/2019 with generalized weakness and abdominal pain.      Problem List:   1. RUQ abdominal pain - ongoing since shortly after discharge from hospital on 10/9. Denies nausea, vomiting, diarrhea or worsening pain with eating. She denies dysphagia but nursing reports issues with swallowing at bedside. Speech consulted, notes moderate oropharyngeal dysphagia, diet adjusted. Endorses poor appetite, notes regular BM (although CT scan from hospitalization showed colonic constipation), also showed incidental kidney stone but nothing else acute. Daughter reports hx of duodenal ulcers, hx of cholecystectomy. Denies fevers. UA from 10/14 looked concerning for infection but urine culture is growing mixed urogenital  barrera, UA repeated on 10/15, UA again looks concerning for infection but given previous culture results, abx held and urine culture followed, no growth thus far. RUQ US obtained and was unremarkable. No ulcers noted on EGD today, no dilation done of esophagus, biopsies taken. Recently started on Protonix and carafate, continue, switched carafate to liquid here. PO intake improved, tolerating oral intake. Denies abd pain today, notes it can be intermittent  2. Generalized weakness - multifactorial. Recent hospitalization for pneumonia, pt and daughter state she has gotten progressively weaker since discharge. Poor appetite since discharge, likely deconditioning as well. Pt denies worsening SOB, denies diarrhea, N/V. Labs and CXR from yesterday were fairly unremarkable. Equivocal UA with culture now growing mixed urogenital barrera. UA and culture repeated, not growing bacteria at this time, still pending. SW consulted for TCU placement, pt will discharge to [facility].   3. Hypoxia - pt noted to be mildly hypoxic on room air upon arrival to the floor, no hx of chronic respiratory failure. Recently hospitalized with pneumonia, treated with abx, has completed course. CXR yesterday looks unchanged/stable. O2 was weaned, pt did develop mild hypoxia and increased SOB with ambulation, O2 sats improved quickly with rest and deep breaths. Continue to monitor at TCU, continue IS. Pt states SOB she experiences with ambulation is improved from previous.      Brief Hospital Summary:   Reason for your hospital stay   Generalized weakness and abdominal pain.     Briefly, Nedra Carr was admitted on 10/15/2019 for concerns of generalized weakness and abdominal pain.  Pt was admitted directly to the OBS unit after EGD earlier in the day due to abd pain. Pt was weak and no source of abdominal pain was found on EGD.. UA was abnormal prior to admission, this was repeated, urine culture sent. RUQ US was obtained and negative  for acute process. Pt was provided supportive cares. Labs were reviewed and significant results addressed. Speech therapy was consulted and noted pt to have moderate dysphagia and diet adjusted. The patient's abdominal pain improved, she tolerated a oral intake. She was initially placed on oxygen due to mild hypoxia. She was weaned off O2 at rest and maintained normal SpO2. She was ambulated in the hallway and SpO2 did drop into the 80s with exertion. Her O2 sats would quickly recover with rest and deep breaths. She did note SOB with exertion but states this is improved from previous. On the day of discharge, pt's strength had improved and a safe discharge plan was arranged. Medications were reviewed and adjustments made as necessary.    Overall stabilized and discharged to TCU on 10/17/19 for PT, OT, nursing cares, medical management and monitoring.     Today:  She has been able to wean off oxygen. Denies cough. No fever. Feels stronger. She had swallow study yesterday and continues on nectar thick liquids, working with speech therapy in the facility. No chest pain. No complaints of abdominal pain today, she is on Protonix and carafate. Has anemia, on iron. Last hgb in the hospital on 10/14/19 was 10.0, then on 10/21/19 was 8.3 and 7.6 in the TCU, most recently 7.8 on 11/11/19. No signs of bleeding. Her appetite is fair. Dietician following closely. She denies diarrhea or constipation. No new vision or hearing problems.       Past Medical History:  Past Medical History:   Diagnosis Date   ? A-fib (H)    ? Dysphagia 2000    Had duodenal strcture dilated at [facility] [place] in 2000 and by Dr. Hernández in 2001   ? HTN (hypertension)    ? Migraine     beta blocker prophylaxis   ? OA (osteoarthritis)    ? UZMA (obstructive sleep apnea) 04/09/2015   ? Pain in joint, shoulder region    ? Polymyalgia rheumatica (H)    ? Renal stone            Surgical History:  Past Surgical History:   Procedure Laterality Date   ? BACK SURGERY     ?  CHOLECYSTECTOMY  11/15/2014   ? COLONOSCOPY  09/03/2019   ? CYSTOSCOPY W/ LITHOLAPAXY / EHL  04/08/2015    Procedure: COMBINED CYSTOSCOPY, LITHOLAPAXY; Surgeon: Randy Reno MD; Location: [facility] OR     ? ESOPHAGOGASTRODUODENOSCOPY  11/21/12, 2/22/13, 1/13/15, 3/5/15, 11/10/15, 10/15/19   ? EXPLORATORY LAPAROTOMY  11/15/2014   ? FLEXIBLE BRONCHOSCOPY  11/21/2014   ? HIP FRACTURE SURGERY      x2   ? HYSTERECTOMY     ? LASER HOLMIUM LITHOTRIPSY BLADDER  04/08/2015       Family History:   Family History   Problem Relation Age of Onset   ? Diabetes Mother    ? Diabetes Sister    ? Heart disease Son        Social History:    Social History     Socioeconomic History   ? Marital status:      Spouse name: Not on file   ? Number of children: Not on file   ? Years of education: Not on file   ? Highest education level: Not on file   Occupational History   ? Not on file   Social Needs   ? Financial resource strain: Not on file   ? Food insecurity:     Worry: Not on file     Inability: Not on file   ? Transportation needs:     Medical: Not on file     Non-medical: Not on file   Tobacco Use   ? Smoking status: Never Smoker   ? Smokeless tobacco: Never Used   Substance and Sexual Activity   ? Alcohol use: Not Currently   ? Drug use: Not on file   ? Sexual activity: Not on file   Lifestyle   ? Physical activity:     Days per week: Not on file     Minutes per session: Not on file   ? Stress: Not on file   Relationships   ? Social connections:     Talks on phone: Not on file     Gets together: Not on file     Attends Spiritism service: Not on file     Active member of club or organization: Not on file     Attends meetings of clubs or organizations: Not on file     Relationship status: Not on file   ? Intimate partner violence:     Fear of current or ex partner: Not on file     Emotionally abused: Not on file     Physically abused: Not on file     Forced sexual activity: Not on file   Other Topics Concern   ? Not on file    Social History Narrative   ? Not on file        Medications:  Current Outpatient Medications   Medication Sig   ? acetaminophen (TYLENOL) 325 MG tablet Take 650 mg by mouth every 6 (six) hours as needed for pain.   ? albuterol (ACCUNEB) 1.25 mg/3 mL nebulizer solution Take 1 ampule by nebulization every 4 (four) hours as needed for wheezing.          ? albuterol (ACCUNEB) 1.25 mg/3 mL nebulizer solution Take 1 ampule by nebulization 4 (four) times a day.   ? alendronate (FOSAMAX) 70 MG tablet Take 70 mg by mouth every 7 days. Take in the morning on an empty stomach with a full glass of water 30 minutes before food (thur)   ? benzonatate (TESSALON) 100 MG capsule Take 100 mg by mouth 3 (three) times a day as needed for cough.   ? ferrous gluconate (FERGON) 324 MG tablet Take 324 mg by mouth daily with breakfast.   ? guaiFENesin (ROBITUSSIN) 100 mg/5 mL syrup Take 100 mg by mouth every 4 (four) hours as needed for cough. And 200mg q6h prn         ? melatonin 1 mg Tab tablet Take 1 mg by mouth at bedtime.   ? metoprolol tartrate (LOPRESSOR) 25 MG tablet Take 12.5 mg by mouth 2 (two) times a day.   ? multivitamin therapeutic tablet Take 1 tablet by mouth daily.   ? pantoprazole (PROTONIX) 40 MG tablet Take 40 mg by mouth daily.   ? sucralfate (CARAFATE) 100 mg/mL suspension Take 1 g by mouth 4 (four) times a day.   ? vitamin A-vitamin C-vit E-min (OCUVITE) Tab tablet Take 1 tablet by mouth daily.       Allergies:  Lab Results   Component Value Date    WBC 6.2 11/11/2019    HGB 7.8 (L) 11/11/2019    HCT 27.1 (L) 11/11/2019    MCV 96 11/11/2019     (H) 11/11/2019         Review of Systems:  Pertinent items are noted in HPI.      Physical Exam:   General: Patient is alert, thin, frail, pale elderly female, no distress.  Vitals: /67, Temp 98.7, Pulse 93, RR 20, O2 sat 91% RA.  HEENT: Head is NCAT. Eyes show no injection or icterus. Nares negative. Oropharynx well hydrated.  Neck: Supple. No tenderness or  adenopathy. No JVD.  Lungs: Diminished at bases otherwise clear. No wheezes.  Cardiovascular: Regular rate and rhythm, normal S1, S2.  Back: No spinal or CVA tenderness.  Abdomen: Soft, no tenderness on exam. Bowel sounds present. No guarding rebound or rigidity.  : Deferred.  Extremities: No edema is noted.  Musculoskeletal: Degen changes.   Skin: Warm and dry.  Psych: Mood appears pretty good.      Labs:  Lab Results   Component Value Date    WBC 6.2 11/11/2019    HGB 7.8 (L) 11/11/2019    HCT 27.1 (L) 11/11/2019    MCV 96 11/11/2019     (H) 11/11/2019     Results for orders placed or performed in visit on 10/21/19   Basic Metabolic Panel   Result Value Ref Range    Sodium 137 136 - 145 mmol/L    Potassium 4.6 3.5 - 5.0 mmol/L    Chloride 98 98 - 107 mmol/L    CO2 31 22 - 31 mmol/L    Anion Gap, Calculation 8 5 - 18 mmol/L    Glucose 99 70 - 125 mg/dL    Calcium 9.9 8.5 - 10.5 mg/dL    BUN 12 8 - 28 mg/dL    Creatinine 0.59 (L) 0.60 - 1.10 mg/dL    GFR MDRD Af Amer >60 >60 mL/min/1.73m2    GFR MDRD Non Af Amer >60 >60 mL/min/1.73m2       Assessment/Plan:  1. General debilitation. Advanced age, frailty, medical conditions. Appropriate for LTC.  2. HTN. Continue on metoprolol, helps with rate control for afib. Monitor BPs.   3. Hx Afib. No anticoagulation due to GI issues. Rate control with beta blocker.  4. Anemia. She is on iron. No obvious bleeding, follow closely. Had EGD with no ulcers. Follow up with GI as needed. Continue PPI and Carafate.  5. Recent pneumonia. Resolved.   6. Dysphagia. On nectar thick liquids, working with speech therapy, had swallow study yesterday.  7. Code status is DNR.          Electronically signed by: Clarita Deal MD

## 2021-06-19 NOTE — LETTER
Letter by June Marshall CNP at      Author: June Marshall CNP Service: -- Author Type: --    Filed:  Encounter Date: 10/31/2019 Status: Signed         Patient: Nedra Carr   MR Number: [id number]   YOB: 1932   Date of Visit: 10/31/2019     [facility]    Facility:   [facility] [[id number]]   Code Status: DNR      CHIEF COMPLAINT/REASON FOR VISIT:  Chief Complaint   Patient presents with   ? Problem Visit     F/U swallowing        HISTORY:      HPI: Nedra is a 86 y.o. female currently [facility] physical [facility]. She is with a past medical history for Afib, HTN UZMA, osteoporosis, polymyalgia rheumatica and perforated gastric ulcer.  She was recently at an elective EGD for reflux in which she was directly admitted to the hospital for weakness and malnutrition.  She had a previous hospitalization for pna and on this hospitalization she was found to be hypoxic and worsening pneumonia for which she was treated with antibiotics.  She also has left ureterovesical stone with UTI.  CT was negative for hydronephrosis.  She was found to have anemia and thrombocytosis.  She was started on carafate and pepcid for GERD and she completed the course of antibiotics for UTI and PNA and discharged to TCU.     Today she is seen for f/U on coughing with food.  Today she tells me she has had no further episodes of coughing with food since the last episode but she does have a cough at night.  Her recent chest X-ray was unchaged from the one prior.  She deneid having any CP but is short of breath with activity currently she is satting at 91% on 2 L nasal cannula.  Last hemoglobin was 8.1 on 10/28 which is slightly down from previous one at 8.3 and her platelets are slightly lower at 511 from 515 .SHe she is currently on a puréed diet with nectar thickened liquids. Her weight is down 1 pound and dietician is aware.   She is  not on anticoagulation due to PUD.     Past Medical History:   Diagnosis Date   ? A-fib (H)    ? Dysphagia 2000    [place] [facility] [place] Dr. Hernández in 2001   ? HTN (hypertension)    ? Migraine     beta blocker prophylaxis   ? OA (osteoarthritis)    ? UZMA (obstructive sleep apnea) 04/09/2015   ? Pain in joint, shoulder region    ? Polymyalgia rheumatica (H)    ? Renal stone              Family History   Problem Relation Age of Onset   ? Diabetes Mother    ? Diabetes Sister    ? Heart disease Son      Social History     Socioeconomic History   ? Marital status:      Spouse name: Not on file   ? Number of children: Not on file   ? Years of education: Not on file   ? Highest education level: Not on file   Occupational History   ? Not on file   Social Needs   ? Financial resource strain: Not on file   ? Food insecurity:     Worry: Not on file     Inability: Not on file   ? Transportation needs:     Medical: Not on file     Non-medical: Not on file   Tobacco Use   ? Smoking status: Never Smoker   ? Smokeless tobacco: Never Used   Substance and Sexual Activity   ? Alcohol use: Not Currently   ? Drug use: Not on file   ? Sexual activity: Not on file   Lifestyle   ? Physical activity:     Days per week: Not on file     Minutes per session: Not on file   ? Stress: Not on file   Relationships   ? Social connections:     Talks on phone: Not on file     Gets together: Not on file     Attends Episcopalian service: Not on file     Active member of club or organization: Not on file     Attends meetings of clubs or organizations: Not on file     Relationship status: Not on file   ? Intimate partner violence:     Fear of current or ex partner: Not on file     Emotionally abused: Not on file     Physically abused: Not on file     Forced sexual activity: Not on file   Other Topics Concern   ? Not on file   Social History Narrative   ? Not on file         Review of Systems   Constitutional: Positive for  activity change and fatigue. Negative for appetite change and fever.   HENT: Negative for congestion.    Respiratory: Positive for shortness of breath. Negative for cough and wheezing.    Cardiovascular: Negative for chest pain and leg swelling.   Gastrointestinal: Negative for abdominal distention, abdominal pain, constipation, diarrhea and nausea.   Genitourinary: Negative for dysuria.   Musculoskeletal: Negative for arthralgias and back pain.   Skin: Negative for color change and wound.   Neurological: Negative for dizziness.   Psychiatric/Behavioral: Positive for sleep disturbance. Negative for agitation, behavioral problems and confusion.       Vitals:    10/31/19 0839   BP: 112/72   Pulse: 100   Resp: 20   Temp: 98.7  F (37.1  C)   SpO2: 91%   Weight: (!) 64 lb 6.4 oz (29.2 kg)       Physical Exam  Constitutional:       Appearance: She is well-developed.      Comments: Pleasant frail woman in no acute distress    HENT:      Head: Normocephalic.   Eyes:      Conjunctiva/sclera: Conjunctivae normal.   Neck:      Musculoskeletal: Normal range of motion.   Cardiovascular:      Rate and Rhythm: Regular rhythm.      Heart sounds: Normal heart sounds. No murmur.      Comments: Pulse 80, Rhythm regular.   Pulmonary:      Effort: No respiratory distress.      Breath sounds: Rales present. No wheezing.      Comments: Right lower lobe.   Abdominal:      General: Bowel sounds are normal. There is no distension.      Palpations: Abdomen is soft.      Tenderness: There is no tenderness.   Musculoskeletal: Normal range of motion.   Skin:     General: Skin is warm.   Neurological:      Mental Status: She is alert and oriented to person, place, and time.   Psychiatric:         Behavior: Behavior normal.           LABS:   Recent Results (from the past 240 hour(s))   HM2(CBC w/o Differential)   Result Value Ref Range    WBC 8.5 4.0 - 11.0 thou/uL    RBC 2.70 (L) 3.80 - 5.40 mill/uL    Hemoglobin 7.6 (L) 12.0 - 16.0 g/dL     Hematocrit 25.8 (L) 35.0 - 47.0 %    MCV 96 80 - 100 fL    MCH 28.1 27.0 - 34.0 pg    MCHC 29.5 (L) 32.0 - 36.0 g/dL    RDW 14.0 11.0 - 14.5 %    Platelets 469 (H) 140 - 440 thou/uL    MPV 10.3 8.5 - 12.5 fL   HM2(CBC w/o Differential)   Result Value Ref Range    WBC 6.3 4.0 - 11.0 thou/uL    RBC 2.91 (L) 3.80 - 5.40 mill/uL    Hemoglobin 8.1 (L) 12.0 - 16.0 g/dL    Hematocrit 28.0 (L) 35.0 - 47.0 %    MCV 96 80 - 100 fL    MCH 27.8 27.0 - 34.0 pg    MCHC 28.9 (L) 32.0 - 36.0 g/dL    RDW 13.8 11.0 - 14.5 %    Platelets 511 (H) 140 - 440 thou/uL    MPV 10.3 8.5 - 12.5 fL       ASSESSMENT:      ICD-10-CM    1. Coughing R05        PLAN:      Cough  Speech  hfollowing . Chest x-ray unchanged from prior one. Add Tessalon 100 mg three times a day PRN     Insomnia - melatonin 1 mg p.o. at bedtime    Kidney stone- follow up with Urology on 11/26/19    Dysphagia and followed by speech therapy Currently on a puréed diet with nectar thickened liquids    Severe malnutrition followed by dietary and placed on nutritional supplements twice daily    Anemia currently on iron supplements last hemoglobin 8.1 labs to be monitored    Thrombocytosis last platelet count 511 labs being monitored    Atrial fibrillation currently not on anticoagulation due to PUD, patient recently  started on metoprolol tartrate 12.5 mg two times a day.     Electronically signed by: June Marshall CNP

## 2021-06-19 NOTE — LETTER
Letter by June Marshall CNP at      Author: June Marshall CNP Service: -- Author Type: --    Filed:  Encounter Date: 10/21/2019 Status: Signed         Patient: Nedra Carr   MR Number: [id number]   YOB: 1932   Date of Visit: 10/21/2019     [facility]    Facility:   [facility] [[id number]]   Code Status: DNR      CHIEF COMPLAINT/REASON FOR VISIT:  Chief Complaint   Patient presents with   ? Problem Visit       HISTORY:      HPI: Nedra is a 86 y.o. female currently undergoing physical occupational therapy at [facility]. She is with a past medical history for Afib, HTN UZMA, osteoporosis, polymyalgia rheumatica and perforated gastric ulcer.  She was recently at an elective EGD for reflux in which she was directly admitted to the hospital for weakness and malnutrition.  She had a previous hospitalization for pna and on this hospitalization she was found to be hypoxic and worsening pneumonia for which she was treated with antibiotics.  She also has left ureterovesical stone with UTI.  CT was negative for hydronephrosis.  She was found to have anemia and thrombocytosis.  She was started on carafate and pepcid for GERD and she completed the course of antibiotics for UTI and PNA and discharged to TCU.     Today she is seen in her room as a first routine visit to review multiple medical issues.  Her main concern today is she has not been able to sleep and reports insomnia.  She was started on 1 mg melatonin at bedtime.  She denies chest pain or shortness of breath.  She was found to be very frail 65 pound woman.  She did deny chest pain or increased shortness of breath from baseline.  Last hemoglobin was 8.3 and her platelets were elevated at 515.  Labs pending for recheck.    Past Medical History:   Diagnosis Date   ? A-fib (H)    ? Dysphagia 2000    Had duodenal strcture dilated at [facility] in 2000 and by Dr. Hernández in 2001   ? HTN  (hypertension)    ? Migraine     beta blocker prophylaxis   ? OA (osteoarthritis)    ? UZMA (obstructive sleep apnea) 04/09/2015   ? Pain in joint, shoulder region    ? Polymyalgia rheumatica (H)    ? Renal stone              Family History   Problem Relation Age of Onset   ? Diabetes Mother    ? Diabetes Sister    ? Heart disease Son      Social History     Socioeconomic History   ? Marital status:      Spouse name: Not on file   ? Number of children: Not on file   ? Years of education: Not on file   ? Highest education level: Not on file   Occupational History   ? Not on file   Social Needs   ? Financial resource strain: Not on file   ? Food insecurity:     Worry: Not on file     Inability: Not on file   ? Transportation needs:     Medical: Not on file     Non-medical: Not on file   Tobacco Use   ? Smoking status: Never Smoker   ? Smokeless tobacco: Never Used   Substance and Sexual Activity   ? Alcohol use: Not Currently   ? Drug use: Not on file   ? Sexual activity: Not on file   Lifestyle   ? Physical activity:     Days per week: Not on file     Minutes per session: Not on file   ? Stress: Not on file   Relationships   ? Social connections:     Talks on phone: Not on file     Gets together: Not on file     Attends Yarsanism service: Not on file     Active member of club or organization: Not on file     Attends meetings of clubs or organizations: Not on file     Relationship status: Not on file   ? Intimate partner violence:     Fear of current or ex partner: Not on file     Emotionally abused: Not on file     Physically abused: Not on file     Forced sexual activity: Not on file   Other Topics Concern   ? Not on file   Social History Narrative   ? Not on file         Review of Systems   Constitutional: Positive for activity change and fatigue. Negative for appetite change and fever.   HENT: Negative for congestion.    Respiratory: Positive for shortness of breath. Negative for cough and wheezing.     Cardiovascular: Negative for chest pain and leg swelling.   Gastrointestinal: Negative for abdominal distention, abdominal pain, constipation, diarrhea and nausea.   Genitourinary: Negative for dysuria.   Musculoskeletal: Negative for arthralgias and back pain.   Skin: Negative for color change and wound.   Neurological: Negative for dizziness.   Psychiatric/Behavioral: Positive for sleep disturbance. Negative for agitation, behavioral problems and confusion.       Vitals:    10/21/19 1218   BP: 121/74   Pulse: 98   Resp: 18   Temp: 97.2  F (36.2  C)   SpO2: 95%   Weight: (!) 65 lb 6.4 oz (29.7 kg)       Physical Exam  Constitutional:       Appearance: She is well-developed.      Comments: Pleasant frail woman in no acute distress seen today sitting up in a recliner.   HENT:      Head: Normocephalic.   Eyes:      Conjunctiva/sclera: Conjunctivae normal.   Neck:      Musculoskeletal: Normal range of motion.   Cardiovascular:      Rate and Rhythm: Normal rate and regular rhythm.      Heart sounds: Normal heart sounds. No murmur.   Pulmonary:      Effort: No respiratory distress.      Breath sounds: Rales present. No wheezing.      Comments: Right lower lobe.   Abdominal:      General: Bowel sounds are normal. There is no distension.      Palpations: Abdomen is soft.      Tenderness: There is no tenderness.   Musculoskeletal: Normal range of motion.   Skin:     General: Skin is warm.   Neurological:      Mental Status: She is alert and oriented to person, place, and time.   Psychiatric:         Behavior: Behavior normal.           LABS:   Recent Results (from the past 240 hour(s))   HM2(CBC w/o Differential)   Result Value Ref Range    WBC 8.0 4.0 - 11.0 thou/uL    RBC 2.95 (L) 3.80 - 5.40 mill/uL    Hemoglobin 8.3 (L) 12.0 - 16.0 g/dL    Hematocrit 28.4 (L) 35.0 - 47.0 %    MCV 96 80 - 100 fL    MCH 28.1 27.0 - 34.0 pg    MCHC 29.2 (L) 32.0 - 36.0 g/dL    RDW 13.9 11.0 - 14.5 %    Platelets 515 (H) 140 - 440 thou/uL     MPV 10.2 8.5 - 12.5 fL   Basic Metabolic Panel   Result Value Ref Range    Sodium 137 136 - 145 mmol/L    Potassium 4.6 3.5 - 5.0 mmol/L    Chloride 98 98 - 107 mmol/L    CO2 31 22 - 31 mmol/L    Anion Gap, Calculation 8 5 - 18 mmol/L    Glucose 99 70 - 125 mg/dL    Calcium 9.9 8.5 - 10.5 mg/dL    BUN 12 8 - 28 mg/dL    Creatinine 0.59 (L) 0.60 - 1.10 mg/dL    GFR MDRD Af Amer >60 >60 mL/min/1.73m2    GFR MDRD Non Af Amer >60 >60 mL/min/1.73m2       ASSESSMENT:      ICD-10-CM    1. Insomnia, unspecified type G47.00    2. Dysphagia, unspecified type R13.10    3. Atrial fibrillation, unspecified type (H) I48.91    4. Kidney stone N20.0        PLAN:    Insomnia -start melatonin 1 mg p.o. at bedtime    Kidney stone- follow up with Urology on 11/26/19    Dysphagia and followed by speech therapy    Severe malnutrition followed by dietary and placed on nutritional supplements twice daily    Anemia currently on iron supplements last hemoglobin 8.3 labs to be monitored    Thrombocytosis last platelet count 515 labs being monitored    Atrial fibrillation currently not on anticoagulation due to PUD, patient also not on a beta-blocker heart rate is controlled pulse 98, monitor closely    Electronically signed by: June Marshall CNP

## 2021-06-19 NOTE — LETTER
Letter by June Marshall CNP at      Author: June Marshall CNP Service: -- Author Type: --    Filed:  Encounter Date: 11/4/2019 Status: Signed         Patient: Nedra Carr   MR Number: [id number]   YOB: 1932   Date of Visit: 11/4/2019     [facility]    Facility:   [facility] [[id number]]   Code Status: DNR      CHIEF COMPLAINT/REASON FOR VISIT:  Chief Complaint   Patient presents with   ? Problem Visit     continued cough and SOB        HISTORY:      HPI: Nedra is a 86 y.o. female currently undergoing physical occupational therapy at [facility]. She is with a past medical history for Afib, HTN UZMA, osteoporosis, polymyalgia rheumatica and perforated gastric ulcer.  She was recently at an elective EGD for reflux in which she was directly admitted to the hospital for weakness and malnutrition.  She had a previous hospitalization for pna and on this hospitalization she was found to be hypoxic and worsening pneumonia for which she was treated with antibiotics.  She also has left ureterovesical stone with UTI.  CT was negative for hydronephrosis.  She was found to have anemia and thrombocytosis.  She was started on carafate and pepcid for GERD and she completed the course of antibiotics for UTI and PNA and discharged to TCU.     Today she is seen for continued coughing. She reports minimal relief with the cough medicine and tessalon.   Her recent chest X-ray was unchanged from the one prior and she is having increased shortness of breath requiring oxygen. Antibiotics were continued for 7 days.   She deneid having any CP but is short of breath with activity currently she is satting at 93% on 2 L nasal cannula.  Last hemoglobin was 8.1 on 10/28 which is slightly down from previous one at 8.3 and her platelets are slightly lower at 511 from 515 .SHe she is currently on a puréed diet with nectar thickened liquids.   She is not on  anticoagulation due to PUD.     Past Medical History:   Diagnosis Date   ? A-fib (H)    ? Dysphagia 2000    [place] [facility] [place] and by Dr. Hernández in 2001   ? HTN (hypertension)    ? Migraine     beta blocker prophylaxis   ? OA (osteoarthritis)    ? UZMA (obstructive sleep apnea) 04/09/2015   ? Pain in joint, shoulder region    ? Polymyalgia rheumatica (H)    ? Renal stone              Family History   Problem Relation Age of Onset   ? Diabetes Mother    ? Diabetes Sister    ? Heart disease Son      Social History     Socioeconomic History   ? Marital status:      Spouse name: Not on file   ? Number of children: Not on file   ? Years of education: Not on file   ? Highest education level: Not on file   Occupational History   ? Not on file   Social Needs   ? Financial resource strain: Not on file   ? Food insecurity:     Worry: Not on file     Inability: Not on file   ? Transportation needs:     Medical: Not on file     Non-medical: Not on file   Tobacco Use   ? Smoking status: Never Smoker   ? Smokeless tobacco: Never Used   Substance and Sexual Activity   ? Alcohol use: Not Currently   ? Drug use: Not on file   ? Sexual activity: Not on file   Lifestyle   ? Physical activity:     Days per week: Not on file     Minutes per session: Not on file   ? Stress: Not on file   Relationships   ? Social connections:     Talks on phone: Not on file     Gets together: Not on file     Attends Jewish service: Not on file     Active member of club or organization: Not on file     Attends meetings of clubs or organizations: Not on file     Relationship status: Not on file   ? Intimate partner violence:     Fear of current or ex partner: Not on file     Emotionally abused: Not on file     Physically abused: Not on file     Forced sexual activity: Not on file   Other Topics Concern   ? Not on file   Social History Narrative   ? Not on file         Review of Systems   Constitutional: Positive for  activity change and fatigue. Negative for appetite change and fever.   HENT: Negative for congestion.    Respiratory: Positive for cough and shortness of breath. Negative for wheezing.    Cardiovascular: Negative for chest pain and leg swelling.   Gastrointestinal: Negative for abdominal distention, abdominal pain, constipation, diarrhea and nausea.   Genitourinary: Negative for dysuria.   Musculoskeletal: Negative for arthralgias and back pain.   Skin: Negative for color change and wound.   Neurological: Negative for dizziness.   Psychiatric/Behavioral: Positive for sleep disturbance. Negative for agitation, behavioral problems and confusion.       Vitals:    11/04/19 1318   BP: 122/63   Pulse: 78   Resp: 18   Temp: 98.3  F (36.8  C)   SpO2: 90%   Weight: (!) 67 lb 9.6 oz (30.7 kg)       Physical Exam  Constitutional:       Appearance: She is well-developed.      Comments: Pleasant frail woman in no acute distress    HENT:      Head: Normocephalic.   Eyes:      Conjunctiva/sclera: Conjunctivae normal.   Neck:      Musculoskeletal: Normal range of motion.   Cardiovascular:      Rate and Rhythm: Regular rhythm.      Heart sounds: Normal heart sounds. No murmur.      Comments: Pulse 78, Rhythm regular.   Pulmonary:      Effort: No respiratory distress.      Breath sounds: Rales present. No wheezing.      Comments: Right lower lobe.   Abdominal:      General: Bowel sounds are normal. There is no distension.      Palpations: Abdomen is soft.      Tenderness: There is no tenderness.   Musculoskeletal: Normal range of motion.   Skin:     General: Skin is warm.   Neurological:      Mental Status: She is alert and oriented to person, place, and time.   Psychiatric:         Behavior: Behavior normal.           LABS:   Recent Results (from the past 240 hour(s))   HM2(CBC w/o Differential)   Result Value Ref Range    WBC 6.3 4.0 - 11.0 thou/uL    RBC 2.91 (L) 3.80 - 5.40 mill/uL    Hemoglobin 8.1 (L) 12.0 - 16.0 g/dL     Hematocrit 28.0 (L) 35.0 - 47.0 %    MCV 96 80 - 100 fL    MCH 27.8 27.0 - 34.0 pg    MCHC 28.9 (L) 32.0 - 36.0 g/dL    RDW 13.8 11.0 - 14.5 %    Platelets 511 (H) 140 - 440 thou/uL    MPV 10.3 8.5 - 12.5 fL       ASSESSMENT:      ICD-10-CM    1. Coughing R05    2. Pneumonia due to infectious organism, unspecified laterality, unspecified part of lung J18.9    3. Essential hypertension I10    4. Weakness R53.1        PLAN:      Cough  Speech  Following  . Chest x-ray unchanged from prior one. Which still shoes pneumonia- placed on doxycycline.  Continue  Tessalon 100 mg three times a day PRN     Insomnia - melatonin 1 mg p.o. at bedtime    Kidney stone- follow up with Urology on 11/26/19    Dysphagia and followed by speech therapy Currently on a puréed diet with nectar thickened liquids    Severe malnutrition followed by dietary and placed on nutritional supplements twice daily    Anemia currently on iron supplements last hemoglobin 8.1 labs to be monitored    Thrombocytosis last platelet count 511 labs being monitored    Atrial fibrillation currently not on anticoagulation due to PUD, patient recently   on metoprolol tartrate 12.5 mg two times a day.     Electronically signed by: June Marshall CNP

## 2021-06-19 NOTE — LETTER
Letter by June Marshall CNP at      Author: June Marshall CNP Service: -- Author Type: --    Filed:  Encounter Date: 10/22/2019 Status: Signed         Patient: Nedra Carr   MR Number: [id number]   YOB: 1932   Date of Visit: 10/22/2019     [facility]    Facility:   [facility] [[id number]]   Code Status: DNR      CHIEF COMPLAINT/REASON FOR VISIT:  Chief Complaint   Patient presents with   ? Problem Visit     tachycardia        HISTORY:      HPI: Nedra is a 86 y.o. female currently undergoing physical occupational therapy at [facility]. She is with a past medical history for Afib, HTN UZMA, osteoporosis, polymyalgia rheumatica and perforated gastric ulcer.  She was recently at an elective EGD for reflux in which she was directly admitted to the hospital for weakness and malnutrition.  She had a previous hospitalization for pna and on this hospitalization she was found to be hypoxic and worsening pneumonia for which she was treated with antibiotics.  She also has left ureterovesical stone with UTI.  CT was negative for hydronephrosis.  She was found to have anemia and thrombocytosis.  She was started on carafate and pepcid for GERD and she completed the course of antibiotics for UTI and PNA and discharged to TCU.     Today she is seen or tachycardia . Her heart rate was between 116-120 apical. Her Rhythm was regular. She did a few other isolated episodes of tachycardia during her stay.  She deneid having any CP but did feel shortness of breath. Sats 88% on RA and she was placed on oxygen to keep sats > 90%  Last hemoglobin was 8.3 and her platelets were elevated at 515.  Labs pending for recheck.SHe appeared comfortable in the chair and was eating her lunch. She is not on anticoagulation due to PUD.     Past Medical History:   Diagnosis Date   ? A-fib (H)    ? Dysphagia 2000    Had [place] str[place]ure [place] [facility] [place] and by  Dr. Hernández in 2001   ? HTN (hypertension)    ? Migraine     beta blocker prophylaxis   ? OA (osteoarthritis)    ? UZMA (obstructive sleep apnea) 04/09/2015   ? Pain in joint, shoulder region    ? Polymyalgia rheumatica (H)    ? Renal stone              Family History   Problem Relation Age of Onset   ? Diabetes Mother    ? Diabetes Sister    ? Heart disease Son      Social History     Socioeconomic History   ? Marital status:      Spouse name: Not on file   ? Number of children: Not on file   ? Years of education: Not on file   ? Highest education level: Not on file   Occupational History   ? Not on file   Social Needs   ? Financial resource strain: Not on file   ? Food insecurity:     Worry: Not on file     Inability: Not on file   ? Transportation needs:     Medical: Not on file     Non-medical: Not on file   Tobacco Use   ? Smoking status: Never Smoker   ? Smokeless tobacco: Never Used   Substance and Sexual Activity   ? Alcohol use: Not Currently   ? Drug use: Not on file   ? Sexual activity: Not on file   Lifestyle   ? Physical activity:     Days per week: Not on file     Minutes per session: Not on file   ? Stress: Not on file   Relationships   ? Social connections:     Talks on phone: Not on file     Gets together: Not on file     Attends Sikhism service: Not on file     Active member of club or organization: Not on file     Attends meetings of clubs or organizations: Not on file     Relationship status: Not on file   ? Intimate partner violence:     Fear of current or ex partner: Not on file     Emotionally abused: Not on file     Physically abused: Not on file     Forced sexual activity: Not on file   Other Topics Concern   ? Not on file   Social History Narrative   ? Not on file         Review of Systems   Constitutional: Positive for activity change and fatigue. Negative for appetite change and fever.   HENT: Negative for congestion.    Respiratory: Positive for shortness of breath. Negative for  cough and wheezing.    Cardiovascular: Negative for chest pain and leg swelling.   Gastrointestinal: Negative for abdominal distention, abdominal pain, constipation, diarrhea and nausea.   Genitourinary: Negative for dysuria.   Musculoskeletal: Negative for arthralgias and back pain.   Skin: Negative for color change and wound.   Neurological: Negative for dizziness.   Psychiatric/Behavioral: Positive for sleep disturbance. Negative for agitation, behavioral problems and confusion.       Vitals:    10/22/19 1202   BP: 124/70   Pulse: 98   Resp: 20   Temp: 99.3  F (37.4  C)   SpO2: 90%   Weight: (!) 65 lb 6.4 oz (29.7 kg)       Physical Exam  Constitutional:       Appearance: She is well-developed.      Comments: Pleasant frail woman in no acute distress seen today sitting up in a recliner.   HENT:      Head: Normocephalic.   Eyes:      Conjunctiva/sclera: Conjunctivae normal.   Neck:      Musculoskeletal: Normal range of motion.   Cardiovascular:      Rate and Rhythm: Regular rhythm.      Heart sounds: Normal heart sounds. No murmur.      Comments: Tachycardia 116-120 apical. Rhythm regular.   Pulmonary:      Effort: No respiratory distress.      Breath sounds: Rales present. No wheezing.      Comments: Right lower lobe.   Abdominal:      General: Bowel sounds are normal. There is no distension.      Palpations: Abdomen is soft.      Tenderness: There is no tenderness.   Musculoskeletal: Normal range of motion.   Skin:     General: Skin is warm.   Neurological:      Mental Status: She is alert and oriented to person, place, and time.   Psychiatric:         Behavior: Behavior normal.           LABS:   Recent Results (from the past 240 hour(s))   HM2(CBC w/o Differential)   Result Value Ref Range    WBC 8.0 4.0 - 11.0 thou/uL    RBC 2.95 (L) 3.80 - 5.40 mill/uL    Hemoglobin 8.3 (L) 12.0 - 16.0 g/dL    Hematocrit 28.4 (L) 35.0 - 47.0 %    MCV 96 80 - 100 fL    MCH 28.1 27.0 - 34.0 pg    MCHC 29.2 (L) 32.0 - 36.0 g/dL     RDW 13.9 11.0 - 14.5 %    Platelets 515 (H) 140 - 440 thou/uL    MPV 10.2 8.5 - 12.5 fL   Basic Metabolic Panel   Result Value Ref Range    Sodium 137 136 - 145 mmol/L    Potassium 4.6 3.5 - 5.0 mmol/L    Chloride 98 98 - 107 mmol/L    CO2 31 22 - 31 mmol/L    Anion Gap, Calculation 8 5 - 18 mmol/L    Glucose 99 70 - 125 mg/dL    Calcium 9.9 8.5 - 10.5 mg/dL    BUN 12 8 - 28 mg/dL    Creatinine 0.59 (L) 0.60 - 1.10 mg/dL    GFR MDRD Af Amer >60 >60 mL/min/1.73m2    GFR MDRD Non Af Amer >60 >60 mL/min/1.73m2       ASSESSMENT:      ICD-10-CM    1. Tachycardia R00.0        PLAN:    Insomnia - melatonin 1 mg p.o. at bedtime    Kidney stone- follow up with Urology on 11/26/19    Dysphagia and followed by speech therapy    Severe malnutrition followed by dietary and placed on nutritional supplements twice daily    Anemia currently on iron supplements last hemoglobin 8.3 labs to be monitored    Thrombocytosis last platelet count 515 labs being monitored    Atrial fibrillation currently not on anticoagulation due to PUD, patient also not on a beta-blocker heart rate is controlled pulse 98, monitor closely Pt started on metoprolol tartrate 12.5 mg two times a day for pulse 116-120 and a few other isolated  readings.     Electronically signed by: June Marshall CNP

## 2021-06-19 NOTE — LETTER
Letter by Clarita Deal MD at      Author: Clarita Deal MD Service: -- Author Type: --    Filed:  Encounter Date: 10/24/2019 Status: Signed         Patient: Nedra Carr   MR Number: [id number]   YOB: 1932   Date of Visit: 10/24/2019     [facility]      Facility:    [facility] [[id number]]  Code Status: DNR       Chief Complaint/Reason for Visit:  Chief Complaint   Patient presents with   ? H & P     Admit note to TCU-weakness, abdominal pain, recent pneumonia, hypoxia.       HPI:   Nedra is a 86 y.o. female with hx of afib not on AC, HTN, PMR, UZMA, admitted to the hospital on 10/15/19 due to generalized weakness and abdominal pain as noted below.     Primary Discharge Diagnoses:   Summary of Stay: Nedra Carr is a 86 year old female with a PMH significant for a fib, HTN, UZMA, PMR and was recently hospitalized for pneumonia and incidentally noted to have a kidney stone that dropped into the bladder, who was directly admitted from endoscopy on 10/15/2019 with generalized weakness and abdominal pain.      Problem List:   1. RUQ abdominal pain - ongoing since shortly after discharge from hospital on 10/9. Denies nausea, vomiting, diarrhea or worsening pain with eating. She denies dysphagia but nursing reports issues with swallowing at bedside. Speech consulted, notes moderate oropharyngeal dysphagia, diet adjusted. Endorses poor appetite, notes regular BM (although CT scan from hospitalization showed colonic constipation), also showed incidental kidney stone but nothing else acute. Daughter reports hx of duodenal ulcers, hx of cholecystectomy. Denies fevers. UA from 10/14 looked concerning for infection but urine culture is growing mixed urogenital barrera, UA repeated on 10/15, UA again looks concerning for infection but given previous culture results, abx held and urine culture followed, no growth thus far. RUQ US obtained and was  unremarkable. No ulcers noted on EGD today, no dilation done of esophagus, biopsies taken. Recently started on Protonix and carafate, continue, switched carafate to liquid here. PO intake improved, tolerating oral intake. Denies abd pain today, notes it can be intermittent  2. Generalized weakness - multifactorial. Recent hospitalization for pneumonia, pt and daughter state she has gotten progressively weaker since discharge. Poor appetite since discharge, likely deconditioning as well. Pt denies worsening SOB, denies diarrhea, N/V. Labs and CXR from yesterday were fairly unremarkable. Equivocal UA with culture now growing mixed urogenital barrera. UA and culture repeated, not growing bacteria at this time, still pending. SW consulted for TCU placement, pt will discharge to [facility].   3. Hypoxia - pt noted to be mildly hypoxic on room air upon arrival to the floor, no hx of chronic respiratory failure. Recently hospitalized with pneumonia, treated with abx, has completed course. CXR yesterday looks unchanged/stable. O2 was weaned, pt did develop mild hypoxia and increased SOB with ambulation, O2 sats improved quickly with rest and deep breaths. Continue to monitor at TCU, continue IS. Pt states SOB she experiences with ambulation is improved from previous.      Brief Hospital Summary:   Reason for your hospital stay   Generalized weakness and abdominal pain.     Briefly, Nedra Carr was admitted on 10/15/2019 for concerns of generalized weakness and abdominal pain.  Pt was admitted directly to the OBS unit after EGD earlier in the day due to abd pain. Pt was weak and no source of abdominal pain was found on EGD.. UA was abnormal prior to admission, this was repeated, urine culture sent. RUQ US was obtained and negative for acute process. Pt was provided supportive cares. Labs were reviewed and significant results addressed. Speech therapy was consulted and noted pt to have moderate dysphagia and diet  adjusted. The patient's abdominal pain improved, she tolerated a oral intake. She was initially placed on oxygen due to mild hypoxia. She was weaned off O2 at rest and maintained normal SpO2. She was ambulated in the hallway and SpO2 did drop into the 80s with exertion. Her O2 sats would quickly recover with rest and deep breaths. She did note SOB with exertion but states this is improved from previous. On the day of discharge, pt's strength had improved and a safe discharge plan was arranged. Medications were reviewed and adjustments made as necessary.    Overall stabilized and discharged to TCU on 10/17/19 for PT, OT, nursing cares, medical management and monitoring.     Today:  She continues on supplemental oxygen, some residual cough noted. No fever. She is on nectar thick liquids due to dysphagia. No chest pain. No complaints of abdominal pain today, she is on Protonix and carafate. Has anemia, on iron. Last hgb in the hospital on 10/14/19 was 10.0, then on 10/21/19 was 8.3 and today 7.6. Iron will be increased to two times a day. No signs of bleeding. Labs to be rechecked on 10/28/19 and GI updated. Her appetite is poor. Weight is only 65.4 pounds, dietician involved. She denies diarrhea or constipation. No new vision or hearing problems.       Past Medical History:  Past Medical History:   Diagnosis Date   ? A-fib (H)    ? Dysphagia 2000    Had duodenal strcture [place] [facility] [place] and by Dr. Hernández in 2001   ? HTN (hypertension)    ? Migraine     beta blocker prophylaxis   ? OA (osteoarthritis)    ? UZMA (obstructive sleep apnea) 04/09/2015   ? Pain in joint, shoulder region    ? Polymyalgia rheumatica (H)    ? Renal stone            Surgical History:  Past Surgical History:   Procedure Laterality Date   ? BACK SURGERY     ? CHOLECYSTECTOMY  11/15/2014   ? COLONOSCOPY  09/03/2019   ? CYSTOSCOPY W/ LITHOLAPAXY / EHL  04/08/2015    Procedure: COMBINED CYSTOSCOPY, LITHOLAPAXY; Surgeon: Randy Reno,  MD; Location: [facility] OR     ? ESOPHAGOGASTRODUODENOSCOPY  11/21/12, 2/22/13, 1/13/15, 3/5/15, 11/10/15, 10/15/19   ? EXPLORATORY LAPAROTOMY  11/15/2014   ? FLEXIBLE BRONCHOSCOPY  11/21/2014   ? HIP FRACTURE SURGERY      x2   ? HYSTERECTOMY     ? LASER HOLMIUM LITHOTRIPSY BLADDER  04/08/2015       Family History:   Family History   Problem Relation Age of Onset   ? Diabetes Mother    ? Diabetes Sister    ? Heart disease Son        Social History:    Social History     Socioeconomic History   ? Marital status:      Spouse name: Not on file   ? Number of children: Not on file   ? Years of education: Not on file   ? Highest education level: Not on file   Occupational History   ? Not on file   Social Needs   ? Financial resource strain: Not on file   ? Food insecurity:     Worry: Not on file     Inability: Not on file   ? Transportation needs:     Medical: Not on file     Non-medical: Not on file   Tobacco Use   ? Smoking status: Never Smoker   ? Smokeless tobacco: Never Used   Substance and Sexual Activity   ? Alcohol use: Not Currently   ? Drug use: Not on file   ? Sexual activity: Not on file   Lifestyle   ? Physical activity:     Days per week: Not on file     Minutes per session: Not on file   ? Stress: Not on file   Relationships   ? Social connections:     Talks on phone: Not on file     Gets together: Not on file     Attends [employer] service: Not on file     Active member of club or organization: Not on file     Attends meetings of clubs or organizations: Not on file     Relationship status: Not on file   ? Intimate partner violence:     Fear of current or ex partner: Not on file     Emotionally abused: Not on file     Physically abused: Not on file     Forced sexual activity: Not on file   Other Topics Concern   ? Not on file   Social History Narrative   ? Not on file       Medications:  Current Outpatient Medications   Medication Sig   ? acetaminophen (TYLENOL) 325 MG tablet Take 650 mg by mouth every 6  (six) hours as needed for pain.   ? albuterol (ACCUNEB) 1.25 mg/3 mL nebulizer solution Take 1 ampule by nebulization every 4 (four) hours as needed for wheezing.          ? albuterol (ACCUNEB) 1.25 mg/3 mL nebulizer solution Take 1 ampule by nebulization 4 (four) times a day.   ? alendronate (FOSAMAX) 70 MG tablet Take 70 mg by mouth every 7 days. Take in the morning on an empty stomach with a full glass of water 30 minutes before food (thur)   ? benzonatate (TESSALON) 100 MG capsule Take 100 mg by mouth 3 (three) times a day as needed for cough.   ? ferrous gluconate (FERGON) 324 MG tablet Take 324 mg by mouth daily with breakfast.   ? guaiFENesin (ROBITUSSIN) 100 mg/5 mL syrup Take 100 mg by mouth every 4 (four) hours as needed for cough. And 200mg q6h prn         ? melatonin 1 mg Tab tablet Take 1 mg by mouth at bedtime.   ? multivitamin therapeutic tablet Take 1 tablet by mouth daily.   ? pantoprazole (PROTONIX) 40 MG tablet Take 40 mg by mouth daily.   ? sucralfate (CARAFATE) 100 mg/mL suspension Take 1 g by mouth 4 (four) times a day.   ? vitamin A-vitamin C-vit E-min (OCUVITE) Tab tablet Take 1 tablet by mouth daily.       Allergies:  No Known Allergies       Review of Systems:  Pertinent items are noted in HPI.      Physical Exam:   General: Patient is alert, thin, frail, pale elderly female, on oxygen per NC.  Vitals: /70, Temp 98.2, Pulse 84, RR 18, O2 sat 94% on O2.  HEENT: Head is NCAT. Eyes show no injection or icterus. Nares negative. Oropharynx well hydrated.  Neck: Supple. No tenderness or adenopathy. No JVD.  Lungs: Diminished at bases. Upper rhonchi. No wheezes.  Cardiovascular: Regular rate and rhythm, normal S1, S2.  Back: No spinal or CVA tenderness.  Abdomen: Soft, no tenderness on exam. Bowel sounds present. No guarding rebound or rigidity.  : Deferred.  Extremities: No edema is noted.  Musculoskeletal: Degen changes.   Skin: Warm and dry.  Psych: Mood appears pretty  good.      Labs:  Outside hospital      Assessment/Plan:  1. Weakness. Overall frailty, debilitation. Attempting therapy.  2. HTN. On metoprolol, helps with rate control for afib. Monitor BPs.   3. Hx Afib. No anticoagulation due to GI issues. Rate control with beta blocker.  4. Anemia. Hgb has dropped, she is on iron which will be increased. No obvious bleeding, follow closely, update GI. Had EGD with no ulcers. Continue PPI and Carafate.  5. Recent pneumonia. No current abx. Still on oxygen, attempting to wean. Encourage IS.  6. Dysphagia. On nectar thick liquids, working with speech therapy.  7. Code status is DNR.        Total time greater than 45 minutes, greater than 50% counseling and coordination of care, time spent in interview and examination of patient, discussion with nursing staff. CC time includes review with patient of hospital events, EGD and recommendations from GI, monitoring of Hgb and watch for signs of bleeding, increase iron. Attempt to wean form O2, continue GI meds, follow up EGD later. Expected course in TCU.       Electronically signed by: Clarita Deal MD

## 2021-06-19 NOTE — LETTER
Letter by Sydnie Franklin CNP at      Author: Sydnie Franklin CNP Service: -- Author Type: --    Filed:  Encounter Date: 10/18/2019 Status: Signed         Patient: Nedra Carr   MR Number: [id number]   YOB: 1932   Date of Visit: 10/18/2019     Code Status:  DNR  Visit Type: Follow Up     Facility:  [facility] [[id number]]        Facility Type: SNF (Skilled Nursing Facility, TCU)    History of Present Illness: Nedra Carr is a 86 y.o. female who has a pmh for Afib, HTN UZMA, osteoporosis, polymyalgia rheumatica and perforated gastric ulcer.  She was recently at an elective EGD for reflux in which she was directly admitted to the hospital for weakness and malnutrition.  She had a previous hospitalization for pna and on this hospitalization she was found to be hypoxic and worsening pneumonia for which she was treated with antibiotics.  She also has left ureterovesical stone with UTI.  CT was negative for hydronephrosis.  She was found to have anemia and thrombocytosis.  She was started on carafate and pepcid for GERD and she completed the course of antibiotics for UTI and PNA and discharged to TCU.     Today, upon exam she reports nausea.  Nursing reports that when she was given her iron pill she coughed and vomited.  I supplied her with cool washclothes and the nausea resolved on its own.  She does have a significantly productive cough.  She has coarse rhonchi in her right base. She is afebrile and VSS.  She endorses abdominal discomfort but reports she is able to eat at each meal.  She is very frail and thin.     Review of Systems   Patient denies fever, chills, headache, lightheadedness, dizziness, rhinorrhea, chest pain, palpitations, abdominal pain, n/v, diarrhea, constipation, change in appetite, dysuria, frequency, burning or pain with urination.  Other than stated in HPI all other review of systems is negative.         Physical Exam   Vital signs: /60,  HR 83, resp 18, temp 98.2, 65.4lbs.   GENERAL APPEARANCE: very thin, frail female in no acute distress.  HEENT: normocephalic, atraumatic  PERRL, sclerae anicteric, conjunctivae clear and moist, EOM intact  LUNGS: diminished lung sounds with coarse rhonchi in right base respiratory effort normal.  CARD: RRR, S1, S2, without murmurs, gallops, rubs,  ABD: Soft and nontender with normal bowel sounds.   EXTREMITIES: No cyanosis, clubbing or edema.  NEURO: Alert & oriented,  Face is symmetric.  SKIN: Inspection of the skin reveals no rashes, ulcerations or petechiae.  PSYCH: euthymic          Labs:  No results found for this or any previous visit (from the past 240 hour(s)).      Assessment:  1. Pneumonia due to infectious organism, unspecified laterality, unspecified part of lung     2. Oropharyngeal dysphagia     3. Calculus of ureterovesical junction (UVJ)     4. Severe malnutrition (H)     5. Anemia, unspecified type     6. Thrombocytosis (H)     7. Atrial fibrillation, unspecified type (H)     8. PUD (peptic ulcer disease)     9. Nausea         Plan:   PNA: continues to have congestion with productive cough.  O2 sats are lower but no need for O2 yet.  Will add scheduled guaifenesin, prn tessalon perles and scheduled albuterol nebs.  ST to evaluate and treat.      Dysphagia: ST to eval and treat.  Likely worsening congestion.     Kidney stone: she will follow up with urology on 11/26    Severe malnutrition: add nutritional supplements two times a day  Dietician for malnutrition and iron rich foods for anemia    Anemia: check CBC, continue iron supplement.  This could be causing her GI upset so will monitor and try for iron rich foods.     Thrombocytosis: CBC    Afib: not on beta blocker, heart rate controlled, no anticoagulation due to PUD.     PUD: carafate and pantoprazole.  Dietician for low acid foods    Nausea: likely related to swallowed phlegm or iron pill.  This resolved on its own so will monitor.  Did  instruct patient and nursing to be sure to eat prior to pill to help with GI upset.  Counseled patient on the likely guzman it was coughed up phlegm that she swallowed making her nauseous.      35 total minutes spent with 20 minutes spent with patient and nursing in counseling and coordination regarding her acute nausea, cough and congestion with risk for ongoing pneumonia.     Electronically signed by: Sydnie Franklin CNP

## 2021-06-19 NOTE — LETTER
Letter by June Marshall CNP at      Author: June Marshall CNP Service: -- Author Type: --    Filed:  Encounter Date: 11/12/2019 Status: Signed         Patient: Nedra Carr   MR Number: [id number]   YOB: 1932   Date of Visit: 11/12/2019     [facility]    Facility:   [facility] [[id number]]   Code Status: DNR  PCP: Provider, No Primary Care   Phone: None   Fax: None      CHIEF COMPLAINT/REASON FOR VISIT:  Chief Complaint   Patient presents with   ? Review Of Multiple Medical Conditions       HISTORY COURSE:  Nedra is a 86 y.o. female currently undergoing physical occupational therapy at [facility]. She is with a past medical history for Afib, HTN UZMA, osteoporosis, polymyalgia rheumatica and perforated gastric ulcer.  She was recently at an elective EGD for reflux in which she was directly admitted to the hospital for weakness and malnutrition.  She had a previous hospitalization for pna and on this hospitalization she was found to be hypoxic and worsening pneumonia for which she was treated with antibiotics.  She also has left ureterovesical stone with UTI.  CT was negative for hydronephrosis.  She was found to have anemia and thrombocytosis.  She was started on carafate and pepcid for GERD and she completed the course of antibiotics for UTI and PNA and discharged to TCU.      Today she is seen for  routine visit to review multiple medical issues . She continues to have a cough at night. Per therapy she did not cough during the 40 minute session. Speech therapy is unable to determine if her coughing during meals is related to her pneumonia or foods and recommended a swallow study. Today she move to a LTC bed but will remain on skilled nursing.  She denied any increased shortness of breath.  . She was extended on an antibiotic which ended 11/9/19.    She deneid having any CP.    Last hemoglobin was 8.1 on 10/28 which is  slightly down from previous one at 8.3 and her platelets are slightly lower at 511 from 515 Next lab 11/11/19.  .SHe she is currently on a puréed diet with nectar thickened liquids.   She is not on anticoagulation due to PUD. She is ambulating well with a walker and SBA.       Review of Systems  Constitutional: Positive for activity change and fatigue. Negative for appetite change and fever.   HENT: Negative for congestion.    Respiratory: Positive for cough and shortness of breath. Negative for wheezing.    Cardiovascular: Negative for chest pain and leg swelling.   Gastrointestinal: Negative for abdominal distention, abdominal pain, constipation, diarrhea and nausea.   Genitourinary: Negative for dysuria.   Musculoskeletal: Negative for arthralgias and back pain.   Skin: Negative for color change and wound.   Neurological: Negative for dizziness.   Psychiatric/Behavioral: Positive for sleep disturbance. Negative for agitation, behavioral problems and confusion.         Vitals:    11/12/19 1439   BP: 156/73   Pulse: 88   Resp: 18   Temp: 97.1  F (36.2  C)   SpO2: 96%   Weight: (!) 67 lb 12.8 oz (30.8 kg)       Physical Exam  Constitutional:       Appearance: She is well-developed.      Comments: Pleasant frail woman in no acute distress    HENT:      Head: Normocephalic.   Eyes:      Conjunctiva/sclera: Conjunctivae normal.   Neck:      Musculoskeletal: Normal range of motion.   Cardiovascular:      Rate and Rhythm: Regular rhythm.      Heart sounds: Normal heart sounds. No murmur.      Comments: Pulse 88, Rhythm regular.   Pulmonary:      Effort: No respiratory distress.      Breath sounds: Rales present. No wheezing.      Comments: Right lower lobe.   Abdominal:      General: Bowel sounds are normal. There is no distension.      Palpations: Abdomen is soft.      Tenderness: There is no tenderness.   Musculoskeletal: Normal range of motion.   Skin:     General: Skin is warm.   Neurological:      Mental Status: She  is alert and oriented to person, place, and time.   Psychiatric:         Behavior: Behavior normal.   MEDICATION LIST:  Current Outpatient Medications   Medication Sig   ? acetaminophen (TYLENOL) 325 MG tablet Take 650 mg by mouth every 6 (six) hours as needed for pain.   ? albuterol (ACCUNEB) 1.25 mg/3 mL nebulizer solution Take 1 ampule by nebulization every 4 (four) hours as needed for wheezing.          ? albuterol (ACCUNEB) 1.25 mg/3 mL nebulizer solution Take 1 ampule by nebulization 4 (four) times a day.   ? alendronate (FOSAMAX) 70 MG tablet Take 70 mg by mouth every 7 days. Take in the morning on an empty stomach with a full glass of water 30 minutes before food (thur)   ? benzonatate (TESSALON) 100 MG capsule Take 100 mg by mouth 3 (three) times a day as needed for cough.   ? ferrous gluconate (FERGON) 324 MG tablet Take 324 mg by mouth daily with breakfast.   ? guaiFENesin (ROBITUSSIN) 100 mg/5 mL syrup Take 100 mg by mouth every 4 (four) hours as needed for cough. And 200mg q6h prn         ? melatonin 1 mg Tab tablet Take 1 mg by mouth at bedtime.   ? metoprolol tartrate (LOPRESSOR) 25 MG tablet Take 12.5 mg by mouth 2 (two) times a day.   ? multivitamin therapeutic tablet Take 1 tablet by mouth daily.   ? pantoprazole (PROTONIX) 40 MG tablet Take 40 mg by mouth daily.   ? sucralfate (CARAFATE) 100 mg/mL suspension Take 1 g by mouth 4 (four) times a day.   ? vitamin A-vitamin C-vit E-min (OCUVITE) Tab tablet Take 1 tablet by mouth daily.       DISCHARGE DIAGNOSIS:    ICD-10-CM    1. Coughing R05    2. Atrial fibrillation, unspecified type (H) I48.91    3. Weakness R53.1    4. Severe malnutrition (H) E43      PLAN:       Cough  Speech  hfollowing . Chest x-ray unchanged from prior one. Continue  Tessalon 100 mg three times a day PRN swallow study ordered per speech recommendations.      Insomnia - melatonin 1 mg p.o. at bedtime     Kidney stone- follow up with Urology on 11/26/19     Dysphagia and  followed by speech therapy Currently on a puréed diet with nectar thickened liquids     Severe malnutrition followed by dietary and placed on nutritional supplements twice daily     Anemia currently on iron supplements last hemoglobin 7.8 down from  8.1 labs to be monitored     Thrombocytosis last platelet count 482 down from  511 labs being monitored     Atrial fibrillation currently not on anticoagulation due to PUD, patient recently  started on metoprolol tartrate 12.5 mg two times a day.       Electronically signed by: June Marshall CNP

## 2021-06-19 NOTE — LETTER
Letter by June Marshall CNP at      Author: June Marshall CNP Service: -- Author Type: --    Filed:  Encounter Date: 10/28/2019 Status: Signed         Patient: Nedra Carr   MR Number: [id number]   YOB: 1932   Date of Visit: 10/28/2019     [facility]    Facility:   [facility] [[id number]]   Code Status: DNR      CHIEF COMPLAINT/REASON FOR VISIT:  Chief Complaint   Patient presents with   ? Problem Visit     possible aspiration        HISTORY:      HPI: Nedra is a 86 y.o. female currently undergoing physical occupational therapy at [facility]. She is with a past medical history for Afib, HTN UZMA, osteoporosis, polymyalgia rheumatica and perforated gastric ulcer.  She was recently at an elective EGD for reflux in which she was directly admitted to the hospital for weakness and malnutrition.  She had a previous hospitalization for pna and on this hospitalization she was found to be hypoxic and worsening pneumonia for which she was treated with antibiotics.  She also has left ureterovesical stone with UTI.  CT was negative for hydronephrosis.  She was found to have anemia and thrombocytosis.  She was started on carafate and pepcid for GERD and she completed the course of antibiotics for UTI and PNA and discharged to TCU.     Today she is seen for reports of coughing with dinner last night.  Per the patient she developed coughing with her dinner last night.  Per the staff it took her approximately 40 minutes to clear.  I did have her evaluated by speech this morning who thinks she may have aspirated.  Today she tells me she is feeling much better she was noted to have Rales right lower extremities.  However she is currently in a transitional care unit with recent pneumonia.  She deneid having any CP but is short of breath with activity currently she is satting at 91% on 2 L nasal cannula.  Last hemoglobin was 8.1 on 10/28 which is  slightly down from previous one at 8.3 and her platelets are slightly lower at 511 from 515 .SHe she is currently on a puréed diet with nectar thickened liquids and she tells me she thinks that it might be related to her casserole being a little thicker than normal.  She is not on anticoagulation due to PUD.  Metoprolol was recently started and  her pulse was  80    Past Medical History:   Diagnosis Date   ? A-fib (H)    ? Dysphagia 2000    Had duodenal str[facility] in [facility] and by Dr. Hernández in 2001   ? HTN (hypertension)    ? Migraine     beta blocker prophylaxis   ? OA (osteoarthritis)    ? UZMA (obstructive sleep apnea) 04/09/2015   ? Pain in joint, shoulder region    ? Polymyalgia rheumatica (H)    ? Renal stone              Family History   Problem Relation Age of Onset   ? Diabetes Mother    ? Diabetes Sister    ? Heart disease Son      Social History     Socioeconomic History   ? Marital status:      Spouse name: Not on file   ? Number of children: Not on file   ? Years of education: Not on file   ? Highest education level: Not on file   Occupational History   ? Not on file   Social Needs   ? Financial resource strain: Not on file   ? Food insecurity:     Worry: Not on file     Inability: Not on file   ? Transportation needs:     Medical: Not on file     Non-medical: Not on file   Tobacco Use   ? Smoking status: Never Smoker   ? Smokeless tobacco: Never Used   Substance and Sexual Activity   ? Alcohol use: Not Currently   ? Drug use: Not on file   ? Sexual activity: Not on file   Lifestyle   ? Physical activity:     Days per week: Not on file     Minutes per session: Not on file   ? Stress: Not on file   Relationships   ? Social connections:     Talks on phone: Not on file     Gets together: Not on file     Attends Spiritism service: Not on file     Active member of club or organization: Not on file     Attends meetings of clubs or organizations: Not on file     Relationship status: Not on  file   ? Intimate partner violence:     Fear of current or ex partner: Not on file     Emotionally abused: Not on file     Physically abused: Not on file     Forced sexual activity: Not on file   Other Topics Concern   ? Not on file   Social History Narrative   ? Not on file         Review of Systems   Constitutional: Positive for activity change and fatigue. Negative for appetite change and fever.   HENT: Negative for congestion.    Respiratory: Positive for shortness of breath. Negative for cough and wheezing.    Cardiovascular: Negative for chest pain and leg swelling.   Gastrointestinal: Negative for abdominal distention, abdominal pain, constipation, diarrhea and nausea.   Genitourinary: Negative for dysuria.   Musculoskeletal: Negative for arthralgias and back pain.   Skin: Negative for color change and wound.   Neurological: Negative for dizziness.   Psychiatric/Behavioral: Positive for sleep disturbance. Negative for agitation, behavioral problems and confusion.       Vitals:    10/28/19 1220   BP: 108/64   Pulse: 80   Resp: 18   Temp: 97.9  F (36.6  C)   SpO2: 91%   Weight: (!) 65 lb 6.4 oz (29.7 kg)       Physical Exam  Constitutional:       Appearance: She is well-developed.      Comments: Pleasant frail woman in no acute distress    HENT:      Head: Normocephalic.   Eyes:      Conjunctiva/sclera: Conjunctivae normal.   Neck:      Musculoskeletal: Normal range of motion.   Cardiovascular:      Rate and Rhythm: Regular rhythm.      Heart sounds: Normal heart sounds. No murmur.      Comments: Pulse 80, Rhythm regular.   Pulmonary:      Effort: No respiratory distress.      Breath sounds: Rales present. No wheezing.      Comments: Right lower lobe.   Abdominal:      General: Bowel sounds are normal. There is no distension.      Palpations: Abdomen is soft.      Tenderness: There is no tenderness.   Musculoskeletal: Normal range of motion.   Skin:     General: Skin is warm.   Neurological:      Mental Status:  She is alert and oriented to person, place, and time.   Psychiatric:         Behavior: Behavior normal.           LABS:   Recent Results (from the past 240 hour(s))   HM2(CBC w/o Differential)   Result Value Ref Range    WBC 8.0 4.0 - 11.0 thou/uL    RBC 2.95 (L) 3.80 - 5.40 mill/uL    Hemoglobin 8.3 (L) 12.0 - 16.0 g/dL    Hematocrit 28.4 (L) 35.0 - 47.0 %    MCV 96 80 - 100 fL    MCH 28.1 27.0 - 34.0 pg    MCHC 29.2 (L) 32.0 - 36.0 g/dL    RDW 13.9 11.0 - 14.5 %    Platelets 515 (H) 140 - 440 thou/uL    MPV 10.2 8.5 - 12.5 fL   Basic Metabolic Panel   Result Value Ref Range    Sodium 137 136 - 145 mmol/L    Potassium 4.6 3.5 - 5.0 mmol/L    Chloride 98 98 - 107 mmol/L    CO2 31 22 - 31 mmol/L    Anion Gap, Calculation 8 5 - 18 mmol/L    Glucose 99 70 - 125 mg/dL    Calcium 9.9 8.5 - 10.5 mg/dL    BUN 12 8 - 28 mg/dL    Creatinine 0.59 (L) 0.60 - 1.10 mg/dL    GFR MDRD Af Amer >60 >60 mL/min/1.73m2    GFR MDRD Non Af Amer >60 >60 mL/min/1.73m2   HM2(CBC w/o Differential)   Result Value Ref Range    WBC 8.5 4.0 - 11.0 thou/uL    RBC 2.70 (L) 3.80 - 5.40 mill/uL    Hemoglobin 7.6 (L) 12.0 - 16.0 g/dL    Hematocrit 25.8 (L) 35.0 - 47.0 %    MCV 96 80 - 100 fL    MCH 28.1 27.0 - 34.0 pg    MCHC 29.5 (L) 32.0 - 36.0 g/dL    RDW 14.0 11.0 - 14.5 %    Platelets 469 (H) 140 - 440 thou/uL    MPV 10.3 8.5 - 12.5 fL       ASSESSMENT:      ICD-10-CM    1. Pneumonia due to infectious organism, unspecified laterality, unspecified part of lung J18.9    2. Coughing R05        PLAN:      choking episode Speech consult. Chest x-ray Ap/Lateral and check CBC.     Insomnia - melatonin 1 mg p.o. at bedtime    Kidney stone- follow up with Urology on 11/26/19    Dysphagia and followed by speech therapy Currently on a puréed diet with nectar thickened liquids    Severe malnutrition followed by dietary and placed on nutritional supplements twice daily    Anemia currently on iron supplements last hemoglobin 8.1 labs to be  monitored    Thrombocytosis last platelet count 511 labs being monitored    Atrial fibrillation currently not on anticoagulation due to PUD, patient recently  started on metoprolol tartrate 12.5 mg two times a day for pulse reg rate and rhythm     Electronically signed by: June Marshall CNP

## 2021-06-19 NOTE — LETTER
Letter by June Marshall CNP at      Author: June Marshall CNP Service: -- Author Type: --    Filed:  Encounter Date: 11/7/2019 Status: Signed         Patient: Nedra Carr   MR Number: [id number]   YOB: 1932   Date of Visit: 11/7/2019     [facility]    Facility:   [facility] [[id number]]   Code Status: DNR  PCP: Provider, No Primary Care   Phone: None   Fax: None      CHIEF COMPLAINT/REASON FOR VISIT:  Chief Complaint   Patient presents with   ? Discharge Summary       HISTORY COURSE:  Nedra is a 86 y.o. female currently undergoing physical occupational therapy at [facility]. She is with a past medical history for Afib, HTN UZMA, osteoporosis, polymyalgia rheumatica and perforated gastric ulcer.  She was recently at an elective EGD for reflux in which she was directly admitted to the hospital for weakness and malnutrition.  She had a previous hospitalization for pna and on this hospitalization she was found to be hypoxic and worsening pneumonia for which she was treated with antibiotics.  She also has left ureterovesical stone with UTI.  CT was negative for hydronephrosis.  She was found to have anemia and thrombocytosis.  She was started on carafate and pepcid for GERD and she completed the course of antibiotics for UTI and PNA and discharged to TCU.      Today she is seen for   a face-to-face for discharge.  Patient will currently stay in her TCU room 213 with current medications and treatments until a long-term bed becomes available.  She will switch from TCU to long-term care status as of 11/8/2019.  She was weaned off her oxygen this morning and she was 91% on room air and her other finger was checked and she was 85% on room air.   she denied any increased shortness of breath.  she did work with therapy  on room air and she maintained her sats at 96%.  So I will order PRN oxygen to keep sats greater than 90%. She was  extended on an antibiotic which will end 11/9/19 and reports feeling much better.     She deneid having any CP.    Last hemoglobin was 8.1 on 10/28 which is slightly down from previous one at 8.3 and her platelets are slightly lower at 511 from 515 Next lab 11/11/19.  .SHe she is currently on a puréed diet with nectar thickened liquids.   She is not on anticoagulation due to PUD.     UPDATE: Pt has won her appeal and will remain on TCU with therapies at this time.     Review of Systems  Constitutional: Positive for activity change and fatigue. Negative for appetite change and fever.   HENT: Negative for congestion.    Respiratory: Positive for cough and shortness of breath. Negative for wheezing.    Cardiovascular: Negative for chest pain and leg swelling.   Gastrointestinal: Negative for abdominal distention, abdominal pain, constipation, diarrhea and nausea.   Genitourinary: Negative for dysuria.   Musculoskeletal: Negative for arthralgias and back pain.   Skin: Negative for color change and wound.   Neurological: Negative for dizziness.   Psychiatric/Behavioral: Positive for sleep disturbance. Negative for agitation, behavioral problems and confusion.         Vitals:    11/07/19 1147   BP: 157/64   Pulse: 79   Resp: 20   Temp: 97.9  F (36.6  C)   Weight: (!) 67 lb 12.8 oz (30.8 kg)       Physical Exam  Constitutional:       Appearance: She is well-developed.      Comments: Pleasant frail woman in no acute distress    HENT:      Head: Normocephalic.   Eyes:      Conjunctiva/sclera: Conjunctivae normal.   Neck:      Musculoskeletal: Normal range of motion.   Cardiovascular:      Rate and Rhythm: Regular rhythm.      Heart sounds: Normal heart sounds. No murmur.      Comments: Pulse 79, Rhythm regular.   Pulmonary:      Effort: No respiratory distress.      Breath sounds: Rales present. No wheezing.      Comments: Right lower lobe.   Abdominal:      General: Bowel sounds are normal. There is no distension.       Palpations: Abdomen is soft.      Tenderness: There is no tenderness.   Musculoskeletal: Normal range of motion.   Skin:     General: Skin is warm.   Neurological:      Mental Status: She is alert and oriented to person, place, and time.   Psychiatric:         Behavior: Behavior normal.    MEDICATION LIST:  Current Outpatient Medications   Medication Sig   ? acetaminophen (TYLENOL) 325 MG tablet Take 650 mg by mouth every 6 (six) hours as needed for pain.   ? albuterol (ACCUNEB) 1.25 mg/3 mL nebulizer solution Take 1 ampule by nebulization every 4 (four) hours as needed for wheezing.          ? alendronate (FOSAMAX) 70 MG tablet Take 70 mg by mouth every 7 days. Take in the morning on an empty stomach with a full glass of water 30 minutes before food (thur)   ? benzonatate (TESSALON) 100 MG capsule Take 100 mg by mouth 3 (three) times a day as needed for cough.   ? ferrous gluconate (FERGON) 324 MG tablet Take 324 mg by mouth daily with breakfast.   ? guaiFENesin (ROBITUSSIN) 100 mg/5 mL syrup Take 100 mg by mouth every 4 (four) hours as needed for cough. And 200mg q6h prn         ? melatonin 1 mg Tab tablet Take 1 mg by mouth at bedtime.   ? metoprolol tartrate (LOPRESSOR) 25 MG tablet Take 12.5 mg by mouth 2 (two) times a day.   ? multivitamin therapeutic tablet Take 1 tablet by mouth daily.   ? pantoprazole (PROTONIX) 40 MG tablet Take 40 mg by mouth daily.   ? sucralfate (CARAFATE) 100 mg/mL suspension Take 1 g by mouth 4 (four) times a day.   ? vitamin A-vitamin C-vit E-min (OCUVITE) Tab tablet Take 1 tablet by mouth daily.   ? albuterol (ACCUNEB) 1.25 mg/3 mL nebulizer solution Take 1 ampule by nebulization 4 (four) times a day.       DISCHARGE DIAGNOSIS:    ICD-10-CM    1. Coughing R05    2. Pneumonia due to infectious organism, unspecified laterality, unspecified part of lung J18.9    3. Atrial fibrillation, unspecified type (H) I48.91    4. Weakness R53.1      PLAN:       Cough  Speech  hfollowing . Chest  x-ray unchanged from prior one. Add Tessalon 100 mg three times a day PRN and antibiotic was extended.       Insomnia - melatonin 1 mg p.o. at bedtime     Kidney stone- follow up with Urology on 11/26/19     Dysphagia and followed by speech therapy Currently on a puréed diet with nectar thickened liquids     Severe malnutrition followed by dietary and placed on nutritional supplements twice daily     Anemia currently on iron supplements last hemoglobin 8.1 labs to be monitored     Thrombocytosis last platelet count 511 labs being monitored     Atrial fibrillation currently not on anticoagulation due to PUD, patient recently  started on metoprolol tartrate 12.5 mg two times a day.       Electronically signed by: June Marshall CNP

## 2021-06-20 NOTE — LETTER
Letter by June Marshall CNP at      Author: June Marshall CNP Service: -- Author Type: --    Filed:  Encounter Date: 2/6/2020 Status: (Other)         Patient: Nedra Carr   MR Number: [id number]   YOB: 1932   Date of Visit: 2/6/2020     [facility]    Facility:   [facility] [[id number]]   Code Status: DNR      CHIEF COMPLAINT/REASON FOR VISIT:  Chief Complaint   Patient presents with   ? FVP Care Coordination - Regulatory       HISTORY:      HPI: Nerda is a 87 y.o. female  residing in the LTC  at [facility]. She is with a past medical history for Afib, HTN UZMA, osteoporosis, polymyalgia rheumatica and perforated gastric ulcer.         Today she is seen for  routine  regulatory visit to review multiple medical issues.  However I did receive a call on her last night and she spiked a temperature of 101.9.  She was given an IM dose of Rocephin and a Neb. a stat chest x-ray was done which showed right lower lobe pneumonia.  And she was started on doxycycline and Augmentin.  However her white blood cell was normal at 6.2.  Today she reports feeling much better and she is afebrile at 98.5.  Her lung sounds were coarse in the bases.  Her weights were reviewed and she is up 2 pounds in the last month.  She does have chronic low back pain and takes Tylenol and Aspercreme was added to that regimen.  She deneid having any CP.     Past Medical History:   Diagnosis Date   ? A-fib (H)    ? Dysphagia 2000    Had duodenal strcture dilated at [facility] in 2000 and by Dr. eHrnández in 2001   ? HTN (hypertension)    ? Migraine     beta blocker prophylaxis   ? OA (osteoarthritis)    ? UZMA (obstructive sleep apnea) 04/09/2015   ? Pain in joint, shoulder region    ? Polymyalgia rheumatica (H)    ? Renal stone              Family History   Problem Relation Age of Onset   ? Diabetes Mother    ? Diabetes Sister    ? Heart disease Son      Social History      Socioeconomic History   ? Marital status:      Spouse name: Not on file   ? Number of children: Not on file   ? Years of education: Not on file   ? Highest education level: Not on file   Occupational History   ? Not on file   Social Needs   ? Financial resource strain: Not on file   ? Food insecurity:     Worry: Not on file     Inability: Not on file   ? Transportation needs:     Medical: Not on file     Non-medical: Not on file   Tobacco Use   ? Smoking status: Never Smoker   ? Smokeless tobacco: Never Used   Substance and Sexual Activity   ? Alcohol use: Not Currently   ? Drug use: Not on file   ? Sexual activity: Not on file   Lifestyle   ? Physical activity:     Days per week: Not on file     Minutes per session: Not on file   ? Stress: Not on file   Relationships   ? Social connections:     Talks on phone: Not on file     Gets together: Not on file     Attends Cheondoism service: Not on file     Active member of club or organization: Not on file     Attends meetings of clubs or organizations: Not on file     Relationship status: Not on file   ? Intimate partner violence:     Fear of current or ex partner: Not on file     Emotionally abused: Not on file     Physically abused: Not on file     Forced sexual activity: Not on file   Other Topics Concern   ? Not on file   Social History Narrative   ? Not on file         Review of Systems   Constitutional: Positive for fever.     Constitutional: Positive for activity change and fatigue. Negative for appetite change and fever.   HENT: Negative for congestion.    Respiratory: Positive for cough and shortness of breath. Negative for wheezing.    Cardiovascular: Negative for chest pain and leg swelling.   Gastrointestinal: Negative for abdominal distention, abdominal pain, constipation, diarrhea and nausea.   Genitourinary: Negative for dysuria.   Musculoskeletal: Negative for arthralgias and back pain.   Skin: Negative for color change and wound.    Neurological: Negative for dizziness.   Psychiatric/Behavioral: Positive for sleep disturbance. Negative for agitation, behavioral problems and confusion.      Vitals:    02/06/20 0909   BP: 140/82   Pulse: 88   Resp: 20   Temp: 98.5  F (36.9  C)   SpO2: 92%   Weight: (!) 71 lb 3.2 oz (32.3 kg)       Physical Exam    Constitutional:       Appearance: She is well-developed.      Comments: Pleasant frail woman in no acute distress    HENT:      Head: Normocephalic.   Eyes:      Conjunctiva/sclera: Conjunctivae normal.   Neck:      Musculoskeletal: Normal range of motion.   Cardiovascular:      Rate and Rhythm: Regular rhythm.      Heart sounds: Normal heart sounds. No murmur.      Comments: Pulse 88, Rhythm regular.   Pulmonary:      Effort: No respiratory distress.      Breath sounds: Rales present. No wheezing.      Comments: bilateral bases R>L   Abdominal:      General: Bowel sounds are normal. There is no distension.      Palpations: Abdomen is soft.      Tenderness: There is no tenderness.   Musculoskeletal: Normal range of motion.   Skin:     General: Skin is warm.   Neurological:      Mental Status: She is alert and oriented to person, place, and time.   Psychiatric:         Behavior: Behavior normal.   LABS:   Recent Results (from the past 240 hour(s))   White Blood Count (WBC)   Result Value Ref Range    WBC 6.2 4.0 - 11.0 thou/uL     Current Outpatient Medications   Medication Sig   ? acetaminophen (TYLENOL) 325 MG tablet Take 650 mg by mouth every 6 (six) hours as needed for pain.   ? albuterol (ACCUNEB) 1.25 mg/3 mL nebulizer solution Take 1 ampule by nebulization every 4 (four) hours as needed for wheezing.          ? albuterol (ACCUNEB) 1.25 mg/3 mL nebulizer solution Take 1 ampule by nebulization every 4 (four) hours as needed for wheezing.   ? alendronate (FOSAMAX) 70 MG tablet Take 70 mg by mouth every 7 days. Take in the morning on an empty stomach with a full glass of water 30 minutes before food  (thur)   ? benzonatate (TESSALON) 100 MG capsule Take 100 mg by mouth daily as needed for cough.    ? ferrous gluconate (FERGON) 324 MG tablet Take 324 mg by mouth daily with breakfast.   ? guaiFENesin (ROBITUSSIN) 100 mg/5 mL syrup Take 100 mg by mouth every 4 (four) hours as needed for cough. And 200mg q6h prn         ? melatonin 1 mg Tab tablet Take 1 mg by mouth at bedtime.   ? metoprolol tartrate (LOPRESSOR) 25 MG tablet Take 12.5 mg by mouth 2 (two) times a day.   ? multivitamin therapeutic tablet Take 1 tablet by mouth daily.   ? pantoprazole (PROTONIX) 40 MG tablet Take 40 mg by mouth daily.   ? sucralfate (CARAFATE) 100 mg/mL suspension Take 1 g by mouth 4 (four) times a day.   ? trolamine salicylate (ASPERCREME) 10 % cream Apply 1 application topically daily.   ? vitamin A-vitamin C-vit E-min (OCUVITE) Tab tablet Take 1 tablet by mouth daily.   ? albuterol (ACCUNEB) 1.25 mg/3 mL nebulizer solution Take 1 ampule by nebulization 4 (four) times a day.     Case Management:  I have reviewed the facility/SNF care plan/MDS which was done 1/21/20, including the falls risk, nutrition and pain screening. I also reviewed the current immunizations, and preventive care.. Future cancer screening is not clinically indicated secondary to age/goals of care.   Patient's desire to return to the community is not assessible due to cognitive impairment.    Information reviewed:  Medications, vital signs, orders, and nursing notes.    ASSESSMENT:      ICD-10-CM    1. Atrial fibrillation, unspecified type (H) I48.91    2. Cough R05    3. Fever, unspecified fever cause R50.9        PLAN:    Pneumonia patient given a one-time dose of Rocephin IM and then started on doxycycline and Augmentin.  Nebulizers as needed Robitussin as needed    Pain management continue Tylenol and Aspercreme    Insomnia - melatonin 1 mg p.o. at bedtime     Dysphagia  Currently on a puréed diet with nectar thickened liquids     Severe malnutrition On   nutritional supplements     Anemia currently on iron supplements last hemoglobin 7.8      Thrombocytosis last platelet count 482 down from  511     Atrial fibrillation currently not on anticoagulation  patient  on metoprolol tartrate 12.5 mg two times a day.     Electronically signed by: June Marshall CNP

## 2021-06-20 NOTE — LETTER
Letter by June Marshall CNP at      Author: June Marshall CNP Service: -- Author Type: --    Filed:  Encounter Date: 2/5/2020 Status: (Other)         Patient: Nedra Carr   MR Number: [id number]   YOB: 1932   Date of Visit: 2/5/2020     Error patient not seen

## 2021-06-28 NOTE — PROGRESS NOTES
Progress Notes by Twyla Fox Pharmacy Intern at 2/8/2020  1:59 PM     Author: Twyla Fox Pharmacy Intern Service: Pharmacy Author Type: Pharmacy Intern    Filed: 2/8/2020  2:24 PM Date of Service: 2/8/2020  1:59 PM Status: Attested    : Twyla Fox Pharmacy Intern (Pharmacy Intern)    Related Notes: Original Note by Twyla Fox Pharmacy Intern (Pharmacy Intern) filed at 2/8/2020  2:00 PM    Cosigner: Behl, Jeremy, PharmD at 2/8/2020  2:29 PM    Attestation signed by Behl, Jeremy, PharmD at 2/8/2020  2:29 PM    Although I was not present for the medication history interview, to my knowledge, the intern has compiled the PTA medication list to the best of her ability given the information available at the present time.    Jeremy Behl, PharmD     2/8/2020     2:29 PM                  Pharmacy Note - Admission Medication History    Pertinent Provider Information: Pt is coming from [facility] (P# [phone]).      ______________________________________________________________________    Prior To Admission (PTA) med list completed and updated in EMR.       PTA Med List   Medication Sig Note Last Dose   ? acetaminophen (TYLENOL) 325 MG tablet Take 650 mg by mouth every 6 (six) hours as needed for pain.  PRN   ? albuterol (ACCUNEB) 1.25 mg/3 mL nebulizer solution Take 1 ampule by nebulization every 4 (four) hours as needed for wheezing.         2/8/2020 at AM   ? alendronate (FOSAMAX) 70 MG tablet Take 70 mg by mouth every 7 days. Take in the morning on an empty stomach with a full glass of water 30 minutes before food (thur)  2/6/2020   ? amoxicillin-clavulanate (AUGMENTIN) 500-125 mg per tablet Take 1 tablet by mouth every 8 (eight) hours. Take for 5 days.  2/8/2020: Start 2/6/2020- End 2/10/2020 2/8/2020 at AM   ? benzonatate (TESSALON) 100 MG capsule Take 100 mg by mouth daily as needed for cough.   PRN   ? doxycycline (ADOXA) 75 MG tablet Take 75 mg by mouth 2 (two) times a day.  Take for 7 days 2/8/2020: Start 2/6/2020- End 2/12/2020 2/8/2020 at AM   ? ferrous gluconate (FERGON) 324 MG tablet Take 324 mg by mouth daily with breakfast.  2/8/2020 at AM   ? guaiFENesin (ROBITUSSIN) 100 mg/5 mL syrup Take 200 mg by mouth every 4 (four) hours as needed for cough.   PRN   ? melatonin 1 mg Tab tablet Take 1 mg by mouth at bedtime.  2/7/2020 at HS   ? metoprolol tartrate (LOPRESSOR) 25 MG tablet Take 12.5 mg by mouth 2 (two) times a day.  2/8/2020 at AM   ? pantoprazole (PROTONIX) 40 MG tablet Take 40 mg by mouth daily.  2/8/2020 at AM   ? sucralfate (CARAFATE) 100 mg/mL suspension Take 1 g by mouth 4 (four) times a day.  2/8/2020 at AM   ? trolamine salicylate (ASPERCREME) 10 % cream Apply 1 application topically daily.  2/8/2020 at AM   ? vitamin A-vitamin C-vit E-min (OCUVITE) Tab tablet Take 1 tablet by mouth daily.  2/8/2020 at AM       Information source(s): Hospital records and Facility ([facility]/) medication list/MAR    Summary of Changes to PTA Med List  New: Augmentin and Doxycycline  Discontinued: Multivitamin  Changed: Guaifenesin    Patient was asked about OTC/herbal products specifically.  PTA med list reflects this.    Based on the pharmacists assessment, the PTA med list information appears reliable    Patient appears adherent: Yes    Allergies were reviewed, assessed, and updated with the patient.      Patient did not bring any medications to the hospital and can't retrieve from home. No multi-dose medications are available for use during hospital stay.     Thank you for the opportunity to participate in the care of this patient.    Twyla Fox, Pharmacy Intern  2/8/2020 2:24 PM
